# Patient Record
Sex: MALE | Race: WHITE | NOT HISPANIC OR LATINO | Employment: OTHER | URBAN - METROPOLITAN AREA
[De-identification: names, ages, dates, MRNs, and addresses within clinical notes are randomized per-mention and may not be internally consistent; named-entity substitution may affect disease eponyms.]

---

## 2017-01-05 ENCOUNTER — OFFICE VISIT (OUTPATIENT)
Dept: ENDOCRINOLOGY | Age: 62
End: 2017-01-05

## 2017-01-05 VITALS
OXYGEN SATURATION: 97 % | HEIGHT: 73 IN | HEART RATE: 70 BPM | SYSTOLIC BLOOD PRESSURE: 128 MMHG | DIASTOLIC BLOOD PRESSURE: 64 MMHG | WEIGHT: 213.2 LBS | BODY MASS INDEX: 28.26 KG/M2

## 2017-01-05 DIAGNOSIS — E11.9 TYPE 2 DIABETES MELLITUS WITHOUT COMPLICATION, WITH LONG-TERM CURRENT USE OF INSULIN (HCC): Primary | ICD-10-CM

## 2017-01-05 DIAGNOSIS — Z79.4 TYPE 2 DIABETES MELLITUS WITHOUT COMPLICATION, WITH LONG-TERM CURRENT USE OF INSULIN (HCC): Primary | ICD-10-CM

## 2017-01-05 DIAGNOSIS — E78.5 HYPERLIPIDEMIA, UNSPECIFIED HYPERLIPIDEMIA TYPE: ICD-10-CM

## 2017-01-05 PROCEDURE — 99214 OFFICE O/P EST MOD 30 MIN: CPT | Performed by: INTERNAL MEDICINE

## 2017-01-05 RX ORDER — INSULIN GLARGINE 100 [IU]/ML
32 INJECTION, SOLUTION SUBCUTANEOUS NIGHTLY
Qty: 30 ML | Refills: 0 | Status: SHIPPED | OUTPATIENT
Start: 2017-01-05 | End: 2017-05-17 | Stop reason: SDUPTHER

## 2017-01-05 NOTE — PROGRESS NOTES
Subjective   Jeff Bernard is a 61 y.o. male.     HPI Comments: DM2. Test B/S 4x a day. Last DM eye exam 12/2016. Last Dm Foot exam today with Dr. Grier.    Diabetes   He has type 2 diabetes mellitus. No MedicAlert identification noted. The initial diagnosis of diabetes was made 17 years ago. Pertinent negatives for hypoglycemia include no hunger, mood changes, sleepiness or sweats. Pertinent negatives for diabetes include no blurred vision, no foot paresthesias, no foot ulcerations, no visual change and no weight loss. Pertinent negatives for hypoglycemia complications include no blackouts, no hospitalization, no nocturnal hypoglycemia, no required assistance and no required glucagon injection. Symptoms are worsening. Pertinent negatives for diabetic complications include no CVA, heart disease, nephropathy, peripheral neuropathy, PVD or retinopathy. There are no known risk factors for coronary artery disease. Current diabetic treatment includes diet and insulin injections. He is compliant with treatment all of the time. He is currently taking insulin pre-breakfast, pre-lunch, pre-dinner and at bedtime. Insulin injections are given by patient. Rotation sites for injection include the abdominal wall. His weight is fluctuating minimally. He is following a generally healthy, high fiber and low salt diet. Meal planning includes carbohydrate counting. He has not had a previous visit with a dietitian. He participates in exercise weekly. He monitors blood glucose at home 3-4 x per day. He monitors urine at home <1 x per month. Blood glucose monitoring compliance is good. His home blood glucose trend is fluctuating minimally. His breakfast blood glucose is taken between 8-9 am. His breakfast blood glucose range is generally 70-90 mg/dl. His lunch blood glucose is taken between 1-2 pm. His lunch blood glucose range is generally  mg/dl. His dinner blood glucose is taken between 5-6 pm. His dinner blood glucose range is  generally 130-140 mg/dl. His highest blood glucose is 180-200 mg/dl. His overall blood glucose range is 140-180 mg/dl. He does not see a podiatrist.Eye exam is current.      Patient has known diabetes mellitus since 1999 and started on insulin in 2000. He is presently on Lantus 30 units every evening and NovoLog 1 unit per 6 g of carbohydrate before each meal.  He checks his blood sugar 4 times a day. Fasting blood sugar runs 102-250. Lunchtime blood sugar runs . Suppertime blood sugar runs between . Bedtime blood sugar runs between . He denies any severe hypoglycemic episode. He has gained 8 pounds since Jan 2015. His last meal was at 11 AM.     His last eye examination was in December 2016 and he has no retinopathy. He denies any associated nephropathy. Urine microalbumin was normal in September 2015. He is on benazepril. He denies any numbness, tingling or burning in his feet.     He has hyperlipidemia and has been on Lipitor 20 mg once a day. He denies any muscle pains.     He has no history of hypertension. He denies any previous history of myocardial infarction, heart failure, or stroke.  He denies chest pain, shortness of breath or pedal edema.    The following portions of the patient's history were reviewed and updated as appropriate: allergies, current medications, past family history, past medical history, past social history, past surgical history and problem list.    Review of Systems   Constitutional: Negative.  Negative for weight loss.   HENT: Negative.    Eyes: Negative.  Negative for blurred vision.   Respiratory: Negative.    Cardiovascular: Negative.    Gastrointestinal: Negative.    Endocrine: Negative.    Genitourinary: Negative.    Musculoskeletal: Negative.    Skin: Negative.    Allergic/Immunologic: Negative.    Neurological: Negative.    Hematological: Negative.    Psychiatric/Behavioral: Negative.        Objective      Vitals:    01/05/17 1403   BP: 128/64   BP Location:  "Left arm   Patient Position: Sitting   Cuff Size: Large Adult   Pulse: 70   SpO2: 97%   Weight: 213 lb 3.2 oz (96.7 kg)   Height: 73\" (185.4 cm)     Physical Exam   Constitutional: He is oriented to person, place, and time. He appears well-developed and well-nourished. No distress.   HENT:   Head: Normocephalic.   Nose: Nose normal.   Mouth/Throat: No oropharyngeal exudate.   Eyes: Conjunctivae and EOM are normal. Right eye exhibits no discharge. Left eye exhibits no discharge. No scleral icterus.   Neck: Neck supple. No JVD present. No tracheal deviation present. No thyromegaly present.   Cardiovascular: Normal rate, regular rhythm, normal heart sounds and intact distal pulses.  Exam reveals no friction rub.    No murmur heard.  Pulmonary/Chest: Effort normal and breath sounds normal. No respiratory distress. He has no wheezes. He has no rales.   Abdominal: Soft. Bowel sounds are normal. He exhibits no distension and no mass. There is no tenderness.   Musculoskeletal: He exhibits no edema, tenderness or deformity.   Lymphadenopathy:     He has no cervical adenopathy.   Neurological: He is alert and oriented to person, place, and time. He displays normal reflexes. No cranial nerve deficit.   Skin: Skin is warm and dry. No rash noted. No erythema.   Psychiatric: He has a normal mood and affect. His behavior is normal.     Admission on 08/07/2016, Discharged on 08/07/2016   Component Date Value Ref Range Status   • Rapid Strep A Screen 08/07/2016 Negative  Negative, VALID, INVALID, Not Performed Final     Assessment/Plan   Jeff was seen today for diabetes.    Diagnoses and all orders for this visit:    Type 2 diabetes mellitus without complication, with long-term current use of insulin  -     Comprehensive Metabolic Panel  -     Lipid Panel  -     Hemoglobin A1c  -     Microalbumin / Creatinine Urine Ratio  -     T4, Free  -     TSH  -     Glutamic Acid Decarboxylase  -     C-Peptide  -     insulin glargine (LANTUS) " 100 UNIT/ML injection; Inject 32 Units under the skin Every Night.    Hyperlipidemia, unspecified hyperlipidemia type  -     Comprehensive Metabolic Panel  -     Lipid Panel  -     Hemoglobin A1c  -     Microalbumin / Creatinine Urine Ratio  -     T4, Free  -     TSH  -     Glutamic Acid Decarboxylase  -     C-Peptide      Increase Lantus to 32 units every evening.  Continue NovoLog 1 unit per 6 g of carbohydrate.  Check hemoglobin A1c, C-peptide, RALF antibody and urine microalbumin.  Continue Lipitor 20 mg once a day.  Check lipid profile and thyroid function tests.  Advised to make appointment with Dr. Kelly for follow-up colonoscopy    Send copy of my notes and labs to Esperanza MISHRA    RTC 6 mos.

## 2017-01-05 NOTE — MR AVS SNAPSHOT
Jeff Bernard   1/5/2017 2:00 PM   Office Visit    Dept Phone:  [phone]   Encounter #:  [id number]    Provider:  Salinas Juarez MD   Department:  [facility] ENDOCRINOLOGY                Your Full Care Plan              Today's Medication Changes          These changes are accurate as of: 1/5/17  2:50 PM.  If you have any questions, ask your nurse or doctor.               Medication(s)that have changed:     B-D ULTRAFINE III SHORT PEN 31G X 8 MM misc   Generic drug:  Insulin Pen Needle   USE 3 NEEDLES EVERYDAY AS DIRECTED   What changed:  Another medication with the same name was removed. Continue taking this medication, and follow the directions you see here.   Changed by:  Salinas Juarez MD       insulin glargine 100 UNIT/ML injection   Commonly known as:  LANTUS   Inject 32 Units under the skin Every Night.   What changed:  how much to take   Changed by:  Salinas Juarez MD         Stop taking medication(s)listed here:     cefdinir 300 MG capsule   Commonly known as:  OMNICEF   Stopped by:  Salinas Juarez MD           cetirizine 10 MG tablet   Commonly known as:  zyrTEC   Stopped by:  Salinas Juarez MD           guaifenesin-dextromethorphan  MG tablet sustained-release 12 hour tablet   Stopped by:  Salinas Juarez MD           SINUS RINSE KIT pack   Stopped by:  Salinas Juarez MD                Where to Get Your Medications      These medications were sent to [facility]/pharmacy #[id number] [facility], [place] - [address] AT [facility] [phone] [facility] [phone]   [address], [facility] [address]     Phone:  [phone]     insulin glargine 100 UNIT/ML injection                  Your Updated Medication List          This list is accurate as of: 1/5/17  2:50 PM.  Always use your most recent med list.                aspirin 81 MG tablet       atorvastatin 20 MG tablet   Commonly known as:  LIPITOR   Take 1  "tablet by mouth Daily.       B-D ULTRAFINE III SHORT PEN 31G X 8 MM misc   Generic drug:  Insulin Pen Needle   USE 3 NEEDLES EVERYDAY AS DIRECTED       benazepril 10 MG tablet   Commonly known as:  LOTENSIN   Take 1 tablet by mouth daily.       * insulin aspart 100 UNIT/ML solution pen-injector sc pen   Commonly known as:  novoLOG FLEXPEN   Inject 8-13 units at breakfast, 9-15 at lunch and 10-15 at supper 1 UNIT PER 6 CARBS AVERAGE 45 UNITS DAILY       * NOVOLOG PENFILL 100 UNIT/ML solution cartridge   Generic drug:  Insulin Aspart       insulin glargine 100 UNIT/ML injection   Commonly known as:  LANTUS   Inject 32 Units under the skin Every Night.       Insulin Syringe 31G X 5/16\" 0.5 ML misc   USING  1 SYRING DAILY       ONETOUCH VERIO test strip   Generic drug:  glucose blood   TESTING BS 4 X DAY   DX CODE E11.9       * Notice:  This list has 2 medication(s) that are the same as other medications prescribed for you. Read the directions carefully, and ask your doctor or other care provider to review them with you.            We Performed the Following     C-Peptide     Comprehensive Metabolic Panel     Glutamic Acid Decarboxylase     Hemoglobin A1c     Lipid Panel     Microalbumin / Creatinine Urine Ratio     T4, Free     TSH       You Were Diagnosed With        Codes Comments    Type 2 diabetes mellitus without complication, with long-term current use of insulin    -  Primary ICD-10-CM: E11.9, Z79.4  ICD-9-CM: 250.00, V58.67     Hyperlipidemia, unspecified hyperlipidemia type     ICD-10-CM: E78.5  ICD-9-CM: 272.4       Instructions     None    Patient Instructions History      Upcoming Appointments     Visit Type Date Time Department    OFFICE VISIT 1/5/2017  2:00 PM [facility]    PHYSICAL 1/18/2017  9:00 AM [facility]    OFFICE VISIT 7/12/2017  9:00 AM [facility]      MyChart Signup     Our records indicate that you have an active [employer] account.    You can view your " "After Visit Summary by going to [employer] and logging in with your [employer] username and password.  If you don't have a [employer] username and password but a parent or guardian has access to your record, the parent or guardian should login with their own [employer] username and password and access your record to view the After Visit Summary.    If you have questions, you can email [email or web address] or call [phone] to talk to our [employer] staff.  Remember, [employer] is NOT to be used for urgent needs.  For medical emergencies, dial 911.               Other Info from Your Visit           Your Appointments     Jan 18, 2017  9:00 AM EST   Physical with TAD Mccartney   [facility] FAMILY MEDICINE (--)    [address] [facility] [address] [phone]           Arrive 15 minutes prior to appointment.            Jul 12, 2017  9:00 AM EDT   Office Visit with Salinas Juarez MD   [facility] ENDOCRINOLOGY (--)    [address] [place] [id number] [phone]           Arrive 15 minutes prior to appointment.              Allergies     No Known Allergies      Reason for Visit     Diabetes           Vital Signs     Blood Pressure Pulse Height Weight Oxygen Saturation Body Mass Index    128/64 (BP Location: Left arm, Patient Position: Sitting, Cuff Size: Large Adult) 70 73\" (185.4 cm) 213 lb 3.2 oz (96.7 kg) 97% 28.13 kg/m2    Smoking Status                   Never Smoker           Problems and Diagnoses Noted     High cholesterol or triglycerides    Type 2 diabetes mellitus without complication        "

## 2017-01-05 NOTE — LETTER
January 5, 2017     TAD Mccartney  [address] [facility] [address]    Patient: Jeff Bernard   YOB: 1955   Date of Visit: 1/5/2017       Dear TAD Colindres:    Thank you for referring Jeff Bernard to me for evaluation. Below are the relevant portions of my assessment and plan of care.    If you have questions, please do not hesitate to call me. I look forward to following Jeff along with you.         Sincerely,        Salinas Juarez MD        CC: No Recipients  Salinas Juarez MD  1/5/2017  2:46 PM  Signed  Subjective   Jeff Bernard is a 61 y.o. male.     HPI Comments: DM2. Test B/S 4x a day. Last DM eye exam 12/2016. Last Dm Foot exam today with Dr. Grier.    Diabetes   He has type 2 diabetes mellitus. No MedicAlert identification noted. The initial diagnosis of diabetes was made 17 years ago. Pertinent negatives for hypoglycemia include no hunger, mood changes, sleepiness or sweats. Pertinent negatives for diabetes include no blurred vision, no foot paresthesias, no foot ulcerations, no visual change and no weight loss. Pertinent negatives for hypoglycemia complications include no blackouts, no hospitalization, no nocturnal hypoglycemia, no required assistance and no required glucagon injection. Symptoms are worsening. Pertinent negatives for diabetic complications include no CVA, heart disease, nephropathy, peripheral neuropathy, PVD or retinopathy. There are no known risk factors for coronary artery disease. Current diabetic treatment includes diet and insulin injections. He is compliant with treatment all of the time. He is currently taking insulin pre-breakfast, pre-lunch, pre-dinner and at bedtime. Insulin injections are given by patient. Rotation sites for injection include the abdominal wall. His weight is fluctuating minimally. He is following a generally healthy, high fiber and low salt diet. Meal planning includes carbohydrate counting. He has not had a previous  visit with a dietitian. He participates in exercise weekly. He monitors blood glucose at home 3-4 x per day. He monitors urine at home <1 x per month. Blood glucose monitoring compliance is good. His home blood glucose trend is fluctuating minimally. His breakfast blood glucose is taken between 8-9 am. His breakfast blood glucose range is generally 70-90 mg/dl. His lunch blood glucose is taken between 1-2 pm. His lunch blood glucose range is generally  mg/dl. His dinner blood glucose is taken between 5-6 pm. His dinner blood glucose range is generally 130-140 mg/dl. His highest blood glucose is 180-200 mg/dl. His overall blood glucose range is 140-180 mg/dl. He does not see a podiatrist.Eye exam is current.      Patient has known diabetes mellitus since 1999 and started on insulin in 2000. He is presently on Lantus 30 units every evening and NovoLog 1 unit per 6 g of carbohydrate before each meal.  He checks his blood sugar 4 times a day. Fasting blood sugar runs 102-250. Lunchtime blood sugar runs . Suppertime blood sugar runs between . Bedtime blood sugar runs between . He denies any severe hypoglycemic episode. He has gained 8 pounds since Jan 2015. His last meal was at 11 AM.     His last eye examination was in December 2016 and he has no retinopathy. He denies any associated nephropathy. Urine microalbumin was normal in September 2015. He is on benazepril. He denies any numbness, tingling or burning in his feet.     He has hyperlipidemia and has been on Lipitor 20 mg once a day. He denies any muscle pains.     He has no history of hypertension. He denies any previous history of myocardial infarction, heart failure, or stroke.  He denies chest pain, shortness of breath or pedal edema.    The following portions of the patient's history were reviewed and updated as appropriate: allergies, current medications, past family history, past medical history, past social history, past surgical  "history and problem list.    Review of Systems   Constitutional: Negative.  Negative for weight loss.   HENT: Negative.    Eyes: Negative.  Negative for blurred vision.   Respiratory: Negative.    Cardiovascular: Negative.    Gastrointestinal: Negative.    Endocrine: Negative.    Genitourinary: Negative.    Musculoskeletal: Negative.    Skin: Negative.    Allergic/Immunologic: Negative.    Neurological: Negative.    Hematological: Negative.    Psychiatric/Behavioral: Negative.        Objective      Vitals:    01/05/17 1403   BP: 128/64   BP Location: Left arm   Patient Position: Sitting   Cuff Size: Large Adult   Pulse: 70   SpO2: 97%   Weight: 213 lb 3.2 oz (96.7 kg)   Height: 73\" (185.4 cm)     Physical Exam   Constitutional: He is oriented to person, place, and time. He appears well-developed and well-nourished. No distress.   HENT:   Head: Normocephalic.   Nose: Nose normal.   Mouth/Throat: No oropharyngeal exudate.   Eyes: Conjunctivae and EOM are normal. Right eye exhibits no discharge. Left eye exhibits no discharge. No scleral icterus.   Neck: Neck supple. No JVD present. No tracheal deviation present. No thyromegaly present.   Cardiovascular: Normal rate, regular rhythm, normal heart sounds and intact distal pulses.  Exam reveals no friction rub.    No murmur heard.  Pulmonary/Chest: Effort normal and breath sounds normal. No respiratory distress. He has no wheezes. He has no rales.   Abdominal: Soft. Bowel sounds are normal. He exhibits no distension and no mass. There is no tenderness.   Musculoskeletal: He exhibits no edema, tenderness or deformity.   Lymphadenopathy:     He has no cervical adenopathy.   Neurological: He is alert and oriented to person, place, and time. He displays normal reflexes. No cranial nerve deficit.   Skin: Skin is warm and dry. No rash noted. No erythema.   Psychiatric: He has a normal mood and affect. His behavior is normal.     Admission on 08/07/2016, Discharged on 08/07/2016 "   Component Date Value Ref Range Status   • Rapid Strep A Screen 08/07/2016 Negative  Negative, VALID, INVALID, Not Performed Final     Assessment/Plan   Jeff was seen today for diabetes.    Diagnoses and all orders for this visit:    Type 2 diabetes mellitus without complication, with long-term current use of insulin  -     Comprehensive Metabolic Panel  -     Lipid Panel  -     Hemoglobin A1c  -     Microalbumin / Creatinine Urine Ratio  -     T4, Free  -     TSH  -     Glutamic Acid Decarboxylase  -     C-Peptide  -     insulin glargine (LANTUS) 100 UNIT/ML injection; Inject 32 Units under the skin Every Night.    Hyperlipidemia, unspecified hyperlipidemia type  -     Comprehensive Metabolic Panel  -     Lipid Panel  -     Hemoglobin A1c  -     Microalbumin / Creatinine Urine Ratio  -     T4, Free  -     TSH  -     Glutamic Acid Decarboxylase  -     C-Peptide      Increase Lantus to 32 units every evening.  Continue NovoLog 1 unit per 6 g of carbohydrate.  Check hemoglobin A1c, C-peptide, RALF antibody and urine microalbumin.  Continue Lipitor 20 mg once a day.  Check lipid profile and thyroid function tests.  Advised to make appointment with Dr. Kelly for follow-up colonoscopy    Send copy of my notes and labs to Esperanza MISHRA    RTC 6 mos.

## 2017-01-09 LAB
ALBUMIN SERPL-MCNC: 4.5 G/DL (ref 3.5–5.2)
ALBUMIN/CREAT UR: 4.2 MG/G CREAT (ref 0–30)
ALBUMIN/GLOB SERPL: 2 G/DL
ALP SERPL-CCNC: 68 U/L (ref 39–117)
ALT SERPL-CCNC: 21 U/L (ref 1–41)
AST SERPL-CCNC: 21 U/L (ref 1–40)
BILIRUB SERPL-MCNC: 0.4 MG/DL (ref 0.1–1.2)
BUN SERPL-MCNC: 14 MG/DL (ref 8–23)
BUN/CREAT SERPL: 16.3 (ref 7–25)
C PEPTIDE SERPL-MCNC: 0.2 NG/ML (ref 1.1–4.4)
CALCIUM SERPL-MCNC: 9 MG/DL (ref 8.6–10.5)
CHLORIDE SERPL-SCNC: 104 MMOL/L (ref 98–107)
CHOLEST SERPL-MCNC: 141 MG/DL (ref 0–200)
CO2 SERPL-SCNC: 25.1 MMOL/L (ref 22–29)
CREAT SERPL-MCNC: 0.86 MG/DL (ref 0.76–1.27)
CREAT UR-MCNC: 171.5 MG/DL
GAD65 AB SER IA-ACNC: <5 U/ML (ref 0–5)
GLOBULIN SER CALC-MCNC: 2.3 GM/DL
GLUCOSE SERPL-MCNC: 121 MG/DL (ref 65–99)
HBA1C MFR BLD: 7.94 % (ref 4.8–5.6)
HDLC SERPL-MCNC: 55 MG/DL (ref 40–60)
LDLC SERPL CALC-MCNC: 75 MG/DL (ref 0–100)
MICROALBUMIN UR-MCNC: 7.2 UG/ML
POTASSIUM SERPL-SCNC: 4.3 MMOL/L (ref 3.5–5.2)
PROT SERPL-MCNC: 6.8 G/DL (ref 6–8.5)
SODIUM SERPL-SCNC: 143 MMOL/L (ref 136–145)
T4 FREE SERPL-MCNC: 0.99 NG/DL (ref 0.93–1.7)
TRIGL SERPL-MCNC: 53 MG/DL (ref 0–150)
TSH SERPL DL<=0.005 MIU/L-ACNC: 1.3 MIU/ML (ref 0.27–4.2)
VLDLC SERPL CALC-MCNC: 10.6 MG/DL (ref 5–40)

## 2017-01-09 RX ORDER — PEN NEEDLE, DIABETIC 31 GX5/16"
NEEDLE, DISPOSABLE MISCELLANEOUS
Qty: 100 EACH | Refills: 5 | Status: SHIPPED | OUTPATIENT
Start: 2017-01-09 | End: 2017-07-12 | Stop reason: SDUPTHER

## 2017-01-23 RX ORDER — ATORVASTATIN CALCIUM 20 MG/1
TABLET, FILM COATED ORAL
Qty: 90 TABLET | Refills: 1 | Status: SHIPPED | OUTPATIENT
Start: 2017-01-23 | End: 2017-10-25 | Stop reason: SDUPTHER

## 2017-02-01 ENCOUNTER — OFFICE VISIT (OUTPATIENT)
Dept: FAMILY MEDICINE CLINIC | Facility: CLINIC | Age: 62
End: 2017-02-01

## 2017-02-01 VITALS
TEMPERATURE: 98.2 F | BODY MASS INDEX: 27.94 KG/M2 | DIASTOLIC BLOOD PRESSURE: 64 MMHG | HEIGHT: 73 IN | SYSTOLIC BLOOD PRESSURE: 102 MMHG | OXYGEN SATURATION: 99 % | HEART RATE: 66 BPM | WEIGHT: 210.8 LBS

## 2017-02-01 DIAGNOSIS — E78.5 HYPERLIPIDEMIA, UNSPECIFIED HYPERLIPIDEMIA TYPE: ICD-10-CM

## 2017-02-01 DIAGNOSIS — Z79.4 TYPE 2 DIABETES MELLITUS WITHOUT COMPLICATION, WITH LONG-TERM CURRENT USE OF INSULIN (HCC): ICD-10-CM

## 2017-02-01 DIAGNOSIS — I10 BENIGN ESSENTIAL HYPERTENSION: ICD-10-CM

## 2017-02-01 DIAGNOSIS — K63.5 BENIGN COLONIC POLYP: ICD-10-CM

## 2017-02-01 DIAGNOSIS — E11.9 TYPE 2 DIABETES MELLITUS WITHOUT COMPLICATION, WITH LONG-TERM CURRENT USE OF INSULIN (HCC): ICD-10-CM

## 2017-02-01 DIAGNOSIS — E55.9 VITAMIN D DEFICIENCY: ICD-10-CM

## 2017-02-01 DIAGNOSIS — Z00.00 ROUTINE ADULT HEALTH MAINTENANCE: Primary | ICD-10-CM

## 2017-02-01 LAB
25(OH)D3+25(OH)D2 SERPL-MCNC: 18.2 NG/ML (ref 30–100)
BILIRUB BLD-MCNC: NEGATIVE MG/DL
CK SERPL-CCNC: 247 U/L (ref 20–200)
CLARITY, POC: CLEAR
COLOR UR: YELLOW
DEVELOPER EXPIRATION DATE: NORMAL
DEVELOPER LOT NUMBER: NORMAL
EXPIRATION DATE: NORMAL
FECAL OCCULT BLOOD SCREEN, POC: NEGATIVE
GLUCOSE UR STRIP-MCNC: NEGATIVE MG/DL
KETONES UR QL: NEGATIVE
LEUKOCYTE EST, POC: NEGATIVE
Lab: NORMAL
NEGATIVE CONTROL: NEGATIVE
NITRITE UR-MCNC: NEGATIVE MG/ML
PH UR: 5.5 [PH] (ref 5–8)
POSITIVE CONTROL: POSITIVE
PROT UR STRIP-MCNC: NEGATIVE MG/DL
PSA SERPL-MCNC: 2.13 NG/ML (ref 0–4)
RBC # UR STRIP: NEGATIVE /UL
SP GR UR: 1.02 (ref 1–1.03)
UROBILINOGEN UR QL: NORMAL

## 2017-02-01 PROCEDURE — 93000 ELECTROCARDIOGRAM COMPLETE: CPT | Performed by: PHYSICIAN ASSISTANT

## 2017-02-01 PROCEDURE — 81003 URINALYSIS AUTO W/O SCOPE: CPT | Performed by: PHYSICIAN ASSISTANT

## 2017-02-01 PROCEDURE — 82270 OCCULT BLOOD FECES: CPT | Performed by: PHYSICIAN ASSISTANT

## 2017-02-01 PROCEDURE — 99213 OFFICE O/P EST LOW 20 MIN: CPT | Performed by: PHYSICIAN ASSISTANT

## 2017-02-01 NOTE — PATIENT INSTRUCTIONS
61 YEAR OLD MALE WHO PRESENTS TODAY FOR CPE. CPE COMPLETED TODAY. EKG WITHOUT CHANGE. I WILL CHECK LABS TODAY. CALL IF NO RESULTS IN 1 WEEK. PATIENT TO CALL INSURANCE REGARDING COVERAGE OF ZOSTAVAX AND TO FIND OUT WHICH PNEUMONIA VACCINE WAS GIVEN AND WHEN WAS GIVEN. PATIENT WILL FOLLOW UP WITH DR MUKHERJEE FOR RECHECK COLONOSCOPY. HAS PAPERWORK. FOLLOW UP IN 6 MONTHS OR SOONER IF NEEDED.

## 2017-02-01 NOTE — PROGRESS NOTES
Subjective   Jeff Bernard is a 61 y.o. male here today for physical exam    History of Present Illness     LAST COLONOSCOPY- 2/2014- POLYPECTOMY- DR MUKHERJEE- RECHECK IN 3-5 YEARS.   LAST TD- 11/13/13  FLU SHOT- 10/5/16  Pneumonia vaccine- UNSURE WHEN- HAD 1.   ZOSTAVAX- NOT HAD.     The following portions of the patient's history were reviewed and updated as appropriate: allergies, current medications, past family history, past medical history, past social history, past surgical history and problem list.    Review of Systems   All other systems reviewed and are negative.      Objective   Physical Exam   Constitutional: He is oriented to person, place, and time. He appears well-developed and well-nourished. No distress.   HENT:   Head: Normocephalic and atraumatic.   Right Ear: Hearing, tympanic membrane, external ear and ear canal normal.   Left Ear: Hearing, tympanic membrane, external ear and ear canal normal.   Nose: Nose normal.   Mouth/Throat: Oropharynx is clear and moist.   Eyes: Conjunctivae, EOM and lids are normal. Pupils are equal, round, and reactive to light.   Neck: Neck supple. No JVD present. Carotid bruit is not present. No tracheal deviation present. No thyroid mass and no thyromegaly present.   Cardiovascular: Normal rate, regular rhythm, normal heart sounds and intact distal pulses.  Exam reveals no gallop and no friction rub.    No murmur heard.  Pulses:       Radial pulses are 2+ on the right side, and 2+ on the left side.        Posterior tibial pulses are 2+ on the right side, and 2+ on the left side.   Pulmonary/Chest: Effort normal and breath sounds normal. No respiratory distress. He has no wheezes. He has no rhonchi. He has no rales.   Abdominal: Soft. Normal aorta and bowel sounds are normal. He exhibits no distension and no abdominal bruit. There is no hepatosplenomegaly. There is no tenderness. There is no rigidity, no rebound and no guarding. No hernia.   Musculoskeletal: Normal  range of motion. He exhibits no edema, tenderness or deformity.   Normal strength.   Lymphadenopathy:     He has no cervical adenopathy.   Neurological: He is alert and oriented to person, place, and time. He has normal strength and normal reflexes. He displays normal reflexes. No cranial nerve deficit or sensory deficit. He exhibits normal muscle tone. Coordination and gait normal.   Skin: Skin is warm and dry. No rash noted. He is not diaphoretic. No erythema.   Psychiatric: He has a normal mood and affect. His speech is normal and behavior is normal. Judgment and thought content normal. Cognition and memory are normal.     ECG 12 Lead  Date/Time: 2/1/2017 9:42 AM  Performed by: HAIDER PRICE  Authorized by: HAIDER PRICE   Comparison: compared with previous ECG from 11/19/2014  Similar to previous ECG  Rhythm: sinus rhythm  Rate: normal  Conduction: non-specific intraventricular conduction delay  ST Depression: III  T wave elevation noted on lead: PEAKED T.  QRS axis: normal  Other findings comments: INCREASED VOLTAGE- SUSPECT LVH. REVIEWED ECHO FROM 11/2013- NORMAL LEFT VENTRICLE  Clinical impression: non-specific ECG  Comments: INDICATION : CPE          Assessment/Plan   Jeff was seen today for annual exam.    Diagnoses and all orders for this visit:    Routine adult health maintenance  -     ECG 12 Lead  -     CK  -     Vitamin D 25 Hydroxy  -     PSA  -     POC Urinalysis Dipstick, Automated  -     POC Occult Blood Stool    Vitamin D deficiency  -     CK  -     Vitamin D 25 Hydroxy  -     PSA    Type 2 diabetes mellitus without complication, with long-term current use of insulin  -     CK  -     Vitamin D 25 Hydroxy  -     PSA  -     POC Urinalysis Dipstick, Automated    Hyperlipidemia, unspecified hyperlipidemia type  -     CK  -     Vitamin D 25 Hydroxy  -     PSA    Benign essential hypertension  -     CK  -     Vitamin D 25 Hydroxy  -     PSA    Benign colonic polyp  -     CK  -     Vitamin D 25  Hydroxy  -     PSA      Patient Instructions   61 YEAR OLD MALE WHO PRESENTS TODAY FOR CPE. CPE COMPLETED TODAY. EKG WITHOUT CHANGE. I WILL CHECK LABS TODAY. CALL IF NO RESULTS IN 1 WEEK. PATIENT TO CALL INSURANCE REGARDING COVERAGE OF ZOSTAVAX AND TO FIND OUT WHICH PNEUMONIA VACCINE WAS GIVEN AND WHEN WAS GIVEN. PATIENT WILL FOLLOW UP WITH DR MUKHERJEE FOR RECHECK COLONOSCOPY. HAS PAPERWORK. FOLLOW UP IN 6 MONTHS OR SOONER IF NEEDED.

## 2017-02-13 RX ORDER — BLOOD SUGAR DIAGNOSTIC
STRIP MISCELLANEOUS
Qty: 400 EACH | Refills: 1 | Status: SHIPPED | OUTPATIENT
Start: 2017-02-13 | End: 2017-07-12 | Stop reason: CLARIF

## 2017-02-24 ENCOUNTER — TRANSCRIBE ORDERS (OUTPATIENT)
Dept: GASTROENTEROLOGY | Facility: CLINIC | Age: 62
End: 2017-02-24

## 2017-02-24 DIAGNOSIS — Z86.010 HX OF COLONIC POLYPS: Primary | ICD-10-CM

## 2017-03-13 RX ORDER — SYRINGE-NEEDLE,INSULIN,0.5 ML 31 GX5/16"
SYRINGE, EMPTY DISPOSABLE MISCELLANEOUS
Qty: 100 EACH | Refills: 1 | Status: SHIPPED | OUTPATIENT
Start: 2017-03-13 | End: 2017-07-12 | Stop reason: SDUPTHER

## 2017-03-20 ENCOUNTER — HOSPITAL ENCOUNTER (OUTPATIENT)
Facility: HOSPITAL | Age: 62
Setting detail: HOSPITAL OUTPATIENT SURGERY
Discharge: HOME | End: 2017-03-20
Attending: INTERNAL MEDICINE | Admitting: INTERNAL MEDICINE

## 2017-03-20 ENCOUNTER — ANESTHESIA EVENT (OUTPATIENT)
Dept: GASTROENTEROLOGY | Facility: HOSPITAL | Age: 62
End: 2017-03-20

## 2017-03-20 ENCOUNTER — ANESTHESIA (OUTPATIENT)
Dept: GASTROENTEROLOGY | Facility: HOSPITAL | Age: 62
End: 2017-03-20

## 2017-03-20 VITALS
OXYGEN SATURATION: 96 % | SYSTOLIC BLOOD PRESSURE: 142 MMHG | RESPIRATION RATE: 16 BRPM | HEART RATE: 69 BPM | HEIGHT: 73 IN | BODY MASS INDEX: 27.77 KG/M2 | DIASTOLIC BLOOD PRESSURE: 74 MMHG | WEIGHT: 209.5 LBS | TEMPERATURE: 98 F

## 2017-03-20 DIAGNOSIS — Z86.010 HX OF COLONIC POLYPS: ICD-10-CM

## 2017-03-20 PROBLEM — Z86.0101 HISTORY OF ADENOMATOUS POLYP OF COLON: Status: ACTIVE | Noted: 2017-03-20

## 2017-03-20 LAB — GLUCOSE BLDC GLUCOMTR-MCNC: 130 MG/DL (ref 70–130)

## 2017-03-20 PROCEDURE — 82962 GLUCOSE BLOOD TEST: CPT

## 2017-03-20 PROCEDURE — 25010000002 PROPOFOL 10 MG/ML EMULSION: Performed by: ANESTHESIOLOGY

## 2017-03-20 PROCEDURE — 88305 TISSUE EXAM BY PATHOLOGIST: CPT | Performed by: INTERNAL MEDICINE

## 2017-03-20 PROCEDURE — 45385 COLONOSCOPY W/LESION REMOVAL: CPT | Performed by: INTERNAL MEDICINE

## 2017-03-20 RX ORDER — PROPOFOL 10 MG/ML
VIAL (ML) INTRAVENOUS AS NEEDED
Status: DISCONTINUED | OUTPATIENT
Start: 2017-03-20 | End: 2017-03-20 | Stop reason: SURG

## 2017-03-20 RX ORDER — SODIUM CHLORIDE, SODIUM LACTATE, POTASSIUM CHLORIDE, CALCIUM CHLORIDE 600; 310; 30; 20 MG/100ML; MG/100ML; MG/100ML; MG/100ML
30 INJECTION, SOLUTION INTRAVENOUS CONTINUOUS PRN
Status: DISCONTINUED | OUTPATIENT
Start: 2017-03-20 | End: 2017-03-20 | Stop reason: HOSPADM

## 2017-03-20 RX ORDER — LIDOCAINE HYDROCHLORIDE 20 MG/ML
INJECTION, SOLUTION INFILTRATION; PERINEURAL AS NEEDED
Status: DISCONTINUED | OUTPATIENT
Start: 2017-03-20 | End: 2017-03-20 | Stop reason: SURG

## 2017-03-20 RX ORDER — PROPOFOL 10 MG/ML
VIAL (ML) INTRAVENOUS CONTINUOUS PRN
Status: DISCONTINUED | OUTPATIENT
Start: 2017-03-20 | End: 2017-03-20 | Stop reason: SURG

## 2017-03-20 RX ADMIN — SODIUM CHLORIDE, POTASSIUM CHLORIDE, SODIUM LACTATE AND CALCIUM CHLORIDE 30 ML/HR: 600; 310; 30; 20 INJECTION, SOLUTION INTRAVENOUS at 11:55

## 2017-03-20 RX ADMIN — LIDOCAINE HYDROCHLORIDE 50 MG: 20 INJECTION, SOLUTION INFILTRATION; PERINEURAL at 13:05

## 2017-03-20 RX ADMIN — PROPOFOL 150 MG: 10 INJECTION, EMULSION INTRAVENOUS at 13:05

## 2017-03-20 RX ADMIN — PROPOFOL 140 MCG/KG/MIN: 10 INJECTION, EMULSION INTRAVENOUS at 13:05

## 2017-03-20 NOTE — ANESTHESIA PREPROCEDURE EVALUATION
Anesthesia Evaluation     Patient summary reviewed      Airway   Mallampati: II  TM distance: >3 FB  Neck ROM: full  no difficulty expected  Dental - normal exam     Pulmonary     breath sounds clear to auscultation  Cardiovascular   Exercise tolerance: good (4-7 METS)    Rhythm: regular  Rate: normal        Neuro/Psych  GI/Hepatic/Renal/Endo      Musculoskeletal     Abdominal    Substance History      OB/GYN          Other                                    Anesthesia Plan    ASA 3     MAC     intravenous induction   Anesthetic plan and risks discussed with patient.

## 2017-03-20 NOTE — BRIEF OP NOTE
COLONOSCOPY  Procedure Note    Jeff Bernard  3/20/2017    Pre-op Diagnosis:   Hx of colonic polyps [Z86.010]    Post-op Diagnosis:     Post-Op Diagnosis Codes:     * Colon polyp [K63.5]     * Internal hemorrhoids [K64.8]    Procedure/CPT® Codes:      Procedure(s):  COLONOSCOPY TO CECUM WITH HOT SNARE POLYPECTOMY    Surgeon(s):  Christian Kelly MD    Anesthesia: Monitor Anesthesia Care    Staff:   Endo Technician: Irina Olmedo  Endo Nurse: Ana Maria Coughlin RN    Estimated Blood Loss: 0 mL  Urine Voided: * No values recorded between 3/20/2017  1:02 PM and 3/20/2017  1:35 PM *    Specimens:                  ID Type Source Tests Collected by Time Destination   A : ASCENDING COLON POLYPS Polyp Large Intestine, Right / Ascending Colon TISSUE EXAM Christian Kelly MD 3/20/2017 1321          Drains:    na       Findings: Colon polyps  Internal hemorrhoids    Complications: None      Christian Kelly MD     Date: 3/20/2017  Time: 1:39 PM

## 2017-03-20 NOTE — ANESTHESIA POSTPROCEDURE EVALUATION
Patient: Jeff Bernard    Procedure Summary     Date Anesthesia Start Anesthesia Stop Room / Location    03/20/17 [phone]  [facility]       Procedure Diagnosis Surgeon Provider    COLONOSCOPY TO CECUM WITH HOT SNARE POLYPECTOMY (N/A ) Colon polyp; Internal hemorrhoids  (Hx of colonic polyps [Z86.010]) MD Vishal Lowe MD          Anesthesia Type: MAC  Last vitals  BP 98/59 (03/20/17 1340)    Temp      Pulse 74 (03/20/17 1340)   Resp 16 (03/20/17 1340)    SpO2 94 % (03/20/17 1340)      Post Anesthesia Care and Evaluation    Patient location during evaluation: PACU  Patient participation: complete - patient participated  Level of consciousness: awake and alert  Pain score: 0  Pain management: adequate  Airway patency: patent  Anesthetic complications: No anesthetic complications    Cardiovascular status: acceptable  Respiratory status: acceptable  Hydration status: acceptable

## 2017-03-20 NOTE — H&P
"[facility]  Pre Procedure History & Physical    Chief Complaint:   History colon polyp    Subjective     HPI:   Patient 60-year-old male with history of hypertension hyperlipidemia and diabetes presents for history of colon polyps.  Patient had 5 polyps in 2014 now referred for follow-up colonoscopy.    Past Medical History:   Past Medical History   Diagnosis Date   • ED (erectile dysfunction)    • Hematuria    • Hyperlipidemia    • Hypertension    • Type 2 diabetes mellitus        Family History:  Family History   Problem Relation Age of Onset   • Stroke Mother    • Aneurysm Mother      BRAIN   • COPD Father        Social History:   reports that he has never smoked. He has never used smokeless tobacco. He reports that he drinks alcohol. He reports that he does not use illicit drugs.    Medications:   Prescriptions Prior to Admission   Medication Sig Dispense Refill Last Dose   • atorvastatin (LIPITOR) 20 MG tablet TAKE 1 TABLET BY MOUTH DAILY. 90 tablet 1 Past Week at Unknown time   • B-D INS SYRINGE 0.5CC/31GX5/16 31G X 5/16\" 0.5 ML misc USE 1 SYRINGE DAILY AS DIRECTED 100 each 1 Past Week at Unknown time   • B-D ULTRAFINE III SHORT PEN 31G X 8 MM misc USE 3 NEEDLES EVERYDAY AS DIRECTED 100 each 5 Past Week at Unknown time   • benazepril (LOTENSIN) 10 MG tablet Take 1 tablet by mouth daily. 30 tablet 5 Past Week at Unknown time   • insulin aspart (NOVOLOG FLEXPEN) 100 UNIT/ML solution pen-injector sc pen Inject 8-13 units in the morning 9-15 units at lunch and 10-15 units at dinner 45 mL 1 Past Week at Unknown time   • insulin glargine (LANTUS) 100 UNIT/ML injection Inject 32 Units under the skin Every Night. 30 mL 0 3/19/2017 at Unknown time   • ONETOUCH VERIO test strip TEST BLOOD SUGAR 4 TIMES A DAY . DX E11.9 400 each 1 3/19/2017 at Unknown time   • aspirin 81 MG tablet Take 1 tablet by mouth daily.   3/18/2017       Allergies:  Review of patient's allergies indicates no known " "allergies.    ROS:    Pertinent items are noted in HPI     Objective     Blood pressure 126/70, pulse 76, temperature 98 °F (36.7 °C), temperature source Oral, resp. rate 12, height 73\" (185.4 cm), weight 209 lb 8 oz (95 kg), SpO2 95 %.    Physical Exam   Constitutional: Pt is oriented to person, place, and time and well-developed, well-nourished, and in no distress.   HENT:   Mouth/Throat: Oropharynx is clear and moist.   Neck: Normal range of motion. Neck supple.   Cardiovascular: Normal rate, regular rhythm and normal heart sounds.    Pulmonary/Chest: Effort normal and breath sounds normal. No respiratory distress. No  wheezes.   Abdominal: Soft. Bowel sounds are normal.   Skin: Skin is warm and dry.   Psychiatric: Mood, memory, affect and judgment normal.     Assessment/Plan     Diagnosis:  History of adenomatous colon polyps    Anticipated Surgical Procedure:  Colonoscopy    The risks, benefits, and alternatives of this procedure have been discussed with the patient or the responsible party- the patient understands and agrees to proceed.                                                                "

## 2017-03-20 NOTE — PLAN OF CARE
Problem: Patient Care Overview (Adult)  Goal: Plan of Care Review  Outcome: Ongoing (interventions implemented as appropriate)    03/20/17 1135   Coping/Psychosocial Response Interventions   Plan Of Care Reviewed With patient       Goal: Adult Individualization and Mutuality  Outcome: Ongoing (interventions implemented as appropriate)    03/20/17 1135   Individualization   Patient Specific Preferences none       Goal: Discharge Needs Assessment  Outcome: Ongoing (interventions implemented as appropriate)    03/20/17 1135   Discharge Needs Assessment   Concerns To Be Addressed no discharge needs identified   Living Environment   Transportation Available family or friend will provide         Problem: GI Endoscopy (Adult)  Goal: Signs and Symptoms of Listed Potential Problems Will be Absent or Manageable (GI Endoscopy)  Outcome: Ongoing (interventions implemented as appropriate)    03/20/17 1135   GI Endoscopy   Problems Assessed (GI Endoscopy) pain   Problems Present (GI Endoscopy) none

## 2017-03-21 LAB
CYTO UR: NORMAL
LAB AP CASE REPORT: NORMAL
Lab: NORMAL
PATH REPORT.FINAL DX SPEC: NORMAL
PATH REPORT.GROSS SPEC: NORMAL

## 2017-04-12 RX ORDER — BLOOD SUGAR DIAGNOSTIC
STRIP MISCELLANEOUS
Qty: 400 EACH | Refills: 1 | Status: SHIPPED | OUTPATIENT
Start: 2017-04-12 | End: 2017-07-12 | Stop reason: SDUPTHER

## 2017-04-12 RX ORDER — INSULIN ASPART 100 [IU]/ML
INJECTION, SOLUTION INTRAVENOUS; SUBCUTANEOUS
Qty: 45 ML | Refills: 1 | Status: SHIPPED | OUTPATIENT
Start: 2017-04-12 | End: 2017-07-12 | Stop reason: SDUPTHER

## 2017-05-17 DIAGNOSIS — Z79.4 TYPE 2 DIABETES MELLITUS WITHOUT COMPLICATION, WITH LONG-TERM CURRENT USE OF INSULIN (HCC): ICD-10-CM

## 2017-05-17 DIAGNOSIS — E11.9 TYPE 2 DIABETES MELLITUS WITHOUT COMPLICATION, WITH LONG-TERM CURRENT USE OF INSULIN (HCC): ICD-10-CM

## 2017-05-17 RX ORDER — INSULIN GLARGINE 100 [IU]/ML
INJECTION, SOLUTION SUBCUTANEOUS
Qty: 30 ML | Refills: 1 | Status: SHIPPED | OUTPATIENT
Start: 2017-05-17 | End: 2017-11-26 | Stop reason: SDUPTHER

## 2017-07-12 ENCOUNTER — OFFICE VISIT (OUTPATIENT)
Dept: ENDOCRINOLOGY | Age: 62
End: 2017-07-12

## 2017-07-12 VITALS
DIASTOLIC BLOOD PRESSURE: 74 MMHG | BODY MASS INDEX: 28.76 KG/M2 | SYSTOLIC BLOOD PRESSURE: 130 MMHG | WEIGHT: 217 LBS | HEART RATE: 64 BPM | HEIGHT: 73 IN | OXYGEN SATURATION: 95 %

## 2017-07-12 DIAGNOSIS — Z79.4 TYPE 2 DIABETES MELLITUS WITHOUT COMPLICATION, WITH LONG-TERM CURRENT USE OF INSULIN (HCC): Primary | ICD-10-CM

## 2017-07-12 DIAGNOSIS — E78.5 HYPERLIPIDEMIA, UNSPECIFIED HYPERLIPIDEMIA TYPE: ICD-10-CM

## 2017-07-12 DIAGNOSIS — I10 BENIGN ESSENTIAL HYPERTENSION: ICD-10-CM

## 2017-07-12 DIAGNOSIS — E11.9 TYPE 2 DIABETES MELLITUS WITHOUT COMPLICATION, WITH LONG-TERM CURRENT USE OF INSULIN (HCC): Primary | ICD-10-CM

## 2017-07-12 LAB
ALBUMIN SERPL-MCNC: 4.3 G/DL (ref 3.5–5.2)
ALBUMIN/GLOB SERPL: 1.5 G/DL
ALP SERPL-CCNC: 62 U/L (ref 39–117)
ALT SERPL-CCNC: 27 U/L (ref 1–41)
AST SERPL-CCNC: 29 U/L (ref 1–40)
BILIRUB SERPL-MCNC: 0.4 MG/DL (ref 0.1–1.2)
BUN SERPL-MCNC: 12 MG/DL (ref 8–23)
BUN/CREAT SERPL: 16.4 (ref 7–25)
CALCIUM SERPL-MCNC: 9.4 MG/DL (ref 8.6–10.5)
CHLORIDE SERPL-SCNC: 105 MMOL/L (ref 98–107)
CHOLEST SERPL-MCNC: 145 MG/DL (ref 0–200)
CO2 SERPL-SCNC: 25.3 MMOL/L (ref 22–29)
CREAT SERPL-MCNC: 0.73 MG/DL (ref 0.76–1.27)
GLOBULIN SER CALC-MCNC: 2.8 GM/DL
GLUCOSE SERPL-MCNC: 143 MG/DL (ref 65–99)
HBA1C MFR BLD: 8.02 % (ref 4.8–5.6)
HDLC SERPL-MCNC: 49 MG/DL (ref 40–60)
LDLC SERPL CALC-MCNC: 85 MG/DL (ref 0–100)
POTASSIUM SERPL-SCNC: 4 MMOL/L (ref 3.5–5.2)
PROT SERPL-MCNC: 7.1 G/DL (ref 6–8.5)
SODIUM SERPL-SCNC: 143 MMOL/L (ref 136–145)
TRIGL SERPL-MCNC: 57 MG/DL (ref 0–150)
TSH SERPL DL<=0.005 MIU/L-ACNC: 1.64 MIU/ML (ref 0.27–4.2)
VLDLC SERPL CALC-MCNC: 11.4 MG/DL (ref 5–40)

## 2017-07-12 PROCEDURE — 99214 OFFICE O/P EST MOD 30 MIN: CPT | Performed by: INTERNAL MEDICINE

## 2017-07-12 RX ORDER — BLOOD SUGAR DIAGNOSTIC
STRIP MISCELLANEOUS
Qty: 100 EACH | Refills: 1 | Status: SHIPPED | OUTPATIENT
Start: 2017-07-12 | End: 2018-05-22 | Stop reason: SDUPTHER

## 2017-07-12 RX ORDER — BENAZEPRIL HYDROCHLORIDE 10 MG/1
10 TABLET ORAL DAILY
Qty: 90 TABLET | Refills: 1 | Status: SHIPPED | OUTPATIENT
Start: 2017-07-12 | End: 2017-08-02 | Stop reason: SDUPTHER

## 2017-07-12 RX ORDER — LANCETS 33 GAUGE
EACH MISCELLANEOUS
Qty: 500 EACH | Refills: 1 | Status: SHIPPED | OUTPATIENT
Start: 2017-07-12 | End: 2020-02-07 | Stop reason: HOSPADM

## 2017-07-12 NOTE — PROGRESS NOTES
Subjective   Jeff Bernard is a 62 y.o. male.     HPI Comments: F/u for dm 2 / testing bs 4-5  x day / last dm eye exam 12/23/16 with dr ABNER Camejo / last dm foot exam 1/5/17 with dr Juarez     Diabetes   He has type 2 diabetes mellitus. No MedicAlert identification noted. The initial diagnosis of diabetes was made 18 years ago. Pertinent negatives for hypoglycemia include no confusion, dizziness, headaches, hunger, mood changes, nervousness/anxiousness, pallor, seizures, sleepiness, speech difficulty, sweats or tremors. Pertinent negatives for diabetes include no blurred vision, no chest pain, no fatigue, no foot paresthesias, no foot ulcerations, no polydipsia, no polyphagia, no polyuria, no visual change, no weakness and no weight loss. Pertinent negatives for hypoglycemia complications include no blackouts, no hospitalization, no nocturnal hypoglycemia, no required assistance and no required glucagon injection. Symptoms are improving. Pertinent negatives for diabetic complications include no CVA, heart disease, nephropathy, peripheral neuropathy, PVD or retinopathy. There are no known risk factors for coronary artery disease. Current diabetic treatment includes insulin injections. He is compliant with treatment most of the time. He is currently taking insulin pre-breakfast, pre-lunch, pre-dinner and at bedtime. Insulin injections are given by patient. Rotation sites for injection include the abdominal wall. His weight is fluctuating minimally. He is following a diabetic diet. Meal planning includes carbohydrate counting. He has not had a previous visit with a dietitian. He participates in exercise three times a week. He monitors blood glucose at home 3-4 x per day. He monitors urine at home <1 x per month. Blood glucose monitoring compliance is good. His home blood glucose trend is fluctuating minimally. His breakfast blood glucose is taken between 5-6 am. His breakfast blood glucose range is generally 140-180  mg/dl. His lunch blood glucose is taken between 11-12 pm. His lunch blood glucose range is generally 110-130 mg/dl. His dinner blood glucose is taken between 5-6 pm. His dinner blood glucose range is generally 140-180 mg/dl. His highest blood glucose is 140-180 mg/dl. His overall blood glucose range is 130-140 mg/dl. He does not see a podiatrist.Eye exam is current.      Patient has known diabetes mellitus since 1999 and started on insulin in 2000. He is on Lantus 32 units every evening and NovoLog 1 unit per 6 g of carbohydrate before each meal. He checks his blood sugar 4 times a day. Fasting blood sugar runs . Lunchtime blood sugar runs . Suppertime blood sugar runs between 109-228. Bedtime blood sugar runs between . He denies any severe hypoglycemic episode. He has gained 4 pounds since 7/17. His last meal was at 5 AM.      His last eye examination was in December 2016 and he has no retinopathy. He denies any associated nephropathy. Urine microalbumin was normal in 1/17. He is on benazepril. He denies any numbness, tingling or burning in his feet.      He has hyperlipidemia and has been on Lipitor 20 mg once a day. He denies any muscle pains.      He has no history of hypertension. He denies any previous history of myocardial infarction, heart failure, or stroke. He denies chest pain, shortness of breath or pedal edema.    He had a colonoscopy done in March 2017 and polyps were removed by Dr. Kelly.  Pathology report was read as tubular adenoma with low-grade dysplasia and hyperplastic polyps.  He was advised follow-up colonoscopy in 6 years.     He has vit D deficiency and is not taking any supplement.    The following portions of the patient's history were reviewed and updated as appropriate: allergies, current medications, past family history, past medical history, past social history, past surgical history and problem list.    Review of Systems   Constitutional: Negative.  Negative for  "fatigue and weight loss.   HENT: Negative.    Eyes: Negative.  Negative for blurred vision.   Respiratory: Negative.    Cardiovascular: Negative.  Negative for chest pain.   Gastrointestinal: Negative.    Endocrine: Negative.  Negative for polydipsia, polyphagia and polyuria.   Genitourinary: Negative.    Musculoskeletal: Negative.    Skin: Negative.  Negative for pallor.   Allergic/Immunologic: Negative.    Neurological: Negative.  Negative for dizziness, tremors, seizures, speech difficulty, weakness and headaches.   Hematological: Negative.    Psychiatric/Behavioral: Negative.  Negative for confusion. The patient is not nervous/anxious.        Objective      Vitals:    07/12/17 0853   BP: 130/74   BP Location: Right arm   Patient Position: Sitting   Cuff Size: Large Adult   Pulse: 64   SpO2: 95%   Weight: 217 lb (98.4 kg)   Height: 73\" (185.4 cm)     Physical Exam   Constitutional: He is oriented to person, place, and time. He appears well-developed and well-nourished. No distress.   HENT:   Head: Normocephalic.   Nose: Nose normal.   Mouth/Throat: No oropharyngeal exudate.   Eyes: Conjunctivae and EOM are normal. Right eye exhibits no discharge. Left eye exhibits no discharge. No scleral icterus.   Neck: Neck supple. No JVD present. No tracheal deviation present. No thyromegaly present.   Cardiovascular: Normal rate, regular rhythm, normal heart sounds and intact distal pulses.  Exam reveals no gallop and no friction rub.    No murmur heard.  Pulmonary/Chest: Effort normal and breath sounds normal. No respiratory distress. He has no wheezes. He has no rales. He exhibits no tenderness.   Abdominal: Soft. Bowel sounds are normal. He exhibits no distension and no mass. There is no tenderness. No hernia.   Musculoskeletal: Normal range of motion. He exhibits no edema, tenderness or deformity.   Lymphadenopathy:     He has no cervical adenopathy.   Neurological: He is alert and oriented to person, place, and time. He " displays normal reflexes. No cranial nerve deficit.   Intact light touch   Skin: Skin is warm and dry. No rash noted. No erythema. No pallor.   Psychiatric: He has a normal mood and affect. His behavior is normal.     Admission on 03/20/2017, Discharged on 03/20/2017   Component Date Value Ref Range Status   • Glucose 03/20/2017 130  70 - 130 mg/dL Final   • Case Report 03/20/2017    Final                    Value:Surgical Pathology Report                         Case: [id number]                                  Authorizing Provider:  Christian Kelly MD     Collected:           03/20/2017 01:21 PM          Ordering Location:     [facility]  Received:            03/20/2017 02:22 PM                                 [facility]                                                                  Pathologist:           Elton Torres MD                                                       Specimen:    Large Intestine, Right / Ascending Colon, ASCENDING COLON POLYPS                          • Final Diagnosis 03/20/2017    Final                    Value:This result contains rich text formatting which cannot be displayed here.   • Gross Description 03/20/2017    Final                    Value:This result contains rich text formatting which cannot be displayed here.   • Microscopic Description 03/20/2017    Final                    Value:This result contains rich text formatting which cannot be displayed here.     Assessment/Plan   Jeff was seen today for diabetes.    Diagnoses and all orders for this visit:    Type 2 diabetes mellitus without complication, with long-term current use of insulin  -     Comprehensive Metabolic Panel  -     Hemoglobin A1c  -     TSH    Hyperlipidemia, unspecified hyperlipidemia type  -     Lipid Panel  -     TSH    Benign essential hypertension      Continue Lantus and NovoLog.  Check hemoglobin A1c.  Continue Lipitor 20 mg once a day.  Check lipid profile.  Continue  benazepril.  Start vitamin D3 1000 units daily.  Follow-up with Dr. Kelly as per his instructions.  Discussed about CT colonography and Cologuard    RTC 6 mos    Send copy of my notes and labs to Dr. Kelly and Esperanza MISHRA.

## 2017-07-25 ENCOUNTER — TELEPHONE (OUTPATIENT)
Dept: GASTROENTEROLOGY | Facility: CLINIC | Age: 62
End: 2017-07-25

## 2017-07-25 NOTE — TELEPHONE ENCOUNTER
----- Message from Christian Kelly MD sent at 7/24/2017  8:12 AM EDT -----  Benign polyp, repeat colon 5 years as discussed.  Options discussed by his care provider including Corguard and CT colonography not recommended in high risk patients including those with previous polyps,

## 2017-08-02 ENCOUNTER — OFFICE VISIT (OUTPATIENT)
Dept: FAMILY MEDICINE CLINIC | Facility: CLINIC | Age: 62
End: 2017-08-02

## 2017-08-02 VITALS
OXYGEN SATURATION: 98 % | HEART RATE: 77 BPM | TEMPERATURE: 98.6 F | BODY MASS INDEX: 28.1 KG/M2 | HEIGHT: 73 IN | WEIGHT: 212 LBS | SYSTOLIC BLOOD PRESSURE: 108 MMHG | DIASTOLIC BLOOD PRESSURE: 58 MMHG

## 2017-08-02 DIAGNOSIS — E78.5 HYPERLIPIDEMIA, UNSPECIFIED HYPERLIPIDEMIA TYPE: ICD-10-CM

## 2017-08-02 DIAGNOSIS — Z23 NEED FOR ZOSTAVAX ADMINISTRATION: ICD-10-CM

## 2017-08-02 DIAGNOSIS — E55.9 VITAMIN D DEFICIENCY: ICD-10-CM

## 2017-08-02 DIAGNOSIS — Z23 NEED FOR PNEUMOCOCCAL VACCINATION: Primary | ICD-10-CM

## 2017-08-02 DIAGNOSIS — I10 BENIGN ESSENTIAL HYPERTENSION: ICD-10-CM

## 2017-08-02 PROCEDURE — 99213 OFFICE O/P EST LOW 20 MIN: CPT | Performed by: PHYSICIAN ASSISTANT

## 2017-08-02 RX ORDER — BENAZEPRIL HYDROCHLORIDE 10 MG/1
10 TABLET ORAL DAILY
Qty: 90 TABLET | Refills: 1 | Status: SHIPPED | OUTPATIENT
Start: 2017-08-02 | End: 2018-08-16 | Stop reason: SDUPTHER

## 2017-08-02 NOTE — PATIENT INSTRUCTIONS
62 YEAR OLD MALE WHO PRESENTS TODAY IN FOLLOW UP OF HTN AND VITAMIN D. BP TODAY IS OK. CONTINUE REGIMEN. I REVIEWED ENDOCRINOLOGY CONSULTS AND LABS. NO LABS TODAY. ENDOCRINOLOGY HAS ADDRESSED VITAMIN D. PATIENT REPORTS PNEUMONIA VACCINES AND ZOSTAVAX ARE COVERED ON INSURANCE. WE ARE UNABLE TO GIVE HERE. I WILL SEND RX TO PHARMACY FOR VACCINES. FOLLOW UP IN 6 MONTHS FOR CPE AND FOLLOW UP OF HTN. FOLLOW UP SOONER IF NEEDED.

## 2017-08-02 NOTE — PROGRESS NOTES
Subjective   Jeff Bernard is a 62 y.o. male seen today for 6 month medication follow-up for hypertension     History of Present Illness     PATIENT REPORTS INSURANCE WILL COVER ZOSTAVAX AND PNEUMONIA VACCINES. HE IS UNSURE THE VACCINE RECEIVED FOR PREVIOUS PNEUMONIA SHOT BUT STATES WHEN IT WAS GIVEN, THERE WAS ONLY 1 AND WAS GIVEN PRIOR TO 2013.     FEELING GOOD, NO COMPLAINTS.     The following portions of the patient's history were reviewed and updated as appropriate: allergies, current medications, past family history, past medical history, past social history, past surgical history and problem list.    Review of Systems   All other systems reviewed and are negative.      Objective   Physical Exam   Constitutional: He is oriented to person, place, and time. He appears well-developed.   HENT:   Head: Normocephalic and atraumatic.   Right Ear: External ear normal.   Left Ear: External ear normal.   Eyes: Conjunctivae are normal.   Neck: Carotid bruit is not present. No tracheal deviation present. No thyroid mass and no thyromegaly present.   Cardiovascular: Normal rate, regular rhythm, normal heart sounds and intact distal pulses.    Pulmonary/Chest: Effort normal and breath sounds normal.   Neurological: He is alert and oriented to person, place, and time. Gait normal.   Skin: Skin is warm and dry.   Psychiatric: He has a normal mood and affect. His behavior is normal. Judgment and thought content normal.   Nursing note and vitals reviewed.      Assessment/Plan   Jeff was seen today for 6 month medcation check for hypertension.    Diagnoses and all orders for this visit:    Need for pneumococcal vaccination  -     Cancel: Pneumococcal Conjugate Vaccine 13-Valent All  -     Cancel: Varicella-Zoster Vaccine Subcutaneous  -     pneumococcal conj. 13-valent (PREVNAR 13) vaccine; Inject 0.5 mL into the shoulder, thigh, or buttocks 1 (One) Time for 1 dose.    Need for Zostavax administration  -     Cancel:  Pneumococcal Conjugate Vaccine 13-Valent All  -     Cancel: Varicella-Zoster Vaccine Subcutaneous  -     zoster vaccine live (ZOSTAVAX) [id number] UNT/0.65ML reconstituted suspension; Inject 1 dose under the skin 1 (One) Time for 1 dose.    Benign essential hypertension    Hyperlipidemia, unspecified hyperlipidemia type    Vitamin D deficiency      Patient Instructions   62 YEAR OLD MALE WHO PRESENTS TODAY IN FOLLOW UP OF HTN AND VITAMIN D. BP TODAY IS OK. CONTINUE REGIMEN. I REVIEWED ENDOCRINOLOGY CONSULTS AND LABS. NO LABS TODAY. ENDOCRINOLOGY HAS ADDRESSED VITAMIN D. PATIENT REPORTS PNEUMONIA VACCINES AND ZOSTAVAX ARE COVERED ON INSURANCE. WE ARE UNABLE TO GIVE HERE. I WILL SEND RX TO PHARMACY FOR VACCINES. FOLLOW UP IN 6 MONTHS FOR CPE AND FOLLOW UP OF HTN. FOLLOW UP SOONER IF NEEDED.

## 2017-10-25 RX ORDER — ATORVASTATIN CALCIUM 20 MG/1
TABLET, FILM COATED ORAL
Qty: 90 TABLET | Refills: 1 | Status: SHIPPED | OUTPATIENT
Start: 2017-10-25 | End: 2018-08-14 | Stop reason: SDUPTHER

## 2017-11-26 DIAGNOSIS — Z79.4 TYPE 2 DIABETES MELLITUS WITHOUT COMPLICATION, WITH LONG-TERM CURRENT USE OF INSULIN (HCC): ICD-10-CM

## 2017-11-26 DIAGNOSIS — E11.9 TYPE 2 DIABETES MELLITUS WITHOUT COMPLICATION, WITH LONG-TERM CURRENT USE OF INSULIN (HCC): ICD-10-CM

## 2017-11-27 RX ORDER — INSULIN GLARGINE 100 [IU]/ML
INJECTION, SOLUTION SUBCUTANEOUS
Qty: 29 ML | Refills: 1 | Status: SHIPPED | OUTPATIENT
Start: 2017-11-27 | End: 2018-01-25 | Stop reason: CLARIF

## 2018-01-18 RX ORDER — PEN NEEDLE, DIABETIC 31 GX5/16"
NEEDLE, DISPOSABLE MISCELLANEOUS
Qty: 100 EACH | Refills: 0 | Status: SHIPPED | OUTPATIENT
Start: 2018-01-18 | End: 2020-02-07 | Stop reason: HOSPADM

## 2018-01-25 RX ORDER — INSULIN GLARGINE 100 [IU]/ML
INJECTION, SOLUTION SUBCUTANEOUS
Qty: 10 ML | Refills: 2 | Status: SHIPPED | OUTPATIENT
Start: 2018-01-25 | End: 2018-02-26 | Stop reason: DRUGHIGH

## 2018-02-07 ENCOUNTER — TRANSCRIBE ORDERS (OUTPATIENT)
Dept: ADMINISTRATIVE | Facility: HOSPITAL | Age: 63
End: 2018-02-07

## 2018-02-07 ENCOUNTER — OFFICE VISIT (OUTPATIENT)
Dept: FAMILY MEDICINE CLINIC | Facility: CLINIC | Age: 63
End: 2018-02-07

## 2018-02-07 ENCOUNTER — TELEPHONE (OUTPATIENT)
Dept: FAMILY MEDICINE CLINIC | Facility: CLINIC | Age: 63
End: 2018-02-07

## 2018-02-07 VITALS
HEIGHT: 73 IN | OXYGEN SATURATION: 99 % | BODY MASS INDEX: 28.31 KG/M2 | SYSTOLIC BLOOD PRESSURE: 124 MMHG | WEIGHT: 213.6 LBS | TEMPERATURE: 98.6 F | HEART RATE: 69 BPM | DIASTOLIC BLOOD PRESSURE: 68 MMHG

## 2018-02-07 DIAGNOSIS — Z79.4 TYPE 2 DIABETES MELLITUS WITHOUT COMPLICATION, WITH LONG-TERM CURRENT USE OF INSULIN (HCC): ICD-10-CM

## 2018-02-07 DIAGNOSIS — R68.89 ABNORMAL TESTICULAR EXAM: ICD-10-CM

## 2018-02-07 DIAGNOSIS — I65.29 ARTERIOSCLEROSIS OF CAROTID ARTERY, UNSPECIFIED LATERALITY: ICD-10-CM

## 2018-02-07 DIAGNOSIS — E78.5 HYPERLIPIDEMIA, UNSPECIFIED HYPERLIPIDEMIA TYPE: ICD-10-CM

## 2018-02-07 DIAGNOSIS — I10 BENIGN ESSENTIAL HYPERTENSION: ICD-10-CM

## 2018-02-07 DIAGNOSIS — E55.9 VITAMIN D DEFICIENCY: ICD-10-CM

## 2018-02-07 DIAGNOSIS — E11.9 TYPE 2 DIABETES MELLITUS WITHOUT COMPLICATION, WITH LONG-TERM CURRENT USE OF INSULIN (HCC): ICD-10-CM

## 2018-02-07 DIAGNOSIS — R94.31 ABNORMAL ELECTROCARDIOGRAM: ICD-10-CM

## 2018-02-07 DIAGNOSIS — Z00.00 ROUTINE ADULT HEALTH MAINTENANCE: Primary | ICD-10-CM

## 2018-02-07 DIAGNOSIS — Z13.6 ENCOUNTER FOR SCREENING FOR VASCULAR DISEASE: Primary | ICD-10-CM

## 2018-02-07 LAB
DEVELOPER EXPIRATION DATE: NORMAL
DEVELOPER LOT NUMBER: NORMAL
EXPIRATION DATE: NORMAL
FECAL OCCULT BLOOD SCREEN, POC: NEGATIVE
Lab: NORMAL
NEGATIVE CONTROL: NEGATIVE
POSITIVE CONTROL: POSITIVE

## 2018-02-07 PROCEDURE — 99214 OFFICE O/P EST MOD 30 MIN: CPT | Performed by: PHYSICIAN ASSISTANT

## 2018-02-07 PROCEDURE — 99396 PREV VISIT EST AGE 40-64: CPT | Performed by: PHYSICIAN ASSISTANT

## 2018-02-07 PROCEDURE — 82270 OCCULT BLOOD FECES: CPT | Performed by: PHYSICIAN ASSISTANT

## 2018-02-07 NOTE — PROGRESS NOTES
Subjective   Jeff Bernard is a 62 y.o. male seen today for annual exam AND 6 MONTH FOLLOW UP OF HTN. HYPERLIPIDEMIA, VITAMIN D.    History of Present Illness     LAST COLONOSCOPY- DR MUKHERJEE- 2/2014- POLYPECTOMY- 3/2017- RECHECK 5 YEARS.    LAST TD- 11/13/13  FLU SHOT- 10/2017- [facility]  Pneumonia vaccine- PREVNAR- .   ZOSTAVAX-     HAD TO CANCEL RECENT ENDOCRINOLOGY VISIT AND WILL NOT SEE THEM UNTIL June 2018. HAS NOT HAD A1C SINCE 7/2017.     The following portions of the patient's history were reviewed and updated as appropriate: allergies, current medications, past family history, past medical history, past social history, past surgical history and problem list.    Review of Systems   All other systems reviewed and are negative.      Objective   Physical Exam   Constitutional: He is oriented to person, place, and time. He appears well-developed and well-nourished. No distress.   HENT:   Head: Normocephalic and atraumatic.   Right Ear: Hearing, tympanic membrane, external ear and ear canal normal.   Left Ear: Hearing, tympanic membrane, external ear and ear canal normal.   Nose: Nose normal.   Mouth/Throat: Oropharynx is clear and moist.   Eyes: Conjunctivae, EOM and lids are normal. Pupils are equal, round, and reactive to light.   Neck: Neck supple. No JVD present. Carotid bruit is not present. No tracheal deviation present. No thyroid mass and no thyromegaly present.   Cardiovascular: Normal rate, regular rhythm, normal heart sounds and intact distal pulses.  Exam reveals no gallop and no friction rub.    No murmur heard.  Pulses:       Radial pulses are 2+ on the right side, and 2+ on the left side.        Posterior tibial pulses are 2+ on the right side, and 2+ on the left side.   Pulmonary/Chest: Effort normal and breath sounds normal. No respiratory distress. He has no wheezes. He has no rhonchi. He has no rales.   Abdominal: Soft. Normal aorta and bowel sounds are normal. He exhibits no distension and no  abdominal bruit. There is no hepatosplenomegaly. There is no tenderness. There is no rigidity, no rebound and no guarding. No hernia. Hernia confirmed negative in the right inguinal area and confirmed negative in the left inguinal area.   Genitourinary: Rectum normal, prostate normal and penis normal.   Genitourinary Comments: LEFT TESTICLE NORMAL. RIGHT TESTICLE NOT PALPABLE- NOT PRESENT IN SCROTOM   Musculoskeletal: Normal range of motion. He exhibits no edema, tenderness or deformity.   Lymphadenopathy:     He has no cervical adenopathy. No inguinal adenopathy noted on the right or left side.   Neurological: He is alert and oriented to person, place, and time. He has normal strength and normal reflexes. He displays normal reflexes. No cranial nerve deficit or sensory deficit. He exhibits normal muscle tone. Coordination and gait normal.   Skin: Skin is warm and dry. No rash noted. He is not diaphoretic. No erythema.   Psychiatric: He has a normal mood and affect. His speech is normal and behavior is normal. Judgment and thought content normal. Cognition and memory are normal.       Assessment/Plan   Jeff was seen today for annual exam.    Diagnoses and all orders for this visit:    Routine adult health maintenance  -     CBC & Differential  -     Comprehensive Metabolic Panel  -     CK  -     Lipid Panel With LDL / HDL Ratio  -     Thyroid Panel With TSH  -     PSA Screen  -     Vitamin D 25 Hydroxy  -     Hemoglobin A1c  -     Ambulatory Referral to Cardiology  -     POC Occult Blood Stool    Benign essential hypertension  -     CBC & Differential  -     Comprehensive Metabolic Panel  -     CK  -     Lipid Panel With LDL / HDL Ratio  -     Thyroid Panel With TSH  -     PSA Screen  -     Vitamin D 25 Hydroxy  -     Hemoglobin A1c  -     Ambulatory Referral to Cardiology    Abnormal electrocardiogram  -     CBC & Differential  -     Comprehensive Metabolic Panel  -     CK  -     Lipid Panel With LDL / HDL  Ratio  -     Thyroid Panel With TSH  -     PSA Screen  -     Vitamin D 25 Hydroxy  -     Hemoglobin A1c  -     Ambulatory Referral to Cardiology    Arteriosclerosis of carotid artery, unspecified laterality  -     CBC & Differential  -     Comprehensive Metabolic Panel  -     CK  -     Lipid Panel With LDL / HDL Ratio  -     Thyroid Panel With TSH  -     PSA Screen  -     Vitamin D 25 Hydroxy  -     Hemoglobin A1c  -     Ambulatory Referral to Cardiology    Hyperlipidemia, unspecified hyperlipidemia type  -     CBC & Differential  -     Comprehensive Metabolic Panel  -     CK  -     Lipid Panel With LDL / HDL Ratio  -     Thyroid Panel With TSH  -     PSA Screen  -     Vitamin D 25 Hydroxy  -     Hemoglobin A1c  -     Ambulatory Referral to Cardiology    Vitamin D deficiency  -     CBC & Differential  -     Comprehensive Metabolic Panel  -     CK  -     Lipid Panel With LDL / HDL Ratio  -     Thyroid Panel With TSH  -     PSA Screen  -     Vitamin D 25 Hydroxy  -     Hemoglobin A1c  -     Ambulatory Referral to Cardiology    Type 2 diabetes mellitus without complication, with long-term current use of insulin  -     CBC & Differential  -     Comprehensive Metabolic Panel  -     CK  -     Lipid Panel With LDL / HDL Ratio  -     Thyroid Panel With TSH  -     PSA Screen  -     Vitamin D 25 Hydroxy  -     Hemoglobin A1c  -     Ambulatory Referral to Cardiology    Abnormal testicular exam    Other orders  -     SCANNED EKG      Patient Instructions   62 YEAR OLD MALE WHO PRESENTS TODAY FOR CPE AND 6 MONTH FOLLOW UP OF HTN, HYPERLIPIDEMIA, AND VITAMIN D DEFICIENCY. CPE COMPLETED TODAY. EKG TODAY IS ABNORMAL- I WILL REFER TO CARDIOLOGY FOR EVALUATION WITH UNCONTROLLED DM, HTN, AND HYPERLIPIDEMIA, HE HAS SIGNIFICANT CAD RISK. HE ALSO HAS ABNORMAL SCROTAL EXAM WITH ONLY 1 PALPABLE TESTICLE. HE REPORTS THIS IS NOT ABNORMAL FOR HIM. PATIENT REPORTS HE WAS SEEN BY UROLOGY IN THE PAST WITH IMAGING AND WAS TOLD NO CONCERNS. I WILL  OBTAIN THE CONSULT AND IMAGING AND DETERMINE IF HE WILL NEED TO RETURN TO UROLOGY FOR FOLLOW UP. HE IS UP TO DATE ON COLONOSCOPY. FASTING LABS TODAY- CALL IF NO RESULTS IN 1 WEEK.     BLOOD PRESSURE IS NORMAL TODAY. CONTINUE LOTENSIN 10 MG ONCE DAILY. HE WILL HAVE FASTING LABS TODAY. CALL IF NO RESULTS IN 1 WEEK. FOLLOW UP IN 6 MONTHS IF LABS ARE STABLE. PATIENT HAS NOT SEEN ENDOCRINOLOGY SINCE 7/2017 AND APPT WAS RESCHEDULED TO 6/2018. LAST A1C WAS 8%. I WILL CHECK A1C WITH LABS TODAY. IF ABNORMAL, I WILL SEND TO ENDOCRINOLOGIST FOR RECOMMENDATIONS.

## 2018-02-08 ENCOUNTER — DOCUMENTATION (OUTPATIENT)
Dept: FAMILY MEDICINE CLINIC | Facility: CLINIC | Age: 63
End: 2018-02-08

## 2018-02-08 LAB
25(OH)D3+25(OH)D2 SERPL-MCNC: 30.5 NG/ML (ref 30–100)
ALBUMIN SERPL-MCNC: 4.1 G/DL (ref 3.5–5.2)
ALBUMIN/GLOB SERPL: 1.3 G/DL
ALP SERPL-CCNC: 70 U/L (ref 39–117)
ALT SERPL-CCNC: 22 U/L (ref 1–41)
AST SERPL-CCNC: 20 U/L (ref 1–40)
BASOPHILS # BLD AUTO: 0.03 10*3/MM3 (ref 0–0.2)
BASOPHILS NFR BLD AUTO: 0.4 % (ref 0–1.5)
BILIRUB SERPL-MCNC: 0.5 MG/DL (ref 0.1–1.2)
BUN SERPL-MCNC: 14 MG/DL (ref 8–23)
BUN/CREAT SERPL: 16.3 (ref 7–25)
CALCIUM SERPL-MCNC: 9.2 MG/DL (ref 8.6–10.5)
CHLORIDE SERPL-SCNC: 102 MMOL/L (ref 98–107)
CHOLEST SERPL-MCNC: 161 MG/DL (ref 0–200)
CK SERPL-CCNC: 181 U/L (ref 20–200)
CO2 SERPL-SCNC: 26.3 MMOL/L (ref 22–29)
CREAT SERPL-MCNC: 0.86 MG/DL (ref 0.76–1.27)
EOSINOPHIL # BLD AUTO: 0.1 10*3/MM3 (ref 0–0.7)
EOSINOPHIL NFR BLD AUTO: 1.2 % (ref 0.3–6.2)
ERYTHROCYTE [DISTWIDTH] IN BLOOD BY AUTOMATED COUNT: 12.8 % (ref 11.5–14.5)
FT4I SERPL CALC-MCNC: 1.5 (ref 1.2–4.9)
GFR SERPLBLD CREATININE-BSD FMLA CKD-EPI: 109 ML/MIN/1.73
GFR SERPLBLD CREATININE-BSD FMLA CKD-EPI: 90 ML/MIN/1.73
GLOBULIN SER CALC-MCNC: 3.1 GM/DL
GLUCOSE SERPL-MCNC: 49 MG/DL (ref 65–99)
HBA1C MFR BLD: 8.31 % (ref 4.8–5.6)
HCT VFR BLD AUTO: 46.8 % (ref 40.4–52.2)
HDLC SERPL-MCNC: 51 MG/DL (ref 40–60)
HGB BLD-MCNC: 15.6 G/DL (ref 13.7–17.6)
IMM GRANULOCYTES # BLD: 0 10*3/MM3 (ref 0–0.03)
IMM GRANULOCYTES NFR BLD: 0 % (ref 0–0.5)
LDLC SERPL CALC-MCNC: 98 MG/DL (ref 0–100)
LDLC/HDLC SERPL: 1.91 {RATIO}
LYMPHOCYTES # BLD AUTO: 1.59 10*3/MM3 (ref 0.9–4.8)
LYMPHOCYTES NFR BLD AUTO: 18.8 % (ref 19.6–45.3)
MCH RBC QN AUTO: 30.6 PG (ref 27–32.7)
MCHC RBC AUTO-ENTMCNC: 33.3 G/DL (ref 32.6–36.4)
MCV RBC AUTO: 91.8 FL (ref 79.8–96.2)
MONOCYTES # BLD AUTO: 0.56 10*3/MM3 (ref 0.2–1.2)
MONOCYTES NFR BLD AUTO: 6.6 % (ref 5–12)
NEUTROPHILS # BLD AUTO: 6.16 10*3/MM3 (ref 1.9–8.1)
NEUTROPHILS NFR BLD AUTO: 73 % (ref 42.7–76)
PLATELET # BLD AUTO: 271 10*3/MM3 (ref 140–500)
POTASSIUM SERPL-SCNC: 3.8 MMOL/L (ref 3.5–5.2)
PROT SERPL-MCNC: 7.2 G/DL (ref 6–8.5)
PSA SERPL-MCNC: 3.59 NG/ML (ref 0–4)
RBC # BLD AUTO: 5.1 10*6/MM3 (ref 4.6–6)
SODIUM SERPL-SCNC: 142 MMOL/L (ref 136–145)
T3RU NFR SERPL: 23 % (ref 24–39)
T4 SERPL-MCNC: 6.5 UG/DL (ref 4.5–12)
TRIGL SERPL-MCNC: 62 MG/DL (ref 0–150)
TSH SERPL DL<=0.005 MIU/L-ACNC: 1.44 UIU/ML (ref 0.45–4.5)
VLDLC SERPL CALC-MCNC: 12.4 MG/DL (ref 5–40)
WBC # BLD AUTO: 8.44 10*3/MM3 (ref 4.5–10.7)

## 2018-02-08 NOTE — PROGRESS NOTES
Received phone call on February 7, 2018 at approximately 7:40 PM from Hallie at [facility] with critical lab value of glucose 49. Called patient at his home phone [phone] left voicemail for him to eat something immediately and had to the emergency room if he was not feeling well. I am hopeful that the patient had already eaten during the day, but I did stress importance of eating a carbohydrate and a protein and heading to the emergency room if he felt ill.

## 2018-02-11 DIAGNOSIS — R97.20 INCREASED PROSTATE SPECIFIC ANTIGEN (PSA) VELOCITY: Primary | ICD-10-CM

## 2018-02-15 ENCOUNTER — APPOINTMENT (OUTPATIENT)
Dept: CARDIOLOGY | Facility: HOSPITAL | Age: 63
End: 2018-02-15

## 2018-02-16 ENCOUNTER — TELEPHONE (OUTPATIENT)
Dept: FAMILY MEDICINE CLINIC | Facility: CLINIC | Age: 63
End: 2018-02-16

## 2018-02-16 NOTE — TELEPHONE ENCOUNTER
Patient called states he has seen Dr Fatima at [facility] and he has a follow-up appointment on 3/5/18, he doesn't understand why his blood sugar level was so low here at home because at home it is 72, I called Dr Fatima's office for his records

## 2018-02-22 ENCOUNTER — APPOINTMENT (OUTPATIENT)
Dept: CARDIOLOGY | Facility: HOSPITAL | Age: 63
End: 2018-02-22

## 2018-02-23 ENCOUNTER — TRANSCRIBE ORDERS (OUTPATIENT)
Dept: ADMINISTRATIVE | Facility: HOSPITAL | Age: 63
End: 2018-02-23

## 2018-02-23 DIAGNOSIS — Z13.6 ENCOUNTER FOR SCREENING FOR VASCULAR DISEASE: Primary | ICD-10-CM

## 2018-02-26 RX ORDER — INSULIN GLARGINE 100 [IU]/ML
INJECTION, SOLUTION SUBCUTANEOUS
Qty: 10 ML | Refills: 2
Start: 2018-02-26 | End: 2018-02-27 | Stop reason: ALTCHOICE

## 2018-02-27 DIAGNOSIS — Z79.4 TYPE 2 DIABETES MELLITUS WITHOUT COMPLICATION, WITH LONG-TERM CURRENT USE OF INSULIN (HCC): ICD-10-CM

## 2018-02-27 DIAGNOSIS — E11.9 TYPE 2 DIABETES MELLITUS WITHOUT COMPLICATION, WITH LONG-TERM CURRENT USE OF INSULIN (HCC): ICD-10-CM

## 2018-02-27 RX ORDER — INSULIN GLARGINE 100 [IU]/ML
28 INJECTION, SOLUTION SUBCUTANEOUS NIGHTLY
Qty: 30 ML | Refills: 2
Start: 2018-02-27 | End: 2018-06-06 | Stop reason: CLARIF

## 2018-02-28 ENCOUNTER — HOSPITAL ENCOUNTER (OUTPATIENT)
Dept: CARDIOLOGY | Facility: HOSPITAL | Age: 63
Discharge: HOME | End: 2018-02-28
Admitting: PHYSICIAN ASSISTANT

## 2018-02-28 VITALS
HEIGHT: 71 IN | HEART RATE: 64 BPM | WEIGHT: 213 LBS | SYSTOLIC BLOOD PRESSURE: 128 MMHG | DIASTOLIC BLOOD PRESSURE: 64 MMHG | BODY MASS INDEX: 29.82 KG/M2

## 2018-02-28 DIAGNOSIS — Z13.6 ENCOUNTER FOR SCREENING FOR VASCULAR DISEASE: ICD-10-CM

## 2018-02-28 LAB
BH CV ECHO MEAS - DIST AO DIAM: 1.68 CM
BH CV VAS BP LEFT ARM: NORMAL MMHG
BH CV VAS BP RIGHT ARM: NORMAL MMHG
BH CV XLRA MEAS - MID AO DIAM: 1.61 CM
BH CV XLRA MEAS - PAD LEFT ABI DP: 1.17
BH CV XLRA MEAS - PAD LEFT ABI PT: 1.15
BH CV XLRA MEAS - PAD LEFT ARM: 125 MMHG
BH CV XLRA MEAS - PAD LEFT LEG DP: 150 MMHG
BH CV XLRA MEAS - PAD LEFT LEG PT: 148 MMHG
BH CV XLRA MEAS - PAD RIGHT ABI DP: 1.12
BH CV XLRA MEAS - PAD RIGHT ABI PT: 1.17
BH CV XLRA MEAS - PAD RIGHT ARM: 128 MMHG
BH CV XLRA MEAS - PAD RIGHT LEG DP: 144 MMHG
BH CV XLRA MEAS - PAD RIGHT LEG PT: 150 MMHG
BH CV XLRA MEAS - PROX AO DIAM: 1.99 CM
BH CV XLRA MEAS LEFT ICA/CCA RATIO: 1.22
BH CV XLRA MEAS LEFT MID CCA PSV: NORMAL CM/SEC
BH CV XLRA MEAS LEFT MID ICA PSV: NORMAL CM/SEC
BH CV XLRA MEAS LEFT PROX ECA PSV: NORMAL CM/SEC
BH CV XLRA MEAS RIGHT ICA/CCA RATIO: 1
BH CV XLRA MEAS RIGHT MID CCA PSV: NORMAL CM/SEC
BH CV XLRA MEAS RIGHT MID ICA PSV: NORMAL CM/SEC
BH CV XLRA MEAS RIGHT PROX ECA PSV: NORMAL CM/SEC

## 2018-02-28 PROCEDURE — 93799 UNLISTED CV SVC/PROCEDURE: CPT

## 2018-03-01 ENCOUNTER — APPOINTMENT (OUTPATIENT)
Dept: CARDIOLOGY | Facility: HOSPITAL | Age: 63
End: 2018-03-01

## 2018-03-16 ENCOUNTER — OFFICE VISIT (OUTPATIENT)
Dept: CARDIOLOGY | Facility: CLINIC | Age: 63
End: 2018-03-16

## 2018-03-16 VITALS
HEIGHT: 71 IN | SYSTOLIC BLOOD PRESSURE: 132 MMHG | DIASTOLIC BLOOD PRESSURE: 70 MMHG | HEART RATE: 65 BPM | BODY MASS INDEX: 30.1 KG/M2 | WEIGHT: 215 LBS

## 2018-03-16 DIAGNOSIS — Z79.4 TYPE 2 DIABETES MELLITUS WITHOUT COMPLICATION, WITH LONG-TERM CURRENT USE OF INSULIN (HCC): ICD-10-CM

## 2018-03-16 DIAGNOSIS — E11.9 TYPE 2 DIABETES MELLITUS WITHOUT COMPLICATION, WITH LONG-TERM CURRENT USE OF INSULIN (HCC): ICD-10-CM

## 2018-03-16 DIAGNOSIS — I10 BENIGN ESSENTIAL HYPERTENSION: ICD-10-CM

## 2018-03-16 DIAGNOSIS — E78.5 HYPERLIPIDEMIA, UNSPECIFIED HYPERLIPIDEMIA TYPE: ICD-10-CM

## 2018-03-16 DIAGNOSIS — I49.3 ASYMPTOMATIC PVCS: Primary | ICD-10-CM

## 2018-03-16 PROCEDURE — 99214 OFFICE O/P EST MOD 30 MIN: CPT | Performed by: INTERNAL MEDICINE

## 2018-03-16 PROCEDURE — 93000 ELECTROCARDIOGRAM COMPLETE: CPT | Performed by: INTERNAL MEDICINE

## 2018-03-16 NOTE — PROGRESS NOTES
Jeff Bernard  1955  Date of Office Visit: 03/16/2018  Encounter Provider: Lazarus Gallegos MD  Place of Service: [facility] CARDIOLOGY      CHIEF COMPLAINT:   Premature ventricular complexes  Essential hypertension  Hyperlipidemia    HISTORY OF PRESENT ILLNESS:He is a very pleasant 62-year-old gentleman with a medical history of essential hypertension, hyperlipidemia, diabetes mellitus, and just mild carotid plaquing who presents to me for evaluation of an abnormal electrocardiogram.  He was noted to have premature ventricular complexes on his EKG.  These are monomorphic and outflow tract in location.  He denies any dyspnea on exertion or chest pain with exertion.  He only does probably more towards mild-to-moderate levels of exertion at work.  He does not work out at a gym.  He does not do a whole lot from an aerobic exercise standpoint.  He has no orthopnea or PND.  No lower extremity edema.  He denies palpitations with his PVCs.        Review of Systems   Constitution: Negative for fever, weakness and malaise/fatigue.   HENT: Negative for nosebleeds and sore throat.    Eyes: Negative for blurred vision and double vision.   Cardiovascular: Negative for chest pain, claudication, palpitations and syncope.   Respiratory: Negative for cough, shortness of breath and snoring.    Endocrine: Negative for cold intolerance, heat intolerance and polydipsia.   Skin: Negative for itching, poor wound healing and rash.   Musculoskeletal: Negative for joint pain, joint swelling, muscle weakness and myalgias.   Gastrointestinal: Negative for abdominal pain, melena, nausea and vomiting.   Neurological: Negative for light-headedness, loss of balance, seizures and vertigo.   Psychiatric/Behavioral: Negative for altered mental status and depression.       Past Medical History:   Diagnosis Date   • Arteriosclerosis of carotid artery    • ED (erectile dysfunction)    • Hematuria    •  "Hyperlipidemia    • Hypertension    • Type 2 diabetes mellitus    • Vitamin D deficiency        The following portions of the patient's history were reviewed and updated as appropriate: Social history , Family history and Surgical history     Current Outpatient Prescriptions on File Prior to Visit   Medication Sig Dispense Refill   • aspirin 81 MG tablet Take 1 tablet by mouth daily.     • atorvastatin (LIPITOR) 20 MG tablet TAKE ONE TABLET BY MOUTH DAILY 90 tablet 1   • B-D ULTRAFINE III SHORT PEN 31G X 8 MM misc USE 3 PEN NEEDLES DAILY AS DIRECTED 100 each 0   • benazepril (LOTENSIN) 10 MG tablet Take 1 tablet by mouth Daily. 90 tablet 1   • glucose blood (LUDIVINA CONTOUR NEXT TEST) test strip 1 each by Other route As Needed (test bs). Testing bs 5 x day  Dx code E11.9 500 each 1   • insulin aspart (NOVOLOG FLEXPEN) 100 UNIT/ML solution pen-injector sc pen Inject 8-13 units in the morning 9-15 units at lunch and 10-15 units at dinner 15 mL 2   • insulin glargine (LANTUS) 100 UNIT/ML injection Inject 28 Units under the skin Every Night. 30 mL 2   • Insulin Pen Needle (B-D ULTRAFINE III SHORT PEN) 31G X 8 MM misc Using 4 pen needles daily  Dx code E11.9 400 each 1   • Insulin Syringe-Needle U-100 (B-D INS SYRINGE 0.5CC/31GX5/16) 31G X 5/16\" 0.5 ML misc Using 1 syringe daily 100 each 1   • [facility] LANCETS MICRO THIN 33G misc Testing bs 5 x day 500 each 1     No current facility-administered medications on file prior to visit.        No Known Allergies    Vitals:    03/16/18 1408   BP: 132/70   Pulse: 65   Weight: 97.5 kg (215 lb)   Height: 180.3 cm (71\")     Physical Exam   Constitutional: He is oriented to person, place, and time. He appears well-developed and well-nourished.   HENT:   Head: Normocephalic and atraumatic.   Eyes: Conjunctivae and EOM are normal. No scleral icterus.   Neck: Normal range of motion. Neck supple. Normal carotid pulses, no hepatojugular reflux and no JVD present. Carotid bruit is not " present. No tracheal deviation present. No thyromegaly present.   Cardiovascular: Normal rate and regular rhythm.  Exam reveals no gallop and no friction rub.    No murmur heard.  Pulses:       Carotid pulses are 2+ on the right side, and 2+ on the left side.       Radial pulses are 2+ on the right side, and 2+ on the left side.        Femoral pulses are 2+ on the right side, and 2+ on the left side.       Dorsalis pedis pulses are 2+ on the right side, and 2+ on the left side.        Posterior tibial pulses are 2+ on the right side, and 2+ on the left side.   Pulmonary/Chest: Breath sounds normal. No respiratory distress. He has no decreased breath sounds. He has no wheezes. He has no rhonchi. He has no rales. He exhibits no tenderness.   Abdominal: Soft. Bowel sounds are normal. He exhibits no distension. There is no tenderness. There is no rebound.   Musculoskeletal: He exhibits no edema or deformity.   Neurological: He is alert and oriented to person, place, and time. He has normal strength. No sensory deficit.   Skin: No rash noted. No erythema.   Psychiatric: He has a normal mood and affect. His behavior is normal.       No components found for: CBC  No results found for: CMP  No components found for: LIPID  No results found for: BMP      ECG 12 Lead  Date/Time: 3/16/2018 2:32 PM  Performed by: LUANN WINN  Authorized by: LUANN WINN   Comparison: compared with previous ECG from 2/7/2018  Comparison to previous ECG: PVCs are no longer present on initial EKG  Rhythm: sinus rhythm  Rate: normal  Conduction: conduction normal  ST Segments: ST segments normal  T Waves: T waves normal  QRS axis: normal  Clinical impression: normal ECG              DISCUSSION/SUMMARYA 62-year-old gentleman with a medical history of hypertension, hyperlipidemia, diabetes mellitus and premature ventricular atrial complexes that are asymptomatic. They are outflow tract in location. They do not appear to be frequent  enough to cause any significant issues with cardiomyopathies.     I am a little bit concerned that he is not an active gentleman and does have diabetes mellitus, hyperlipidemia, hypertension and has not had prior ischemic evaluation.    1. Premature ventricular complexes: Monomorphic and outflow tract. I do not think he needs a monitor as these are not frequent enough to cause any significant issue. I would not recommend medication for this as he is asymptomatic. I will get a transthoracic echocardiogram to evaluate for a cardiomyopathy that could predispose him to this; however, I think this is unlikely considering the outflow tract location. Standard treadmill stress test with his multiple risk factors for coronary disease.

## 2018-03-26 ENCOUNTER — HOSPITAL ENCOUNTER (OUTPATIENT)
Dept: CARDIOLOGY | Facility: HOSPITAL | Age: 63
Discharge: HOME | End: 2018-03-26
Attending: INTERNAL MEDICINE | Admitting: INTERNAL MEDICINE

## 2018-03-26 ENCOUNTER — HOSPITAL ENCOUNTER (OUTPATIENT)
Dept: CARDIOLOGY | Facility: HOSPITAL | Age: 63
Discharge: HOME | End: 2018-03-26
Attending: INTERNAL MEDICINE

## 2018-03-26 VITALS — WEIGHT: 210 LBS | OXYGEN SATURATION: 95 % | BODY MASS INDEX: 27.83 KG/M2 | HEART RATE: 87 BPM | HEIGHT: 73 IN

## 2018-03-26 DIAGNOSIS — I49.3 ASYMPTOMATIC PVCS: ICD-10-CM

## 2018-03-26 DIAGNOSIS — E78.5 HYPERLIPIDEMIA, UNSPECIFIED HYPERLIPIDEMIA TYPE: ICD-10-CM

## 2018-03-26 DIAGNOSIS — I10 BENIGN ESSENTIAL HYPERTENSION: ICD-10-CM

## 2018-03-26 LAB
BH CV ECHO MEAS - ACS: 2 CM
BH CV ECHO MEAS - AO MAX PG (FULL): 3.7 MMHG
BH CV ECHO MEAS - AO MAX PG: 8 MMHG
BH CV ECHO MEAS - AO MEAN PG (FULL): 3.5 MMHG
BH CV ECHO MEAS - AO MEAN PG: 5.5 MMHG
BH CV ECHO MEAS - AO ROOT AREA (BSA CORRECTED): 1.6
BH CV ECHO MEAS - AO ROOT AREA: 9.4 CM^2
BH CV ECHO MEAS - AO ROOT DIAM: 3.5 CM
BH CV ECHO MEAS - AO V2 MAX: 141.2 CM/SEC
BH CV ECHO MEAS - AO V2 MEAN: 112.7 CM/SEC
BH CV ECHO MEAS - AO V2 VTI: 33.3 CM
BH CV ECHO MEAS - AVA(I,A): 2 CM^2
BH CV ECHO MEAS - AVA(I,D): 2 CM^2
BH CV ECHO MEAS - AVA(V,A): 2.8 CM^2
BH CV ECHO MEAS - AVA(V,D): 2.8 CM^2
BH CV ECHO MEAS - BSA(HAYCOCK): 2.2 M^2
BH CV ECHO MEAS - BSA: 2.2 M^2
BH CV ECHO MEAS - BZI_BMI: 27.7 KILOGRAMS/M^2
BH CV ECHO MEAS - BZI_METRIC_HEIGHT: 185.4 CM
BH CV ECHO MEAS - BZI_METRIC_WEIGHT: 95.3 KG
BH CV ECHO MEAS - CONTRAST EF (2CH): 62.9 ML/M^2
BH CV ECHO MEAS - CONTRAST EF 4CH: 62.6 ML/M^2
BH CV ECHO MEAS - EDV(MOD-SP2): 105 ML
BH CV ECHO MEAS - EDV(MOD-SP4): 107 ML
BH CV ECHO MEAS - EDV(TEICH): 103.6 ML
BH CV ECHO MEAS - EF(CUBED): 69.3 %
BH CV ECHO MEAS - EF(MOD-SP2): 62.9 %
BH CV ECHO MEAS - EF(MOD-SP4): 62.6 %
BH CV ECHO MEAS - EF(TEICH): 60.8 %
BH CV ECHO MEAS - ESV(MOD-SP2): 39 ML
BH CV ECHO MEAS - ESV(MOD-SP4): 40 ML
BH CV ECHO MEAS - ESV(TEICH): 40.6 ML
BH CV ECHO MEAS - FS: 32.5 %
BH CV ECHO MEAS - IVS/LVPW: 0.96
BH CV ECHO MEAS - IVSD: 1 CM
BH CV ECHO MEAS - LAT PEAK E' VEL: 13 CM/SEC
BH CV ECHO MEAS - LV DIASTOLIC VOL/BSA (35-75): 48.7 ML/M^2
BH CV ECHO MEAS - LV MASS(C)D: 169.9 GRAMS
BH CV ECHO MEAS - LV MASS(C)DI: 77.3 GRAMS/M^2
BH CV ECHO MEAS - LV MAX PG: 4.3 MMHG
BH CV ECHO MEAS - LV MEAN PG: 2 MMHG
BH CV ECHO MEAS - LV SYSTOLIC VOL/BSA (12-30): 18.2 ML/M^2
BH CV ECHO MEAS - LV V1 MAX: 103.3 CM/SEC
BH CV ECHO MEAS - LV V1 MEAN: 63.7 CM/SEC
BH CV ECHO MEAS - LV V1 VTI: 17.3 CM
BH CV ECHO MEAS - LVIDD: 4.7 CM
BH CV ECHO MEAS - LVIDS: 3.2 CM
BH CV ECHO MEAS - LVLD AP2: 7 CM
BH CV ECHO MEAS - LVLD AP4: 7 CM
BH CV ECHO MEAS - LVLS AP2: 6.2 CM
BH CV ECHO MEAS - LVLS AP4: 6 CM
BH CV ECHO MEAS - LVOT AREA (M): 3.8 CM^2
BH CV ECHO MEAS - LVOT AREA: 3.8 CM^2
BH CV ECHO MEAS - LVOT DIAM: 2.2 CM
BH CV ECHO MEAS - LVPWD: 1 CM
BH CV ECHO MEAS - MED PEAK E' VEL: 11 CM/SEC
BH CV ECHO MEAS - MV A DUR: 0.11 SEC
BH CV ECHO MEAS - MV A MAX VEL: 112.5 CM/SEC
BH CV ECHO MEAS - MV DEC SLOPE: 476.4 CM/SEC^2
BH CV ECHO MEAS - MV DEC TIME: 0.17 SEC
BH CV ECHO MEAS - MV E MAX VEL: 79.5 CM/SEC
BH CV ECHO MEAS - MV E/A: 0.71
BH CV ECHO MEAS - MV MAX PG: 4.4 MMHG
BH CV ECHO MEAS - MV MEAN PG: 2.7 MMHG
BH CV ECHO MEAS - MV P1/2T MAX VEL: 78.6 CM/SEC
BH CV ECHO MEAS - MV P1/2T: 48.3 MSEC
BH CV ECHO MEAS - MV V2 MAX: 104.5 CM/SEC
BH CV ECHO MEAS - MV V2 MEAN: 79.4 CM/SEC
BH CV ECHO MEAS - MV V2 VTI: 24.6 CM
BH CV ECHO MEAS - MVA P1/2T LCG: 2.8 CM^2
BH CV ECHO MEAS - MVA(P1/2T): 4.6 CM^2
BH CV ECHO MEAS - MVA(VTI): 2.6 CM^2
BH CV ECHO MEAS - PA MAX PG (FULL): 8.2 MMHG
BH CV ECHO MEAS - PA MAX PG: 9.7 MMHG
BH CV ECHO MEAS - PA V2 MAX: 156.1 CM/SEC
BH CV ECHO MEAS - PULM A REVS DUR: 0.11 SEC
BH CV ECHO MEAS - PULM A REVS VEL: 49.5 CM/SEC
BH CV ECHO MEAS - PULM DIAS VEL: 58 CM/SEC
BH CV ECHO MEAS - PULM S/D: 1.3
BH CV ECHO MEAS - PULM SYS VEL: 77.6 CM/SEC
BH CV ECHO MEAS - PVA(V,A): 1.6 CM^2
BH CV ECHO MEAS - PVA(V,D): 1.6 CM^2
BH CV ECHO MEAS - QP/QS: 0.76
BH CV ECHO MEAS - RV MAX PG: 1.5 MMHG
BH CV ECHO MEAS - RV MEAN PG: 0.89 MMHG
BH CV ECHO MEAS - RV V1 MAX: 61.6 CM/SEC
BH CV ECHO MEAS - RV V1 MEAN: 43.8 CM/SEC
BH CV ECHO MEAS - RV V1 VTI: 12.5 CM
BH CV ECHO MEAS - RVOT AREA: 4 CM^2
BH CV ECHO MEAS - RVOT DIAM: 2.2 CM
BH CV ECHO MEAS - SI(AO): 142.6 ML/M^2
BH CV ECHO MEAS - SI(CUBED): 33.3 ML/M^2
BH CV ECHO MEAS - SI(LVOT): 29.6 ML/M^2
BH CV ECHO MEAS - SI(MOD-SP2): 30 ML/M^2
BH CV ECHO MEAS - SI(MOD-SP4): 30.5 ML/M^2
BH CV ECHO MEAS - SI(TEICH): 28.7 ML/M^2
BH CV ECHO MEAS - SUP REN AO DIAM: 1.9 CM
BH CV ECHO MEAS - SV(AO): 313.2 ML
BH CV ECHO MEAS - SV(CUBED): 73.1 ML
BH CV ECHO MEAS - SV(LVOT): 65.1 ML
BH CV ECHO MEAS - SV(MOD-SP2): 66 ML
BH CV ECHO MEAS - SV(MOD-SP4): 67 ML
BH CV ECHO MEAS - SV(RVOT): 49.6 ML
BH CV ECHO MEAS - SV(TEICH): 63 ML
BH CV ECHO MEAS - TAPSE (>1.6): 2.7 CM2
BH CV STRESS BP STAGE 1: NORMAL
BH CV STRESS BP STAGE 2: NORMAL
BH CV STRESS DURATION MIN STAGE 1: 3
BH CV STRESS DURATION MIN STAGE 2: 3
BH CV STRESS DURATION SEC STAGE 1: 0
BH CV STRESS DURATION SEC STAGE 2: 0
BH CV STRESS GRADE STAGE 1: 10
BH CV STRESS GRADE STAGE 2: 12
BH CV STRESS HR STAGE 1: 159
BH CV STRESS HR STAGE 2: 160
BH CV STRESS METS STAGE 1: 5
BH CV STRESS METS STAGE 2: 7.5
BH CV STRESS PROTOCOL 1: NORMAL
BH CV STRESS RECOVERY BP: NORMAL MMHG
BH CV STRESS RECOVERY HR: 98 BPM
BH CV STRESS SPEED STAGE 1: 1.7
BH CV STRESS SPEED STAGE 2: 2.5
BH CV STRESS STAGE 1: 1
BH CV STRESS STAGE 2: 2
BH CV XLRA - RV BASE: 3.2 CM
BH CV XLRA - TDI S': 17 CM/SEC
E/E' RATIO: 7
LEFT ATRIUM VOLUME INDEX: 21 ML/M2
LEFT ATRIUM VOLUME: 47 CM3
MAXIMAL PREDICTED HEART RATE: 156 BPM
MAXIMAL PREDICTED HEART RATE: 156 BPM
PERCENT MAX PREDICTED HR: 102.56 %
STRESS BASELINE BP: NORMAL MMHG
STRESS BASELINE HR: 93 BPM
STRESS PERCENT HR: 121 %
STRESS POST ESTIMATED WORKLOAD: 7 METS
STRESS POST EXERCISE DUR MIN: 6 MIN
STRESS POST EXERCISE DUR SEC: 0 SEC
STRESS POST PEAK BP: NORMAL MMHG
STRESS POST PEAK HR: 160 BPM
STRESS TARGET HR: 133 BPM
STRESS TARGET HR: 133 BPM

## 2018-03-26 PROCEDURE — 93306 TTE W/DOPPLER COMPLETE: CPT | Performed by: INTERNAL MEDICINE

## 2018-03-26 PROCEDURE — 25010000002 PERFLUTREN (DEFINITY) 8.476 MG IN SODIUM CHLORIDE 0.9 % 10 ML INJECTION: Performed by: INTERNAL MEDICINE

## 2018-03-26 PROCEDURE — 93017 CV STRESS TEST TRACING ONLY: CPT

## 2018-03-26 PROCEDURE — 93018 CV STRESS TEST I&R ONLY: CPT | Performed by: INTERNAL MEDICINE

## 2018-03-26 PROCEDURE — 93016 CV STRESS TEST SUPVJ ONLY: CPT | Performed by: INTERNAL MEDICINE

## 2018-03-26 PROCEDURE — 93306 TTE W/DOPPLER COMPLETE: CPT

## 2018-03-26 RX ADMIN — PERFLUTREN 1.5 ML: 6.52 INJECTION, SUSPENSION INTRAVENOUS at 14:45

## 2018-03-28 DIAGNOSIS — R94.39 ABNORMAL STRESS ECG WITH TREADMILL: Primary | ICD-10-CM

## 2018-04-11 ENCOUNTER — HOSPITAL ENCOUNTER (OUTPATIENT)
Dept: CARDIOLOGY | Facility: HOSPITAL | Age: 63
Discharge: HOME | End: 2018-04-11
Attending: INTERNAL MEDICINE | Admitting: INTERNAL MEDICINE

## 2018-04-11 DIAGNOSIS — R94.39 ABNORMAL STRESS ECG WITH TREADMILL: ICD-10-CM

## 2018-04-11 LAB
BH CV NUCLEAR PRIOR STUDY: 3
BH CV STRESS BP STAGE 1: NORMAL
BH CV STRESS BP STAGE 2: NORMAL
BH CV STRESS DURATION MIN STAGE 1: 3
BH CV STRESS DURATION MIN STAGE 2: 3
BH CV STRESS DURATION SEC STAGE 1: 0
BH CV STRESS DURATION SEC STAGE 2: 0
BH CV STRESS GRADE STAGE 1: 10
BH CV STRESS GRADE STAGE 2: 12
BH CV STRESS HR STAGE 1: 132
BH CV STRESS HR STAGE 2: 152
BH CV STRESS METS STAGE 1: 5
BH CV STRESS METS STAGE 2: 7.5
BH CV STRESS PROTOCOL 1: NORMAL
BH CV STRESS RECOVERY BP: NORMAL MMHG
BH CV STRESS RECOVERY HR: 94 BPM
BH CV STRESS SPEED STAGE 1: 1.7
BH CV STRESS SPEED STAGE 2: 2.5
BH CV STRESS STAGE 1: 1
BH CV STRESS STAGE 2: 2
LV EF NUC BP: 61 %
MAXIMAL PREDICTED HEART RATE: 156 BPM
PERCENT MAX PREDICTED HR: 97.44 %
STRESS BASELINE BP: NORMAL MMHG
STRESS BASELINE HR: 84 BPM
STRESS PERCENT HR: 115 %
STRESS POST ESTIMATED WORKLOAD: 7 METS
STRESS POST EXERCISE DUR MIN: 6 MIN
STRESS POST EXERCISE DUR SEC: 0 SEC
STRESS POST PEAK BP: NORMAL MMHG
STRESS POST PEAK HR: 152 BPM
STRESS TARGET HR: 133 BPM

## 2018-04-11 PROCEDURE — 93018 CV STRESS TEST I&R ONLY: CPT | Performed by: INTERNAL MEDICINE

## 2018-04-11 PROCEDURE — 78452 HT MUSCLE IMAGE SPECT MULT: CPT

## 2018-04-11 PROCEDURE — 78452 HT MUSCLE IMAGE SPECT MULT: CPT | Performed by: INTERNAL MEDICINE

## 2018-04-11 PROCEDURE — 93016 CV STRESS TEST SUPVJ ONLY: CPT | Performed by: INTERNAL MEDICINE

## 2018-04-11 PROCEDURE — A9502 TC99M TETROFOSMIN: HCPCS | Performed by: INTERNAL MEDICINE

## 2018-04-11 PROCEDURE — 0 TECHNETIUM TETROFOSMIN KIT: Performed by: INTERNAL MEDICINE

## 2018-04-11 PROCEDURE — 93017 CV STRESS TEST TRACING ONLY: CPT

## 2018-04-11 PROCEDURE — 25010000002 REGADENOSON 0.4 MG/5ML SOLUTION: Performed by: INTERNAL MEDICINE

## 2018-04-11 RX ADMIN — REGADENOSON 0.4 MG: 0.08 INJECTION, SOLUTION INTRAVENOUS at 13:35

## 2018-04-11 RX ADMIN — TETROFOSMIN 1 DOSE: 1.38 INJECTION, POWDER, LYOPHILIZED, FOR SOLUTION INTRAVENOUS at 13:35

## 2018-04-11 RX ADMIN — TETROFOSMIN 1 DOSE: 1.38 INJECTION, POWDER, LYOPHILIZED, FOR SOLUTION INTRAVENOUS at 12:50

## 2018-04-16 ENCOUNTER — TELEPHONE (OUTPATIENT)
Dept: FAMILY MEDICINE CLINIC | Facility: CLINIC | Age: 63
End: 2018-04-16

## 2018-04-16 NOTE — TELEPHONE ENCOUNTER
I CAN SEE RESULTS FROM CARDIOLOGY. I WASN'T SURE IF YOU ASKED THEN ABOUT THEIR OPINION. BP MOST RECENT SEVERAL READINGS HAVE BEEN 120S-130S/60S-70S, WHICH DOES NOT REQUIRE MEDICATION CHANGE, BUT ARE JUST BARELY CONTROLLED. BP NOT CONSISTENTLY < 120, WHICH WOULD BE CONSIDERED CONTROLLED BLOOD PRESSURE WITHOUT MEDICATION AND IS NOT LOW, WHICH WE SEE FREQUENTLY WITH NORMAL BP THAT WE THEN ADD MEDICATION FOR HEART OR KIDNEY PROTECTION.

## 2018-04-16 NOTE — TELEPHONE ENCOUNTER
----- Message from Page Bernard sent at 4/16/2018  3:16 PM EDT -----  Regarding: RE: Non-Urgent Medical Question  Contact: [phone]  the last test they did was a stress test with myocardial perfusion,and dr. sanders called and said everything looked good and no further testing was required. we never talked about blood pressure. his number is [phone] or [phone]. results of all testing should have been sent to you. i have it through my chart. THANKS PAGE BERNARD. i was just wondering, because everytime i had my blood pressure taken(physical,d.o.t. physical,etc.) i would ask what it was and was told it was always good.  ----- Message -----  From: TAD Mccartney  Sent: 4/16/2018  8:35 AM EDT  To: Page Bernard  Subject: RE: Non-Urgent Medical Question  I UNDERSTAND. THIS IS FREQUENTLY STARTED FOR KIDNEY PROTECTION EVEN WITHOUT HIGH BLOOD PRESSURE. HOWEVER, YOUR BLOOD PRESSURE HAS BEEN BORDERLINE ELEVATED EVEN ON MEDICATION WHEN LOOKING AT YOUR VITAL SIGN HISTORY. IT IS POSSIBLE YOU HAVE DEVELOPED HIGH BLOOD PRESSURE. USUALLY WHEN SOMEONE DOES NOT HAVE HIGH BLOOD PRESSURE AND IS ON MEDICATION, IT IS USUALLY LOW. WHAT DID CARDIOLOGY THINK?    ----- Message -----     From: Page Bernard     Sent: 4/14/2018 10:20 AM EDT       To: TAD Mccartney  Subject: Non-Urgent Medical Question    when was i diagnosed with high blood pressure? the benazepril was prescribed by dr. meyer as a protection for my kidneys because of the diabetes, not for high blood pressure.

## 2018-05-22 RX ORDER — SYRINGE-NEEDLE,INSULIN,0.5 ML 31 GX5/16"
SYRINGE, EMPTY DISPOSABLE MISCELLANEOUS
Qty: 90 EACH | Refills: 0 | Status: SHIPPED | OUTPATIENT
Start: 2018-05-22 | End: 2022-01-20

## 2018-05-27 DIAGNOSIS — E11.9 TYPE 2 DIABETES MELLITUS WITHOUT COMPLICATION, WITH LONG-TERM CURRENT USE OF INSULIN (HCC): ICD-10-CM

## 2018-05-27 DIAGNOSIS — Z79.4 TYPE 2 DIABETES MELLITUS WITHOUT COMPLICATION, WITH LONG-TERM CURRENT USE OF INSULIN (HCC): ICD-10-CM

## 2018-05-29 RX ORDER — INSULIN ASPART 100 [IU]/ML
INJECTION, SOLUTION INTRAVENOUS; SUBCUTANEOUS
Qty: 15 ML | Refills: 0 | Status: SHIPPED | OUTPATIENT
Start: 2018-05-29 | End: 2018-06-06 | Stop reason: SDUPTHER

## 2018-05-29 RX ORDER — INSULIN GLARGINE 100 [IU]/ML
28 INJECTION, SOLUTION SUBCUTANEOUS NIGHTLY
Qty: 9 ML | Refills: 0 | Status: SHIPPED | OUTPATIENT
Start: 2018-05-29 | End: 2018-06-06 | Stop reason: CLARIF

## 2018-06-06 ENCOUNTER — OFFICE VISIT (OUTPATIENT)
Dept: ENDOCRINOLOGY | Age: 63
End: 2018-06-06

## 2018-06-06 VITALS
WEIGHT: 211.8 LBS | SYSTOLIC BLOOD PRESSURE: 104 MMHG | DIASTOLIC BLOOD PRESSURE: 70 MMHG | HEIGHT: 73 IN | OXYGEN SATURATION: 95 % | BODY MASS INDEX: 28.07 KG/M2 | HEART RATE: 78 BPM

## 2018-06-06 DIAGNOSIS — I10 BENIGN ESSENTIAL HYPERTENSION: ICD-10-CM

## 2018-06-06 DIAGNOSIS — E11.9 TYPE 2 DIABETES MELLITUS WITHOUT COMPLICATION, WITH LONG-TERM CURRENT USE OF INSULIN (HCC): Primary | ICD-10-CM

## 2018-06-06 DIAGNOSIS — E55.9 VITAMIN D DEFICIENCY: ICD-10-CM

## 2018-06-06 DIAGNOSIS — Z79.4 TYPE 2 DIABETES MELLITUS WITHOUT COMPLICATION, WITH LONG-TERM CURRENT USE OF INSULIN (HCC): Primary | ICD-10-CM

## 2018-06-06 DIAGNOSIS — E78.5 HYPERLIPIDEMIA, UNSPECIFIED HYPERLIPIDEMIA TYPE: ICD-10-CM

## 2018-06-06 PROCEDURE — 99214 OFFICE O/P EST MOD 30 MIN: CPT | Performed by: INTERNAL MEDICINE

## 2018-06-06 RX ORDER — INSULIN GLARGINE 100 [IU]/ML
INJECTION, SOLUTION SUBCUTANEOUS
Qty: 5 PEN | Refills: 6 | Status: SHIPPED | OUTPATIENT
Start: 2018-06-06 | End: 2019-03-25 | Stop reason: SDUPTHER

## 2018-06-06 NOTE — PROGRESS NOTES
Subjective   Jeff Bernard is a 63 y.o. male.     F/u for dm 2/ testing bs 4 x day / last dm eye exam 12/29/17 with dr Camejo/ last dm foot exam today with dr Juarez       Diabetes   He has type 2 diabetes mellitus. No MedicAlert identification noted. The initial diagnosis of diabetes was made 19 years ago. Pertinent negatives for hypoglycemia include no confusion, dizziness, headaches, hunger, mood changes, nervousness/anxiousness, pallor, seizures, sleepiness, speech difficulty, sweats or tremors. Pertinent negatives for diabetes include no blurred vision, no chest pain, no fatigue, no foot paresthesias, no foot ulcerations, no polydipsia, no polyphagia, no polyuria, no visual change, no weakness and no weight loss. Pertinent negatives for hypoglycemia complications include no blackouts, no hospitalization, no nocturnal hypoglycemia, no required assistance and no required glucagon injection. Symptoms are improving. Diabetic complications include impotence. Pertinent negatives for diabetic complications include no CVA, heart disease, nephropathy, peripheral neuropathy, PVD or retinopathy. Risk factors for coronary artery disease include obesity. Current diabetic treatment includes insulin injections. He is compliant with treatment all of the time. He is currently taking insulin pre-breakfast, pre-lunch, pre-dinner and at bedtime. Insulin injections are given by patient. Rotation sites for injection include the abdominal wall. His weight is stable. He is following a generally healthy diet. Meal planning includes carbohydrate counting. He has not had a previous visit with a dietitian. He participates in exercise three times a week. He monitors blood glucose at home 3-4 x per day. He monitors urine at home <1 x per month. Blood glucose monitoring compliance is good. His home blood glucose trend is fluctuating minimally. His breakfast blood glucose is taken between 8-9 am. His breakfast blood glucose range is generally  110-130 mg/dl. His lunch blood glucose is taken between 12-1 pm. His lunch blood glucose range is generally 140-180 mg/dl. His dinner blood glucose is taken between 5-6 pm. His dinner blood glucose range is generally 140-180 mg/dl. His highest blood glucose is 140-180 mg/dl. His overall blood glucose range is 140-180 mg/dl. He does not see a podiatrist.Eye exam is current.      Patient has known diabetes mellitus since 1999 and started on insulin in 2000. He is on Lantus 30 units every evening and NovoLog 1 unit per 6 g of carbohydrate before each meal. He checks his blood sugar 4 times a day. Fasting blood sugar runs . Lunchtime blood sugar runs . Suppertime blood sugar runs between . Bedtime blood sugar runs between .  He denies any severe hypoglycemic episode. He has lost 6  pounds since 7/17. His last meal was at 4:30 AM.      His last eye examination was in December 2017 and he has no retinopathy. He denies any associated nephropathy. Urine microalbumin was normal in 1/17. He is on benazepril. He denies any numbness, tingling or burning in his feet.      He has hyperlipidemia and has been on Lipitor 20 mg once a day. He denies any muscle pains.      He has no history of hypertension. He denies any previous history of myocardial infarction, heart failure, or stroke. He denies chest pain, shortness of breath or pedal edema.  He had a normal nuclear stress test in 4/18     He had a colonoscopy done in March 2017 and polyps were removed by Dr. Kelly.  Pathology report was read as tubular adenoma with low-grade dysplasia and hyperplastic polyps.  He was advised follow-up colonoscopy in 5 years.     He has vit D deficiency and is on Vit D 1000 u/day.    The following portions of the patient's history were reviewed and updated as appropriate: allergies, current medications, past family history, past medical history, past social history, past surgical history and problem list.    Review of  "Systems   Constitutional: Negative.  Negative for fatigue and weight loss.   HENT: Negative.    Eyes: Negative.  Negative for blurred vision.   Respiratory: Negative.    Cardiovascular: Negative.  Negative for chest pain.   Gastrointestinal: Negative.    Endocrine: Negative.  Negative for polydipsia, polyphagia and polyuria.   Genitourinary: Positive for impotence.   Musculoskeletal: Negative.    Skin: Negative.  Negative for pallor.   Allergic/Immunologic: Negative.    Neurological: Negative.  Negative for dizziness, tremors, seizures, speech difficulty, weakness and headaches.   Hematological: Negative.    Psychiatric/Behavioral: Negative.  Negative for confusion. The patient is not nervous/anxious.        Objective      Vitals:    06/06/18 1144   BP: 104/70   BP Location: Right arm   Patient Position: Sitting   Cuff Size: Large Adult   Pulse: 78   SpO2: 95%   Weight: 96.1 kg (211 lb 12.8 oz)   Height: 185.4 cm (72.99\")     Physical Exam   Constitutional: He is oriented to person, place, and time. He appears well-developed and well-nourished. No distress.   HENT:   Head: Normocephalic.   Nose: Nose normal.   Mouth/Throat: No oropharyngeal exudate.   Eyes: Conjunctivae and EOM are normal. Right eye exhibits no discharge. Left eye exhibits no discharge. No scleral icterus.   Neck: Neck supple. No JVD present. No tracheal deviation present. No thyromegaly present.   Cardiovascular: Normal rate, regular rhythm, normal heart sounds and intact distal pulses.  Exam reveals no friction rub.    No murmur heard.  Pulmonary/Chest: Effort normal and breath sounds normal. No respiratory distress. He has no wheezes. He has no rales.   Abdominal: Soft. Bowel sounds are normal. He exhibits no distension and no mass. There is no tenderness. There is no guarding.   Musculoskeletal: Normal range of motion. He exhibits no edema, tenderness or deformity.   Lymphadenopathy:     He has no cervical adenopathy.   Neurological: He is alert " and oriented to person, place, and time.   Intact light touch   Skin: Skin is warm and dry. No rash noted. No erythema.   Psychiatric: He has a normal mood and affect. His behavior is normal.     Hospital Outpatient Visit on 04/11/2018   Component Date Value Ref Range Status   • Nuclear Prior Study 04/11/2018 3   Final   • BH CV STRESS PROTOCOL 1 04/11/2018 Roby   Final   • Stage 1 04/11/2018 1   Final   • HR Stage 1 04/11/2018 132   Final   • BP Stage 1 04/11/2018 180/90   Final   • Duration Min Stage 1 04/11/2018 3   Final   • Duration Sec Stage 1 04/11/2018 0   Final   • Grade Stage 1 04/11/2018 10   Final   • Speed Stage 1 04/11/2018 1.7   Final   • BH CV STRESS METS STAGE 1 04/11/2018 5   Final   • Stage 2 04/11/2018 2   Final   • HR Stage 2 04/11/2018 152   Final   • BP Stage 2 04/11/2018 190/96   Final   • Duration Min Stage 2 04/11/2018 3   Final   • Duration Sec Stage 2 04/11/2018 0   Final   • Grade Stage 2 04/11/2018 12   Final   • Speed Stage 2 04/11/2018 2.5   Final   • BH CV STRESS METS STAGE 2 04/11/2018 7.5   Final   • Baseline HR 04/11/2018 84  bpm Final   • Baseline BP 04/11/2018 150/72  mmHg Final   • Peak HR 04/11/2018 152  bpm Final   • Percent Max Pred HR 04/11/2018 97.44  % Final   • Percent Target HR 04/11/2018 115  % Final   • Peak BP 04/11/2018 190/96  mmHg Final   • Recovery HR 04/11/2018 94  bpm Final   • Recovery BP 04/11/2018 180/72  mmHg Final   • Target HR (85%) 04/11/2018 133  bpm Final   • Max. Pred. HR (100%) 04/11/2018 156  bpm Final   • Exercise duration (min) 04/11/2018 6  min Final   • Exercise duration (sec) 04/11/2018 0  sec Final   • Estimated workload 04/11/2018 7.0  METS Final   • Nuc Stress EF 04/11/2018 61  % Final     Assessment/Plan   Jeff was seen today for diabetes.    Diagnoses and all orders for this visit:    Type 2 diabetes mellitus without complication, with long-term current use of insulin  -     Comprehensive Metabolic Panel  -     Lipid Panel  -      Hemoglobin A1c  -     TSH  -     T4, Free  -     Microalbumin / Creatinine Urine Ratio - Urine, Clean Catch  -     Insulin Glargine (BASAGLAR KWIKPEN) 100 UNIT/ML injection pen; 30 units every evening    Hyperlipidemia, unspecified hyperlipidemia type  -     Comprehensive Metabolic Panel  -     Lipid Panel  -     TSH  -     T4, Free    Benign essential hypertension  -     Comprehensive Metabolic Panel    Vitamin D deficiency  -     Comprehensive Metabolic Panel  -     Vitamin D 25 Hydroxy      May switch from Lantus to basilar 30 units every evening.  Continue mealtime NovoLog.  Continue Lipitor 20 mg once a day.  Continue benazepril 10 mg once a day.  Continue vitamin D 1000 units per day.    Send copy of my notes and labs to [facility]    RTC 6 mos

## 2018-06-07 LAB
25(OH)D3+25(OH)D2 SERPL-MCNC: 33.7 NG/ML (ref 30–100)
ALBUMIN SERPL-MCNC: 4.2 G/DL (ref 3.5–5.2)
ALBUMIN/CREAT UR: 5.4 MG/G CREAT (ref 0–30)
ALBUMIN/GLOB SERPL: 1.4 G/DL
ALP SERPL-CCNC: 64 U/L (ref 39–117)
ALT SERPL-CCNC: 18 U/L (ref 1–41)
AST SERPL-CCNC: 22 U/L (ref 1–40)
BILIRUB SERPL-MCNC: 0.7 MG/DL (ref 0.1–1.2)
BUN SERPL-MCNC: 13 MG/DL (ref 8–23)
BUN/CREAT SERPL: 15.5 (ref 7–25)
CALCIUM SERPL-MCNC: 9.1 MG/DL (ref 8.6–10.5)
CHLORIDE SERPL-SCNC: 102 MMOL/L (ref 98–107)
CHOLEST SERPL-MCNC: 152 MG/DL (ref 0–200)
CO2 SERPL-SCNC: 25.8 MMOL/L (ref 22–29)
CREAT SERPL-MCNC: 0.84 MG/DL (ref 0.76–1.27)
CREAT UR-MCNC: 216.6 MG/DL
GFR SERPLBLD CREATININE-BSD FMLA CKD-EPI: 111 ML/MIN/1.73
GFR SERPLBLD CREATININE-BSD FMLA CKD-EPI: 92 ML/MIN/1.73
GLOBULIN SER CALC-MCNC: 3.1 GM/DL
GLUCOSE SERPL-MCNC: 119 MG/DL (ref 65–99)
HBA1C MFR BLD: 7.8 % (ref 4.8–5.6)
HDLC SERPL-MCNC: 46 MG/DL (ref 40–60)
INTERPRETATION: NORMAL
LDLC SERPL CALC-MCNC: 94 MG/DL (ref 0–100)
Lab: NORMAL
MICROALBUMIN UR-MCNC: 11.7 UG/ML
POTASSIUM SERPL-SCNC: 4.1 MMOL/L (ref 3.5–5.2)
PROT SERPL-MCNC: 7.3 G/DL (ref 6–8.5)
SODIUM SERPL-SCNC: 141 MMOL/L (ref 136–145)
T4 FREE SERPL-MCNC: 1.08 NG/DL (ref 0.93–1.7)
TRIGL SERPL-MCNC: 58 MG/DL (ref 0–150)
TSH SERPL DL<=0.005 MIU/L-ACNC: 1.44 MIU/ML (ref 0.27–4.2)
VLDLC SERPL CALC-MCNC: 11.6 MG/DL (ref 5–40)

## 2018-06-25 DIAGNOSIS — E11.9 TYPE 2 DIABETES MELLITUS WITHOUT COMPLICATION, WITH LONG-TERM CURRENT USE OF INSULIN (HCC): ICD-10-CM

## 2018-06-25 DIAGNOSIS — Z79.4 TYPE 2 DIABETES MELLITUS WITHOUT COMPLICATION, WITH LONG-TERM CURRENT USE OF INSULIN (HCC): ICD-10-CM

## 2018-06-25 RX ORDER — INSULIN GLARGINE 100 [IU]/ML
INJECTION, SOLUTION SUBCUTANEOUS
Qty: 30 ML | Refills: 1 | Status: SHIPPED | OUTPATIENT
Start: 2018-06-25 | End: 2018-12-06 | Stop reason: SDUPTHER

## 2018-06-25 RX ORDER — INSULIN ASPART 100 [IU]/ML
INJECTION, SOLUTION INTRAVENOUS; SUBCUTANEOUS
Qty: 45 ML | Refills: 1 | Status: SHIPPED | OUTPATIENT
Start: 2018-06-25 | End: 2018-08-08 | Stop reason: SDUPTHER

## 2018-08-08 ENCOUNTER — OFFICE VISIT (OUTPATIENT)
Dept: FAMILY MEDICINE CLINIC | Facility: CLINIC | Age: 63
End: 2018-08-08

## 2018-08-08 VITALS
BODY MASS INDEX: 28.57 KG/M2 | HEIGHT: 73 IN | DIASTOLIC BLOOD PRESSURE: 76 MMHG | TEMPERATURE: 98.8 F | WEIGHT: 215.6 LBS | OXYGEN SATURATION: 96 % | HEART RATE: 69 BPM | SYSTOLIC BLOOD PRESSURE: 122 MMHG

## 2018-08-08 DIAGNOSIS — R68.89 ABNORMAL TESTICULAR EXAM: ICD-10-CM

## 2018-08-08 DIAGNOSIS — I10 BENIGN ESSENTIAL HYPERTENSION: Primary | ICD-10-CM

## 2018-08-08 DIAGNOSIS — E55.9 VITAMIN D DEFICIENCY: ICD-10-CM

## 2018-08-08 DIAGNOSIS — R97.20 INCREASED PROSTATE SPECIFIC ANTIGEN (PSA) VELOCITY: ICD-10-CM

## 2018-08-08 DIAGNOSIS — E11.9 TYPE 2 DIABETES MELLITUS WITHOUT COMPLICATION, WITH LONG-TERM CURRENT USE OF INSULIN (HCC): ICD-10-CM

## 2018-08-08 DIAGNOSIS — I65.21 ARTERIOSCLEROSIS OF RIGHT CAROTID ARTERY: ICD-10-CM

## 2018-08-08 DIAGNOSIS — Z79.4 TYPE 2 DIABETES MELLITUS WITHOUT COMPLICATION, WITH LONG-TERM CURRENT USE OF INSULIN (HCC): ICD-10-CM

## 2018-08-08 DIAGNOSIS — E78.5 HYPERLIPIDEMIA, UNSPECIFIED HYPERLIPIDEMIA TYPE: ICD-10-CM

## 2018-08-08 PROCEDURE — 99213 OFFICE O/P EST LOW 20 MIN: CPT | Performed by: PHYSICIAN ASSISTANT

## 2018-08-08 NOTE — PROGRESS NOTES
Subjective   Jeff Bernard is a 63 y.o. male.Patient seen for follow-up for hyperlipidemia, hypertension     History of Present Illness     HAS BEEN DOING WELL WITHOUT COMPLAINTS. NO CONCERNS. TOLERATING MEDICATIONS WITHOUT AE.     ENDOCRINOLOGY- DR PANTOJA- LAST SEEN 6/2018 WITH A1C 7.8%.   CARDIOLOGY- WAS SEEN BY DR WINN 3/2018 AND HAD ECHOCARDIOGRAM FOR PVC. HE WILL FOLLOW UP IN 6 MONTHS.   UROLOGY- DR CINTRON- LAST SEEN 6/2018 FOR ELEVATED PSA AND UNDESCENDED TESTICLE. FOLLOW UP IN 1 WEEK. WE WILL OBTAIN MORE RECENT CONSULT NOTE.   VASCULAR SCREENING- 2/2018- MILD PLAQUE IN THE RIGHT CAROTID BUT OTHERWISE NO STENOSIS, NORMAL KWAN AND AAA SCREEN.     The following portions of the patient's history were reviewed and updated as appropriate: allergies, current medications, past family history, past medical history, past social history, past surgical history and problem list.    Review of Systems   All other systems reviewed and are negative.      Objective   Physical Exam   Constitutional: He is oriented to person, place, and time. He appears well-developed.   HENT:   Head: Normocephalic and atraumatic.   Right Ear: External ear normal.   Left Ear: External ear normal.   Eyes: Conjunctivae are normal.   Neck: Carotid bruit is not present. No tracheal deviation present. No thyroid mass and no thyromegaly present.   Cardiovascular: Normal rate, regular rhythm, normal heart sounds and intact distal pulses.    Pulmonary/Chest: Effort normal and breath sounds normal.   Neurological: He is alert and oriented to person, place, and time. Gait normal.   Skin: Skin is warm and dry.   Psychiatric: He has a normal mood and affect. His behavior is normal. Judgment and thought content normal.   Nursing note and vitals reviewed.      Assessment/Plan   Jeff was seen today for follow-up.    Diagnoses and all orders for this visit:    Benign essential hypertension    Hyperlipidemia, unspecified hyperlipidemia type    Arteriosclerosis  of right carotid artery    Type 2 diabetes mellitus without complication, with long-term current use of insulin (CMS/[facility])    Vitamin D deficiency    Increased prostate specific antigen (PSA) velocity    Abnormal testicular exam      Patient Instructions   63 YEAR OLD MALE WHO PRESENTS TODAY IN FOLLOW UP OF HTN AND SPECIALISTS. BLOOD PRESSURE TODAY IS OK- HE HAS NOT BEEN DIAGNOSED WITH HTN BUT WAS STARTED ON LOTENSIN FOR RENAL PROTECTION WITH DIABETES. HOWEVER, BLOOD PRESSURE IS USUALLY JUST CONTROLLED OR BORDERLINE. HE WOULD LIKELY HAVE ELEVATED BP WITHOUT LOTENSIN. HE IS STABLE ON MEDICATION WITHOUT AE. PATIENT IS UP TO DATE WITH DR PANTOJA FOR DIABETES, HYPERLIPIDEMIA, AND VITAMIN D DEFICIENCY. HIS LAST A1C WAS ELEVATED AT 7.8%. HE IS WORKING ON BETTER CONTROL. HE WAS ALSO SEEN BY CARDIOLOGY WITH NEGATIVE ECHO AND IS DUE FOR FOLLOW UP IN 9/2018. PATIENT WAS SEEN IN FOLLOW UP WITH DR CINTRON. I DO NOT HAVE THE MOST RECENT NOTES FROM HIS, SO WE WILL NEED TO OBTAIN THESE. HE IS NOW ON MEDICARE AND WILL NEED A MEDICARE WELLNESS VISIT IN January. PATIENT IS AGREEABLE AND WILL SCHEDULE FOR AWV AND FOLLOW UP.

## 2018-08-13 PROBLEM — R97.20 INCREASED PROSTATE SPECIFIC ANTIGEN (PSA) VELOCITY: Status: ACTIVE | Noted: 2018-08-13

## 2018-08-13 PROBLEM — R68.89 ABNORMAL TESTICULAR EXAM: Status: ACTIVE | Noted: 2018-08-13

## 2018-08-13 NOTE — PATIENT INSTRUCTIONS
63 YEAR OLD MALE WHO PRESENTS TODAY IN FOLLOW UP OF HTN AND SPECIALISTS. BLOOD PRESSURE TODAY IS OK- HE HAS NOT BEEN DIAGNOSED WITH HTN BUT WAS STARTED ON LOTENSIN FOR RENAL PROTECTION WITH DIABETES. HOWEVER, BLOOD PRESSURE IS USUALLY JUST CONTROLLED OR BORDERLINE. HE WOULD LIKELY HAVE ELEVATED BP WITHOUT LOTENSIN. HE IS STABLE ON MEDICATION WITHOUT AE. PATIENT IS UP TO DATE WITH DR PANTOJA FOR DIABETES, HYPERLIPIDEMIA, AND VITAMIN D DEFICIENCY. HIS LAST A1C WAS ELEVATED AT 7.8%. HE IS WORKING ON BETTER CONTROL. HE WAS ALSO SEEN BY CARDIOLOGY WITH NEGATIVE ECHO AND IS DUE FOR FOLLOW UP IN 9/2018. PATIENT WAS SEEN IN FOLLOW UP WITH DR CINTRON. I DO NOT HAVE THE MOST RECENT NOTES FROM HIS, SO WE WILL NEED TO OBTAIN THESE. HE IS NOW ON MEDICARE AND WILL NEED A MEDICARE WELLNESS VISIT IN January. PATIENT IS AGREEABLE AND WILL SCHEDULE FOR AWV AND FOLLOW UP.

## 2018-08-14 RX ORDER — ATORVASTATIN CALCIUM 20 MG/1
TABLET, FILM COATED ORAL
Qty: 30 TABLET | Refills: 3 | Status: SHIPPED | OUTPATIENT
Start: 2018-08-14 | End: 2018-12-13 | Stop reason: SDUPTHER

## 2018-08-16 RX ORDER — BENAZEPRIL HYDROCHLORIDE 10 MG/1
10 TABLET ORAL DAILY
Qty: 30 TABLET | Refills: 0 | Status: SHIPPED | OUTPATIENT
Start: 2018-08-16 | End: 2018-09-17 | Stop reason: SDUPTHER

## 2018-09-17 RX ORDER — BENAZEPRIL HYDROCHLORIDE 10 MG/1
TABLET ORAL
Qty: 30 TABLET | Refills: 3 | Status: SHIPPED | OUTPATIENT
Start: 2018-09-17 | End: 2019-02-17 | Stop reason: SDUPTHER

## 2018-11-07 ENCOUNTER — OFFICE VISIT (OUTPATIENT)
Dept: FAMILY MEDICINE CLINIC | Facility: CLINIC | Age: 63
End: 2018-11-07

## 2018-11-07 ENCOUNTER — TELEPHONE (OUTPATIENT)
Dept: CARDIOLOGY | Facility: CLINIC | Age: 63
End: 2018-11-07

## 2018-11-07 VITALS
OXYGEN SATURATION: 98 % | SYSTOLIC BLOOD PRESSURE: 120 MMHG | HEIGHT: 73 IN | WEIGHT: 221 LBS | RESPIRATION RATE: 16 BRPM | TEMPERATURE: 98.3 F | HEART RATE: 78 BPM | BODY MASS INDEX: 29.29 KG/M2 | DIASTOLIC BLOOD PRESSURE: 64 MMHG

## 2018-11-07 DIAGNOSIS — Z79.4 TYPE 2 DIABETES MELLITUS WITHOUT COMPLICATION, WITH LONG-TERM CURRENT USE OF INSULIN (HCC): ICD-10-CM

## 2018-11-07 DIAGNOSIS — E78.5 HYPERLIPIDEMIA, UNSPECIFIED HYPERLIPIDEMIA TYPE: ICD-10-CM

## 2018-11-07 DIAGNOSIS — R97.20 INCREASED PROSTATE SPECIFIC ANTIGEN (PSA) VELOCITY: ICD-10-CM

## 2018-11-07 DIAGNOSIS — Z00.00 MEDICARE ANNUAL WELLNESS VISIT, INITIAL: Primary | ICD-10-CM

## 2018-11-07 DIAGNOSIS — E11.9 TYPE 2 DIABETES MELLITUS WITHOUT COMPLICATION, WITH LONG-TERM CURRENT USE OF INSULIN (HCC): ICD-10-CM

## 2018-11-07 DIAGNOSIS — I10 BENIGN ESSENTIAL HYPERTENSION: ICD-10-CM

## 2018-11-07 DIAGNOSIS — E55.9 VITAMIN D DEFICIENCY: ICD-10-CM

## 2018-11-07 LAB
A/C: NORMAL
BILIRUB BLD-MCNC: NEGATIVE MG/DL
CLARITY, POC: CLEAR
COLOR UR: YELLOW
GLUCOSE UR STRIP-MCNC: NEGATIVE MG/DL
KETONES UR QL: NEGATIVE
LEUKOCYTE EST, POC: NEGATIVE
NITRITE UR-MCNC: NEGATIVE MG/ML
PH UR: 7 [PH] (ref 5–8)
POC CREATININE URINE: NORMAL
POC MICROALBUMIN URINE: NORMAL
PROT UR STRIP-MCNC: NEGATIVE MG/DL
RBC # UR STRIP: NEGATIVE /UL
SP GR UR: 1.02 (ref 1–1.03)
UROBILINOGEN UR QL: NORMAL

## 2018-11-07 PROCEDURE — 82044 UR ALBUMIN SEMIQUANTITATIVE: CPT | Performed by: PHYSICIAN ASSISTANT

## 2018-11-07 PROCEDURE — G0402 INITIAL PREVENTIVE EXAM: HCPCS | Performed by: PHYSICIAN ASSISTANT

## 2018-11-07 PROCEDURE — 99213 OFFICE O/P EST LOW 20 MIN: CPT | Performed by: PHYSICIAN ASSISTANT

## 2018-11-07 PROCEDURE — 81003 URINALYSIS AUTO W/O SCOPE: CPT | Performed by: PHYSICIAN ASSISTANT

## 2018-11-07 NOTE — PATIENT INSTRUCTIONS
63 YEAR OLD MALE WHO PRESENTS TODAY FOR AWV AND IN FOLLOW UP OF HTN AND SPECIALISTS. HE IS UP TO DATE ON COLONOSCOPY AND MOST IMMUNIZATIONS. PATIENT TO CHECK WITH INSURANCE REGARDING COVERAGE FOR VACCINE- PNEUMOVAX AND HEPATITIS A HERE VS PHARMACY.     BLOOD PRESSURE TODAY IS OK- HE HAS NOT BEEN DIAGNOSED WITH HTN BUT WAS STARTED ON LOTENSIN FOR RENAL PROTECTION WITH DIABETES. HOWEVER, BLOOD PRESSURE IS USUALLY JUST CONTROLLED OR BORDERLINE. HE WOULD LIKELY HAVE ELEVATED BP WITHOUT LOTENSIN. HE IS STABLE ON MEDICATION WITHOUT AE.     PATIENT IS UP TO DATE WITH DR PANTOJA FOR DIABETES, HYPERLIPIDEMIA, AND VITAMIN D DEFICIENCY, WITH FOLLOW UP 12/2018. HIS LAST A1C WAS ELEVATED AT 7.8%. HE IS WORKING ON BETTER CONTROL. HE WAS ALSO SEEN BY CARDIOLOGY WITH NEGATIVE ECHO AND WAS DUE FOR FOLLOW UP IN 9/2018. HE DID NOT FOLLOW UP. HE WILL CALL TO SEE IF FOLLOW UP IS NEEDED OR ONLY PRN. PATIENT WAS SEEN IN FOLLOW UP WITH DR CINTRON 7/2018- HE WILL FOLLOW UP IN 1 YEAR.     LABS TODAY- CALL IF NO RESULTS IN 1 WEEK. FOLLOW UP IN 6 MONTHS IF HE IS DOING WELL.

## 2018-11-07 NOTE — TELEPHONE ENCOUNTER
11/07/18  2:46 PM  Jeff Bernard  1955    Home Phone [phone]   Mobile [phone]       Jeff Bernard is a patient who saw Dr Gallegos back 3/2018.  He stated you spoke with him after his stress test and told him he didn't need to follow up, unless he is having issues.  Mr Bernard saw his PCP and she mentioned to him you wanted to see him in 6 months.  He is not having any issues and wanted to know if he should schedule a follow up or not?  Amy Fitzgerald RN  Triage nurse

## 2018-11-07 NOTE — PROGRESS NOTES
QUICK REFERENCE INFORMATION:  The ABCs of the Annual Wellness Visit    Welcome to Medicare Visit    HEALTH RISK ASSESSMENT    1955    Recent Hospitalizations:  No hospitalization(s) within the last year..      Current Medical Providers:  Patient Care Team:  Esperanza Sarmiento PA as PCP - General  Esperanza Sarmiento PA as PCP - Family Medicine  Salinas Juarez MD as Consulting Physician (Endocrinology)  Lazarus Gallegos MD as Consulting Physician (Cardiology)  Michael Fatima MD as Consulting Physician (Urology)      Smoking Status:  History   Smoking Status   • Never Smoker   Smokeless Tobacco   • Never Used       Alcohol Consumption:  History   Alcohol Use   • Yes   • 4 - 6 Cans of beer per week     Comment: Socially.       Depression Screen:   PHQ-2/PHQ-9 Depression Screening 11/7/2018   Little interest or pleasure in doing things 0   Feeling down, depressed, or hopeless 0   Trouble falling or staying asleep, or sleeping too much 0   Feeling tired or having little energy 0   Poor appetite or overeating 0   Feeling bad about yourself - or that you are a failure or have let yourself or your family down 0   Trouble concentrating on things, such as reading the newspaper or watching television 0   Moving or speaking so slowly that other people could have noticed. Or the opposite - being so fidgety or restless that you have been moving around a lot more than usual 0   Thoughts that you would be better off dead, or of hurting yourself in some way 0   Total Score 0   If you checked off any problems, how difficult have these problems made it for you to do your work, take care of things at home, or get along with other people? Not difficult at all       Health Habits and Functional and Cognitive Screening:  Functional & Cognitive Status 11/7/2018   Do you have difficulty preparing food and eating? No   Do you have difficulty bathing yourself, getting dressed or grooming yourself? No   Do you have difficulty  using the toilet? No   Do you have difficulty moving around from place to place? No   Do you have trouble with steps or getting out of a bed or a chair? No   In the past year have you fallen or experienced a near fall? No   Current Diet Low Carb Diet   Dental Exam Up to date   Eye Exam Up to date   Exercise (times per week) 3 times per week   Current Exercise Activities Include Walking   Do you need help using the phone?  No   Are you deaf or do you have serious difficulty hearing?  No   Do you need help with transportation? No   Do you need help shopping? No   Do you need help preparing meals?  No   Do you need help with housework?  No   Do you need help with laundry? No   Do you need help taking your medications? No   Do you need help managing money? No   Do you ever drive or ride in a car without wearing a seat belt? No   Have you felt unusual stress, anger or loneliness in the last month? No   Who do you live with? Alone   If you need help, do you have trouble finding someone available to you? No   Have you been bothered in the last four weeks by sexual problems? No   Do you have difficulty concentrating, remembering or making decisions? No           Does the patient have evidence of cognitive impairment? No    Aspirin use counseling? Taking ASA appropriately as indicated      Recent Lab Results:  CMP:  Lab Results   Component Value Date     (H) 06/06/2018    BUN 13 06/06/2018    CREATININE 0.84 06/06/2018    EGFRIFNONA 92 06/06/2018    EGFRIFAFRI 111 06/06/2018    BCR 15.5 06/06/2018     06/06/2018    K 4.1 06/06/2018    CO2 25.8 06/06/2018    CALCIUM 9.1 06/06/2018    PROTENTOTREF 7.3 06/06/2018    ALBUMIN 4.20 06/06/2018    LABGLOBREF 3.1 06/06/2018    LABIL2 1.4 06/06/2018    BILITOT 0.7 06/06/2018    ALKPHOS 64 06/06/2018    AST 22 06/06/2018    ALT 18 06/06/2018     Lipid Panel:  Lab Results   Component Value Date    TRIG 58 06/06/2018    HDL 46 06/06/2018    VLDL 11.6 06/06/2018    LDLHDL  1.91 02/07/2018     HbA1c:  Lab Results   Component Value Date    HGBA1C 7.80 (H) 06/06/2018       Visual Acuity:  No exam data present    Age-appropriate Screening Schedule:  Refer to the list below for future screening recommendations based on patient's age, sex and/or medical conditions. Orders for these recommended tests are listed in the plan section. The patient has been provided with a written plan.    Health Maintenance   Topic Date Due   • ZOSTER VACCINE (2 of 3) 09/28/2017   • INFLUENZA VACCINE  08/01/2018   • PNEUMOCOCCAL VACCINES (65+ LOW/MEDIUM RISK) (2 of 2 - PPSV23) 08/02/2018   • HEMOGLOBIN A1C  12/06/2018   • DIABETIC EYE EXAM  12/29/2018   • DIABETIC FOOT EXAM  06/06/2019   • LIPID PANEL  06/06/2019   • URINE MICROALBUMIN  06/06/2019   • COLONOSCOPY  03/20/2022   • TDAP/TD VACCINES (2 - Td) 11/13/2023        Subjective   History of Present Illness    Jeff Bernard is a 63 y.o. male an established patient presenting for a Welcome to Medicare Visit.     LAST COLONOSCOPY- DR MUKHERJEE- 2/2014- POLYPECTOMY- 3/2017- RECHECK 5 YEARS.    LAST TDAP- 11/13/13  FLU SHOT- [facility]/17/2018- [facility]  PREVNAR- 8/2/17  PNEUMOVAX- HAS NOT HAD  ZOSTAVAX- 8/3/17  HEPATITIS A- 5/7/18    The following portions of the patient's history were reviewed and updated as appropriate: allergies, current medications, past family history, past medical history, past social history, past surgical history and problem list.    Outpatient Medications Prior to Visit   Medication Sig Dispense Refill   • aspirin 81 MG tablet Take 1 tablet by mouth daily.     • atorvastatin (LIPITOR) 20 MG tablet TAKE 1 TABLET BY MOUTH DAILY FOR CHOLESTEROL 30 tablet 3   • B-D ULTRAFINE III SHORT PEN 31G X 8 MM misc USE 3 PEN NEEDLES DAILY AS DIRECTED 100 each 0   • benazepril (LOTENSIN) 10 MG tablet TAKE 1 TABLET BY MOUTH EVERY DAY 30 tablet 3   • glucose blood (DAKSHA CONTOUR NEXT TEST) test strip Daksha contour next test strip  Testing bs 4 x day  Dx code E11.9 400  "each 1   • insulin aspart (NOVOLOG FLEXPEN) 100 UNIT/ML solution pen-injector sc pen Inject 8-13 units in the morning 9-15 units at lunch and 10-15 units at dinner 15 mL 2   • Insulin Glargine (BASAGLAR KWIKPEN) 100 UNIT/ML injection pen 30 units every evening 5 pen 6   • Insulin Pen Needle (B-D ULTRAFINE III SHORT PEN) 31G X 8 MM misc Use 1 pen needle 4 x daily   Dx code E11.9 400 each 1   • KROGER LANCETS MICRO THIN 33G misc Testing bs 5 x day 500 each 1   • B-D INS SYRINGE 0.5CC/31GX5/16 31G X 5/16\" 0.5 ML misc USE ONE SYRINGE DAILY AS DIRECTED 90 each 0   • insulin glargine (LANTUS) 100 UNIT/ML injection Inject 30 units in the evening 30 mL 1     No facility-administered medications prior to visit.        Patient Active Problem List   Diagnosis   • Abnormal electrocardiogram   • Abnormal pituitary follicle stimulating hormone (FSH)   • Arteriosclerosis of carotid artery   • Benign colonic polyp   • Benign essential hypertension   • Erectile dysfunction of nonorganic origin   • Muscle weakness   • Type 2 diabetes mellitus without complication, with long-term current use of insulin (CMS/[facility])   • Hyperlipidemia   • Vitamin D deficiency   • History of adenomatous polyp of colon   • Increased prostate specific antigen (PSA) velocity   • Abnormal testicular exam       Advance Care Planning:  has NO advance directive - information provided to the patient today    Identification of Risk Factors:  Risk factors include: cardiovascular risk and polypharmacy.    Review of Systems    Compared to one year ago, the patient feels his physical health is better.  Compared to one year ago, the patient feels his mental health is the same.    Objective    Physical Exam    Vitals:    11/07/18 1000   BP: 120/64   BP Location: Left arm   Patient Position: Sitting   Cuff Size: Adult   Pulse: 78   Resp: 16   Temp: 98.3 °F (36.8 °C)   TempSrc: Oral   SpO2: 98%   Weight: 100 kg (221 lb)   Height: 185.4 cm (72.99\")   PainSc: 0-No pain "       Patient's Body mass index is 29.16 kg/m². BMI is above normal parameters. Recommendations include: exercise counseling and nutrition counseling.      Procedure   Procedures       Assessment/Plan   Patient Self-Management and Personalized Health Advice  The patient has been provided with information about: diet, exercise, prevention of cardiac or vascular disease and designing advance directives and preventive services including:   · Advance directive, Pneumococcal vaccine , SHINGRIX, AND HEPATITIS A.    Visit Diagnoses:    ICD-10-CM ICD-9-CM   1. Medicare annual wellness visit, initial Z00.00 V70.0   2. Benign essential hypertension I10 401.1   3. Hyperlipidemia, unspecified hyperlipidemia type E78.5 272.4   4. Vitamin D deficiency E55.9 268.9   5. Type 2 diabetes mellitus without complication, with long-term current use of insulin (CMS/[facility]) E11.9 250.00    Z79.4 V58.67   6. Increased prostate specific antigen (PSA) velocity R97.20 790.93       Orders Placed This Encounter   Procedures   • Comprehensive Metabolic Panel     Order Specific Question:   [facility] Has the patient fasted?     Answer:   Yes   • CK     Order Specific Question:   [facility] Has the patient fasted?     Answer:   Yes   • Lipid Panel With LDL / HDL Ratio     Order Specific Question:   [facility] Has the patient fasted?     Answer:   Yes   • Thyroid Panel With TSH     Order Specific Question:   [facility] Has the patient fasted?     Answer:   Yes   • Vitamin B12 & Folate     Order Specific Question:   [facility] Has the patient fasted?     Answer:   Yes   • Vitamin D 25 Hydroxy     Order Specific Question:   [facility] Has the patient fasted?     Answer:   Yes   • C-Peptide     Order Specific Question:   [facility] Has the patient fasted?     Answer:   Yes   • POC Urinalysis Dipstick, Automated   • POC Microalbumin   • CBC & Differential     Order Specific Question:   Manual Differential     Answer:   No     Order Specific Question:   [facility] Has the patient  "fasted?     Answer:   Yes       Outpatient Encounter Prescriptions as of 11/7/2018   Medication Sig Dispense Refill   • aspirin 81 MG tablet Take 1 tablet by mouth daily.     • atorvastatin (LIPITOR) 20 MG tablet TAKE 1 TABLET BY MOUTH DAILY FOR CHOLESTEROL 30 tablet 3   • B-D ULTRAFINE III SHORT PEN 31G X 8 MM misc USE 3 PEN NEEDLES DAILY AS DIRECTED 100 each 0   • benazepril (LOTENSIN) 10 MG tablet TAKE 1 TABLET BY MOUTH EVERY DAY 30 tablet 3   • glucose blood (DAKSHA CONTOUR NEXT TEST) test strip Daksha contour next test strip  Testing bs 4 x day  Dx code E11.9 400 each 1   • insulin aspart (NOVOLOG FLEXPEN) 100 UNIT/ML solution pen-injector sc pen Inject 8-13 units in the morning 9-15 units at lunch and 10-15 units at dinner 15 mL 2   • Insulin Glargine (BASAGLAR KWIKPEN) 100 UNIT/ML injection pen 30 units every evening 5 pen 6   • Insulin Pen Needle (B-D ULTRAFINE III SHORT PEN) 31G X 8 MM misc Use 1 pen needle 4 x daily   Dx code E11.9 400 each 1   • KROGER LANCETS MICRO THIN 33G misc Testing bs 5 x day 500 each 1   • B-D INS SYRINGE 0.5CC/31GX5/16 31G X 5/16\" 0.5 ML misc USE ONE SYRINGE DAILY AS DIRECTED 90 each 0   • insulin glargine (LANTUS) 100 UNIT/ML injection Inject 30 units in the evening 30 mL 1     No facility-administered encounter medications on file as of 11/7/2018.        Reviewed use of high risk medication in the elderly: not applicable  Reviewed for potential of harmful drug interactions in the elderly: not applicable    Follow Up:  Return for PENDING LABS.     An After Visit Summary and PPPS with all of these plans were given to the patient.         "

## 2018-11-07 NOTE — PROGRESS NOTES
Subjective   Jeff Bernard is a 63 y.o. male HERE FOR FOLLOW UP OF HTN AND SPECIALISTS.     History of Present Illness     FOLLOW UP WITH UROLOGY NEXT YEAR- LAST SEEN 7/2018 WITH 1 YEAR FOLLOW UP.     DR PANTOJA FOLLOW UP IN December.     DID NOT SEE CARDIOLOGY 9/2018. REPORTS HE WAS TOLD NO FOLLOW UP WAS NEEDED AFTER STRESS TEST- NOTE STATES 6 MONTH FOLLOW UP.     The following portions of the patient's history were reviewed and updated as appropriate: allergies, current medications, past family history, past medical history, past social history, past surgical history and problem list.    Review of Systems   All other systems reviewed and are negative.      Objective   Physical Exam   Constitutional: He is oriented to person, place, and time. He appears well-developed and well-nourished. No distress.   HENT:   Head: Normocephalic and atraumatic.   Right Ear: Hearing, tympanic membrane, external ear and ear canal normal.   Left Ear: Hearing, tympanic membrane, external ear and ear canal normal.   Nose: Nose normal.   Mouth/Throat: Oropharynx is clear and moist.   Eyes: Conjunctivae, EOM and lids are normal. Pupils are equal, round, and reactive to light.   Neck: Neck supple. No JVD present. Carotid bruit is not present. No tracheal deviation present. No thyroid mass and no thyromegaly present.   Cardiovascular: Normal rate, regular rhythm, normal heart sounds and intact distal pulses. Exam reveals no gallop and no friction rub.   No murmur heard.  Pulses:       Radial pulses are 2+ on the right side, and 2+ on the left side.        Posterior tibial pulses are 2+ on the right side, and 2+ on the left side.   Pulmonary/Chest: Effort normal and breath sounds normal. No respiratory distress. He has no wheezes. He has no rhonchi. He has no rales.   Abdominal: Soft. Normal aorta and bowel sounds are normal. He exhibits no distension and no abdominal bruit. There is no hepatosplenomegaly. There is no tenderness. There is no  rigidity, no rebound and no guarding. No hernia.   Musculoskeletal: Normal range of motion. He exhibits no edema, tenderness or deformity.   Normal strength.   Lymphadenopathy:     He has no cervical adenopathy.   Neurological: He is alert and oriented to person, place, and time. He has normal strength and normal reflexes. He displays normal reflexes. No cranial nerve deficit or sensory deficit. He exhibits normal muscle tone. Coordination and gait normal.   Skin: Skin is warm and dry. No rash noted. He is not diaphoretic. No erythema.   Psychiatric: He has a normal mood and affect. His speech is normal and behavior is normal. Judgment and thought content normal. Cognition and memory are normal.       Assessment/Plan   Jeff was seen today for welcome to medicare.    Diagnoses and all orders for this visit:    Medicare annual wellness visit, initial    Benign essential hypertension  -     CBC & Differential  -     Comprehensive Metabolic Panel  -     Thyroid Panel With TSH  -     Vitamin B12 & Folate    Hyperlipidemia, unspecified hyperlipidemia type  -     CBC & Differential  -     Comprehensive Metabolic Panel  -     CK  -     Lipid Panel With LDL / HDL Ratio  -     Thyroid Panel With TSH  -     Vitamin B12 & Folate    Vitamin D deficiency  -     CBC & Differential  -     Comprehensive Metabolic Panel  -     Thyroid Panel With TSH  -     Vitamin B12 & Folate  -     Vitamin D 25 Hydroxy    Type 2 diabetes mellitus without complication, with long-term current use of insulin (CMS/[facility])  -     CBC & Differential  -     Comprehensive Metabolic Panel  -     Thyroid Panel With TSH  -     Vitamin B12 & Folate  -     C-Peptide  -     POC Urinalysis Dipstick, Automated  -     POC Microalbumin    Increased prostate specific antigen (PSA) velocity  -     Vitamin B12 & Folate      Patient Instructions   63 YEAR OLD MALE WHO PRESENTS TODAY FOR AWV AND IN FOLLOW UP OF HTN AND SPECIALISTS. HE IS UP TO DATE ON COLONOSCOPY AND MOST  IMMUNIZATIONS. PATIENT TO CHECK WITH INSURANCE REGARDING COVERAGE FOR VACCINE- PNEUMOVAX AND HEPATITIS A HERE VS PHARMACY.     BLOOD PRESSURE TODAY IS OK- HE HAS NOT BEEN DIAGNOSED WITH HTN BUT WAS STARTED ON LOTENSIN FOR RENAL PROTECTION WITH DIABETES. HOWEVER, BLOOD PRESSURE IS USUALLY JUST CONTROLLED OR BORDERLINE. HE WOULD LIKELY HAVE ELEVATED BP WITHOUT LOTENSIN. HE IS STABLE ON MEDICATION WITHOUT AE.     PATIENT IS UP TO DATE WITH DR PANTOJA FOR DIABETES, HYPERLIPIDEMIA, AND VITAMIN D DEFICIENCY, WITH FOLLOW UP 12/2018. HIS LAST A1C WAS ELEVATED AT 7.8%. HE IS WORKING ON BETTER CONTROL. HE WAS ALSO SEEN BY CARDIOLOGY WITH NEGATIVE ECHO AND WAS DUE FOR FOLLOW UP IN 9/2018. HE DID NOT FOLLOW UP. HE WILL CALL TO SEE IF FOLLOW UP IS NEEDED OR ONLY PRN. PATIENT WAS SEEN IN FOLLOW UP WITH DR CINTRON 7/2018- HE WILL FOLLOW UP IN 1 YEAR.     LABS TODAY- CALL IF NO RESULTS IN 1 WEEK. FOLLOW UP IN 6 MONTHS IF HE IS DOING WELL.

## 2018-11-08 LAB
25(OH)D3+25(OH)D2 SERPL-MCNC: 36.4 NG/ML (ref 30–100)
ALBUMIN SERPL-MCNC: 4.1 G/DL (ref 3.5–5.2)
ALBUMIN/GLOB SERPL: 1.4 G/DL
ALP SERPL-CCNC: 73 U/L (ref 39–117)
ALT SERPL-CCNC: 22 U/L (ref 1–41)
AST SERPL-CCNC: 15 U/L (ref 1–40)
BASOPHILS # BLD AUTO: 0.02 10*3/MM3 (ref 0–0.2)
BASOPHILS NFR BLD AUTO: 0.3 % (ref 0–1.5)
BILIRUB SERPL-MCNC: 0.6 MG/DL (ref 0.1–1.2)
BUN SERPL-MCNC: 13 MG/DL (ref 8–23)
BUN/CREAT SERPL: 15.5 (ref 7–25)
C PEPTIDE SERPL-MCNC: <0.1 NG/ML (ref 1.1–4.4)
CALCIUM SERPL-MCNC: 9.3 MG/DL (ref 8.6–10.5)
CHLORIDE SERPL-SCNC: 102 MMOL/L (ref 98–107)
CHOLEST SERPL-MCNC: 162 MG/DL (ref 0–200)
CK SERPL-CCNC: 130 U/L (ref 20–200)
CO2 SERPL-SCNC: 29.2 MMOL/L (ref 22–29)
CREAT SERPL-MCNC: 0.84 MG/DL (ref 0.76–1.27)
EOSINOPHIL # BLD AUTO: 0.05 10*3/MM3 (ref 0–0.7)
EOSINOPHIL NFR BLD AUTO: 0.8 % (ref 0.3–6.2)
ERYTHROCYTE [DISTWIDTH] IN BLOOD BY AUTOMATED COUNT: 12.7 % (ref 11.5–14.5)
FOLATE SERPL-MCNC: >20 NG/ML (ref 4.78–24.2)
FT4I SERPL CALC-MCNC: 1.8 (ref 1.2–4.9)
GLOBULIN SER CALC-MCNC: 3 GM/DL
GLUCOSE SERPL-MCNC: 143 MG/DL (ref 65–99)
HCT VFR BLD AUTO: 46.8 % (ref 40.4–52.2)
HDLC SERPL-MCNC: 45 MG/DL (ref 40–60)
HGB BLD-MCNC: 15.3 G/DL (ref 13.7–17.6)
IMM GRANULOCYTES # BLD: 0 10*3/MM3 (ref 0–0.03)
IMM GRANULOCYTES NFR BLD: 0 % (ref 0–0.5)
LDLC SERPL CALC-MCNC: 104 MG/DL (ref 0–100)
LDLC/HDLC SERPL: 2.32 {RATIO}
LYMPHOCYTES # BLD AUTO: 1.68 10*3/MM3 (ref 0.9–4.8)
LYMPHOCYTES NFR BLD AUTO: 25.6 % (ref 19.6–45.3)
MCH RBC QN AUTO: 30.1 PG (ref 27–32.7)
MCHC RBC AUTO-ENTMCNC: 32.7 G/DL (ref 32.6–36.4)
MCV RBC AUTO: 92.1 FL (ref 79.8–96.2)
MONOCYTES # BLD AUTO: 0.34 10*3/MM3 (ref 0.2–1.2)
MONOCYTES NFR BLD AUTO: 5.2 % (ref 5–12)
NEUTROPHILS # BLD AUTO: 4.47 10*3/MM3 (ref 1.9–8.1)
NEUTROPHILS NFR BLD AUTO: 68.1 % (ref 42.7–76)
PLATELET # BLD AUTO: 259 10*3/MM3 (ref 140–500)
POTASSIUM SERPL-SCNC: 4.1 MMOL/L (ref 3.5–5.2)
PROT SERPL-MCNC: 7.1 G/DL (ref 6–8.5)
RBC # BLD AUTO: 5.08 10*6/MM3 (ref 4.6–6)
SODIUM SERPL-SCNC: 141 MMOL/L (ref 136–145)
T3RU NFR SERPL: 24 % (ref 24–39)
T4 SERPL-MCNC: 7.3 UG/DL (ref 4.5–12)
TRIGL SERPL-MCNC: 64 MG/DL (ref 0–150)
TSH SERPL DL<=0.005 MIU/L-ACNC: 2.04 UIU/ML (ref 0.45–4.5)
VIT B12 SERPL-MCNC: 279 PG/ML (ref 211–946)
VLDLC SERPL CALC-MCNC: 12.8 MG/DL (ref 5–40)
WBC # BLD AUTO: 6.56 10*3/MM3 (ref 4.5–10.7)

## 2018-11-16 ENCOUNTER — CLINICAL SUPPORT (OUTPATIENT)
Dept: FAMILY MEDICINE CLINIC | Facility: CLINIC | Age: 63
End: 2018-11-16

## 2018-11-16 DIAGNOSIS — Z23 ENCOUNTER FOR IMMUNIZATION: ICD-10-CM

## 2018-11-16 DIAGNOSIS — E53.8 LOW VITAMIN B12 LEVEL: Primary | ICD-10-CM

## 2018-11-16 PROCEDURE — 90732 PPSV23 VACC 2 YRS+ SUBQ/IM: CPT | Performed by: PHYSICIAN ASSISTANT

## 2018-11-16 PROCEDURE — 96372 THER/PROPH/DIAG INJ SC/IM: CPT | Performed by: PHYSICIAN ASSISTANT

## 2018-11-16 PROCEDURE — G0009 ADMIN PNEUMOCOCCAL VACCINE: HCPCS | Performed by: PHYSICIAN ASSISTANT

## 2018-11-16 RX ORDER — CYANOCOBALAMIN 1000 UG/ML
1000 INJECTION, SOLUTION INTRAMUSCULAR; SUBCUTANEOUS
Status: DISCONTINUED | OUTPATIENT
Start: 2018-11-16 | End: 2018-12-14

## 2018-11-16 RX ORDER — CYANOCOBALAMIN 1000 UG/ML
1000 INJECTION, SOLUTION INTRAMUSCULAR; SUBCUTANEOUS
Status: DISCONTINUED | OUTPATIENT
Start: 2018-11-16 | End: 2018-11-16

## 2018-11-16 RX ADMIN — CYANOCOBALAMIN 1000 MCG: 1000 INJECTION, SOLUTION INTRAMUSCULAR; SUBCUTANEOUS at 10:36

## 2018-11-21 ENCOUNTER — CLINICAL SUPPORT (OUTPATIENT)
Dept: FAMILY MEDICINE CLINIC | Facility: CLINIC | Age: 63
End: 2018-11-21

## 2018-11-21 DIAGNOSIS — E53.8 VITAMIN B 12 DEFICIENCY: Primary | ICD-10-CM

## 2018-11-21 PROCEDURE — 96372 THER/PROPH/DIAG INJ SC/IM: CPT | Performed by: FAMILY MEDICINE

## 2018-11-21 RX ORDER — CYANOCOBALAMIN 1000 UG/ML
1000 INJECTION, SOLUTION INTRAMUSCULAR; SUBCUTANEOUS
Status: DISCONTINUED | OUTPATIENT
Start: 2018-11-21 | End: 2018-12-14

## 2018-11-21 RX ADMIN — CYANOCOBALAMIN 1000 MCG: 1000 INJECTION, SOLUTION INTRAMUSCULAR; SUBCUTANEOUS at 11:02

## 2018-11-30 ENCOUNTER — CLINICAL SUPPORT (OUTPATIENT)
Dept: FAMILY MEDICINE CLINIC | Facility: CLINIC | Age: 63
End: 2018-11-30

## 2018-11-30 DIAGNOSIS — E53.8 VITAMIN B 12 DEFICIENCY: Primary | ICD-10-CM

## 2018-11-30 PROCEDURE — 96372 THER/PROPH/DIAG INJ SC/IM: CPT | Performed by: PHYSICIAN ASSISTANT

## 2018-11-30 RX ORDER — CYANOCOBALAMIN 1000 UG/ML
1000 INJECTION, SOLUTION INTRAMUSCULAR; SUBCUTANEOUS
Status: DISCONTINUED | OUTPATIENT
Start: 2018-11-30 | End: 2018-12-14

## 2018-11-30 RX ADMIN — CYANOCOBALAMIN 1000 MCG: 1000 INJECTION, SOLUTION INTRAMUSCULAR; SUBCUTANEOUS at 11:07

## 2018-12-06 ENCOUNTER — OFFICE VISIT (OUTPATIENT)
Dept: ENDOCRINOLOGY | Age: 63
End: 2018-12-06

## 2018-12-06 VITALS
SYSTOLIC BLOOD PRESSURE: 128 MMHG | WEIGHT: 218 LBS | HEART RATE: 77 BPM | DIASTOLIC BLOOD PRESSURE: 66 MMHG | HEIGHT: 73 IN | BODY MASS INDEX: 28.89 KG/M2 | OXYGEN SATURATION: 96 %

## 2018-12-06 DIAGNOSIS — E11.9 TYPE 2 DIABETES MELLITUS WITHOUT COMPLICATION, WITH LONG-TERM CURRENT USE OF INSULIN (HCC): Primary | ICD-10-CM

## 2018-12-06 DIAGNOSIS — E55.9 VITAMIN D DEFICIENCY: ICD-10-CM

## 2018-12-06 DIAGNOSIS — E78.5 HYPERLIPIDEMIA, UNSPECIFIED HYPERLIPIDEMIA TYPE: ICD-10-CM

## 2018-12-06 DIAGNOSIS — Z79.4 TYPE 2 DIABETES MELLITUS WITHOUT COMPLICATION, WITH LONG-TERM CURRENT USE OF INSULIN (HCC): Primary | ICD-10-CM

## 2018-12-06 PROCEDURE — 99214 OFFICE O/P EST MOD 30 MIN: CPT | Performed by: INTERNAL MEDICINE

## 2018-12-06 RX ORDER — CHOLECALCIFEROL (VITAMIN D3) 25 MCG
1000 CAPSULE ORAL DAILY
COMMUNITY

## 2018-12-06 NOTE — PROGRESS NOTES
Subjective   Jeff Bernard is a 63 y.o. male.     F/u for dm 2, hyperlipidemia, vitamin d def / testing bs 4 x day /last dm eye exam 12/29/17 with dr Camejo/ last dm foot exam 6/6/18 with dr Lomax / flu vaccine @ [facility]       Diabetes   He presents for his follow-up diabetic visit. He has type 2 diabetes mellitus.   Hyperlipidemia        Patient has known diabetes mellitus since 1999 and started on insulin in 2000. He is on Basaglar 30 units every evening and NovoLog 1 unit per 5 g of carbohydrate before each meal. He checks his blood sugar 4 times a day. Fasting blood sugar runs 104-224. Lunchtime blood sugar runs . Suppertime blood sugar runs between 101-167. Bedtime blood sugar runs between .  He denies any severe hypoglycemic episode. He has gained 7 pounds since 7/18. His last meal was at 6 AM      His last eye examination was in December 2017 and he has no retinopathy. He denies any associated nephropathy. Urine microalbumin was normal in 11/18. He is on benazepril. He denies any numbness, tingling or burning in his feet.      He has hyperlipidemia and has been on Lipitor 20 mg once a day. He denies any muscle pains.      He has no history of hypertension. He denies any previous history of myocardial infarction, heart failure, or stroke. He denies chest pain, shortness of breath or pedal edema.  He had a normal nuclear stress test in 4/18     He had a colonoscopy done in March 2017 and polyps were removed by Dr. Kelly.  Pathology report was read as tubular adenoma with low-grade dysplasia and hyperplastic polyps.  He was advised follow-up colonoscopy in 2022.     He has vit D deficiency and is on Vit D 1000 u/day.  25-hydroxyvitamin D level done in 11/18 was normal.    He had a flu vac.    The following portions of the patient's history were reviewed and updated as appropriate: allergies, current medications, past family history, past medical history, past social history, past surgical history and  "problem list.    Review of Systems   Constitutional: Negative.    HENT: Negative.    Eyes: Negative.    Respiratory: Negative.    Cardiovascular: Negative.    Gastrointestinal: Negative.    Endocrine: Negative.    Genitourinary: Negative.    Musculoskeletal: Negative.    Skin: Negative.    Allergic/Immunologic: Negative.    Neurological: Negative.    Hematological: Negative.    Psychiatric/Behavioral: Negative.        Objective      Vitals:    12/06/18 1118   BP: 128/66   BP Location: Right arm   Patient Position: Sitting   Cuff Size: Large Adult   Pulse: 77   SpO2: 96%   Weight: 98.9 kg (218 lb)   Height: 185.4 cm (72.99\")     Physical Exam   Constitutional: He is oriented to person, place, and time. He appears well-developed and well-nourished. No distress.   HENT:   Head: Normocephalic.   Nose: Nose normal.   Mouth/Throat: No oropharyngeal exudate.   Eyes: Conjunctivae and EOM are normal. Right eye exhibits no discharge. Left eye exhibits no discharge. No scleral icterus.   Neck: Neck supple. No JVD present. No tracheal deviation present. No thyromegaly present.   Cardiovascular: Normal rate, regular rhythm, normal heart sounds and intact distal pulses. Exam reveals no gallop and no friction rub.   No murmur heard.  Pulmonary/Chest: Effort normal and breath sounds normal. No stridor. No respiratory distress. He has no wheezes. He has no rales. He exhibits no tenderness.   Abdominal: Soft. Bowel sounds are normal. He exhibits no distension and no mass. There is no tenderness. There is no rebound and no guarding.   Musculoskeletal: Normal range of motion. He exhibits no edema, tenderness or deformity.   Lymphadenopathy:     He has no cervical adenopathy.   Neurological: He is alert and oriented to person, place, and time. He displays normal reflexes. He exhibits normal muscle tone. Coordination normal.   Intact light touch   Skin: Skin is warm and dry. No rash noted. He is not diaphoretic. No erythema. "   Psychiatric: He has a normal mood and affect. His behavior is normal.     Office Visit on 11/07/2018   Component Date Value Ref Range Status   • WBC 11/07/2018 6.56  4.50 - 10.70 10*3/mm3 Final   • RBC 11/07/2018 5.08  4.60 - 6.00 10*6/mm3 Final   • Hemoglobin 11/07/2018 15.3  13.7 - 17.6 g/dL Final   • Hematocrit 11/07/2018 46.8  40.4 - 52.2 % Final   • MCV 11/07/2018 92.1  79.8 - 96.2 fL Final   • MCH 11/07/2018 30.1  27.0 - 32.7 pg Final   • MCHC 11/07/2018 32.7  32.6 - 36.4 g/dL Final   • RDW 11/07/2018 12.7  11.5 - 14.5 % Final   • Platelets 11/07/2018 259  140 - 500 10*3/mm3 Final   • Neutrophil Rel % 11/07/2018 68.1  42.7 - 76.0 % Final   • Lymphocyte Rel % 11/07/2018 25.6  19.6 - 45.3 % Final   • Monocyte Rel % 11/07/2018 5.2  5.0 - 12.0 % Final   • Eosinophil Rel % 11/07/2018 0.8  0.3 - 6.2 % Final   • Basophil Rel % 11/07/2018 0.3  0.0 - 1.5 % Final   • Neutrophils Absolute 11/07/2018 4.47  1.90 - 8.10 10*3/mm3 Final   • Lymphocytes Absolute 11/07/2018 1.68  0.90 - 4.80 10*3/mm3 Final   • Monocytes Absolute 11/07/2018 0.34  0.20 - 1.20 10*3/mm3 Final   • Eosinophils Absolute 11/07/2018 0.05  0.00 - 0.70 10*3/mm3 Final   • Basophils Absolute 11/07/2018 0.02  0.00 - 0.20 10*3/mm3 Final   • Immature Granulocyte Rel % 11/07/2018 0.0  0.0 - 0.5 % Final   • Immature Grans Absolute 11/07/2018 0.00  0.00 - 0.03 10*3/mm3 Final   • Glucose 11/07/2018 143* 65 - 99 mg/dL Final   • BUN 11/07/2018 13  8 - 23 mg/dL Final   • Creatinine 11/07/2018 0.84  0.76 - 1.27 mg/dL Final   • eGFR Non  Am 11/07/2018 92  >60 mL/min/1.73 Final   • eGFR African Am 11/07/2018 111  >60 mL/min/1.73 Final   • BUN/Creatinine Ratio 11/07/2018 15.5  7.0 - 25.0 Final   • Sodium 11/07/2018 141  136 - 145 mmol/L Final   • Potassium 11/07/2018 4.1  3.5 - 5.2 mmol/L Final   • Chloride 11/07/2018 102  98 - 107 mmol/L Final   • Total CO2 11/07/2018 29.2* 22.0 - 29.0 mmol/L Final   • Calcium 11/07/2018 9.3  8.6 - 10.5 mg/dL Final   • Total  Protein 11/07/2018 7.1  6.0 - 8.5 g/dL Final   • Albumin 11/07/2018 4.10  3.50 - 5.20 g/dL Final   • Globulin 11/07/2018 3.0  gm/dL Final   • A/G Ratio 11/07/2018 1.4  g/dL Final   • Total Bilirubin 11/07/2018 0.6  0.1 - 1.2 mg/dL Final   • Alkaline Phosphatase 11/07/2018 73  39 - 117 U/L Final   • AST (SGOT) 11/07/2018 15  1 - 40 U/L Final   • ALT (SGPT) 11/07/2018 22  1 - 41 U/L Final   • Creatine Kinase 11/07/2018 130  20 - 200 U/L Final   • Total Cholesterol 11/07/2018 162  0 - 200 mg/dL Final   • Triglycerides 11/07/2018 64  0 - 150 mg/dL Final   • HDL Cholesterol 11/07/2018 45  40 - 60 mg/dL Final   • VLDL Cholesterol 11/07/2018 12.8  5 - 40 mg/dL Final   • LDL Cholesterol  11/07/2018 104* 0 - 100 mg/dL Final   • LDL/HDL Ratio 11/07/2018 2.32   Final   • TSH 11/07/2018 2.040  0.450 - 4.500 uIU/mL Final   • T4, Total 11/07/2018 7.3  4.5 - 12.0 ug/dL Final   • T3 Uptake 11/07/2018 24  24 - 39 % Final   • Free Thyroxine Index 11/07/2018 1.8  1.2 - 4.9 Final   • Vitamin B-12 11/07/2018 279  211 - 946 pg/mL Final   • Folate 11/07/2018 >20.00  4.78 - 24.20 ng/mL Final   • 25 Hydroxy, Vitamin D 11/07/2018 36.4  30.0 - 100.0 ng/ml Final    Comment: Reference Range for Total Vitamin D 25(OH)  Deficiency    <20.0 ng/mL  Insufficiency 21-29 ng/mL  Sufficiency    ng/mL  Toxicity      >100 ng/ml        • C-Peptide 11/07/2018 <0.1* 1.1 - 4.4 ng/mL Final    C-Peptide reference interval is for fasting patients.   • Color 11/07/2018 Yellow  Yellow, Straw, Dark Yellow, Shae Final   • Clarity, UA 11/07/2018 Clear  Clear Final   • Specific Gravity  11/07/2018 1.020  1.005 - 1.030 Final   • pH, Urine 11/07/2018 7.0  5.0 - 8.0 Final   • Leukocytes 11/07/2018 Negative  Negative Final   • Nitrite, UA 11/07/2018 Negative  Negative Final   • Protein, POC 11/07/2018 Negative  Negative mg/dL Final   • Glucose, UA 11/07/2018 Negative  Negative, 1000 mg/dL (3+) mg/dL Final   • Ketones, UA 11/07/2018 Negative  Negative Final   •  Urobilinogen, UA 11/07/2018 Normal  Normal Final   • Bilirubin 11/07/2018 Negative  Negative Final   • Blood, UA 11/07/2018 Negative  Negative Final   • Microalbumin, Urine 11/07/2018 30 mg/L   Final   • Creatinine, Urine 11/07/2018 200 mg/g   Final   • A/C 11/07/2018 <30 mg/g   Final     Assessment/Plan   Jeff was seen today for diabetes, hyperlipidemia and vitamin d deficiency.    Diagnoses and all orders for this visit:    Type 2 diabetes mellitus without complication, with long-term current use of insulin (CMS/[facility])  -     Comprehensive Metabolic Panel  -     Lipid Panel  -     Hemoglobin A1c    Hyperlipidemia, unspecified hyperlipidemia type  -     Comprehensive Metabolic Panel  -     Lipid Panel    Vitamin D deficiency  -     Comprehensive Metabolic Panel      Continue Lantus, and Novolog  Discussed about Freestyle eva  Continue Lipitor  Continue Vit d    Send copy of my note to Esperanza MISHRA    RTC 4 mos.

## 2018-12-07 ENCOUNTER — CLINICAL SUPPORT (OUTPATIENT)
Dept: FAMILY MEDICINE CLINIC | Facility: CLINIC | Age: 63
End: 2018-12-07

## 2018-12-07 DIAGNOSIS — E53.8 LOW VITAMIN B12 LEVEL: ICD-10-CM

## 2018-12-07 LAB
ALBUMIN SERPL-MCNC: 4.8 G/DL (ref 3.5–5.2)
ALBUMIN/GLOB SERPL: 1.8 G/DL
ALP SERPL-CCNC: 71 U/L (ref 39–117)
ALT SERPL-CCNC: 24 U/L (ref 1–41)
AST SERPL-CCNC: 20 U/L (ref 1–40)
BILIRUB SERPL-MCNC: 0.5 MG/DL (ref 0.1–1.2)
BUN SERPL-MCNC: 11 MG/DL (ref 8–23)
BUN/CREAT SERPL: 12.2 (ref 7–25)
CALCIUM SERPL-MCNC: 9.7 MG/DL (ref 8.6–10.5)
CHLORIDE SERPL-SCNC: 102 MMOL/L (ref 98–107)
CHOLEST SERPL-MCNC: 178 MG/DL (ref 0–200)
CO2 SERPL-SCNC: 29.2 MMOL/L (ref 22–29)
CREAT SERPL-MCNC: 0.9 MG/DL (ref 0.76–1.27)
GLOBULIN SER CALC-MCNC: 2.6 GM/DL
GLUCOSE SERPL-MCNC: 101 MG/DL (ref 65–99)
HBA1C MFR BLD: 7.5 % (ref 4.8–5.6)
HDLC SERPL-MCNC: 44 MG/DL (ref 40–60)
INTERPRETATION: NORMAL
LDLC SERPL CALC-MCNC: 123 MG/DL (ref 0–100)
Lab: NORMAL
POTASSIUM SERPL-SCNC: 4.4 MMOL/L (ref 3.5–5.2)
PROT SERPL-MCNC: 7.4 G/DL (ref 6–8.5)
SODIUM SERPL-SCNC: 142 MMOL/L (ref 136–145)
TRIGL SERPL-MCNC: 57 MG/DL (ref 0–150)
VLDLC SERPL CALC-MCNC: 11.4 MG/DL (ref 5–40)

## 2018-12-07 PROCEDURE — 96372 THER/PROPH/DIAG INJ SC/IM: CPT | Performed by: PHYSICIAN ASSISTANT

## 2018-12-07 RX ADMIN — CYANOCOBALAMIN 1000 MCG: 1000 INJECTION, SOLUTION INTRAMUSCULAR; SUBCUTANEOUS at 10:55

## 2018-12-13 DIAGNOSIS — E11.9 TYPE 2 DIABETES MELLITUS WITHOUT COMPLICATION, WITH LONG-TERM CURRENT USE OF INSULIN (HCC): ICD-10-CM

## 2018-12-13 DIAGNOSIS — Z79.4 TYPE 2 DIABETES MELLITUS WITHOUT COMPLICATION, WITH LONG-TERM CURRENT USE OF INSULIN (HCC): ICD-10-CM

## 2018-12-13 DIAGNOSIS — I10 BENIGN ESSENTIAL HYPERTENSION: Primary | ICD-10-CM

## 2018-12-13 DIAGNOSIS — E78.49 OTHER HYPERLIPIDEMIA: ICD-10-CM

## 2018-12-13 RX ORDER — ATORVASTATIN CALCIUM 40 MG/1
40 TABLET, FILM COATED ORAL NIGHTLY
Qty: 30 TABLET | Refills: 5 | Status: SHIPPED | OUTPATIENT
Start: 2018-12-13 | End: 2019-03-15 | Stop reason: SDUPTHER

## 2018-12-14 ENCOUNTER — CLINICAL SUPPORT (OUTPATIENT)
Dept: FAMILY MEDICINE CLINIC | Facility: CLINIC | Age: 63
End: 2018-12-14

## 2018-12-14 DIAGNOSIS — E53.8 LOW VITAMIN B12 LEVEL: Primary | ICD-10-CM

## 2018-12-14 PROCEDURE — 96372 THER/PROPH/DIAG INJ SC/IM: CPT | Performed by: PHYSICIAN ASSISTANT

## 2018-12-14 RX ORDER — CYANOCOBALAMIN 1000 UG/ML
1000 INJECTION, SOLUTION INTRAMUSCULAR; SUBCUTANEOUS
Status: DISCONTINUED | OUTPATIENT
Start: 2018-12-14 | End: 2019-03-14

## 2018-12-14 RX ADMIN — CYANOCOBALAMIN 1000 MCG: 1000 INJECTION, SOLUTION INTRAMUSCULAR; SUBCUTANEOUS at 10:52

## 2018-12-21 ENCOUNTER — CLINICAL SUPPORT (OUTPATIENT)
Dept: FAMILY MEDICINE CLINIC | Facility: CLINIC | Age: 63
End: 2018-12-21

## 2018-12-21 DIAGNOSIS — E53.8 B12 DEFICIENCY: ICD-10-CM

## 2018-12-21 PROCEDURE — 96372 THER/PROPH/DIAG INJ SC/IM: CPT | Performed by: NURSE PRACTITIONER

## 2018-12-21 RX ADMIN — CYANOCOBALAMIN 1000 MCG: 1000 INJECTION, SOLUTION INTRAMUSCULAR; SUBCUTANEOUS at 10:52

## 2018-12-28 ENCOUNTER — CLINICAL SUPPORT (OUTPATIENT)
Dept: FAMILY MEDICINE CLINIC | Facility: CLINIC | Age: 63
End: 2018-12-28

## 2018-12-28 PROCEDURE — 96372 THER/PROPH/DIAG INJ SC/IM: CPT | Performed by: PHYSICIAN ASSISTANT

## 2018-12-28 RX ADMIN — CYANOCOBALAMIN 1000 MCG: 1000 INJECTION, SOLUTION INTRAMUSCULAR; SUBCUTANEOUS at 11:14

## 2018-12-30 ENCOUNTER — TELEPHONE (OUTPATIENT)
Dept: FAMILY MEDICINE CLINIC | Facility: CLINIC | Age: 63
End: 2018-12-30

## 2018-12-30 DIAGNOSIS — E53.8 B12 DEFICIENCY: Primary | ICD-10-CM

## 2019-01-04 LAB
FOLATE SERPL-MCNC: 17.16 NG/ML (ref 4.78–24.2)
VIT B12 SERPL-MCNC: 720 PG/ML (ref 211–946)

## 2019-01-14 RX ORDER — PERPHENAZINE 16 MG/1
TABLET, FILM COATED ORAL
Qty: 400 EACH | Refills: 1 | Status: SHIPPED | OUTPATIENT
Start: 2019-01-14 | End: 2019-05-03 | Stop reason: SDUPTHER

## 2019-01-16 ENCOUNTER — CLINICAL SUPPORT (OUTPATIENT)
Dept: FAMILY MEDICINE CLINIC | Facility: CLINIC | Age: 64
End: 2019-01-16

## 2019-01-16 DIAGNOSIS — E53.8 VITAMIN B12 DEFICIENCY: ICD-10-CM

## 2019-01-16 PROCEDURE — 96372 THER/PROPH/DIAG INJ SC/IM: CPT | Performed by: PHYSICIAN ASSISTANT

## 2019-01-16 RX ADMIN — CYANOCOBALAMIN 1000 MCG: 1000 INJECTION, SOLUTION INTRAMUSCULAR; SUBCUTANEOUS at 10:57

## 2019-01-30 ENCOUNTER — RESULTS ENCOUNTER (OUTPATIENT)
Dept: ENDOCRINOLOGY | Age: 64
End: 2019-01-30

## 2019-01-30 DIAGNOSIS — I10 BENIGN ESSENTIAL HYPERTENSION: ICD-10-CM

## 2019-01-30 DIAGNOSIS — Z79.4 TYPE 2 DIABETES MELLITUS WITHOUT COMPLICATION, WITH LONG-TERM CURRENT USE OF INSULIN (HCC): ICD-10-CM

## 2019-01-30 DIAGNOSIS — E11.9 TYPE 2 DIABETES MELLITUS WITHOUT COMPLICATION, WITH LONG-TERM CURRENT USE OF INSULIN (HCC): ICD-10-CM

## 2019-01-30 DIAGNOSIS — E78.49 OTHER HYPERLIPIDEMIA: ICD-10-CM

## 2019-02-02 LAB
ALBUMIN SERPL-MCNC: 4.4 G/DL (ref 3.5–5.2)
ALBUMIN/GLOB SERPL: 1.7 G/DL
ALP SERPL-CCNC: 69 U/L (ref 39–117)
ALT SERPL-CCNC: 19 U/L (ref 1–41)
AST SERPL-CCNC: 16 U/L (ref 1–40)
BILIRUB SERPL-MCNC: 0.4 MG/DL (ref 0.1–1.2)
BUN SERPL-MCNC: 12 MG/DL (ref 8–23)
BUN/CREAT SERPL: 14.6 (ref 7–25)
CALCIUM SERPL-MCNC: 9.1 MG/DL (ref 8.6–10.5)
CHLORIDE SERPL-SCNC: 98 MMOL/L (ref 98–107)
CHOLEST SERPL-MCNC: 126 MG/DL (ref 0–200)
CO2 SERPL-SCNC: 25 MMOL/L (ref 22–29)
CREAT SERPL-MCNC: 0.82 MG/DL (ref 0.76–1.27)
FRUCTOSAMINE SERPL-SCNC: 336 UMOL/L (ref 0–285)
GLOBULIN SER CALC-MCNC: 2.6 GM/DL
GLUCOSE SERPL-MCNC: 264 MG/DL (ref 65–99)
HDLC SERPL-MCNC: 40 MG/DL (ref 40–60)
INTERPRETATION: NORMAL
LDLC SERPL CALC-MCNC: 75 MG/DL (ref 0–100)
Lab: NORMAL
POTASSIUM SERPL-SCNC: 4 MMOL/L (ref 3.5–5.2)
PROT SERPL-MCNC: 7 G/DL (ref 6–8.5)
SODIUM SERPL-SCNC: 139 MMOL/L (ref 136–145)
TRIGL SERPL-MCNC: 55 MG/DL (ref 0–150)
VLDLC SERPL CALC-MCNC: 11 MG/DL (ref 5–40)

## 2019-02-13 ENCOUNTER — CLINICAL SUPPORT (OUTPATIENT)
Dept: FAMILY MEDICINE CLINIC | Facility: CLINIC | Age: 64
End: 2019-02-13

## 2019-02-13 DIAGNOSIS — E53.8 VITAMIN B 12 DEFICIENCY: Primary | ICD-10-CM

## 2019-02-13 PROCEDURE — 96372 THER/PROPH/DIAG INJ SC/IM: CPT | Performed by: PHYSICIAN ASSISTANT

## 2019-02-13 RX ORDER — CYANOCOBALAMIN 1000 UG/ML
1000 INJECTION, SOLUTION INTRAMUSCULAR; SUBCUTANEOUS
Status: SHIPPED | OUTPATIENT
Start: 2019-02-13

## 2019-02-13 RX ADMIN — CYANOCOBALAMIN 1000 MCG: 1000 INJECTION, SOLUTION INTRAMUSCULAR; SUBCUTANEOUS at 10:51

## 2019-02-18 RX ORDER — BENAZEPRIL HYDROCHLORIDE 10 MG/1
TABLET ORAL
Qty: 30 TABLET | Refills: 2 | Status: SHIPPED | OUTPATIENT
Start: 2019-02-18 | End: 2019-04-10 | Stop reason: SDUPTHER

## 2019-03-14 ENCOUNTER — CLINICAL SUPPORT (OUTPATIENT)
Dept: FAMILY MEDICINE CLINIC | Facility: CLINIC | Age: 64
End: 2019-03-14

## 2019-03-14 DIAGNOSIS — E53.8 B12 DEFICIENCY: ICD-10-CM

## 2019-03-14 PROCEDURE — 96372 THER/PROPH/DIAG INJ SC/IM: CPT | Performed by: PHYSICIAN ASSISTANT

## 2019-03-14 RX ADMIN — CYANOCOBALAMIN 1000 MCG: 1000 INJECTION, SOLUTION INTRAMUSCULAR; SUBCUTANEOUS at 10:48

## 2019-03-15 RX ORDER — ATORVASTATIN CALCIUM 40 MG/1
40 TABLET, FILM COATED ORAL NIGHTLY
Qty: 90 TABLET | Refills: 1 | Status: SHIPPED | OUTPATIENT
Start: 2019-03-15 | End: 2019-04-11 | Stop reason: SDUPTHER

## 2019-03-25 DIAGNOSIS — Z79.4 TYPE 2 DIABETES MELLITUS WITHOUT COMPLICATION, WITH LONG-TERM CURRENT USE OF INSULIN (HCC): ICD-10-CM

## 2019-03-25 DIAGNOSIS — E11.9 TYPE 2 DIABETES MELLITUS WITHOUT COMPLICATION, WITH LONG-TERM CURRENT USE OF INSULIN (HCC): ICD-10-CM

## 2019-03-25 RX ORDER — INSULIN GLARGINE 100 [IU]/ML
INJECTION, SOLUTION SUBCUTANEOUS
Qty: 29 ML | Refills: 1 | Status: SHIPPED | OUTPATIENT
Start: 2019-03-25 | End: 2019-12-29 | Stop reason: SDUPTHER

## 2019-03-27 ENCOUNTER — OFFICE VISIT (OUTPATIENT)
Dept: FAMILY MEDICINE CLINIC | Facility: CLINIC | Age: 64
End: 2019-03-27

## 2019-03-27 VITALS
DIASTOLIC BLOOD PRESSURE: 66 MMHG | BODY MASS INDEX: 28.89 KG/M2 | OXYGEN SATURATION: 98 % | HEIGHT: 73 IN | HEART RATE: 100 BPM | SYSTOLIC BLOOD PRESSURE: 130 MMHG | WEIGHT: 218 LBS | RESPIRATION RATE: 16 BRPM | TEMPERATURE: 99 F

## 2019-03-27 DIAGNOSIS — J10.1 INFLUENZA A: Primary | ICD-10-CM

## 2019-03-27 LAB
EXPIRATION DATE: ABNORMAL
FLUAV AG NPH QL: POSITIVE
FLUBV AG NPH QL: NEGATIVE
INTERNAL CONTROL: ABNORMAL
Lab: ABNORMAL

## 2019-03-27 PROCEDURE — 99213 OFFICE O/P EST LOW 20 MIN: CPT | Performed by: FAMILY MEDICINE

## 2019-03-27 PROCEDURE — 87804 INFLUENZA ASSAY W/OPTIC: CPT | Performed by: FAMILY MEDICINE

## 2019-03-27 NOTE — PROGRESS NOTES
Sanjana Bernard is a 63 y.o. male. Presents today to be evaluated for flu like symptoms x 4 days.     History of Present Illness     Sanjana Bernard is a 63 y.o. male who presents for evaluation of influenza like symptoms. Symptoms include chills, headache, myalgias, productive cough and fever and have been present for 4 days. He has tried to alleviate the symptoms with acetaminophen and rest with moderate relief. High risk factors for influenza complications: none.  Unknown any known exposures.  He is retired therefore not working.    The following portions of the patient's history were reviewed and updated as appropriate: allergies, current medications, past family history, past medical history, past social history, past surgical history and problem list.    Review of Systems   Constitutional: Positive for fever.        Body aches   Respiratory: Positive for cough.    Neurological: Positive for headaches.       Objective   Physical Exam   Constitutional: No distress.   Ill-appearing but not toxic   HENT:   Right Ear: Hearing, tympanic membrane, external ear and ear canal normal.   Left Ear: Hearing, tympanic membrane, external ear and ear canal normal.   Nose: Nose normal. Right sinus exhibits no maxillary sinus tenderness and no frontal sinus tenderness. Left sinus exhibits no maxillary sinus tenderness and no frontal sinus tenderness.   Mouth/Throat: Uvula is midline, oropharynx is clear and moist and mucous membranes are normal.   Eyes: Conjunctivae are normal. Right eye exhibits no discharge. Left eye exhibits no discharge.   Neck: Neck supple.   Cardiovascular: Normal rate, regular rhythm and normal heart sounds. Exam reveals no gallop and no friction rub.   No murmur heard.  Pulmonary/Chest: Effort normal and breath sounds normal. He has no wheezes. He has no rales.   Lymphadenopathy:     He has no cervical adenopathy.   Skin: He is not diaphoretic.   Nursing note and vitals  reviewed.  Rapid flu test positive for flu a    Assessment/Plan   Jeff was seen today for flu symptoms.    Diagnoses and all orders for this visit:    Influenza A  -     POCT Influenza A/B      Patient is outside the time window for Tamiflu.  He is already on supportive treatment with the Tylenol and Mucinex as needed and resting.  I believe he will get better on his own fairly quickly as he did have the flu vaccine this year.

## 2019-04-10 RX ORDER — BENAZEPRIL HYDROCHLORIDE 10 MG/1
10 TABLET ORAL DAILY
Qty: 30 TABLET | Refills: 0 | Status: SHIPPED | OUTPATIENT
Start: 2019-04-10 | End: 2019-06-20 | Stop reason: SDUPTHER

## 2019-04-11 RX ORDER — ATORVASTATIN CALCIUM 40 MG/1
40 TABLET, FILM COATED ORAL NIGHTLY
Qty: 90 TABLET | Refills: 1 | Status: SHIPPED | OUTPATIENT
Start: 2019-04-11 | End: 2019-05-22 | Stop reason: SDUPTHER

## 2019-04-17 ENCOUNTER — CLINICAL SUPPORT (OUTPATIENT)
Dept: FAMILY MEDICINE CLINIC | Facility: CLINIC | Age: 64
End: 2019-04-17

## 2019-04-17 DIAGNOSIS — E53.8 VITAMIN B12 DEFICIENCY: ICD-10-CM

## 2019-04-17 PROCEDURE — 96372 THER/PROPH/DIAG INJ SC/IM: CPT | Performed by: PHYSICIAN ASSISTANT

## 2019-04-17 RX ADMIN — CYANOCOBALAMIN 1000 MCG: 1000 INJECTION, SOLUTION INTRAMUSCULAR; SUBCUTANEOUS at 11:13

## 2019-05-08 ENCOUNTER — OFFICE VISIT (OUTPATIENT)
Dept: FAMILY MEDICINE CLINIC | Facility: CLINIC | Age: 64
End: 2019-05-08

## 2019-05-08 VITALS
DIASTOLIC BLOOD PRESSURE: 68 MMHG | HEIGHT: 73 IN | WEIGHT: 214 LBS | TEMPERATURE: 98.3 F | BODY MASS INDEX: 28.36 KG/M2 | OXYGEN SATURATION: 98 % | HEART RATE: 67 BPM | SYSTOLIC BLOOD PRESSURE: 124 MMHG

## 2019-05-08 DIAGNOSIS — E53.8 VITAMIN B 12 DEFICIENCY: ICD-10-CM

## 2019-05-08 DIAGNOSIS — I10 BENIGN ESSENTIAL HYPERTENSION: Primary | ICD-10-CM

## 2019-05-08 DIAGNOSIS — E78.49 OTHER HYPERLIPIDEMIA: ICD-10-CM

## 2019-05-08 DIAGNOSIS — E55.9 VITAMIN D DEFICIENCY: ICD-10-CM

## 2019-05-08 PROCEDURE — 99213 OFFICE O/P EST LOW 20 MIN: CPT | Performed by: PHYSICIAN ASSISTANT

## 2019-05-08 NOTE — PROGRESS NOTES
Subjective   Jeff Bernard is a 63 y.o. male. Patient here today for medication management for hypertension, hyperlipidemia     History of Present Illness     Doing well without complaints. Tolerating medications without AE. No concerns.     The following portions of the patient's history were reviewed and updated as appropriate: allergies, current medications, past family history, past medical history, past social history, past surgical history and problem list.    Review of Systems   All other systems reviewed and are negative.      Objective   Physical Exam   Constitutional: He is oriented to person, place, and time. He appears well-developed.   HENT:   Head: Normocephalic and atraumatic.   Right Ear: External ear normal.   Left Ear: External ear normal.   Eyes: Conjunctivae are normal.   Neck: Carotid bruit is not present. No tracheal deviation present. No thyroid mass and no thyromegaly present.   Cardiovascular: Normal rate, regular rhythm, normal heart sounds and intact distal pulses.   Pulmonary/Chest: Effort normal and breath sounds normal.   Neurological: He is alert and oriented to person, place, and time. Gait normal.   Skin: Skin is warm and dry.   Psychiatric: He has a normal mood and affect. His behavior is normal. Judgment and thought content normal.   Nursing note and vitals reviewed.      Assessment/Plan   Jeff was seen today for med management.    Diagnoses and all orders for this visit:    Benign essential hypertension    Other hyperlipidemia  -     Comprehensive Metabolic Panel  -     CK    Vitamin B 12 deficiency  -     CBC & Differential  -     Vitamin B12 & Folate    Vitamin D deficiency      Patient Instructions   63-year-old male who presents today in follow-up of hypertension, hyperlipidemia, vitamin D and B12 deficiencies.  Blood pressure today is okay.  He was not diagnosed with hypertension in the past and was started on Lotensin for renal protection with diabetes.  However, blood  pressure is borderline on medication, so it is likely he has hypertension.  Continue Lotensin 10 mg once daily.    Patient will follow-up with Dr. Juarez in 1 week.  His A1c has been out of control and they are working on better control.  He was seen by cardiology with echo and no follow-up is needed unless he develops symptoms.  Labs today.  Call if no results in 1 week.  Follow-up in 6 months for AWV and follow-up of hypertension, hyperlipidemia, B12 and vitamin D deficiency.    Last AWV he was up-to-date on colonoscopy and was going to check with insurance regarding coverage for Pneumovax and hepatitis A here or at his pharmacy.

## 2019-05-09 LAB
ALBUMIN SERPL-MCNC: 4.1 G/DL (ref 3.5–5.2)
ALBUMIN/GLOB SERPL: 1.3 G/DL
ALP SERPL-CCNC: 81 U/L (ref 39–117)
ALT SERPL-CCNC: 24 U/L (ref 1–41)
AST SERPL-CCNC: 22 U/L (ref 1–40)
BASOPHILS # BLD AUTO: 0.05 10*3/MM3 (ref 0–0.2)
BASOPHILS NFR BLD AUTO: 0.8 % (ref 0–1.5)
BILIRUB SERPL-MCNC: 0.5 MG/DL (ref 0.2–1.2)
BUN SERPL-MCNC: 11 MG/DL (ref 8–23)
BUN/CREAT SERPL: 14.9 (ref 7–25)
CALCIUM SERPL-MCNC: 9.5 MG/DL (ref 8.6–10.5)
CHLORIDE SERPL-SCNC: 100 MMOL/L (ref 98–107)
CK SERPL-CCNC: 151 U/L (ref 20–200)
CO2 SERPL-SCNC: 27.7 MMOL/L (ref 22–29)
CREAT SERPL-MCNC: 0.74 MG/DL (ref 0.76–1.27)
EOSINOPHIL # BLD AUTO: 0.15 10*3/MM3 (ref 0–0.4)
EOSINOPHIL NFR BLD AUTO: 2.4 % (ref 0.3–6.2)
ERYTHROCYTE [DISTWIDTH] IN BLOOD BY AUTOMATED COUNT: 12.5 % (ref 12.3–15.4)
FOLATE SERPL-MCNC: 18.7 NG/ML (ref 4.78–24.2)
GLOBULIN SER CALC-MCNC: 3.2 GM/DL
GLUCOSE SERPL-MCNC: 100 MG/DL (ref 65–99)
HCT VFR BLD AUTO: 46 % (ref 37.5–51)
HGB BLD-MCNC: 15.1 G/DL (ref 13–17.7)
IMM GRANULOCYTES # BLD AUTO: 0.02 10*3/MM3 (ref 0–0.05)
IMM GRANULOCYTES NFR BLD AUTO: 0.3 % (ref 0–0.5)
LYMPHOCYTES # BLD AUTO: 1.41 10*3/MM3 (ref 0.7–3.1)
LYMPHOCYTES NFR BLD AUTO: 22.3 % (ref 19.6–45.3)
MCH RBC QN AUTO: 30.3 PG (ref 26.6–33)
MCHC RBC AUTO-ENTMCNC: 32.8 G/DL (ref 31.5–35.7)
MCV RBC AUTO: 92.2 FL (ref 79–97)
MONOCYTES # BLD AUTO: 0.39 10*3/MM3 (ref 0.1–0.9)
MONOCYTES NFR BLD AUTO: 6.2 % (ref 5–12)
NEUTROPHILS # BLD AUTO: 4.29 10*3/MM3 (ref 1.7–7)
NEUTROPHILS NFR BLD AUTO: 68 % (ref 42.7–76)
NRBC BLD AUTO-RTO: 0 /100 WBC (ref 0–0.2)
PLATELET # BLD AUTO: 273 10*3/MM3 (ref 140–450)
POTASSIUM SERPL-SCNC: 4 MMOL/L (ref 3.5–5.2)
PROT SERPL-MCNC: 7.3 G/DL (ref 6–8.5)
RBC # BLD AUTO: 4.99 10*6/MM3 (ref 4.14–5.8)
SODIUM SERPL-SCNC: 141 MMOL/L (ref 136–145)
VIT B12 SERPL-MCNC: 702 PG/ML (ref 211–946)
WBC # BLD AUTO: 6.31 10*3/MM3 (ref 3.4–10.8)

## 2019-05-14 NOTE — PROGRESS NOTES
Subjective   Jeff Bernard is a 63 y.o. male.     F/u for dm 2, hyperlipidemia. Vitamin d def / testing bs 4 x day / last dm eye exam 12/2018 with dr Camejo/ last dm foot exam today with dr Juarez           Patient has known diabetes mellitus since 1999 and started on insulin in 2000. He is on Basaglar 30 units every evening and NovoLog 1 unit per 5 g of carbohydrate before each meal. He checks his blood sugar 4 times a day. Fasting blood sugar runs . Lunchtime blood sugar runs . Suppertime blood sugar runs between . Bedtime blood sugar runs between .  He denies any severe hypoglycemic episode. He has lost 4 pounds since 12/18. His last meal was at 7 AM      His last eye examination was in December 2018 and he has no retinopathy. He denies any associated nephropathy. Urine microalbumin was normal in 11/18. He is on benazepril. He denies any numbness, tingling or burning in his feet.      He has hyperlipidemia and has been on Lipitor 40 mg once a day. He denies any muscle pains.      He has no history of hypertension. He denies any previous history of myocardial infarction, heart failure, or stroke. He denies chest pain, shortness of breath or pedal edema.  He had a normal nuclear stress test in 4/18     He had a colonoscopy done in March 2017 and polyps were removed by Dr. Kelly.  Pathology report was read as tubular adenoma with low-grade dysplasia and hyperplastic polyps.  He was advised follow-up colonoscopy in 2022.  He denies bowel complaints.     The following portions of the patient's history were reviewed and updated as appropriate: allergies, current medications, past family history, past medical history, past social history, past surgical history and problem list.    Review of Systems   Constitutional: Negative.    HENT: Negative.    Eyes: Negative.    Respiratory: Negative.    Cardiovascular: Negative.    Gastrointestinal: Negative.    Endocrine: Negative.    Genitourinary:  "Negative.    Musculoskeletal: Negative.    Skin: Negative.    Allergic/Immunologic: Negative.    Neurological: Negative.    Hematological: Negative.    Psychiatric/Behavioral: Negative.        Objective      Vitals:    05/15/19 1019   BP: 124/68   BP Location: Right arm   Patient Position: Sitting   Cuff Size: Large Adult   Pulse: 66   SpO2: 95%   Weight: 97.4 kg (214 lb 12.8 oz)   Height: 185.4 cm (72.99\")     Physical Exam   Constitutional: He is oriented to person, place, and time. He appears well-developed and well-nourished. No distress.   HENT:   Head: Normocephalic.   Right Ear: External ear normal.   Left Ear: External ear normal.   Mouth/Throat: Oropharynx is clear and moist. No oropharyngeal exudate.   Eyes: Conjunctivae are normal. Right eye exhibits no discharge. Left eye exhibits no discharge. No scleral icterus.   Neck: Neck supple. No JVD present. No tracheal deviation present. No thyromegaly present.   Cardiovascular: Normal rate, regular rhythm, normal heart sounds and intact distal pulses. Exam reveals no gallop and no friction rub.   No murmur heard.  Pulmonary/Chest: Effort normal and breath sounds normal. No stridor. No respiratory distress. He has no wheezes. He has no rales. He exhibits no tenderness.   Abdominal: Soft. Bowel sounds are normal. He exhibits no distension and no mass. There is no tenderness. There is no rebound and no guarding.   Musculoskeletal: Normal range of motion. He exhibits no edema, tenderness or deformity.   Lymphadenopathy:     He has no cervical adenopathy.   Neurological: He is oriented to person, place, and time. He displays normal reflexes. No cranial nerve deficit. He exhibits normal muscle tone. Coordination normal.   Intact light touch   Skin: Skin is warm and dry. No rash noted. No erythema.   Psychiatric: He has a normal mood and affect. His behavior is normal.     Office Visit on 05/08/2019   Component Date Value Ref Range Status   • WBC 05/08/2019 6.31  " 3.40 - 10.80 10*3/mm3 Final   • RBC 05/08/2019 4.99  4.14 - 5.80 10*6/mm3 Final   • Hemoglobin 05/08/2019 15.1  13.0 - 17.7 g/dL Final   • Hematocrit 05/08/2019 46.0  37.5 - 51.0 % Final   • MCV 05/08/2019 92.2  79.0 - 97.0 fL Final   • MCH 05/08/2019 30.3  26.6 - 33.0 pg Final   • MCHC 05/08/2019 32.8  31.5 - 35.7 g/dL Final   • RDW 05/08/2019 12.5  12.3 - 15.4 % Final   • Platelets 05/08/2019 273  140 - 450 10*3/mm3 Final   • Neutrophil Rel % 05/08/2019 68.0  42.7 - 76.0 % Final   • Lymphocyte Rel % 05/08/2019 22.3  19.6 - 45.3 % Final   • Monocyte Rel % 05/08/2019 6.2  5.0 - 12.0 % Final   • Eosinophil Rel % 05/08/2019 2.4  0.3 - 6.2 % Final   • Basophil Rel % 05/08/2019 0.8  0.0 - 1.5 % Final   • Neutrophils Absolute 05/08/2019 4.29  1.70 - 7.00 10*3/mm3 Final   • Lymphocytes Absolute 05/08/2019 1.41  0.70 - 3.10 10*3/mm3 Final   • Monocytes Absolute 05/08/2019 0.39  0.10 - 0.90 10*3/mm3 Final   • Eosinophils Absolute 05/08/2019 0.15  0.00 - 0.40 10*3/mm3 Final   • Basophils Absolute 05/08/2019 0.05  0.00 - 0.20 10*3/mm3 Final   • Immature Granulocyte Rel % 05/08/2019 0.3  0.0 - 0.5 % Final   • Immature Grans Absolute 05/08/2019 0.02  0.00 - 0.05 10*3/mm3 Final   • nRBC 05/08/2019 0.0  0.0 - 0.2 /100 WBC Final   • Glucose 05/08/2019 100* 65 - 99 mg/dL Final   • BUN 05/08/2019 11  8 - 23 mg/dL Final   • Creatinine 05/08/2019 0.74* 0.76 - 1.27 mg/dL Final   • eGFR Non African Am 05/08/2019 106  >60 mL/min/1.73 Final   • eGFR African Am 05/08/2019 129  >60 mL/min/1.73 Final   • BUN/Creatinine Ratio 05/08/2019 14.9  7.0 - 25.0 Final   • Sodium 05/08/2019 141  136 - 145 mmol/L Final   • Potassium 05/08/2019 4.0  3.5 - 5.2 mmol/L Final   • Chloride 05/08/2019 100  98 - 107 mmol/L Final   • Total CO2 05/08/2019 27.7  22.0 - 29.0 mmol/L Final   • Calcium 05/08/2019 9.5  8.6 - 10.5 mg/dL Final   • Total Protein 05/08/2019 7.3  6.0 - 8.5 g/dL Final   • Albumin 05/08/2019 4.10  3.50 - 5.20 g/dL Final   • Globulin  05/08/2019 3.2  gm/dL Final   • A/G Ratio 05/08/2019 1.3  g/dL Final   • Total Bilirubin 05/08/2019 0.5  0.2 - 1.2 mg/dL Final   • Alkaline Phosphatase 05/08/2019 81  39 - 117 U/L Final   • AST (SGOT) 05/08/2019 22  1 - 40 U/L Final   • ALT (SGPT) 05/08/2019 24  1 - 41 U/L Final   • Creatine Kinase 05/08/2019 151  20 - 200 U/L Final   • Vitamin B-12 05/08/2019 702  211 - 946 pg/mL Final   • Folate 05/08/2019 18.70  4.78 - 24.20 ng/mL Final     Assessment/Plan   Jeff was seen today for diabetes, hyperlipidemia and vitamin d deficiency.    Diagnoses and all orders for this visit:    Type 2 diabetes mellitus without complication, with long-term current use of insulin (CMS/[facility])  -     Lipid Panel  -     Hemoglobin A1c  -     TSH    Hyperlipidemia, unspecified hyperlipidemia type  -     Lipid Panel  -     TSH    Benign essential hypertension      Continue Basaglar and Novolog.  Continue Lipitor.  Continue benazepril    Copy of my note to Esperanza MISHRA.    RTC 4 mos.

## 2019-05-15 ENCOUNTER — OFFICE VISIT (OUTPATIENT)
Dept: ENDOCRINOLOGY | Age: 64
End: 2019-05-15

## 2019-05-15 VITALS
BODY MASS INDEX: 28.47 KG/M2 | OXYGEN SATURATION: 95 % | WEIGHT: 214.8 LBS | HEART RATE: 66 BPM | SYSTOLIC BLOOD PRESSURE: 124 MMHG | HEIGHT: 73 IN | DIASTOLIC BLOOD PRESSURE: 68 MMHG

## 2019-05-15 DIAGNOSIS — E11.9 TYPE 2 DIABETES MELLITUS WITHOUT COMPLICATION, WITH LONG-TERM CURRENT USE OF INSULIN (HCC): Primary | ICD-10-CM

## 2019-05-15 DIAGNOSIS — I10 BENIGN ESSENTIAL HYPERTENSION: ICD-10-CM

## 2019-05-15 DIAGNOSIS — Z79.4 TYPE 2 DIABETES MELLITUS WITHOUT COMPLICATION, WITH LONG-TERM CURRENT USE OF INSULIN (HCC): Primary | ICD-10-CM

## 2019-05-15 DIAGNOSIS — E78.5 HYPERLIPIDEMIA, UNSPECIFIED HYPERLIPIDEMIA TYPE: ICD-10-CM

## 2019-05-15 PROCEDURE — 99214 OFFICE O/P EST MOD 30 MIN: CPT | Performed by: INTERNAL MEDICINE

## 2019-05-16 LAB
CHOLEST SERPL-MCNC: 134 MG/DL (ref 0–200)
HBA1C MFR BLD: 8 % (ref 4.8–5.6)
HDLC SERPL-MCNC: 43 MG/DL (ref 40–60)
INTERPRETATION: NORMAL
LDLC SERPL CALC-MCNC: 79 MG/DL (ref 0–100)
Lab: NORMAL
TRIGL SERPL-MCNC: 58 MG/DL (ref 0–150)
TSH SERPL DL<=0.005 MIU/L-ACNC: 1.77 MIU/ML (ref 0.27–4.2)
VLDLC SERPL CALC-MCNC: 11.6 MG/DL

## 2019-05-20 NOTE — PATIENT INSTRUCTIONS
63-year-old male who presents today in follow-up of hypertension, hyperlipidemia, vitamin D and B12 deficiencies.  Blood pressure today is okay.  He was not diagnosed with hypertension in the past and was started on Lotensin for renal protection with diabetes.  However, blood pressure is borderline on medication, so it is likely he has hypertension.  Continue Lotensin 10 mg once daily.    Patient will follow-up with Dr. Juarez in 1 week.  His A1c has been out of control and they are working on better control.  He was seen by cardiology with echo and no follow-up is needed unless he develops symptoms.  Labs today.  Call if no results in 1 week.  Follow-up in 6 months for AWV and follow-up of hypertension, hyperlipidemia, B12 and vitamin D deficiency.    Last AWV he was up-to-date on colonoscopy and was going to check with insurance regarding coverage for Pneumovax and hepatitis A here or at his pharmacy.

## 2019-05-22 RX ORDER — ATORVASTATIN CALCIUM 40 MG/1
40 TABLET, FILM COATED ORAL NIGHTLY
Qty: 90 TABLET | Refills: 1 | Status: SHIPPED | OUTPATIENT
Start: 2019-05-22 | End: 2019-06-19 | Stop reason: SDUPTHER

## 2019-06-19 RX ORDER — ATORVASTATIN CALCIUM 40 MG/1
40 TABLET, FILM COATED ORAL NIGHTLY
Qty: 90 TABLET | Refills: 1 | Status: SHIPPED | OUTPATIENT
Start: 2019-06-19 | End: 2019-12-20

## 2019-06-20 RX ORDER — BENAZEPRIL HYDROCHLORIDE 10 MG/1
10 TABLET ORAL DAILY
Qty: 90 TABLET | Refills: 1 | Status: SHIPPED | OUTPATIENT
Start: 2019-06-20 | End: 2019-12-20

## 2019-06-24 RX ORDER — BENAZEPRIL HYDROCHLORIDE 10 MG/1
TABLET ORAL
Qty: 30 TABLET | Refills: 2 | Status: SHIPPED | OUTPATIENT
Start: 2019-06-24 | End: 2019-09-25 | Stop reason: SDUPTHER

## 2019-06-24 RX ORDER — ATORVASTATIN CALCIUM 40 MG/1
TABLET, FILM COATED ORAL
Qty: 30 TABLET | Refills: 5 | Status: SHIPPED | OUTPATIENT
Start: 2019-06-24 | End: 2019-10-24 | Stop reason: SDUPTHER

## 2019-09-25 RX ORDER — BENAZEPRIL HYDROCHLORIDE 10 MG/1
TABLET ORAL
Qty: 90 TABLET | Refills: 0 | Status: SHIPPED | OUTPATIENT
Start: 2019-09-25 | End: 2019-10-24 | Stop reason: SDUPTHER

## 2019-10-23 NOTE — PROGRESS NOTES
Subjective   Jeff Bernard is a 64 y.o. male.     F/u for dm 2, hyperlipidemia, vitamin d def / testing bs 4 x day / last dm eye exam 12/2018 with dr Camejo / last dm foot exam 5/15/19 with dr Juarez / flu vaccine @ [facility]        Patient has known diabetes mellitus since 1999 and started on insulin in 2000. He is on Basaglar 30 units every evening and NovoLog 1 unit per 5 g of carbohydrate before each meal. He checks his blood sugar 4 times a day.  He is using a freestyle eva but has not downloaded.  Fasting glucose .  Lunchtime glucose .  Suppertime glucose .  Bedtime glucose . He denies any severe hypoglycemic episode. He has lost 3 pounds since 5/19. His last meal was at 6 AM      His last eye examination was in December 2018 and he has no retinopathy. He denies any associated nephropathy. Urine microalbumin was normal in 11/18. He is on benazepril. He denies any numbness, tingling or burning in his feet.      He has hyperlipidemia and has been on Lipitor 40 mg once a day. He denies any muscle pains.      He has no history of hypertension. He denies any previous history of myocardial infarction, heart failure, or stroke. He denies chest pain, shortness of breath or pedal edema.  He had a normal nuclear stress test in 4/18     He had a colonoscopy done in March 2017 and polyps were removed by Dr. Kelly.  Pathology report was read as tubular adenoma with low-grade dysplasia and hyperplastic polyps.  He was advised follow-up colonoscopy in 2022.  He denies bowel complaints.    He had 2 pneumococcal vaccination and flu vaccine from [facility].    The following portions of the patient's history were reviewed and updated as appropriate: allergies, current medications, past family history, past medical history, past social history, past surgical history and problem list.    Review of Systems   Constitutional: Negative.    HENT: Negative.    Eyes: Negative.    Respiratory: Negative.    Cardiovascular:  "Negative for chest pain, palpitations and leg swelling.   Gastrointestinal: Negative.    Genitourinary: Negative.    Musculoskeletal: Negative for arthralgias and myalgias.   Neurological: Negative for syncope, weakness and numbness.   Hematological: Negative for adenopathy. Does not bruise/bleed easily.     ROS reviewed again  Objective      Vitals:    10/24/19 0959   BP: 118/68   BP Location: Right arm   Patient Position: Sitting   Cuff Size: Large Adult   Pulse: 68   SpO2: 99%   Weight: 96.1 kg (211 lb 12.8 oz)   Height: 185.4 cm (72.99\")     Physical Exam   Constitutional: He is oriented to person, place, and time. He appears well-developed and well-nourished. No distress.   HENT:   Head: Normocephalic.   Right Ear: External ear normal.   Left Ear: External ear normal.   Nose: Nose normal.   Mouth/Throat: Oropharynx is clear and moist. No oropharyngeal exudate.   Eyes: Conjunctivae and EOM are normal. Right eye exhibits no discharge. Left eye exhibits no discharge. No scleral icterus.   Neck: Neck supple. No JVD present. No tracheal deviation present. No thyromegaly present.   Cardiovascular: Normal rate, regular rhythm and normal heart sounds. Exam reveals no gallop and no friction rub.   No murmur heard.  Pulmonary/Chest: Effort normal and breath sounds normal. No stridor. No respiratory distress. He has no wheezes. He has no rales. He exhibits no tenderness.   Abdominal: Soft. Bowel sounds are normal. He exhibits no distension and no mass. There is no tenderness. There is no rebound and no guarding.   Musculoskeletal: Normal range of motion. He exhibits no edema, tenderness or deformity.   Lymphadenopathy:     He has no cervical adenopathy.   Neurological: He is alert and oriented to person, place, and time. He displays normal reflexes. He exhibits normal muscle tone. Coordination normal.   Intact light touch in lower extremities   Skin: Skin is warm and dry. No rash noted. He is not diaphoretic. No erythema. "   Psychiatric: He has a normal mood and affect. His behavior is normal.     Office Visit on 05/15/2019   Component Date Value Ref Range Status   • Total Cholesterol 05/15/2019 134  0 - 200 mg/dL Final   • Triglycerides 05/15/2019 58  0 - 150 mg/dL Final   • HDL Cholesterol 05/15/2019 43  40 - 60 mg/dL Final   • VLDL Cholesterol 05/15/2019 11.6  mg/dL Final   • LDL Cholesterol  05/15/2019 79  0 - 100 mg/dL Final   • Hemoglobin A1C 05/15/2019 8.00* 4.80 - 5.60 % Final    Comment: Hemoglobin A1C Ranges:  Increased Risk for Diabetes  5.7% to 6.4%  Diabetes                     >= 6.5%  Diabetic Goal                < 7.0%     • TSH 05/15/2019 1.770  0.270 - 4.200 mIU/mL Final   • Interpretation 05/15/2019 Note   Final    Supplemental report is available.   • PDF Image 05/15/2019 Not applicable   Final     Assessment/Plan   Jeff was seen today for diabetes, hyperlipidemia and vitamin d deficiency.    Diagnoses and all orders for this visit:    Type 2 diabetes mellitus without complication, with long-term current use of insulin (CMS/[facility])    Hyperlipidemia, unspecified hyperlipidemia type    Benign essential hypertension      Continue Basaglar, and NovoLog.  Continue Lipitor 40 mg/day.  Continue benazepril 10 mg/day.    Copy of my note sent to [facility]ney    RTC 4 mos.

## 2019-10-24 ENCOUNTER — OFFICE VISIT (OUTPATIENT)
Dept: ENDOCRINOLOGY | Age: 64
End: 2019-10-24

## 2019-10-24 VITALS
BODY MASS INDEX: 28.07 KG/M2 | WEIGHT: 211.8 LBS | HEIGHT: 73 IN | DIASTOLIC BLOOD PRESSURE: 68 MMHG | OXYGEN SATURATION: 99 % | SYSTOLIC BLOOD PRESSURE: 118 MMHG | HEART RATE: 68 BPM

## 2019-10-24 DIAGNOSIS — R68.89 ABNORMAL ENDOCRINE LABORATORY TEST FINDING: ICD-10-CM

## 2019-10-24 DIAGNOSIS — Z79.4 TYPE 2 DIABETES MELLITUS WITHOUT COMPLICATION, WITH LONG-TERM CURRENT USE OF INSULIN (HCC): Primary | ICD-10-CM

## 2019-10-24 DIAGNOSIS — E11.9 TYPE 2 DIABETES MELLITUS WITHOUT COMPLICATION, WITH LONG-TERM CURRENT USE OF INSULIN (HCC): Primary | ICD-10-CM

## 2019-10-24 DIAGNOSIS — I10 BENIGN ESSENTIAL HYPERTENSION: ICD-10-CM

## 2019-10-24 DIAGNOSIS — E78.5 HYPERLIPIDEMIA, UNSPECIFIED HYPERLIPIDEMIA TYPE: ICD-10-CM

## 2019-10-24 PROCEDURE — 99214 OFFICE O/P EST MOD 30 MIN: CPT | Performed by: INTERNAL MEDICINE

## 2019-10-25 LAB
ALBUMIN SERPL-MCNC: 4.7 G/DL (ref 3.5–5.2)
ALBUMIN/GLOB SERPL: 1.9 G/DL
ALP SERPL-CCNC: 72 U/L (ref 39–117)
ALT SERPL-CCNC: 23 U/L (ref 1–41)
AST SERPL-CCNC: 19 U/L (ref 1–40)
BILIRUB SERPL-MCNC: 0.7 MG/DL (ref 0.2–1.2)
BUN SERPL-MCNC: 11 MG/DL (ref 8–23)
BUN/CREAT SERPL: 15.9 (ref 7–25)
CALCIUM SERPL-MCNC: 9.2 MG/DL (ref 8.6–10.5)
CHLORIDE SERPL-SCNC: 102 MMOL/L (ref 98–107)
CHOLEST SERPL-MCNC: 135 MG/DL (ref 0–200)
CO2 SERPL-SCNC: 28.3 MMOL/L (ref 22–29)
CREAT SERPL-MCNC: 0.69 MG/DL (ref 0.76–1.27)
GLOBULIN SER CALC-MCNC: 2.5 GM/DL
GLUCOSE SERPL-MCNC: 91 MG/DL (ref 65–99)
HBA1C MFR BLD: 8.2 % (ref 4.8–5.6)
HDLC SERPL-MCNC: 44 MG/DL (ref 40–60)
INTERPRETATION: NORMAL
LDLC SERPL CALC-MCNC: 80 MG/DL (ref 0–100)
Lab: NORMAL
POTASSIUM SERPL-SCNC: 4.1 MMOL/L (ref 3.5–5.2)
PROT SERPL-MCNC: 7.2 G/DL (ref 6–8.5)
SODIUM SERPL-SCNC: 140 MMOL/L (ref 136–145)
TRIGL SERPL-MCNC: 56 MG/DL (ref 0–150)
TSH SERPL DL<=0.005 MIU/L-ACNC: 1.57 UIU/ML (ref 0.27–4.2)
VLDLC SERPL CALC-MCNC: 11.2 MG/DL

## 2019-11-15 ENCOUNTER — OFFICE VISIT (OUTPATIENT)
Dept: FAMILY MEDICINE CLINIC | Facility: CLINIC | Age: 64
End: 2019-11-15

## 2019-11-15 VITALS
BODY MASS INDEX: 28.63 KG/M2 | TEMPERATURE: 97.7 F | DIASTOLIC BLOOD PRESSURE: 68 MMHG | SYSTOLIC BLOOD PRESSURE: 114 MMHG | HEART RATE: 67 BPM | WEIGHT: 216 LBS | HEIGHT: 73 IN | OXYGEN SATURATION: 98 %

## 2019-11-15 DIAGNOSIS — M62.541 ATROPHY OF MUSCLE OF RIGHT HAND: ICD-10-CM

## 2019-11-15 DIAGNOSIS — E11.9 TYPE 2 DIABETES MELLITUS WITHOUT COMPLICATION, WITH LONG-TERM CURRENT USE OF INSULIN (HCC): ICD-10-CM

## 2019-11-15 DIAGNOSIS — R29.898 WEAKNESS OF RIGHT HAND: ICD-10-CM

## 2019-11-15 DIAGNOSIS — E78.49 OTHER HYPERLIPIDEMIA: ICD-10-CM

## 2019-11-15 DIAGNOSIS — Z00.00 MEDICARE ANNUAL WELLNESS VISIT, SUBSEQUENT: Primary | ICD-10-CM

## 2019-11-15 DIAGNOSIS — Z79.4 TYPE 2 DIABETES MELLITUS WITHOUT COMPLICATION, WITH LONG-TERM CURRENT USE OF INSULIN (HCC): ICD-10-CM

## 2019-11-15 DIAGNOSIS — I10 BENIGN ESSENTIAL HYPERTENSION: ICD-10-CM

## 2019-11-15 DIAGNOSIS — E53.8 VITAMIN B 12 DEFICIENCY: ICD-10-CM

## 2019-11-15 DIAGNOSIS — E55.9 VITAMIN D DEFICIENCY: ICD-10-CM

## 2019-11-15 DIAGNOSIS — Z11.59 ENCOUNTER FOR HEPATITIS C SCREENING TEST FOR LOW RISK PATIENT: ICD-10-CM

## 2019-11-15 DIAGNOSIS — R97.20 INCREASED PROSTATE SPECIFIC ANTIGEN (PSA) VELOCITY: ICD-10-CM

## 2019-11-15 DIAGNOSIS — M62.542 ATROPHY OF MUSCLE OF LEFT HAND: ICD-10-CM

## 2019-11-15 LAB
A/C: NORMAL
POC CREATININE URINE: NORMAL
POC MICROALBUMIN URINE: NORMAL

## 2019-11-15 PROCEDURE — 82044 UR ALBUMIN SEMIQUANTITATIVE: CPT | Performed by: PHYSICIAN ASSISTANT

## 2019-11-15 PROCEDURE — 99214 OFFICE O/P EST MOD 30 MIN: CPT | Performed by: PHYSICIAN ASSISTANT

## 2019-11-15 PROCEDURE — G0439 PPPS, SUBSEQ VISIT: HCPCS | Performed by: PHYSICIAN ASSISTANT

## 2019-11-15 NOTE — PROGRESS NOTES
Subjective   Jeff Bernard is a 64 y.o. male here today for follow-up of hypertension, hyperlipidemia, B12 deficiency, vitamin D deficiency, and specialist. Also weakness and atrophy of right hand after a shoulder injury.     History of Present Illness     B12 taking 1000 mcg twice weekly.   Vitamin D 1000IU daily    Just ran out of ASA and bought regular ASA. Will go back on ASA 81 mg   Weight at home 213. No change in weight at home.     12-13 years ago- hurt right shoulder at work - slipped and swing around, caught by hand, and had therapy and had physical therapy. Then had another job, hurt his shoulder and had to have PT again. He continues doing exercises. He has atrophy and reports he will be holding a coffee cup and drop it. 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 Otherwise, does notice the weakness.     The following portions of the patient's history were reviewed and updated as appropriate: allergies, current medications, past family history, past medical history, past social history, past surgical history and problem list.    Review of Systems   HENT: Negative.    Eyes: Negative.    Respiratory: Negative.    Cardiovascular: Negative.    Gastrointestinal: Negative.    Endocrine: Negative.    Genitourinary: Negative.    Musculoskeletal: Positive for arthralgias.   Skin: Negative.    Neurological: Positive for weakness.   Psychiatric/Behavioral: Negative.        Objective    Vitals:    11/15/19 0924   BP: 114/68   Pulse: 67   Temp: 97.7 °F (36.5 °C)   SpO2: 98%     Body mass index is 28.5 kg/m².    Physical Exam   Constitutional: He is oriented to person, place, and time. He appears well-developed and well-nourished. No distress.   HENT:   Head: Normocephalic and atraumatic.   Right Ear: Hearing, tympanic membrane, external ear and ear canal normal.   Left Ear: Hearing, tympanic membrane, external ear and ear canal normal.   Nose: Nose normal.   Mouth/Throat: Oropharynx is clear and moist.   Eyes:  Conjunctivae, EOM and lids are normal. Pupils are equal, round, and reactive to light.   Neck: Neck supple. No JVD present. Carotid bruit is not present. No tracheal deviation present. No thyroid mass and no thyromegaly present.   Cardiovascular: Normal rate, regular rhythm, normal heart sounds and intact distal pulses. Exam reveals no gallop and no friction rub.   No murmur heard.  Pulses:       Radial pulses are 2+ on the right side, and 2+ on the left side.        Posterior tibial pulses are 2+ on the right side, and 2+ on the left side.   Pulmonary/Chest: Effort normal and breath sounds normal. No respiratory distress. He has no wheezes. He has no rhonchi. He has no rales.   Abdominal: Soft. Normal aorta and bowel sounds are normal. He exhibits no distension and no abdominal bruit. There is no hepatosplenomegaly. There is no tenderness. There is no rigidity, no rebound and no guarding. No hernia.   Musculoskeletal: Normal range of motion. He exhibits no edema, tenderness or deformity.   Normal strength. Atrophy noted right > left thenar and hypothenar eminence.    Lymphadenopathy:     He has no cervical adenopathy.   Neurological: He is alert and oriented to person, place, and time. He has normal strength and normal reflexes. He displays normal reflexes. No cranial nerve deficit or sensory deficit. He exhibits normal muscle tone. Coordination and gait normal.   Skin: Skin is warm and dry. No rash noted. He is not diaphoretic. No erythema.   Psychiatric: He has a normal mood and affect. His speech is normal and behavior is normal. Judgment and thought content normal. Cognition and memory are normal.       Assessment/Plan   Jeff was seen today for medicare wellness-subsequent, follow-up and shoulder injury.    Diagnoses and all orders for this visit:    Medicare annual wellness visit, subsequent  -     Hepatitis C Antibody    Benign essential hypertension    Other hyperlipidemia  -     Comprehensive Metabolic  Panel  -     CK    Vitamin B 12 deficiency  -     CBC & Differential  -     Vitamin B12 & Folate    Vitamin D deficiency  -     Comprehensive Metabolic Panel  -     Vitamin D 25 Hydroxy    Increased prostate specific antigen (PSA) velocity    Encounter for hepatitis C screening test for low risk patient  -     Hepatitis C Antibody    Type 2 diabetes mellitus without complication, with long-term current use of insulin (CMS/HCC)  -     Comprehensive Metabolic Panel  -     POC Microalbumin    Weakness of right hand  -     EMG & Nerve Conduction Test    Atrophy of muscle of right hand  -     EMG & Nerve Conduction Test    Atrophy of muscle of left hand  -     EMG & Nerve Conduction Test      Patient Instructions   64 year old male who presents today for AWV and in follow up of hypertension, hyperlipidemia, B12 and vitamin D deficiency, and specialists. He also has weakness of right > left hands after a remote history of shoulder injury. Last colonoscopy was in 2017 with Dr Kelly and recommendations to follow up in 5 years. To check with insurance about coverage of Shingrix.     Blood pressure is stable today. He is on Lotensin for renal protection with diabetes mellitus but blood pressure is controlled but not low on medication. He is stable on medication without AE. He is taking B12 1000 mcg twice weekly and Vitamin D 1000IU once daily. Patient will have fasting labs. Call if no results in 1 week. Stability of conditions, plan, follow up, and further recommendations pending labs. If stable, follow up in 6 months.     12-13 years ago, he hurt his right shoulder at work. Patient slipped and swung around, caught himself by his hand with injury, and had physical therapy. He then had another job, hurt his shoulder, and had to have PT again. He continues doing exercises he was given by PT. He has atrophy and reports he is dropping things. He does notice the weakness and has atrophy noted on exam. I will refer to EMG/NCS and  we will consider specialist vs further workup pending results.      Patient sees Dr Juarez for diabetes mellitus, hyperlipidemia, and vitamin D deficiency. Last appointment was 10/24/19 and A1C remains uncontrolled at 8.2%. He will keep follow up with with specialists as directed by them (as noted below) and be more compliant with exercise and will continue medication as directed by endocrinology. He is up to date with ophthalmology follow up. I have reviewed consult notes and labs.     Urology-Dr. Fatima- last seen 7/2019 for elevated PSA, BPH, and undescended right testicle.  [id number] PRN.   Endocrinology-Dr. Juarez last visit 10/2019 for diabetes mellitus type 2- stable- continue medications and follow-up in 4 months.  Cardiology- last seen 9/2018-they confirmed no need for follow-up after last visit.  Ophthalmology- Dr Camejo- last appt 12/2018 in [facility] or [facility]- follow up annually. Has appt 12/2019.

## 2019-11-15 NOTE — PROGRESS NOTES
The ABCs of the Annual Wellness Visit  Subsequent Medicare Wellness Visit    Chief Complaint   Patient presents with   • Medicare Wellness-subsequent       Subjective   History of Present Illness:  Jeff Bernard is a 64 y.o. male who presents for a Subsequent Medicare Wellness Visit.    LAST COLONOSCOPY- DR MUKHERJEE- 2/2014- POLYPECTOMY- 3/2017- RECHECK 5 YEARS.    LAST TDAP- 11/13/13  FLU SHOT-9/28/2019- [facility]  PREVNAR- 8/2/2017  PNEUMOVAX-11/16/2018  ZOSTAVAX- 8/3/17  HEPATITIS A- 5/7/18, 12/15/2018    Urology-Dr. Fatima- last seen 7/2019 for elevated PSA, BPH, and undescended right testicle.  [id number] PRN.   Endocrinology-Dr. Juarez last visit 10/2019 for diabetes mellitus type 2- stable- continue medications and follow-up in 4 months.  Cardiology- last seen 9/2018-they confirmed no need for follow-up after last visit.  Ophthalmology- Dr Caemjo- last appt 12/2018 in [facility] or [facility]- follow up annually. Has appt 12/2019.     HEALTH RISK ASSESSMENT    Recent Hospitalizations:  No hospitalization(s) within the last year.    Current Medical Providers:  Patient Care Team:  Esperanza Sarmiento PA as PCP - General  Esperanza Sarmiento PA as PCP - Family Medicine  Salinas Juarez MD as PCP - Claims Attributed  Salinas Juarez MD as Consulting Physician (Endocrinology)  Lazarus Gallegos MD as Consulting Physician (Cardiology)  Michael Fatima MD as Consulting Physician (Urology)    Smoking Status:  Social History     Tobacco Use   Smoking Status Never Smoker   Smokeless Tobacco Never Used       Alcohol Consumption:  Social History     Substance and Sexual Activity   Alcohol Use Yes   • Types: 4 - 6 Cans of beer per week    Comment: Socially.       Depression Screen:   PHQ-2/PHQ-9 Depression Screening 11/15/2019   Little interest or pleasure in doing things 0   Feeling down, depressed, or hopeless 0   Trouble falling or staying asleep, or sleeping too much 0   Feeling tired or having little energy 0    Poor appetite or overeating 0   Feeling bad about yourself - or that you are a failure or have let yourself or your family down 0   Trouble concentrating on things, such as reading the newspaper or watching television 0   Moving or speaking so slowly that other people could have noticed. Or the opposite - being so fidgety or restless that you have been moving around a lot more than usual 0   Thoughts that you would be better off dead, or of hurting yourself in some way 0   Total Score 0   If you checked off any problems, how difficult have these problems made it for you to do your work, take care of things at home, or get along with other people? Not difficult at all       Fall Risk Screen:  CHRISTINE Fall Risk Assessment has not been completed.    Health Habits and Functional and Cognitive Screening:  Functional & Cognitive Status 11/15/2019   Do you have difficulty preparing food and eating? No   Do you have difficulty bathing yourself, getting dressed or grooming yourself? No   Do you have difficulty using the toilet? No   Do you have difficulty moving around from place to place? No   Do you have trouble with steps or getting out of a bed or a chair? No   Current Diet Low Carb Diet   Dental Exam Not up to date   Eye Exam Up to date   Exercise (times per week) 3 times per week   Current Exercise Activities Include Walking   Do you need help using the phone?  No   Are you deaf or do you have serious difficulty hearing?  No   Do you need help with transportation? No   Do you need help shopping? No   Do you need help preparing meals?  No   Do you need help with housework?  No   Do you need help with laundry? No   Do you need help taking your medications? No   Do you need help managing money? No   Do you ever drive or ride in a car without wearing a seat belt? No   Have you felt unusual stress, anger or loneliness in the last month? No   Who do you live with? Alone   If you need help, do you have trouble finding  "someone available to you? No   Have you been bothered in the last four weeks by sexual problems? No   Do you have difficulty concentrating, remembering or making decisions? No         Does the patient have evidence of cognitive impairment? No    Asprin use counseling:Taking ASA appropriately as indicated    Age-appropriate Screening Schedule:  Refer to the list below for future screening recommendations based on patient's age, sex and/or medical conditions. Orders for these recommended tests are listed in the plan section. The patient has been provided with a written plan.    Health Maintenance   Topic Date Due   • ZOSTER VACCINE (2 of 3) 09/28/2017   • DIABETIC EYE EXAM  12/29/2018   • INFLUENZA VACCINE  08/01/2019   • URINE MICROALBUMIN  11/07/2019   • HEMOGLOBIN A1C  04/24/2020   • DIABETIC FOOT EXAM  05/15/2020   • LIPID PANEL  10/24/2020   • COLONOSCOPY  03/20/2022   • TDAP/TD VACCINES (2 - Td) 11/13/2023   • PNEUMOCOCCAL VACCINES (65+ LOW/MEDIUM RISK)  Completed          The following portions of the patient's history were reviewed and updated as appropriate: allergies, current medications, past family history, past medical history, past social history, past surgical history and problem list.    Outpatient Medications Prior to Visit   Medication Sig Dispense Refill   • aspirin 81 MG tablet Take 1 tablet by mouth daily.     • atorvastatin (LIPITOR) 40 MG tablet Take 1 tablet by mouth Every Night. 90 tablet 1   • B-D INS SYRINGE 0.5CC/31GX5/16 31G X 5/16\" 0.5 ML misc USE ONE SYRINGE DAILY AS DIRECTED 90 each 0   • B-D ULTRAFINE III SHORT PEN 31G X 8 MM misc USE 3 PEN NEEDLES DAILY AS DIRECTED 100 each 0   • benazepril (LOTENSIN) 10 MG tablet Take 1 tablet by mouth Daily. 90 tablet 1   • Cholecalciferol (VITAMIN D-3) 1000 units capsule Take 1,000 Units by mouth Daily.     • Cyanocobalamin (VITAMIN B 12 PO) Take 1,000 mg by mouth 1 (One) Time Per Week.     • glucose blood (CONTOUR NEXT TEST) test strip Dx code " E11.9 per medicare pt can only test bs 3 x day 300 each 1   • insulin aspart (NOVOLOG FLEXPEN) 100 UNIT/ML solution pen-injector sc pen INJECT 8-13 UNITS EVERY MORNING, 9-15 UNITS AT LUNCH AND 10-15 UNITS AT SUPPER (AVG 45U/DAY) 15 mL 4   • Insulin Glargine (BASAGLAR KWIKPEN) 100 UNIT/ML injection pen 30 units every evening 29 mL 1   • Insulin Pen Needle (B-D ULTRAFINE III SHORT PEN) 31G X 8 MM misc USE 1 PEN NEEDLE 4 X DAILY DX CODE E11.9 200 each 1   • [employer] LANCETS MICRO THIN 33G misc Testing bs 5 x day 500 each 1     Facility-Administered Medications Prior to Visit   Medication Dose Route Frequency Provider Last Rate Last Dose   • cyanocobalamin injection 1,000 mcg  1,000 mcg Intramuscular Q28 Days Esperanza Sarmiento PA   1,000 mcg at 04/17/19 1113       Patient Active Problem List   Diagnosis   • Abnormal electrocardiogram   • Abnormal pituitary follicle stimulating hormone (FSH)   • Arteriosclerosis of carotid artery   • Benign colonic polyp   • Benign essential hypertension   • Erectile dysfunction of nonorganic origin   • Muscle weakness   • Type 2 diabetes mellitus without complication, with long-term current use of insulin (CMS/[facility])   • Hyperlipidemia   • Vitamin D deficiency   • History of adenomatous polyp of colon   • Increased prostate specific antigen (PSA) velocity   • Abnormal testicular exam       Advanced Care Planning:  Patient does not have an advance directive - information provided to the patient today    Review of Systems    Compared to one year ago, the patient feels his physical health is the same.  Compared to one year ago, the patient feels his mental health is the same.    Reviewed chart for potential of high risk medication in the elderly: not applicable  Reviewed chart for potential of harmful drug interactions in the elderly:not applicable    Objective         Vitals:    11/15/19 0924   BP: 114/68   BP Location: Left arm   Patient Position: Sitting   Cuff Size: Adult   Pulse: 67  "  Temp: 97.7 °F (36.5 °C)   TempSrc: Oral   SpO2: 98%   Weight: 98 kg (216 lb)   Height: 185.4 cm (72.99\")   PainSc: 0-No pain       Body mass index is 28.5 kg/m².  Discussed the patient's BMI with him. The BMI is above average; BMI management plan is completed.    Physical Exam    Lab Results   Component Value Date    GLU 91 10/24/2019    CHLPL 135 10/24/2019    TRIG 56 10/24/2019    HDL 44 10/24/2019    LDL 80 10/24/2019    VLDL 11.2 10/24/2019    HGBA1C 8.20 (H) 10/24/2019        Assessment/Plan   Medicare Risks and Personalized Health Plan  CMS Preventative Services Quick Reference  Immunizations Discussed/Encouraged (specific immunizations; Shingrix )    The above risks/problems have been discussed with the patient.  Pertinent information has been shared with the patient in the After Visit Summary.  Follow up plans and orders are seen below in the Assessment/Plan Section.    Diagnoses and all orders for this visit:    1. Medicare annual wellness visit, subsequent (Primary)  -     Hepatitis C Antibody    2. Benign essential hypertension    3. Other hyperlipidemia  -     Comprehensive Metabolic Panel  -     CK    4. Vitamin B 12 deficiency  -     CBC & Differential  -     Vitamin B12 & Folate    5. Vitamin D deficiency  -     Comprehensive Metabolic Panel  -     Vitamin D 25 Hydroxy    6. Increased prostate specific antigen (PSA) velocity    7. Encounter for hepatitis C screening test for low risk patient  -     Hepatitis C Antibody    8. Type 2 diabetes mellitus without complication, with long-term current use of insulin (CMS/HCC)  -     Comprehensive Metabolic Panel  -     POC Microalbumin    9. Weakness of right hand  -     EMG & Nerve Conduction Test    10. Atrophy of muscle of right hand  -     EMG & Nerve Conduction Test    11. Atrophy of muscle of left hand  -     EMG & Nerve Conduction Test      Follow Up:  Return in about 6 months (around 5/15/2020) for HTN, lipids, B12, Vit D, specialists.     An After " Visit Summary and PPPS were given to the patient.

## 2019-11-16 LAB
25(OH)D3+25(OH)D2 SERPL-MCNC: 35.1 NG/ML (ref 30–100)
ALBUMIN SERPL-MCNC: 4.6 G/DL (ref 3.5–5.2)
ALBUMIN/GLOB SERPL: 1.8 G/DL
ALP SERPL-CCNC: 70 U/L (ref 39–117)
ALT SERPL-CCNC: 18 U/L (ref 1–41)
AST SERPL-CCNC: 15 U/L (ref 1–40)
BASOPHILS # BLD AUTO: 0.04 10*3/MM3 (ref 0–0.2)
BASOPHILS NFR BLD AUTO: 0.7 % (ref 0–1.5)
BILIRUB SERPL-MCNC: 0.7 MG/DL (ref 0.2–1.2)
BUN SERPL-MCNC: 14 MG/DL (ref 8–23)
BUN/CREAT SERPL: 18.9 (ref 7–25)
CALCIUM SERPL-MCNC: 8.8 MG/DL (ref 8.6–10.5)
CHLORIDE SERPL-SCNC: 98 MMOL/L (ref 98–107)
CK SERPL-CCNC: 113 U/L (ref 20–200)
CO2 SERPL-SCNC: 26.4 MMOL/L (ref 22–29)
CREAT SERPL-MCNC: 0.74 MG/DL (ref 0.76–1.27)
EOSINOPHIL # BLD AUTO: 0.05 10*3/MM3 (ref 0–0.4)
EOSINOPHIL NFR BLD AUTO: 0.9 % (ref 0.3–6.2)
ERYTHROCYTE [DISTWIDTH] IN BLOOD BY AUTOMATED COUNT: 12.1 % (ref 12.3–15.4)
FOLATE SERPL-MCNC: 17.2 NG/ML (ref 4.78–24.2)
GLOBULIN SER CALC-MCNC: 2.6 GM/DL
GLUCOSE SERPL-MCNC: 162 MG/DL (ref 65–99)
HCT VFR BLD AUTO: 43.3 % (ref 37.5–51)
HCV AB S/CO SERPL IA: <0.1 S/CO RATIO (ref 0–0.9)
HGB BLD-MCNC: 15.3 G/DL (ref 13–17.7)
IMM GRANULOCYTES # BLD AUTO: 0.02 10*3/MM3 (ref 0–0.05)
IMM GRANULOCYTES NFR BLD AUTO: 0.4 % (ref 0–0.5)
LYMPHOCYTES # BLD AUTO: 1.61 10*3/MM3 (ref 0.7–3.1)
LYMPHOCYTES NFR BLD AUTO: 29.2 % (ref 19.6–45.3)
MCH RBC QN AUTO: 31.2 PG (ref 26.6–33)
MCHC RBC AUTO-ENTMCNC: 35.3 G/DL (ref 31.5–35.7)
MCV RBC AUTO: 88.2 FL (ref 79–97)
MONOCYTES # BLD AUTO: 0.25 10*3/MM3 (ref 0.1–0.9)
MONOCYTES NFR BLD AUTO: 4.5 % (ref 5–12)
NEUTROPHILS # BLD AUTO: 3.55 10*3/MM3 (ref 1.7–7)
NEUTROPHILS NFR BLD AUTO: 64.3 % (ref 42.7–76)
NRBC BLD AUTO-RTO: 0.2 /100 WBC (ref 0–0.2)
PLATELET # BLD AUTO: 279 10*3/MM3 (ref 140–450)
POTASSIUM SERPL-SCNC: 4.2 MMOL/L (ref 3.5–5.2)
PROT SERPL-MCNC: 7.2 G/DL (ref 6–8.5)
RBC # BLD AUTO: 4.91 10*6/MM3 (ref 4.14–5.8)
SODIUM SERPL-SCNC: 136 MMOL/L (ref 136–145)
VIT B12 SERPL-MCNC: 547 PG/ML (ref 211–946)
WBC # BLD AUTO: 5.52 10*3/MM3 (ref 3.4–10.8)

## 2019-11-29 NOTE — PATIENT INSTRUCTIONS
64 year old male who presents today for AWV and in follow up of hypertension, hyperlipidemia, B12 and vitamin D deficiency, and specialists. He also has weakness of right > left hands after a remote history of shoulder injury. Last colonoscopy was in 2017 with Dr Kelly and recommendations to follow up in 5 years. To check with insurance about coverage of Shingrix.     Blood pressure is stable today. He is on Lotensin for renal protection with diabetes mellitus but blood pressure is controlled but not low on medication. He is stable on medication without AE. He is taking B12 1000 mcg twice weekly and Vitamin D 1000IU once daily. Patient will have fasting labs. Call if no results in 1 week. Stability of conditions, plan, follow up, and further recommendations pending labs. If stable, follow up in 6 months.     12-13 years ago, he hurt his right shoulder at work. Patient slipped and swung around, caught himself by his hand with injury, and had physical therapy. He then had another job, hurt his shoulder, and had to have PT again. He continues doing exercises he was given by PT. He has atrophy and reports he is dropping things. He does notice the weakness and has atrophy noted on exam. I will refer to EMG/NCS and we will consider specialist vs further workup pending results.      Patient sees Dr Juarez for diabetes mellitus, hyperlipidemia, and vitamin D deficiency. Last appointment was 10/24/19 and A1C remains uncontrolled at 8.2%. He will keep follow up with with specialists as directed by them (as noted below) and be more compliant with exercise and will continue medication as directed by endocrinology. He is up to date with ophthalmology follow up. I have reviewed consult notes and labs.     Urology-Dr. Fatima- last seen 7/2019 for elevated PSA, BPH, and undescended right testicle.  [id number] [facility].   Endocrinology-Dr. Juarez last visit 10/2019 for diabetes mellitus type 2- stable- continue medications and  follow-up in 4 months.  Cardiology- last seen 2018-they confirmed no need for follow-up after last visit.  Ophthalmology- Dr Camejo- last appt 2018 in [facility] or [facility]- follow up annually. Has appt 2019.       Medicare Wellness  Personal Prevention Plan of Service     Date of Office Visit:  11/15/2019  Encounter Provider:  TAD Mccartney  Place of Service:  [facility]  Patient Name: Jeff Bernard  :  1955    As part of the Medicare Wellness portion of your visit today, we are providing you with this personalized preventive plan of services (PPPS). This plan is based upon recommendations of the [place] Preventive Services Task Force (USPSTF) and the Advisory Committee on Immunization Practices (ACIP).    This lists the preventive care services that should be considered, and provides dates of when you are due. Items listed as completed are up-to-date and do not require any further intervention.    Health Maintenance   Topic Date Due   • ZOSTER VACCINE (2 of 3) 2017   • DIABETIC EYE EXAM  2018   • HEMOGLOBIN A1C  2020   • DIABETIC FOOT EXAM  05/15/2020   • LIPID PANEL  10/24/2020   • MEDICARE ANNUAL WELLNESS  11/15/2020   • URINE MICROALBUMIN  11/15/2020   • COLONOSCOPY  2022   • TDAP/TD VACCINES (2 - Td) 2023   • HEPATITIS C SCREENING  Completed   • INFLUENZA VACCINE  Completed   • PNEUMOCOCCAL VACCINES (65+ LOW/MEDIUM RISK)  Completed       Orders Placed This Encounter   Procedures   • Comprehensive Metabolic Panel     Order Specific Question:   [facility] Has the patient fasted?     Answer:   Yes   • CK     Order Specific Question:   [facility] Has the patient fasted?     Answer:   Yes   • Vitamin B12 & Folate     Order Specific Question:   [facility] Has the patient fasted?     Answer:   Yes   • Hepatitis C Antibody     Order Specific Question:   [facility] Has the patient fasted?     Answer:   Yes   • Vitamin D 25 Hydroxy      Order Specific Question:   [facility] Has the patient fasted?     Answer:   Yes   • POC Microalbumin   • EMG & Nerve Conduction Test     Order Specific Question:   Extremity     Answer:   bilateral upper extremities     Order Specific Question:   Please specify number of extremities:     Answer:   2 Extremities     Order Specific Question:   Reason for Exam:     Answer:   weakness, atrophy right > left hands   • CBC & Differential     Order Specific Question:   Manual Differential     Answer:   No     Order Specific Question:   [facility] Has the patient fasted?     Answer:   Yes       Return in about 6 months (around 5/15/2020) for HTN, lipids, B12, Vit D, specialists.

## 2019-12-10 ENCOUNTER — HOSPITAL ENCOUNTER (OUTPATIENT)
Dept: INFUSION THERAPY | Facility: HOSPITAL | Age: 64
Discharge: HOME | End: 2019-12-10
Admitting: PSYCHIATRY & NEUROLOGY

## 2019-12-10 DIAGNOSIS — M62.542 ATROPHY OF MUSCLE OF LEFT HAND: ICD-10-CM

## 2019-12-10 DIAGNOSIS — R29.898 WEAKNESS OF RIGHT HAND: ICD-10-CM

## 2019-12-10 DIAGNOSIS — M62.541 ATROPHY OF MUSCLE OF RIGHT HAND: ICD-10-CM

## 2019-12-10 PROCEDURE — 95886 MUSC TEST DONE W/N TEST COMP: CPT

## 2019-12-10 PROCEDURE — 95910 NRV CNDJ TEST 7-8 STUDIES: CPT | Performed by: PSYCHIATRY & NEUROLOGY

## 2019-12-10 PROCEDURE — 95910 NRV CNDJ TEST 7-8 STUDIES: CPT

## 2019-12-10 PROCEDURE — 95886 MUSC TEST DONE W/N TEST COMP: CPT | Performed by: PSYCHIATRY & NEUROLOGY

## 2019-12-10 NOTE — PROCEDURES
EMG REPORT    Indication: Bilateral upper extremity numbness and weakness, right greater than left    Findings: Median sensory latency was prolonged at 4.4 ms.  Left median sensory latency is prolonged at 5.0 ms.  Bilateral ulnar sensory study showed no reliable response.  Right median motor study showed a prolonged distal latency of 6.0 ms with a small amplitude response and velocity of 47.7 m/s.  Left median motor study showed a prolonged distal latency of 5.8 ms with normal amplitude and velocity 46.5 m/s.  Right ulnar motor study showed prolonged distal latency of 6.9 ms.  Small amplitude responses and slow velocities.  Left ulnar motor study showed distal latency of 4.9 ms with low amplitude response.    Concentric needle EMG of bilateral deltoid, triceps, brachioradialis, extensor digitorum muscles showed no abnormality.  Both first dorsal interosseous muscles showed reduced interference pattern right greater than left.    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  Median neuropathies are moderate in degree and show some evidence of axonal loss.  Bilateral ulnar motor studies showed severe axonal neuropathies right greater than left.  Because of the loss of amplitude localization is not possible.  Needle EMG failed to show evidence of superimposed cervical radiculopathy or proximal entrapment neuropathy.       Jeff Giron M.D.

## 2019-12-12 DIAGNOSIS — M62.542 ATROPHY OF MUSCLE OF LEFT HAND: ICD-10-CM

## 2019-12-12 DIAGNOSIS — R29.898 WEAKNESS OF RIGHT HAND: Primary | ICD-10-CM

## 2019-12-12 DIAGNOSIS — M62.541 ATROPHY OF MUSCLE OF RIGHT HAND: ICD-10-CM

## 2019-12-12 DIAGNOSIS — R94.131 ABNORMAL EMG: ICD-10-CM

## 2019-12-20 RX ORDER — BENAZEPRIL HYDROCHLORIDE 10 MG/1
TABLET ORAL
Qty: 90 TABLET | Refills: 0 | Status: SHIPPED | OUTPATIENT
Start: 2019-12-20 | End: 2020-03-24

## 2019-12-20 RX ORDER — ATORVASTATIN CALCIUM 40 MG/1
TABLET, FILM COATED ORAL
Qty: 90 TABLET | Refills: 1 | Status: SHIPPED | OUTPATIENT
Start: 2019-12-20 | End: 2020-06-22

## 2019-12-29 DIAGNOSIS — Z79.4 TYPE 2 DIABETES MELLITUS WITHOUT COMPLICATION, WITH LONG-TERM CURRENT USE OF INSULIN (HCC): ICD-10-CM

## 2019-12-29 DIAGNOSIS — E11.9 TYPE 2 DIABETES MELLITUS WITHOUT COMPLICATION, WITH LONG-TERM CURRENT USE OF INSULIN (HCC): ICD-10-CM

## 2019-12-30 RX ORDER — INSULIN GLARGINE 100 [IU]/ML
INJECTION, SOLUTION SUBCUTANEOUS
Qty: 29 ML | Refills: 1 | Status: SHIPPED | OUTPATIENT
Start: 2019-12-30 | End: 2020-03-19

## 2020-01-09 ENCOUNTER — TRANSCRIBE ORDERS (OUTPATIENT)
Dept: ADMINISTRATIVE | Facility: HOSPITAL | Age: 65
End: 2020-01-09

## 2020-01-09 DIAGNOSIS — M54.12 CERVICAL RADICULOPATHY: Primary | ICD-10-CM

## 2020-01-23 ENCOUNTER — HOSPITAL ENCOUNTER (OUTPATIENT)
Dept: MRI IMAGING | Facility: HOSPITAL | Age: 65
Discharge: HOME | End: 2020-01-23
Admitting: ORTHOPAEDIC SURGERY

## 2020-01-23 DIAGNOSIS — M54.12 CERVICAL RADICULOPATHY: ICD-10-CM

## 2020-01-23 PROCEDURE — 72141 MRI NECK SPINE W/O DYE: CPT

## 2020-01-30 ENCOUNTER — TELEPHONE (OUTPATIENT)
Dept: NEUROSURGERY | Facility: CLINIC | Age: 65
End: 2020-01-30

## 2020-02-07 ENCOUNTER — OFFICE VISIT (OUTPATIENT)
Dept: NEUROSURGERY | Facility: CLINIC | Age: 65
End: 2020-02-07

## 2020-02-07 VITALS
SYSTOLIC BLOOD PRESSURE: 137 MMHG | HEART RATE: 72 BPM | BODY MASS INDEX: 27.83 KG/M2 | WEIGHT: 210 LBS | DIASTOLIC BLOOD PRESSURE: 73 MMHG | HEIGHT: 73 IN

## 2020-02-07 DIAGNOSIS — M48.02 SPINAL STENOSIS IN CERVICAL REGION: ICD-10-CM

## 2020-02-07 DIAGNOSIS — M62.541 ATROPHY OF MUSCLE OF RIGHT HAND: Primary | ICD-10-CM

## 2020-02-07 DIAGNOSIS — G56.20 ULNAR NEUROPATHY AT ELBOW, UNSPECIFIED LATERALITY: ICD-10-CM

## 2020-02-07 DIAGNOSIS — G56.03 BILATERAL CARPAL TUNNEL SYNDROME: ICD-10-CM

## 2020-02-07 PROCEDURE — 99204 OFFICE O/P NEW MOD 45 MIN: CPT | Performed by: PHYSICIAN ASSISTANT

## 2020-03-11 NOTE — PROGRESS NOTES
Subjective   Jeff Bernard is a 64 y.o. male.     F/u for dm 2, hyperlipidemia, vitamin d def / testing bs 4 x day / last dm eye exam dec 2018 with dr Camejo / last dm foot exam today with dr Juarez / flu vaccine at [facility]        Patient has known diabetes mellitus since 1999 and started on insulin in 2000. He is on Basaglar 30 units every evening and NovoLog 1 unit per 5 g of carbohydrate before each meal. He checks his blood sugar 4 times a day.  He is using a freestyle eva.  Sensor data reviewed he denies any severe hypoglycemic episode. He has no weight change since October 2019.  His last meal was last night.      His last eye examination was in December 2019 and he has no retinopathy. He denies any associated nephropathy. Urine microalbumin was normal in 11/18. He is on benazepril. He denies any numbness, tingling or burning in his feet.      He has hyperlipidemia and has been on Lipitor 40 mg once a day. He denies any muscle pains.      He has no history of hypertension. He denies any previous history of myocardial infarction, heart failure, or stroke. He denies chest pain, shortness of breath or pedal edema.  He had a normal nuclear stress test in 4/18     He had a colonoscopy done in March 2017 and polyps were removed by Dr. Kelly.  Pathology report was read as tubular adenoma with low-grade dysplasia and hyperplastic polyps.  He was advised follow-up colonoscopy in 2022.  He denies bowel complaints.    He has vitamin D deficiency is on vitamin D3 1000 units/day.    The following portions of the patient's history were reviewed and updated as appropriate: allergies, current medications, past family history, past medical history, past social history, past surgical history and problem list.    Review of Systems   Constitutional: Negative.    HENT: Negative.    Eyes: Negative.    Respiratory: Negative.    Cardiovascular: Negative.    Gastrointestinal: Negative.    Endocrine: Negative.    Genitourinary:  "Negative.    Musculoskeletal: Negative for arthralgias and myalgias.   Neurological: Negative for dizziness, weakness and numbness.   Hematological: Negative for adenopathy. Does not bruise/bleed easily.     Objective      Vitals:    03/19/20 1039   BP: 132/80   Pulse: 71   SpO2: 99%   Weight: 95.7 kg (211 lb)   Height: 182.9 cm (72\")     Physical Exam   Constitutional: He appears well-developed and well-nourished. No distress.   HENT:   Head: Normocephalic.   Right Ear: External ear normal.   Left Ear: External ear normal.   Nose: Nose normal.   Mouth/Throat: Oropharynx is clear and moist. No oropharyngeal exudate.   Eyes: Conjunctivae and EOM are normal. Right eye exhibits no discharge. Left eye exhibits no discharge. No scleral icterus.   Neck: Neck supple. No JVD present. No tracheal deviation present. No thyromegaly present.   Cardiovascular: Normal rate, regular rhythm, normal heart sounds and intact distal pulses. Exam reveals no gallop and no friction rub.   No murmur heard.  Pulmonary/Chest: Effort normal and breath sounds normal. No stridor. No respiratory distress. He has no wheezes. He has no rales. He exhibits no tenderness.   Abdominal: Soft. Bowel sounds are normal. He exhibits no distension and no mass. There is no tenderness. There is no guarding.   Musculoskeletal: Normal range of motion. He exhibits no edema, tenderness or deformity.   Lymphadenopathy:     He has no cervical adenopathy.   Neurological: He is alert. He displays normal reflexes. He exhibits normal muscle tone.   Skin: Skin is warm and dry. No rash noted. He is not diaphoretic. No erythema.   Psychiatric: He has a normal mood and affect.     Office Visit on 11/15/2019   Component Date Value Ref Range Status   • WBC 11/15/2019 5.52  3.40 - 10.80 10*3/mm3 Final   • RBC 11/15/2019 4.91  4.14 - 5.80 10*6/mm3 Final   • Hemoglobin 11/15/2019 15.3  13.0 - 17.7 g/dL Final   • Hematocrit 11/15/2019 43.3  37.5 - 51.0 % Final   • MCV 11/15/2019 " 88.2  79.0 - 97.0 fL Final   • MCH 11/15/2019 31.2  26.6 - 33.0 pg Final   • MCHC 11/15/2019 35.3  31.5 - 35.7 g/dL Final   • RDW 11/15/2019 12.1* 12.3 - 15.4 % Final   • Platelets 11/15/2019 279  140 - 450 10*3/mm3 Final   • Neutrophil Rel % 11/15/2019 64.3  42.7 - 76.0 % Final   • Lymphocyte Rel % 11/15/2019 29.2  19.6 - 45.3 % Final   • Monocyte Rel % 11/15/2019 4.5* 5.0 - 12.0 % Final   • Eosinophil Rel % 11/15/2019 0.9  0.3 - 6.2 % Final   • Basophil Rel % 11/15/2019 0.7  0.0 - 1.5 % Final   • Neutrophils Absolute 11/15/2019 3.55  1.70 - 7.00 10*3/mm3 Final   • Lymphocytes Absolute 11/15/2019 1.61  0.70 - 3.10 10*3/mm3 Final   • Monocytes Absolute 11/15/2019 0.25  0.10 - 0.90 10*3/mm3 Final   • Eosinophils Absolute 11/15/2019 0.05  0.00 - 0.40 10*3/mm3 Final   • Basophils Absolute 11/15/2019 0.04  0.00 - 0.20 10*3/mm3 Final   • Immature Granulocyte Rel % 11/15/2019 0.4  0.0 - 0.5 % Final   • Immature Grans Absolute 11/15/2019 0.02  0.00 - 0.05 10*3/mm3 Final   • nRBC 11/15/2019 0.2  0.0 - 0.2 /100 WBC Final   • Glucose 11/15/2019 162* 65 - 99 mg/dL Final   • BUN 11/15/2019 14  8 - 23 mg/dL Final   • Creatinine 11/15/2019 0.74* 0.76 - 1.27 mg/dL Final   • eGFR Non African Am 11/15/2019 106  >60 mL/min/1.73 Final   • eGFR African Am 11/15/2019 128  >60 mL/min/1.73 Final   • BUN/Creatinine Ratio 11/15/2019 18.9  7.0 - 25.0 Final   • Sodium 11/15/2019 136  136 - 145 mmol/L Final   • Potassium 11/15/2019 4.2  3.5 - 5.2 mmol/L Final   • Chloride 11/15/2019 98  98 - 107 mmol/L Final   • Total CO2 11/15/2019 26.4  22.0 - 29.0 mmol/L Final   • Calcium 11/15/2019 8.8  8.6 - 10.5 mg/dL Final   • Total Protein 11/15/2019 7.2  6.0 - 8.5 g/dL Final   • Albumin 11/15/2019 4.60  3.50 - 5.20 g/dL Final   • Globulin 11/15/2019 2.6  gm/dL Final   • A/G Ratio 11/15/2019 1.8  g/dL Final   • Total Bilirubin 11/15/2019 0.7  0.2 - 1.2 mg/dL Final   • Alkaline Phosphatase 11/15/2019 70  39 - 117 U/L Final   • AST (SGOT) 11/15/2019 15   1 - 40 U/L Final   • ALT (SGPT) 11/15/2019 18  1 - 41 U/L Final   • Creatine Kinase 11/15/2019 113  20 - 200 U/L Final   • Vitamin B-12 11/15/2019 547  211 - 946 pg/mL Final   • Folate 11/15/2019 17.20  4.78 - 24.20 ng/mL Final   • Microalbumin, Urine 11/15/2019 30 MG/L   Final   • Creatinine, Urine 11/15/2019 100 MG/DL   Final   • A/C 11/15/2019 <30 MG/G   Final   • Hep C Virus Ab 11/15/2019 <0.1  0.0 - 0.9 s/co ratio Final    Comment:                                   Negative:     < 0.8                               Indeterminate: 0.8 - 0.9                                    Positive:     > 0.9   The CDC recommends that a positive HCV antibody result   be followed up with a HCV Nucleic Acid Amplification   test ([id number]).     • 25 Hydroxy, Vitamin D 11/15/2019 35.1  30.0 - 100.0 ng/ml Final    Comment: Reference Range for Total Vitamin D 25(OH)  Deficiency <20.0 ng/mL  Insufficiency 21-29 ng/mL  Sufficiency  ng/mL  Toxicity >100 ng/ml       Assessment/Plan   Jeff was seen today for diabetes.    Diagnoses and all orders for this visit:    Type 2 diabetes mellitus without complication, with long-term current use of insulin (CMS/[facility])  -     Comprehensive Metabolic Panel  -     Hemoglobin A1c  -     TSH  -     Microalbumin / Creatinine Urine Ratio - Urine, Clean Catch  -     T4, Free  -     Insulin Glargine (BASAGLAR KWIKPEN) 100 UNIT/ML injection pen; 27 units every evening    Benign essential hypertension  -     Comprehensive Metabolic Panel    Hyperlipidemia, unspecified hyperlipidemia type  -     Comprehensive Metabolic Panel  -     Lipid Panel    Vitamin D deficiency  -     Comprehensive Metabolic Panel      Decrease Basaglar 27 units every evening.  Continue NovoLog 1 unit per 5 g of carbohydrate before each meal.  Continue Lipitor 40 mg/day.  Continue vitamin D3 1000 units/day.    Copy of my note sent to TAD Mccartney    RTC 4 mos.

## 2020-03-19 ENCOUNTER — OFFICE VISIT (OUTPATIENT)
Dept: ENDOCRINOLOGY | Age: 65
End: 2020-03-19

## 2020-03-19 VITALS
WEIGHT: 211 LBS | DIASTOLIC BLOOD PRESSURE: 80 MMHG | HEART RATE: 71 BPM | OXYGEN SATURATION: 99 % | HEIGHT: 72 IN | SYSTOLIC BLOOD PRESSURE: 132 MMHG | BODY MASS INDEX: 28.58 KG/M2

## 2020-03-19 DIAGNOSIS — E78.5 HYPERLIPIDEMIA, UNSPECIFIED HYPERLIPIDEMIA TYPE: ICD-10-CM

## 2020-03-19 DIAGNOSIS — E55.9 VITAMIN D DEFICIENCY: ICD-10-CM

## 2020-03-19 DIAGNOSIS — I10 BENIGN ESSENTIAL HYPERTENSION: ICD-10-CM

## 2020-03-19 DIAGNOSIS — Z79.4 TYPE 2 DIABETES MELLITUS WITHOUT COMPLICATION, WITH LONG-TERM CURRENT USE OF INSULIN (HCC): Primary | ICD-10-CM

## 2020-03-19 DIAGNOSIS — E11.9 TYPE 2 DIABETES MELLITUS WITHOUT COMPLICATION, WITH LONG-TERM CURRENT USE OF INSULIN (HCC): Primary | ICD-10-CM

## 2020-03-19 PROCEDURE — 99214 OFFICE O/P EST MOD 30 MIN: CPT | Performed by: INTERNAL MEDICINE

## 2020-03-19 RX ORDER — INSULIN GLARGINE 100 [IU]/ML
INJECTION, SOLUTION SUBCUTANEOUS
Qty: 29 ML | Refills: 1 | Status: SHIPPED | OUTPATIENT
Start: 2020-03-19 | End: 2020-07-23 | Stop reason: SDUPTHER

## 2020-03-20 LAB
ALBUMIN SERPL-MCNC: 4.4 G/DL (ref 3.5–5.2)
ALBUMIN/CREAT UR: 15 MG/G CREAT (ref 0–29)
ALBUMIN/GLOB SERPL: 1.6 G/DL
ALP SERPL-CCNC: 79 U/L (ref 39–117)
ALT SERPL-CCNC: 24 U/L (ref 1–41)
AST SERPL-CCNC: 17 U/L (ref 1–40)
BILIRUB SERPL-MCNC: 0.8 MG/DL (ref 0.2–1.2)
BUN SERPL-MCNC: 15 MG/DL (ref 8–23)
BUN/CREAT SERPL: 19 (ref 7–25)
CALCIUM SERPL-MCNC: 9 MG/DL (ref 8.6–10.5)
CHLORIDE SERPL-SCNC: 96 MMOL/L (ref 98–107)
CHOLEST SERPL-MCNC: 141 MG/DL (ref 0–200)
CO2 SERPL-SCNC: 26.8 MMOL/L (ref 22–29)
CREAT SERPL-MCNC: 0.79 MG/DL (ref 0.76–1.27)
CREAT UR-MCNC: 73.2 MG/DL
GLOBULIN SER CALC-MCNC: 2.8 GM/DL
GLUCOSE SERPL-MCNC: 283 MG/DL (ref 65–99)
HBA1C MFR BLD: 8.7 % (ref 4.8–5.6)
HDLC SERPL-MCNC: 47 MG/DL (ref 40–60)
INTERPRETATION: NORMAL
LDLC SERPL CALC-MCNC: 81 MG/DL (ref 0–100)
Lab: NORMAL
MICROALBUMIN UR-MCNC: 11 UG/ML
POTASSIUM SERPL-SCNC: 4.7 MMOL/L (ref 3.5–5.2)
PROT SERPL-MCNC: 7.2 G/DL (ref 6–8.5)
SODIUM SERPL-SCNC: 135 MMOL/L (ref 136–145)
T4 FREE SERPL-MCNC: 1.51 NG/DL (ref 0.93–1.7)
TRIGL SERPL-MCNC: 63 MG/DL (ref 0–150)
TSH SERPL DL<=0.005 MIU/L-ACNC: 1.76 UIU/ML (ref 0.27–4.2)
VLDLC SERPL CALC-MCNC: 12.6 MG/DL

## 2020-03-24 RX ORDER — BENAZEPRIL HYDROCHLORIDE 10 MG/1
TABLET ORAL
Qty: 90 TABLET | Refills: 0 | Status: SHIPPED | OUTPATIENT
Start: 2020-03-24 | End: 2020-06-22

## 2020-05-15 ENCOUNTER — OFFICE VISIT (OUTPATIENT)
Dept: FAMILY MEDICINE CLINIC | Facility: CLINIC | Age: 65
End: 2020-05-15

## 2020-05-15 DIAGNOSIS — Z79.4 TYPE 2 DIABETES MELLITUS WITHOUT COMPLICATION, WITH LONG-TERM CURRENT USE OF INSULIN (HCC): ICD-10-CM

## 2020-05-15 DIAGNOSIS — I65.21 ARTERIOSCLEROSIS OF RIGHT CAROTID ARTERY: ICD-10-CM

## 2020-05-15 DIAGNOSIS — R29.898 WEAKNESS OF RIGHT HAND: ICD-10-CM

## 2020-05-15 DIAGNOSIS — M48.02 SPINAL STENOSIS IN CERVICAL REGION: ICD-10-CM

## 2020-05-15 DIAGNOSIS — G56.03 BILATERAL CARPAL TUNNEL SYNDROME: ICD-10-CM

## 2020-05-15 DIAGNOSIS — E11.9 TYPE 2 DIABETES MELLITUS WITHOUT COMPLICATION, WITH LONG-TERM CURRENT USE OF INSULIN (HCC): ICD-10-CM

## 2020-05-15 DIAGNOSIS — E55.9 VITAMIN D DEFICIENCY: ICD-10-CM

## 2020-05-15 DIAGNOSIS — E53.8 VITAMIN B 12 DEFICIENCY: ICD-10-CM

## 2020-05-15 DIAGNOSIS — G56.20 ULNAR NEUROPATHY AT ELBOW, UNSPECIFIED LATERALITY: ICD-10-CM

## 2020-05-15 DIAGNOSIS — E78.5 HYPERLIPIDEMIA, UNSPECIFIED HYPERLIPIDEMIA TYPE: ICD-10-CM

## 2020-05-15 DIAGNOSIS — M62.541 ATROPHY OF MUSCLE OF RIGHT HAND: ICD-10-CM

## 2020-05-15 DIAGNOSIS — I10 BENIGN ESSENTIAL HYPERTENSION: Primary | ICD-10-CM

## 2020-05-15 DIAGNOSIS — R94.131 ABNORMAL EMG: ICD-10-CM

## 2020-05-15 DIAGNOSIS — M62.542 ATROPHY OF MUSCLE OF LEFT HAND: ICD-10-CM

## 2020-05-15 PROCEDURE — G2025 DIS SITE TELE SVCS RHC/FQHC: HCPCS | Performed by: PHYSICIAN ASSISTANT

## 2020-05-15 NOTE — PROGRESS NOTES
Subjective   Jeff Bernard is a 64 y.o. male who is being evaluated by telephone visit today for follow-up of hypertension, hyperlipidemia, B12 deficiency, vitamin D deficiency, weakness and atrophy of right hand after a shoulder injury, and specialists.     History of Present Illness   You have chosen to receive care through a telephone visit. Do you consent to use a telephone visit for your medical care today? Yes    Answers for HPI/ROS submitted by the patient on 5/8/2020   What is the primary reason for your visit?: Other  Please describe your symptoms.: nothing wrong,follow up visit  Have you had these symptoms before?: No  How long have you been having these symptoms?: Other  Please list any medications you are currently taking for this condition.: no condition,no medications  Please describe any probable cause for these symptoms. : this is a followup from previous visits with davi and     Hypertension- has been stable on Benazepril 10 mg once daily. Does not check BP at home.   Hyperlipidemia- continues on Lipitor 40 mg at bedtime and is tolerating without AE.   B12 taking 1000 mcg twice weekly.   Vitamin D 1000IU daily    Patient is having a major discrepancy with his blood sugars.  He is using the freestyle eva machine and when he scanned his meter for his sugars the morning of his labs with endocrinology, he had readings between 80s and 151.  However, the glucose reading from his labs was 283.  He has started checking fingerstick glucose as well as standing his meter and reports the readings are only about 20 points off.  He is not having that significant of the discrepancy.  He discussed this with the endocrinologist and they advised that he call the freestyle company.  They just sent in the new sensor.  He is a little concerned about how to accurately track his blood sugars.  He stated his average per his meter was about 145 which would not be quite 8.7% A1c.    Taking ASA 81 mg   No  change in weight at home.     12-13 years ago- hurt right shoulder at work - slipped and swing around, caught by hand, and had therapy and had physical therapy. Then had another job, hurt his shoulder and had to have PT again. He continues doing exercises. He has atrophy and reports he will be holding a coffee cup and drop it. 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 Otherwise, does notice the weakness.     Urology-Dr. Fatima- last seen 7/2019 for elevated PSA, BPH, and undescended right testicle.  [id number] PRN.   Endocrinology-Dr. Juarez last visit 3/2020 for diabetes mellitus type 2- decrease Basaglar to 27 units every evening, continue NovoLog 1 unit per 5 g of carbohydrate, Lipitor, and vitamin D and follow-up in 4 months.  Cardiology- Dr Gallegos- last seen 9/2018-they confirmed no need for follow-up after last visit.  Ophthalmology- Dr Camejo- last appt 12/2019 in [facility] or [facility]- follow up annually. Has appt 12/2020.   Hand surgery- Dr Herzog- last visit 2/2020 for carpal tunnel syndrome and lesion of ulnar nerve. Follow up had to be cancelled with COVID 19 pandemic. Has rescheduled to the end of this month. Prior Dr Quiroz who referred to neurosurgery  Neurosurgery- Evi Burden PA-C-He had MRI cervical spine with prominent diffuse cervical spondylosis, generalized reversal of the normal cervical lordosis centered at C4-5, mild left facet overgrowth contributing to mild left foraminal narrowing at C2-3 and C3-4, prominent disc space narrowing and degenerative disc and endplate changes from C4 to T1, diffuse spurring and diffuse posterior disc bulge that abuts and mildly deforms the ventral surface of the cord moderately narrowing the canal at C4-5 and bilateral uncovertebral joint hypertrophy with moderate-to-severe bilateral foraminal narrowing at C4-5 that could affect the exiting C5 nerve roots bilaterally, at C5-6, a posterior disc osteophyte complex abuts and flattens the cord  contributing to moderate-to-severe narrowing of the anterior posterior dimension of the canal with uncovertebral joint spurs contributing to moderate-to-severe bilateral bony foraminal narrowing that could affect the exiting C6 nerve roots bilaterally, questionable very minimal T2 high signal in the right lateral aspect of the cord at the C5-6 level, could be artifact or some minimal myelomalacia in the right lateral cord at the C5-6 level, posterior spurs and uncovertebral joint hypertrophy contributing to mild canal and moderate bilateral bony foraminal narrowing at C6-7 and uncovertebral joint spurring contributes to mild-to-moderate bilateral bony foraminal narrowing at C7-T1. Advised to see Dr Herzog and follow up in 6 weeks.     The following portions of the patient's history were reviewed and updated as appropriate: allergies, current medications, past family history, past medical history, past social history, past surgical history and problem list.    Review of Systems   HENT: Negative.    Eyes: Negative.    Respiratory: Negative.    Cardiovascular: Negative.    Gastrointestinal: Negative.    Endocrine: Negative.    Genitourinary: Negative.    Musculoskeletal: Positive for arthralgias.   Skin: Negative.    Neurological: Positive for weakness.   Psychiatric/Behavioral: Negative.      Objective    There were no vitals filed for this visit.  There is no height or weight on file to calculate BMI.    Physical Exam  NA    Assessment/Plan   Diagnoses and all orders for this visit:    Benign essential hypertension    Hyperlipidemia, unspecified hyperlipidemia type    Vitamin B 12 deficiency    Vitamin D deficiency    Type 2 diabetes mellitus without complication, with long-term current use of insulin (CMS/[facility])    Weakness of right hand    Atrophy of muscle of right hand    Atrophy of muscle of left hand    Abnormal EMG    Arteriosclerosis of right carotid artery    Bilateral carpal tunnel syndrome    Ulnar  neuropathy at elbow, unspecified laterality    Spinal stenosis in cervical region    Assessment and plan  64 year old male who is being evaluated by telephone visit today in follow up of hypertension, hyperlipidemia, B12 and vitamin D deficiency, weakness and atrophy of right hand after a shoulder injury, and specialists. Blood pressure is stable today. He is on Lotensin for renal protection with diabetes mellitus but blood pressure is controlled but not low on medication. He is stable on medication without AE. He is taking B12 1000 mcg twice weekly and Vitamin D 1000IU once daily. I reviewed endocrinology labs, and labs here were stable 11/2019. Follow up in 6 months for follow-up and AWV.     12-13 years ago, he hurt his right shoulder at work. Patient slipped and swung around, caught himself by his hand with injury, and had physical therapy. He then had another job, hurt his shoulder, and had to have PT again. He continues doing exercises he was given by PT. He has atrophy and reports he is dropping things. He does notice the weakness and has atrophy noted on exam. I referred for EMG/NCS with median and ulnar neuropathy and he was seen by Dr Quiroz, hand surgery, then was referred to neurosurgery. He had MRI cervical spine with prominent diffuse cervical spondylosis, generalized reversal of the normal cervical  lordosis centered at C4-5, mild left facet overgrowth contributing to mild left foraminal narrowing at C2-3 and C3-4, prominent disc space narrowing and degenerative disc and endplate changes from C4 to T1, diffuse spurring and diffuse posterior disc bulge that abuts and mildly deforms the ventral surface of the cord moderately narrowing the canal at C4-5 and bilateral uncovertebral joint hypertrophy with moderate-to-severe bilateral foraminal narrowing at C4-5 that could affect the exiting C5 nerve roots bilaterally, at C5-6, a posterior disc osteophyte complex abuts and flattens the cord contributing to  moderate-to-severe narrowing of the anterior posterior dimension of the canal with uncovertebral joint spurs contributing to moderate-to-severe bilateral bony foraminal narrowing that could affect the exiting C6 nerve roots bilaterally, questionable  very minimal T2 high signal in the right lateral aspect of the cord at the C5-6 level, could be artifact or some minimal myelomalacia in the right lateral cord at the C5-6 level, posterior spurs and uncovertebral joint hypertrophy contributing to mild canal and moderate bilateral bony foraminal narrowing at C6-7 and uncovertebral joint spurring contributes to mild-to-moderate bilateral bony foraminal narrowing at C7-T1. He was referred him to Dr Herzog, hand surgery, and was to follow up with neurosurgery in 6 weeks from 2/2020 appt. However, then there were restrictions on office visits. He will need follow-up with neurosurgery as well as follow-up with hand surgery as directed by them.    Patient is having a major discrepancy with his blood sugars.  He is using the freestyle eva machine and when he scanned his meter for his sugars the morning of his labs with endocrinology, he had readings between 80s and 151.  However, the glucose reading from his labs was 283.  He has started checking fingerstick glucose as well as standing his meter and reports the readings are only about 20 points off.  He is not having that significant of the discrepancy.  He discussed this with the endocrinologist and they advised that he call the freestyle company.  They just sent in the new sensor.  He is a little concerned about how to accurately track his blood sugars.  He stated his average per his meter was about 145 which would not be quite 8.7% A1c.  I will see if I can find out anything to try to help him with the discrepancy in blood sugars and his labs.    Patient sees Dr Juaerz for diabetes mellitus, hyperlipidemia, and vitamin D deficiency. Last appointment was 10/24/19 and A1C  remains uncontrolled at 8.7%. He will keep follow up with with specialists as directed by them (as noted below) and be more compliant with exercise and will continue medication as directed by endocrinology. He is up to date with ophthalmology follow up. I have reviewed consult notes and labs.   Urology-Dr. Fatima- last seen 7/2019 for elevated PSA, BPH, and undescended right testicle.  [id number] PRN.   Endocrinology-Dr. Juarez last visit 3/2020 for diabetes mellitus type 2- decrease Basaglar to 27 units every evening, continue NovoLog 1 unit per 5 g of carbohydrate, Lipitor, and vitamin D and follow-up in 4 months.  Cardiology- Dr Gallegos- last seen 9/2018-they confirmed no need for follow-up after last visit.  Ophthalmology- Dr Camejo- last appt 12/2019 in [facility] or [facility]- follow up annually. Has appt 12/2020.   Hand surgery- Dr Herzog- last visit 2/2020 for carpal tunnel syndrome and lesion of ulnar nerve. Follow up had to be cancelled with COVID 19 pandemic. Has rescheduled to the end of this month. Prior Dr Quiroz who referred to neurosurgery  Neurosurgery- Evi Burden PA-C-He had MRI cervical spine with prominent diffuse cervical spondylosis, generalized reversal of the normal cervical lordosis centered at C4-5, mild left facet overgrowth contributing to mild left foraminal narrowing at C2-3 and C3-4, prominent disc space narrowing and degenerative disc and endplate changes from C4 to T1, diffuse spurring and diffuse posterior disc bulge that abuts and mildly deforms the ventral surface of the cord moderately narrowing the canal at C4-5 and bilateral uncovertebral joint hypertrophy with moderate-to-severe bilateral foraminal narrowing at C4-5 that could affect the exiting C5 nerve roots bilaterally, at C5-6, a posterior disc osteophyte complex abuts and flattens the cord contributing to moderate-to-severe narrowing of the anterior posterior dimension of the canal with uncovertebral joint  spurs contributing to moderate-to-severe bilateral bony foraminal narrowing that could affect the exiting C6 nerve roots bilaterally, questionable very minimal T2 high signal in the right lateral aspect of the cord at the C5-6 level, could be artifact or some minimal myelomalacia in the right lateral cord at the C5-6 level, posterior spurs and uncovertebral joint hypertrophy contributing to mild canal and moderate bilateral bony foraminal narrowing at C6-7 and uncovertebral joint spurring contributes to mild-to-moderate bilateral bony foraminal narrowing at C7-T1. Advised to see Dr Herzog and follow up in 6 weeks.  He will call for follow-up after his appointment with Dr. Herzog the end of this month.    From AWV 11/2019- Last colonoscopy was in 2017 with Dr Kelly and recommendations to follow up in 5 years. To check with insurance about coverage of Shingrix.       About 25 minutes spent reviewing the patient's chart and telephone visit, medical decision-making, and treatment plan.

## 2020-06-22 RX ORDER — ATORVASTATIN CALCIUM 40 MG/1
TABLET, FILM COATED ORAL
Qty: 90 TABLET | Refills: 1 | Status: SHIPPED | OUTPATIENT
Start: 2020-06-22 | End: 2020-12-15

## 2020-06-22 RX ORDER — BENAZEPRIL HYDROCHLORIDE 10 MG/1
TABLET ORAL
Qty: 90 TABLET | Refills: 0 | Status: SHIPPED | OUTPATIENT
Start: 2020-06-22 | End: 2020-09-17

## 2020-07-23 DIAGNOSIS — Z79.4 TYPE 2 DIABETES MELLITUS WITHOUT COMPLICATION, WITH LONG-TERM CURRENT USE OF INSULIN (HCC): ICD-10-CM

## 2020-07-23 DIAGNOSIS — E11.9 TYPE 2 DIABETES MELLITUS WITHOUT COMPLICATION, WITH LONG-TERM CURRENT USE OF INSULIN (HCC): ICD-10-CM

## 2020-07-24 RX ORDER — INSULIN GLARGINE 100 [IU]/ML
INJECTION, SOLUTION SUBCUTANEOUS
Qty: 29 ML | Refills: 1 | Status: SHIPPED | OUTPATIENT
Start: 2020-07-24 | End: 2021-01-07 | Stop reason: DRUGHIGH

## 2020-08-06 ENCOUNTER — OFFICE VISIT (OUTPATIENT)
Dept: ENDOCRINOLOGY | Age: 65
End: 2020-08-06

## 2020-08-06 VITALS
SYSTOLIC BLOOD PRESSURE: 118 MMHG | OXYGEN SATURATION: 98 % | WEIGHT: 207.2 LBS | DIASTOLIC BLOOD PRESSURE: 66 MMHG | HEART RATE: 76 BPM | BODY MASS INDEX: 28.06 KG/M2 | HEIGHT: 72 IN

## 2020-08-06 DIAGNOSIS — E55.9 VITAMIN D DEFICIENCY: ICD-10-CM

## 2020-08-06 DIAGNOSIS — E11.9 TYPE 2 DIABETES MELLITUS WITHOUT COMPLICATION, WITH LONG-TERM CURRENT USE OF INSULIN (HCC): Primary | ICD-10-CM

## 2020-08-06 DIAGNOSIS — I10 BENIGN ESSENTIAL HYPERTENSION: ICD-10-CM

## 2020-08-06 DIAGNOSIS — Z79.4 TYPE 2 DIABETES MELLITUS WITHOUT COMPLICATION, WITH LONG-TERM CURRENT USE OF INSULIN (HCC): Primary | ICD-10-CM

## 2020-08-06 DIAGNOSIS — E78.5 HYPERLIPIDEMIA, UNSPECIFIED HYPERLIPIDEMIA TYPE: ICD-10-CM

## 2020-08-06 PROCEDURE — 99214 OFFICE O/P EST MOD 30 MIN: CPT | Performed by: INTERNAL MEDICINE

## 2020-08-06 PROCEDURE — 95251 CONT GLUC MNTR ANALYSIS I&R: CPT | Performed by: INTERNAL MEDICINE

## 2020-08-07 LAB
25(OH)D3+25(OH)D2 SERPL-MCNC: 34.9 NG/ML (ref 30–100)
ALBUMIN SERPL-MCNC: 4.5 G/DL (ref 3.5–5.2)
ALBUMIN/GLOB SERPL: 2.1 G/DL
ALP SERPL-CCNC: 70 U/L (ref 39–117)
ALT SERPL-CCNC: 18 U/L (ref 1–41)
AST SERPL-CCNC: 18 U/L (ref 1–40)
BILIRUB SERPL-MCNC: 0.7 MG/DL (ref 0–1.2)
BUN SERPL-MCNC: 17 MG/DL (ref 8–23)
BUN/CREAT SERPL: 22.7 (ref 7–25)
CALCIUM SERPL-MCNC: 8.8 MG/DL (ref 8.6–10.5)
CHLORIDE SERPL-SCNC: 99 MMOL/L (ref 98–107)
CHOLEST SERPL-MCNC: 123 MG/DL (ref 0–200)
CO2 SERPL-SCNC: 26.5 MMOL/L (ref 22–29)
CREAT SERPL-MCNC: 0.75 MG/DL (ref 0.76–1.27)
GLOBULIN SER CALC-MCNC: 2.1 GM/DL
GLUCOSE SERPL-MCNC: 191 MG/DL (ref 65–99)
HBA1C MFR BLD: 8.2 % (ref 4.8–5.6)
HDLC SERPL-MCNC: 40 MG/DL (ref 40–60)
INTERPRETATION: NORMAL
LDLC SERPL CALC-MCNC: 70 MG/DL (ref 0–100)
Lab: NORMAL
POTASSIUM SERPL-SCNC: 4.3 MMOL/L (ref 3.5–5.2)
PROT SERPL-MCNC: 6.6 G/DL (ref 6–8.5)
SODIUM SERPL-SCNC: 137 MMOL/L (ref 136–145)
TRIGL SERPL-MCNC: 63 MG/DL (ref 0–150)
VLDLC SERPL CALC-MCNC: 12.6 MG/DL

## 2020-08-13 DIAGNOSIS — Z79.4 TYPE 2 DIABETES MELLITUS WITHOUT COMPLICATION, WITH LONG-TERM CURRENT USE OF INSULIN (HCC): Primary | ICD-10-CM

## 2020-08-13 DIAGNOSIS — E11.9 TYPE 2 DIABETES MELLITUS WITHOUT COMPLICATION, WITH LONG-TERM CURRENT USE OF INSULIN (HCC): Primary | ICD-10-CM

## 2020-09-17 RX ORDER — BENAZEPRIL HYDROCHLORIDE 10 MG/1
TABLET ORAL
Qty: 90 TABLET | Refills: 0 | Status: SHIPPED | OUTPATIENT
Start: 2020-09-17 | End: 2020-12-15

## 2020-10-09 DIAGNOSIS — Z79.4 TYPE 2 DIABETES MELLITUS WITHOUT COMPLICATION, WITH LONG-TERM CURRENT USE OF INSULIN (HCC): Primary | ICD-10-CM

## 2020-10-09 DIAGNOSIS — E11.9 TYPE 2 DIABETES MELLITUS WITHOUT COMPLICATION, WITH LONG-TERM CURRENT USE OF INSULIN (HCC): Primary | ICD-10-CM

## 2020-10-22 RX ORDER — INSULIN ASPART 100 [IU]/ML
INJECTION, SOLUTION INTRAVENOUS; SUBCUTANEOUS
Qty: 41 ML | Refills: 1 | Status: SHIPPED | OUTPATIENT
Start: 2020-10-22 | End: 2021-04-26

## 2020-11-20 ENCOUNTER — OFFICE VISIT (OUTPATIENT)
Dept: FAMILY MEDICINE CLINIC | Facility: CLINIC | Age: 65
End: 2020-11-20

## 2020-11-20 VITALS
TEMPERATURE: 97 F | OXYGEN SATURATION: 100 % | WEIGHT: 208.4 LBS | SYSTOLIC BLOOD PRESSURE: 104 MMHG | BODY MASS INDEX: 28.23 KG/M2 | HEART RATE: 82 BPM | RESPIRATION RATE: 18 BRPM | HEIGHT: 72 IN | DIASTOLIC BLOOD PRESSURE: 60 MMHG

## 2020-11-20 DIAGNOSIS — E11.9 TYPE 2 DIABETES MELLITUS WITHOUT COMPLICATION, WITH LONG-TERM CURRENT USE OF INSULIN (HCC): ICD-10-CM

## 2020-11-20 DIAGNOSIS — G56.21 CUBITAL TUNNEL SYNDROME ON RIGHT: ICD-10-CM

## 2020-11-20 DIAGNOSIS — I65.21 ATHEROSCLEROSIS OF RIGHT CAROTID ARTERY: ICD-10-CM

## 2020-11-20 DIAGNOSIS — R29.898 WEAKNESS OF RIGHT HAND: ICD-10-CM

## 2020-11-20 DIAGNOSIS — Z00.00 MEDICARE ANNUAL WELLNESS VISIT, SUBSEQUENT: Primary | ICD-10-CM

## 2020-11-20 DIAGNOSIS — Z12.11 ENCOUNTER FOR HEMOCCULT SCREENING: ICD-10-CM

## 2020-11-20 DIAGNOSIS — Z79.4 TYPE 2 DIABETES MELLITUS WITHOUT COMPLICATION, WITH LONG-TERM CURRENT USE OF INSULIN (HCC): ICD-10-CM

## 2020-11-20 DIAGNOSIS — Z12.5 PROSTATE CANCER SCREENING: ICD-10-CM

## 2020-11-20 DIAGNOSIS — I10 BENIGN ESSENTIAL HYPERTENSION: ICD-10-CM

## 2020-11-20 DIAGNOSIS — E53.8 VITAMIN B 12 DEFICIENCY: ICD-10-CM

## 2020-11-20 DIAGNOSIS — G56.01 CARPAL TUNNEL SYNDROME OF RIGHT WRIST: ICD-10-CM

## 2020-11-20 DIAGNOSIS — E78.5 HYPERLIPIDEMIA, UNSPECIFIED HYPERLIPIDEMIA TYPE: ICD-10-CM

## 2020-11-20 DIAGNOSIS — E55.9 VITAMIN D DEFICIENCY: ICD-10-CM

## 2020-11-20 LAB
25(OH)D3+25(OH)D2 SERPL-MCNC: 35 NG/ML (ref 30–100)
ALBUMIN SERPL-MCNC: 4.4 G/DL (ref 3.5–5.2)
ALBUMIN/GLOB SERPL: 1.8 G/DL
ALP SERPL-CCNC: 94 U/L (ref 39–117)
ALT SERPL-CCNC: 30 U/L (ref 1–41)
AST SERPL-CCNC: 19 U/L (ref 1–40)
BASOPHILS # BLD AUTO: 0.04 10*3/MM3 (ref 0–0.2)
BASOPHILS NFR BLD AUTO: 0.7 % (ref 0–1.5)
BILIRUB BLD-MCNC: NEGATIVE MG/DL
BILIRUB SERPL-MCNC: 0.6 MG/DL (ref 0–1.2)
BUN SERPL-MCNC: 16 MG/DL (ref 8–23)
BUN/CREAT SERPL: 21.3 (ref 7–25)
CALCIUM SERPL-MCNC: 8.8 MG/DL (ref 8.6–10.5)
CHLORIDE SERPL-SCNC: 98 MMOL/L (ref 98–107)
CHOLEST SERPL-MCNC: 131 MG/DL (ref 0–200)
CK SERPL-CCNC: 149 U/L (ref 20–200)
CLARITY, POC: CLEAR
CO2 SERPL-SCNC: 28.7 MMOL/L (ref 22–29)
COLOR UR: YELLOW
CREAT SERPL-MCNC: 0.75 MG/DL (ref 0.76–1.27)
DEVELOPER EXPIRATION DATE: NORMAL
DEVELOPER LOT NUMBER: NORMAL
EOSINOPHIL # BLD AUTO: 0.03 10*3/MM3 (ref 0–0.4)
EOSINOPHIL NFR BLD AUTO: 0.5 % (ref 0.3–6.2)
ERYTHROCYTE [DISTWIDTH] IN BLOOD BY AUTOMATED COUNT: 12 % (ref 12.3–15.4)
EXPIRATION DATE: NORMAL
FECAL OCCULT BLOOD SCREEN, POC: NEGATIVE
FOLATE SERPL-MCNC: 18.3 NG/ML (ref 4.78–24.2)
GLOBULIN SER CALC-MCNC: 2.5 GM/DL
GLUCOSE SERPL-MCNC: 275 MG/DL (ref 65–99)
GLUCOSE UR STRIP-MCNC: ABNORMAL MG/DL
HBA1C MFR BLD: 8.1 % (ref 4.8–5.6)
HCT VFR BLD AUTO: 46.7 % (ref 37.5–51)
HDLC SERPL-MCNC: 48 MG/DL (ref 40–60)
HGB BLD-MCNC: 15.5 G/DL (ref 13–17.7)
IMM GRANULOCYTES # BLD AUTO: 0.02 10*3/MM3 (ref 0–0.05)
IMM GRANULOCYTES NFR BLD AUTO: 0.3 % (ref 0–0.5)
KETONES UR QL: ABNORMAL
LDLC SERPL CALC-MCNC: 72 MG/DL (ref 0–100)
LDLC/HDLC SERPL: 1.52 {RATIO}
LEUKOCYTE EST, POC: NEGATIVE
LYMPHOCYTES # BLD AUTO: 1.52 10*3/MM3 (ref 0.7–3.1)
LYMPHOCYTES NFR BLD AUTO: 25.7 % (ref 19.6–45.3)
Lab: NORMAL
MCH RBC QN AUTO: 29.9 PG (ref 26.6–33)
MCHC RBC AUTO-ENTMCNC: 33.2 G/DL (ref 31.5–35.7)
MCV RBC AUTO: 90.2 FL (ref 79–97)
MONOCYTES # BLD AUTO: 0.29 10*3/MM3 (ref 0.1–0.9)
MONOCYTES NFR BLD AUTO: 4.9 % (ref 5–12)
NEGATIVE CONTROL: NEGATIVE
NEUTROPHILS # BLD AUTO: 4.01 10*3/MM3 (ref 1.7–7)
NEUTROPHILS NFR BLD AUTO: 67.9 % (ref 42.7–76)
NITRITE UR-MCNC: NEGATIVE MG/ML
NRBC BLD AUTO-RTO: 0 /100 WBC (ref 0–0.2)
PH UR: 6 [PH] (ref 5–8)
PLATELET # BLD AUTO: 301 10*3/MM3 (ref 140–450)
POC CREATININE URINE: NORMAL
POC MICROALBUMIN URINE: NORMAL
POSITIVE CONTROL: POSITIVE
POTASSIUM SERPL-SCNC: 4.8 MMOL/L (ref 3.5–5.2)
PROT SERPL-MCNC: 6.9 G/DL (ref 6–8.5)
PROT UR STRIP-MCNC: NEGATIVE MG/DL
PSA SERPL-MCNC: 4.61 NG/ML (ref 0–4)
RBC # BLD AUTO: 5.18 10*6/MM3 (ref 4.14–5.8)
RBC # UR STRIP: NEGATIVE /UL
SODIUM SERPL-SCNC: 135 MMOL/L (ref 136–145)
SP GR UR: 1.02 (ref 1–1.03)
TRIGL SERPL-MCNC: 50 MG/DL (ref 0–150)
UROBILINOGEN UR QL: NORMAL
VIT B12 SERPL-MCNC: 752 PG/ML (ref 211–946)
VLDLC SERPL CALC-MCNC: 11 MG/DL (ref 5–40)
WBC # BLD AUTO: 5.91 10*3/MM3 (ref 3.4–10.8)

## 2020-11-20 PROCEDURE — 99213 OFFICE O/P EST LOW 20 MIN: CPT | Performed by: PHYSICIAN ASSISTANT

## 2020-11-20 PROCEDURE — 81003 URINALYSIS AUTO W/O SCOPE: CPT | Performed by: PHYSICIAN ASSISTANT

## 2020-11-20 PROCEDURE — 82270 OCCULT BLOOD FECES: CPT | Performed by: PHYSICIAN ASSISTANT

## 2020-11-20 PROCEDURE — G0439 PPPS, SUBSEQ VISIT: HCPCS | Performed by: PHYSICIAN ASSISTANT

## 2020-11-20 PROCEDURE — 82044 UR ALBUMIN SEMIQUANTITATIVE: CPT | Performed by: PHYSICIAN ASSISTANT

## 2020-11-20 NOTE — PROGRESS NOTES
The ABCs of the Annual Wellness Visit  Subsequent Medicare Wellness Visit    Chief Complaint   Patient presents with   • Medicare Wellness-subsequent       Subjective   History of Present Illness:  Jeff Bernard is a 65 y.o. male who presents for a Subsequent Medicare Wellness Visit.    LAST COLONOSCOPY-Dr. Kelly-2/2014-polypectomy.  10/3/2017-recheck in 5 years.    LAST TDAP- 11/13/13  FLU SHOT-9/28/2019- [facility]  PREVNAR- 8/2/2017  PNEUMOVAX-11/16/2018  ZOSTAVAX- 8/3/17.   HEPATITIS A- 5/7/18, 12/15/2018    HEALTH RISK ASSESSMENT    Recent Hospitalizations:  No hospitalization(s) within the last year.    Current Medical Providers:  Patient Care Team:  Esperanza Sarmiento PA as PCP - General  Esperanza Sarmiento PA as PCP - Family Medicine  Salinas Juarez MD as Consulting Physician (Endocrinology)  Lazarus Gallegos MD as Consulting Physician (Cardiology)  Michael Fatima MD as Consulting Physician (Urology)    Smoking Status:  Social History     Tobacco Use   Smoking Status Never Smoker   Smokeless Tobacco Never Used       Alcohol Consumption:  Social History     Substance and Sexual Activity   Alcohol Use Yes   • Alcohol/week: 4.0 - 6.0 standard drinks   • Types: 4 - 6 Cans of beer per week    Comment: Socially.       Depression Screen:   PHQ-2/PHQ-9 Depression Screening 11/20/2020   Little interest or pleasure in doing things 0   Feeling down, depressed, or hopeless 0   Trouble falling or staying asleep, or sleeping too much 0   Feeling tired or having little energy 0   Poor appetite or overeating 0   Feeling bad about yourself - or that you are a failure or have let yourself or your family down 0   Trouble concentrating on things, such as reading the newspaper or watching television 0   Moving or speaking so slowly that other people could have noticed. Or the opposite - being so fidgety or restless that you have been moving around a lot more than usual 0   Thoughts that you would be better off dead, or of  hurting yourself in some way 0   Total Score 0   If you checked off any problems, how difficult have these problems made it for you to do your work, take care of things at home, or get along with other people? Not difficult at all       Fall Risk Screen:  CHRISTINE Fall Risk Assessment was completed, and patient is at LOW risk for falls.Assessment completed on:11/20/2020    Health Habits and Functional and Cognitive Screening:  Functional & Cognitive Status 11/20/2020   Do you have difficulty preparing food and eating? No   Do you have difficulty bathing yourself, getting dressed or grooming yourself? No   Do you have difficulty using the toilet? No   Do you have difficulty moving around from place to place? No   Do you have trouble with steps or getting out of a bed or a chair? No   Current Diet Well Balanced Diet   Dental Exam Not up to date   Eye Exam Up to date   Exercise (times per week) 2 times per week   Current Exercises Include Walking;Home Fitness Gym   Current Exercise Activities Include -   Do you need help using the phone?  No   Are you deaf or do you have serious difficulty hearing?  No   Do you need help with transportation? No   Do you need help shopping? No   Do you need help preparing meals?  No   Do you need help with housework?  Yes   Do you need help with laundry? No   Do you need help taking your medications? No   Do you need help managing money? No   Do you ever drive or ride in a car without wearing a seat belt? No   Have you felt unusual stress, anger or loneliness in the last month? No   Who do you live with? Alone   If you need help, do you have trouble finding someone available to you? No   Have you been bothered in the last four weeks by sexual problems? No   Do you have difficulty concentrating, remembering or making decisions? No         Does the patient have evidence of cognitive impairment? No    Asprin use counseling:Taking ASA appropriately as indicated    Age-appropriate Screening  "Schedule:  Refer to the list below for future screening recommendations based on patient's age, sex and/or medical conditions. Orders for these recommended tests are listed in the plan section. The patient has been provided with a written plan.    Health Maintenance   Topic Date Due   • DIABETIC EYE EXAM  12/21/2020   • DIABETIC FOOT EXAM  03/19/2021   • HEMOGLOBIN A1C  05/20/2021   • LIPID PANEL  11/20/2021   • URINE MICROALBUMIN  11/20/2021   • COLONOSCOPY  03/20/2022   • TDAP/TD VACCINES (2 - Td) 11/13/2023   • INFLUENZA VACCINE  Completed   • ZOSTER VACCINE  Completed          The following portions of the patient's history were reviewed and updated as appropriate: allergies, current medications, past family history, past medical history, past social history, past surgical history and problem list.    Outpatient Medications Prior to Visit   Medication Sig Dispense Refill   • aspirin 81 MG tablet Take 1 tablet by mouth daily.     • atorvastatin (LIPITOR) 40 MG tablet TAKE 1 TABLET BY MOUTH EVERY DAY EVERY NIGHT 90 tablet 1   • B-D INS SYRINGE 0.5CC/31GX5/16 31G X 5/16\" 0.5 ML misc USE ONE SYRINGE DAILY AS DIRECTED 90 each 0   • benazepril (LOTENSIN) 10 MG tablet TAKE 1 TABLET BY MOUTH EVERY DAY 90 tablet 0   • Cholecalciferol (VITAMIN D-3) 1000 units capsule Take 1,000 Units by mouth Daily.     • Cyanocobalamin (VITAMIN B 12 PO) Take 1,000 mg by mouth. 2 tablets weelkly     • glucose blood (CONTOUR NEXT TEST) test strip Dx code E11.9 testing bs 3 x day 300 each 1   • insulin aspart (NovoLOG FlexPen) 100 UNIT/ML solution pen-injector sc pen INJECT 8-13 UNITS EVERY MORNING, 9-15 UNITS AT LUNCH AND 10-15 UNITS AT SUPPER (AVG 45U/DAY) 41 mL 1   • Insulin Glargine (BASAGLAR KWIKPEN) 100 UNIT/ML injection pen 27 units every evening 29 mL 1   • Insulin Pen Needle (B-D ULTRAFINE III SHORT PEN) 31G X 8 MM misc USE 1 PEN NEEDLE 4 X DAILY DX CODE E11.9 400 each 1     Facility-Administered Medications Prior to Visit " "  Medication Dose Route Frequency Provider Last Rate Last Admin   • cyanocobalamin injection 1,000 mcg  1,000 mcg Intramuscular Q28 Days Esperanza Sarmiento PA   1,000 mcg at 04/17/19 1113       Patient Active Problem List   Diagnosis   • Abnormal electrocardiogram   • Abnormal pituitary follicle stimulating hormone (FSH)   • Arteriosclerosis of carotid artery   • Benign colonic polyp   • Benign essential hypertension   • Erectile dysfunction of nonorganic origin   • Type 2 diabetes mellitus without complication, with long-term current use of insulin (CMS/[facility])   • Hyperlipidemia   • Vitamin D deficiency   • History of adenomatous polyp of colon   • Increased prostate specific antigen (PSA) velocity   • Abnormal testicular exam   • Spinal stenosis in cervical region   • Bilateral carpal tunnel syndrome   • Ulnar neuropathy at elbow   • Atrophy of muscle of right hand       Advanced Care Planning:  ACP discussion was held with the patient during this visit. Patient does not have an advance directive, information provided.    Review of Systems    Compared to one year ago, the patient feels his physical health is the same.  Compared to one year ago, the patient feels his mental health is the same.    Reviewed chart for potential of high risk medication in the elderly: not applicable  Reviewed chart for potential of harmful drug interactions in the elderly:not applicable    Objective         Vitals:    11/20/20 0918   BP: 104/60   Pulse: 82   Resp: 18   Temp: 97 °F (36.1 °C)   SpO2: 100%   Weight: 94.5 kg (208 lb 6.4 oz)   Height: 182.9 cm (72.01\")       Body mass index is 28.26 kg/m².  Discussed the patient's BMI with him. The BMI is above average; BMI management plan is completed.    Physical Exam    Lab Results   Component Value Date     (H) 11/20/2020    CHLPL 131 11/20/2020    TRIG 50 11/20/2020    HDL 48 11/20/2020    LDL 72 11/20/2020    VLDL 11 11/20/2020    HGBA1C 8.10 (H) 11/20/2020        Assessment/Plan "   Medicare Risks and Personalized Health Plan  CMS Preventative Services Quick Reference  Advance Directive Discussion    The above risks/problems have been discussed with the patient.  Pertinent information has been shared with the patient in the After Visit Summary.  Follow up plans and orders are seen below in the Assessment/Plan Section.    Diagnoses and all orders for this visit:    1. Medicare annual wellness visit, subsequent (Primary)    2. Prostate cancer screening  -     PSA Screen    3. Encounter for Hemoccult screening  -     POC Occult Blood Stool    4. Atherosclerosis of right carotid artery  -     Cancel: Duplex Carotid Ultrasound CAR; Future    5. Benign essential hypertension  -     Comprehensive Metabolic Panel  -     POC Urinalysis Dipstick, Automated  -     POC Microalbumin    6. Vitamin B 12 deficiency  -     CBC & Differential  -     Vitamin B12 & Folate    7. Vitamin D deficiency  -     Comprehensive Metabolic Panel  -     Vitamin D 25 Hydroxy    8. Type 2 diabetes mellitus without complication, with long-term current use of insulin (CMS/[facility])  -     Comprehensive Metabolic Panel  -     Hemoglobin A1c  -     POC Urinalysis Dipstick, Automated  -     POC Microalbumin    9. Hyperlipidemia, unspecified hyperlipidemia type  -     Comprehensive Metabolic Panel  -     CK  -     Lipid Panel With LDL / HDL Ratio    10. Weakness of right hand  -     Vitamin D 25 Hydroxy    11. Carpal tunnel syndrome of right wrist  -     Vitamin D 25 Hydroxy    12. Cubital tunnel syndrome on right  -     Vitamin D 25 Hydroxy    Other orders  -     PSA Screen      Follow Up:  Return in about 6 months (around 5/20/2021) for hypertension, B12 and specialists.     An After Visit Summary and PPPS were given to the patient.

## 2020-11-20 NOTE — PROGRESS NOTES
Subjective   Jeff Bernard is a 65 y.o. male who presents today for follow-up of hypertension, hyperlipidemia, B12 deficiency, vitamin D deficiency, weakness and atrophy of right hand after a shoulder injury, and specialists.     History of Present Illness     Hypertension- has been stable on medication- tolerating ok. Does not check BP at home.   Hyperlipidemia- continues medication as directed and is tolerating without AE.   B12- taking supplement twice weekly.  Vitamin D-taking Vitamin D daily    Taking ASA 81 mg   No change in weight at home.     Right shoulder pain- 12-13 years ago, he hurt his right shoulder at work. Patient slipped and swung around, caught himself by his hand with injury, and had physical therapy. He then had another job, hurt his shoulder, and had to have PT again. He continues doing exercises he was given by PT. He has atrophy and reports he is dropping things. He does notice the weakness and has atrophy noted on exam. I referred for EMG/NCS with median and ulnar neuropathy and he was seen by Dr Quiroz, hand surgery, then was referred to neurosurgery. He had MRI cervical spine with prominent diffuse cervical spondylosis, generalized reversal of the normal cervical  lordosis centered at C4-5, mild left facet overgrowth contributing to mild left foraminal narrowing at C2-3 and C3-4, prominent disc space narrowing and degenerative disc and endplate changes from C4 to T1, diffuse spurring and diffuse posterior disc bulge that abuts and mildly deforms the ventral surface of the cord moderately narrowing the canal at C4-5 and bilateral uncovertebral joint hypertrophy with moderate-to-severe bilateral foraminal narrowing at C4-5 that could affect the exiting C5 nerve roots bilaterally, at C5-6, a posterior disc osteophyte complex abuts and flattens the cord contributing to moderate-to-severe narrowing of the anterior posterior dimension of the canal with uncovertebral joint spurs contributing  to moderate-to-severe bilateral bony foraminal narrowing that could affect the exiting C6 nerve roots bilaterally, questionable  very minimal T2 high signal in the right lateral aspect of the cord at the C5-6 level, could be artifact or some minimal myelomalacia in the right lateral cord at the C5-6 level, posterior spurs and uncovertebral joint hypertrophy contributing to mild canal and moderate bilateral bony foraminal narrowing at C6-7 and uncovertebral joint spurring contributes to mild-to-moderate bilateral bony foraminal narrowing at C7-T1. He was referred him to Dr Herzog, hand surgery,  Right arm and hand weakness-patient was seen by Dr. Lyons last 9/2020 following EMG with severe cubital tunnel and carpal tunnel syndrome.  Recommended surgery.  Patient advised to think about it and go back after the first year.    Patient's specialists:  Urology-Dr. Fatima- last seen 7/2019 for elevated PSA, BPH, and undescended right testicle.  [id number] PRN.   Endocrinology-Dr. Juarez last visit 8/2020 for diabetes mellitus type 2-continue Basaglar to 27 units every evening and NovoLog 1 unit per 5 g of carbohydrate, Lipitor, and vitamin D and follow-up in 4 months. Patient had a major discrepancy with his blood sugars.  He is using the freestyle eva machine and when he scanned his meter for his sugars the morning of his labs with endocrinology, he had readings between 80s and 151.  However, the glucose reading from his labs was 283.  He has started checking fingerstick glucose as well as standing his meter and reports the readings are only about 20 points off.  He is not having that significant of the discrepancy.  He discussed this with the endocrinologist and they advised that he call the freestyle company.  They just sent in the new sensor.  He is a little concerned about how to accurately track his blood sugars.  He stated his average per his meter was about 145 which would not be quite 8.7%  A1c.  Cardiology- Dr Gallegos- last seen 9/2018-they confirmed no need for follow-up after last visit.  Ophthalmology- Dr Camejo- last appt 12/2019 in [facility] or [facility]- follow up annually. Has appt 12/2020.   Hand surgery- Dr Herzog- last visit 9/2020 for carpal tunnel syndrome and a little tunnel syndrome.  EMG confirmed severe carpal tunnel and cubital tunnel syndromes-discussed surgical options.  Patient wanted to think about surgery and will let them know. Prior Dr Quiroz who referred to neurosurgery  Neurosurgery- Evi Burden PA-C-He had MRI cervical spine with prominent diffuse cervical spondylosis, generalized reversal of the normal cervical lordosis centered at C4-5, mild left facet overgrowth contributing to mild left foraminal narrowing at C2-3 and C3-4, prominent disc space narrowing and degenerative disc and endplate changes from C4 to T1, diffuse spurring and diffuse posterior disc bulge that abuts and mildly deforms the ventral surface of the cord moderately narrowing the canal at C4-5 and bilateral uncovertebral joint hypertrophy with moderate-to-severe bilateral foraminal narrowing at C4-5 that could affect the exiting C5 nerve roots bilaterally, at C5-6, a posterior disc osteophyte complex abuts and flattens the cord contributing to moderate-to-severe narrowing of the anterior posterior dimension of the canal with uncovertebral joint spurs contributing to moderate-to-severe bilateral bony foraminal narrowing that could affect the exiting C6 nerve roots bilaterally, questionable very minimal T2 high signal in the right lateral aspect of the cord at the C5-6 level, could be artifact or some minimal myelomalacia in the right lateral cord at the C5-6 level, posterior spurs and uncovertebral joint hypertrophy contributing to mild canal and moderate bilateral bony foraminal narrowing at C6-7 and uncovertebral joint spurring contributes to mild-to-moderate bilateral bony foraminal narrowing at  C7-T1. Advised to see Dr Herzog and follow up in 6 weeks.     The following portions of the patient's history were reviewed and updated as appropriate: allergies, current medications, past family history, past medical history, past social history, past surgical history and problem list.    Review of Systems   HENT: Negative.    Eyes: Negative.    Respiratory: Negative.    Cardiovascular: Negative.    Gastrointestinal: Negative.    Endocrine: Negative.    Genitourinary: Negative.    Musculoskeletal: Positive for arthralgias.   Skin: Negative.    Neurological: Positive for weakness.   Psychiatric/Behavioral: Negative.      Objective    Vitals:    11/20/20 0918   BP: 104/60   Pulse: 82   Resp: 18   Temp: 97 °F (36.1 °C)   SpO2: 100%     Body mass index is 28.26 kg/m².    Physical Exam   Constitutional: He is oriented to person, place, and time. He appears well-developed. No distress.   HENT:   Head: Normocephalic and atraumatic.   Right Ear: Hearing, tympanic membrane, external ear and ear canal normal.   Left Ear: Hearing, tympanic membrane, external ear and ear canal normal.   Nose: Nose normal.   Eyes: Pupils are equal, round, and reactive to light. Conjunctivae and lids are normal.   Neck: Neck supple. No JVD present. Carotid bruit is not present. No tracheal deviation present. No thyroid mass and no thyromegaly present.   Cardiovascular: Normal rate, regular rhythm and normal heart sounds. Exam reveals no gallop and no friction rub.   No murmur heard.  Pulses:       Radial pulses are 2+ on the right side and 2+ on the left side.        Posterior tibial pulses are 2+ on the right side and 2+ on the left side.   Pulmonary/Chest: Effort normal and breath sounds normal. No respiratory distress. He has no wheezes. He has no rhonchi. He has no rales.   Abdominal: Soft. Bowel sounds are normal. He exhibits no distension and no abdominal bruit. There is no abdominal tenderness. There is no rigidity, no rebound and no  guarding. No hernia. Hernia confirmed negative in the left inguinal area and confirmed negative in the right inguinal area.   Genitourinary:    Prostate, penis and rectum normal.   Left testis shows no mass, no swelling and no tenderness. Left testis is descended. Cremasteric reflex is not absent on the left side.    Genitourinary Comments: Absent right testicle-patient reports is chronic.     Musculoskeletal: Normal range of motion. No tenderness or deformity.      Comments: Significant atrophy and slight weakness right upper extremity and hand.   Lymphadenopathy:     He has no cervical adenopathy. No inguinal adenopathy noted on the right or left side.   Neurological: He is alert and oriented to person, place, and time. He has normal reflexes. He displays normal reflexes. No cranial nerve deficit or sensory deficit. He exhibits normal muscle tone. Coordination and gait normal.   Skin: Skin is warm and dry. No rash noted. He is not diaphoretic. No erythema.   Psychiatric: His speech is normal and behavior is normal. Judgment and thought content normal.   Nursing note and vitals reviewed.        Assessment/Plan   Diagnoses and all orders for this visit:    1. Medicare annual wellness visit, subsequent (Primary)    2. Prostate cancer screening  -     PSA Screen    3. Encounter for Hemoccult screening  -     POC Occult Blood Stool    4. Atherosclerosis of right carotid artery  -     Cancel: Duplex Carotid Ultrasound CAR; Future    5. Benign essential hypertension  -     Comprehensive Metabolic Panel  -     POC Urinalysis Dipstick, Automated  -     POC Microalbumin    6. Vitamin B 12 deficiency  -     CBC & Differential  -     Vitamin B12 & Folate    7. Vitamin D deficiency  -     Comprehensive Metabolic Panel  -     Vitamin D 25 Hydroxy    8. Type 2 diabetes mellitus without complication, with long-term current use of insulin (CMS/[facility])  -     Comprehensive Metabolic Panel  -     Hemoglobin A1c  -     POC Urinalysis  Dipstick, Automated  -     POC Microalbumin    9. Hyperlipidemia, unspecified hyperlipidemia type  -     Comprehensive Metabolic Panel  -     CK  -     Lipid Panel With LDL / HDL Ratio    10. Weakness of right hand  -     Vitamin D 25 Hydroxy    11. Carpal tunnel syndrome of right wrist  -     Vitamin D 25 Hydroxy    12. Cubital tunnel syndrome on right  -     Vitamin D 25 Hydroxy    Other orders  -     PSA Screen         Assessment and plan  AWV completed today.  He is up-to-date on colonoscopy until 2022 and is up-to-date on flu shot, Prevnar, Pneumovax, Tdap, Shingrix, and hepatitis A.  Last carotid duplex with right sided plaque without stenosis in 2018.  Patient has triple arterial screen card to call for screening today.    Patient will have fasting labs. Call if no results in 1 week. Stability of conditions, plan, follow up, and further recommendations pending labs. Follow up in 6 months for follow-up.  · Hypertension-Blood pressure is stable today. He is on Lotensin for renal protection with diabetes mellitus. Continue Lotensin 10 mg once daily.   · B12 deficiency-Continue B12 1000 mcg twice weekly. Further recommendations pending labs.   · Vitamin D deficiency-Continue Vitamin D 1000IU once daily. Dosing recommendation per endocrinology and labs.   · Diabetes mellitus type II uncontrolled, hyperlipidemia, and vitamin D deficiency- Continue follow up with Dr. Juarez, endocrinology, with last appointment 8/2020 - A1C remains uncontrolled at 8.2%.  Patient wanted to complete his fasting labs today since it has been more than 3 months.  I will complete labs and will send to endocrinologist.  Continue follow up with ophthalmology annually or sooner if directed by them.  · Right upper extremity atrophy-cubital tunnel syndrome and carpal tunnel syndrome- Follow up with hand surgery, Dr. Lyons, to discuss surgery pros and cons and any other treatment recommendations.    Patient continues to see specialist as noted  above.  I have reviewed available records, including consult notes, labs, and imaging/testing.  Patient to continue follow-up with specialist as directed by them.

## 2020-11-24 NOTE — PATIENT INSTRUCTIONS
Medicare Wellness  Personal Prevention Plan of Service     Date of Office Visit:  2020  Encounter Provider:  TAD Mccartney  Place of Service:  [facility]  Patient Name: Jeff Bernard  :  1955    As part of the Medicare Wellness portion of your visit today, we are providing you with this personalized preventive plan of services (PPPS). This plan is based upon recommendations of the [place] Preventive Services Task Force (USPSTF) and the Advisory Committee on Immunization Practices (ACIP).    This lists the preventive care services that should be considered, and provides dates of when you are due. Items listed as completed are up-to-date and do not require any further intervention.    Health Maintenance   Topic Date Due   • ANNUAL WELLNESS VISIT  11/15/2020   • DIABETIC EYE EXAM  2020   • DIABETIC FOOT EXAM  2021   • HEMOGLOBIN A1C  2021   • LIPID PANEL  2021   • URINE MICROALBUMIN  2021   • COLONOSCOPY  2022   • TDAP/TD VACCINES (2 - Td) 2023   • HEPATITIS C SCREENING  Completed   • INFLUENZA VACCINE  Completed   • Pneumococcal Vaccine 65+  Completed   • ZOSTER VACCINE  Completed       Orders Placed This Encounter   Procedures   • Comprehensive Metabolic Panel     Order Specific Question:   [facility] Has the patient fasted?     Answer:   Yes   • Hemoglobin A1c     Order Specific Question:   [facility] Has the patient fasted?     Answer:   Yes   • CK     Order Specific Question:   [facility] Has the patient fasted?     Answer:   Yes   • Lipid Panel With LDL / HDL Ratio     Order Specific Question:   [facility] Has the patient fasted?     Answer:   Yes   • Vitamin B12 & Folate     Order Specific Question:   [facility] Has the patient fasted?     Answer:   Yes   • Vitamin D 25 Hydroxy     Order Specific Question:   [facility] Has the patient fasted?     Answer:   Yes   • PSA Screen   • PSA Screen     Order Specific Question:    [facility] Has the patient fasted?     Answer:   Yes   • POC Urinalysis Dipstick, Automated   • POC Microalbumin   • POC Occult Blood Stool   • CBC & Differential     Order Specific Question:   Manual Differential     Answer:   No     Order Specific Question:   [facility] Has the patient fasted?     Answer:   Yes       Return in about 6 months (around 5/20/2021) for hypertension, B12 and specialists.

## 2020-12-15 RX ORDER — BENAZEPRIL HYDROCHLORIDE 10 MG/1
TABLET ORAL
Qty: 90 TABLET | Refills: 1 | Status: SHIPPED | OUTPATIENT
Start: 2020-12-15 | End: 2021-05-11

## 2020-12-15 RX ORDER — ATORVASTATIN CALCIUM 40 MG/1
TABLET, FILM COATED ORAL
Qty: 90 TABLET | Refills: 1 | Status: SHIPPED | OUTPATIENT
Start: 2020-12-15 | End: 2021-05-08

## 2020-12-22 RX ORDER — PEN NEEDLE, DIABETIC 31 GX5/16"
NEEDLE, DISPOSABLE MISCELLANEOUS
Qty: 120 EACH | Refills: 3 | Status: SHIPPED | OUTPATIENT
Start: 2020-12-22 | End: 2021-03-23

## 2021-01-06 ENCOUNTER — TRANSCRIBE ORDERS (OUTPATIENT)
Dept: ADMINISTRATIVE | Facility: HOSPITAL | Age: 66
End: 2021-01-06

## 2021-01-06 DIAGNOSIS — Z13.9 SCREENING FOR CONDITION: Primary | ICD-10-CM

## 2021-01-06 NOTE — PROGRESS NOTES
Subjective   Jeff Bernard is a 65 y.o. male.     F/u for dm 2, hyperlipidemia, vitamin d def / testing bs using the freestyle eva / last dm eye exam 11/20/20 with dr Camejo/ last dm foot exam today with dr Juarez      Patient has known diabetes mellitus since 1999 and started on insulin in 2000. He is on Basaglar 23 units every evening and NovoLog 1 unit per 4 g of carbohydrate before each meal. He checks his blood sugar 4 times a day.  He is using a freestyle eva. He has gained 4 pounds since 8/20.  He has few hypoglycemic episodes in the afternoon.  His last meal was last night.     Sensor data reviewed.        His last eye examination was in December 2020 and he has no retinopathy. He denies any associated nephropathy. Urine microalbumin was normal in 11/20. He is on benazepril. He denies any numbness, tingling or burning in his feet.      He has hyperlipidemia and has been on Lipitor 40 mg once a day. He denies any muscle pains.      He has no history of hypertension. He denies any previous history of myocardial infarction, heart failure, or stroke. He denies chest pain, shortness of breath or pedal edema.  He had a normal nuclear stress test in 4/18     He had a colonoscopy done in March 2017 and polyps were removed by Dr. Kelly.  Pathology report was read as tubular adenoma with low-grade dysplasia and hyperplastic polyps.  He was advised follow-up colonoscopy in 2022.  He denies bowel complaints.     He has vitamin D deficiency is on vitamin D3 1000 units/day.    He had flu vac this fall.    The following portions of the patient's history were reviewed and updated as appropriate: allergies, current medications, past family history, past medical history, past social history, past surgical history and problem list.    Review of Systems   Constitutional: Negative.    HENT: Negative.    Eyes: Negative.    Respiratory: Negative for shortness of breath and wheezing.    Cardiovascular: Negative for chest pain  "and palpitations.   Gastrointestinal: Negative.    Endocrine: Negative.    Genitourinary: Negative.    Musculoskeletal: Negative for myalgias.   Neurological: Negative for weakness and numbness.     Objective      Vitals:    01/13/21 0953   BP: 116/62   BP Location: Right arm   Patient Position: Sitting   Cuff Size: Large Adult   Pulse: 64   SpO2: 99%   Weight: 95.6 kg (210 lb 12.8 oz)   Height: 182.9 cm (72.01\")     Physical Exam  Constitutional:       General: He is not in acute distress.     Appearance: Normal appearance. He is obese. He is not ill-appearing, toxic-appearing or diaphoretic.   Eyes:      General: No scleral icterus.        Right eye: No discharge.         Left eye: No discharge.      Extraocular Movements: Extraocular movements intact.      Conjunctiva/sclera: Conjunctivae normal.   Neck:      Musculoskeletal: Neck supple. No neck rigidity or muscular tenderness.      Vascular: No carotid bruit.   Cardiovascular:      Rate and Rhythm: Normal rate and regular rhythm.      Heart sounds: Normal heart sounds. No murmur. No friction rub. No gallop.    Pulmonary:      Effort: No respiratory distress.      Breath sounds: Normal breath sounds. No stridor. No wheezing, rhonchi or rales.   Chest:      Chest wall: No tenderness.   Abdominal:      General: Bowel sounds are normal. There is no distension.      Palpations: Abdomen is soft. There is no mass.      Tenderness: There is no abdominal tenderness. There is no guarding or rebound.      Hernia: No hernia is present.   Musculoskeletal:         General: No swelling, tenderness or deformity.   Lymphadenopathy:      Cervical: No cervical adenopathy.   Skin:     General: Skin is warm and dry.   Neurological:      General: No focal deficit present.      Mental Status: He is alert and oriented to person, place, and time.   Psychiatric:         Mood and Affect: Mood normal.         Behavior: Behavior normal.       Office Visit on 11/20/2020   Component Date " Value Ref Range Status   • WBC 11/20/2020 5.91  3.40 - 10.80 10*3/mm3 Final   • RBC 11/20/2020 5.18  4.14 - 5.80 10*6/mm3 Final   • Hemoglobin 11/20/2020 15.5  13.0 - 17.7 g/dL Final   • Hematocrit 11/20/2020 46.7  37.5 - 51.0 % Final   • MCV 11/20/2020 90.2  79.0 - 97.0 fL Final   • MCH 11/20/2020 29.9  26.6 - 33.0 pg Final   • MCHC 11/20/2020 33.2  31.5 - 35.7 g/dL Final   • RDW 11/20/2020 12.0* 12.3 - 15.4 % Final   • Platelets 11/20/2020 301  140 - 450 10*3/mm3 Final   • Neutrophil Rel % 11/20/2020 67.9  42.7 - 76.0 % Final   • Lymphocyte Rel % 11/20/2020 25.7  19.6 - 45.3 % Final   • Monocyte Rel % 11/20/2020 4.9* 5.0 - 12.0 % Final   • Eosinophil Rel % 11/20/2020 0.5  0.3 - 6.2 % Final   • Basophil Rel % 11/20/2020 0.7  0.0 - 1.5 % Final   • Neutrophils Absolute 11/20/2020 4.01  1.70 - 7.00 10*3/mm3 Final   • Lymphocytes Absolute 11/20/2020 1.52  0.70 - 3.10 10*3/mm3 Final   • Monocytes Absolute 11/20/2020 0.29  0.10 - 0.90 10*3/mm3 Final   • Eosinophils Absolute 11/20/2020 0.03  0.00 - 0.40 10*3/mm3 Final   • Basophils Absolute 11/20/2020 0.04  0.00 - 0.20 10*3/mm3 Final   • Immature Granulocyte Rel % 11/20/2020 0.3  0.0 - 0.5 % Final   • Immature Grans Absolute 11/20/2020 0.02  0.00 - 0.05 10*3/mm3 Final   • nRBC 11/20/2020 0.0  0.0 - 0.2 /100 WBC Final   • Glucose 11/20/2020 275* 65 - 99 mg/dL Final   • BUN 11/20/2020 16  8 - 23 mg/dL Final   • Creatinine 11/20/2020 0.75* 0.76 - 1.27 mg/dL Final   • eGFR Non  Am 11/20/2020 104  >60 mL/min/1.73 Final   • eGFR African Am 11/20/2020 126  >60 mL/min/1.73 Final   • BUN/Creatinine Ratio 11/20/2020 21.3  7.0 - 25.0 Final   • Sodium 11/20/2020 135* 136 - 145 mmol/L Final   • Potassium 11/20/2020 4.8  3.5 - 5.2 mmol/L Final   • Chloride 11/20/2020 98  98 - 107 mmol/L Final   • Total CO2 11/20/2020 28.7  22.0 - 29.0 mmol/L Final   • Calcium 11/20/2020 8.8  8.6 - 10.5 mg/dL Final   • Total Protein 11/20/2020 6.9  6.0 - 8.5 g/dL Final   • Albumin 11/20/2020 4.40   3.50 - 5.20 g/dL Final   • Globulin 11/20/2020 2.5  gm/dL Final   • A/G Ratio 11/20/2020 1.8  g/dL Final   • Total Bilirubin 11/20/2020 0.6  0.0 - 1.2 mg/dL Final   • Alkaline Phosphatase 11/20/2020 94  39 - 117 U/L Final   • AST (SGOT) 11/20/2020 19  1 - 40 U/L Final   • ALT (SGPT) 11/20/2020 30  1 - 41 U/L Final   • Hemoglobin A1C 11/20/2020 8.10* 4.80 - 5.60 % Final    Comment: Hemoglobin A1C Ranges:  Increased Risk for Diabetes  5.7% to 6.4%  Diabetes                     >= 6.5%  Diabetic Goal                < 7.0%     • Creatine Kinase 11/20/2020 149  20 - 200 U/L Final   • Total Cholesterol 11/20/2020 131  0 - 200 mg/dL Final   • Triglycerides 11/20/2020 50  0 - 150 mg/dL Final   • HDL Cholesterol 11/20/2020 48  40 - 60 mg/dL Final   • VLDL Cholesterol David 11/20/2020 11  5 - 40 mg/dL Final   • LDL Chol Calc (NIH) 11/20/2020 72  0 - 100 mg/dL Final   • LDL/HDL RATIO 11/20/2020 1.52   Final   • Vitamin B-12 11/20/2020 752  211 - 946 pg/mL Final    Results may be falsely increased if patient taking Biotin.   • Folate 11/20/2020 18.30  4.78 - 24.20 ng/mL Final    Results may be falsely increased if patient taking Biotin.   • 25 Hydroxy, Vitamin D 11/20/2020 35.0  30.0 - 100.0 ng/ml Final    Comment: Results may be falsely increased if patient taking Biotin.  Reference Range for Total Vitamin D 25(OH)  Deficiency <20.0 ng/mL  Insufficiency 21-29 ng/mL  Sufficiency  ng/mL  Toxicity >100 ng/ml     • Color 11/20/2020 Yellow  Yellow, Straw, Dark Yellow, Shae Final   • Clarity, UA 11/20/2020 Clear  Clear Final   • Specific Gravity  11/20/2020 1.025  1.005 - 1.030 Final   • pH, Urine 11/20/2020 6.0  5.0 - 8.0 Final   • Leukocytes 11/20/2020 Negative  Negative Final   • Nitrite, UA 11/20/2020 Negative  Negative Final   • Protein, POC 11/20/2020 Negative  Negative mg/dL Final   • Glucose, UA 11/20/2020 >=1000 mg/dL (3+)* Negative, 1000 mg/dL (3+) mg/dL Final   • Ketones, UA 11/20/2020 15 mg/dL* Negative Final   •  Urobilinogen, UA 11/20/2020 Normal  Normal Final   • Bilirubin 11/20/2020 Negative  Negative Final   • Blood, UA 11/20/2020 Negative  Negative Final   • Microalbumin, Urine 11/20/2020 30 mg/L   Final   • Creatinine, Urine 11/20/2020 200 mg/dL   Final   • Fecal Occult Blood 11/20/2020 Negative  Negative Final   • Lot Number 11/20/2020 [id number]   Final   • Expiration Date 11/20/2020 12/31/2020   Final   • DEVELOPER LOT NUMBER 11/20/2020 [id number]   Final   • DEVELOPER EXPIRATION DATE 11/20/2020 12/31/2020   Final   • Positive Control 11/20/2020 Positive  Positive Final   • Negative Control 11/20/2020 Negative  Negative Final   • PSA 11/20/2020 4.610* 0.000 - 4.000 ng/mL Final    Results may be falsely decreased if patient taking Biotin.     Assessment/Plan   Diagnoses and all orders for this visit:    1. Type 2 diabetes mellitus without complication, with long-term current use of insulin (CMS/[facility]) (Primary)  -     C-Peptide  -     Glutamic Acid Decarboxylase  -     IA-2 Autoantibodies  -     Anti-islet Cell Antibody  -     Comprehensive Metabolic Panel  -     Fructosamine    2. Vitamin D deficiency    3. Hyperlipidemia, unspecified hyperlipidemia type  -     Comprehensive Metabolic Panel    4. Benign essential hypertension  -     Comprehensive Metabolic Panel      Continue Basaglar 23 units every evening and mealtime NovoLog  Continue vitamin D3 1000 units/day  Continue Lipitor 40 mg/day    Copy of my note sent to TAD Mccartney    RTC 4 mos.

## 2021-01-07 DIAGNOSIS — Z79.4 TYPE 2 DIABETES MELLITUS WITHOUT COMPLICATION, WITH LONG-TERM CURRENT USE OF INSULIN (HCC): ICD-10-CM

## 2021-01-07 DIAGNOSIS — E11.9 TYPE 2 DIABETES MELLITUS WITHOUT COMPLICATION, WITH LONG-TERM CURRENT USE OF INSULIN (HCC): ICD-10-CM

## 2021-01-07 RX ORDER — INSULIN GLARGINE 100 [IU]/ML
INJECTION, SOLUTION SUBCUTANEOUS
Qty: 29 ML | Refills: 1
Start: 2021-01-07 | End: 2021-01-25 | Stop reason: SDUPTHER

## 2021-01-13 ENCOUNTER — OFFICE VISIT (OUTPATIENT)
Dept: ENDOCRINOLOGY | Age: 66
End: 2021-01-13

## 2021-01-13 VITALS
WEIGHT: 210.8 LBS | HEIGHT: 72 IN | BODY MASS INDEX: 28.55 KG/M2 | HEART RATE: 64 BPM | OXYGEN SATURATION: 99 % | SYSTOLIC BLOOD PRESSURE: 116 MMHG | DIASTOLIC BLOOD PRESSURE: 62 MMHG

## 2021-01-13 DIAGNOSIS — Z79.4 TYPE 2 DIABETES MELLITUS WITHOUT COMPLICATION, WITH LONG-TERM CURRENT USE OF INSULIN (HCC): Primary | ICD-10-CM

## 2021-01-13 DIAGNOSIS — E55.9 VITAMIN D DEFICIENCY: ICD-10-CM

## 2021-01-13 DIAGNOSIS — E11.9 TYPE 2 DIABETES MELLITUS WITHOUT COMPLICATION, WITH LONG-TERM CURRENT USE OF INSULIN (HCC): Primary | ICD-10-CM

## 2021-01-13 DIAGNOSIS — E78.5 HYPERLIPIDEMIA, UNSPECIFIED HYPERLIPIDEMIA TYPE: ICD-10-CM

## 2021-01-13 PROCEDURE — 95251 CONT GLUC MNTR ANALYSIS I&R: CPT | Performed by: INTERNAL MEDICINE

## 2021-01-13 PROCEDURE — 99214 OFFICE O/P EST MOD 30 MIN: CPT | Performed by: INTERNAL MEDICINE

## 2021-01-18 ENCOUNTER — HOSPITAL ENCOUNTER (OUTPATIENT)
Dept: CARDIOLOGY | Facility: HOSPITAL | Age: 66
Discharge: HOME | End: 2021-01-18
Admitting: PHYSICIAN ASSISTANT

## 2021-01-18 VITALS
HEART RATE: 73 BPM | SYSTOLIC BLOOD PRESSURE: 126 MMHG | DIASTOLIC BLOOD PRESSURE: 64 MMHG | BODY MASS INDEX: 29.12 KG/M2 | HEIGHT: 71 IN | WEIGHT: 208 LBS

## 2021-01-18 DIAGNOSIS — Z13.9 SCREENING FOR CONDITION: ICD-10-CM

## 2021-01-18 LAB
BH CV ECHO MEAS - DIST AO DIAM: 1.44 CM
BH CV VAS BP LEFT ARM: NORMAL MMHG
BH CV VAS BP RIGHT ARM: NORMAL MMHG
BH CV XLRA MEAS - MID AO DIAM: 1.71 CM
BH CV XLRA MEAS - PAD LEFT ABI DP: 1.32
BH CV XLRA MEAS - PAD LEFT ABI PT: 1.37
BH CV XLRA MEAS - PAD LEFT ARM: 126 MMHG
BH CV XLRA MEAS - PAD LEFT LEG DP: 166 MMHG
BH CV XLRA MEAS - PAD LEFT LEG PT: 172 MMHG
BH CV XLRA MEAS - PAD RIGHT ABI DP: 1.36
BH CV XLRA MEAS - PAD RIGHT ABI PT: 1.34
BH CV XLRA MEAS - PAD RIGHT ARM: 125 MMHG
BH CV XLRA MEAS - PAD RIGHT LEG DP: 171 MMHG
BH CV XLRA MEAS - PAD RIGHT LEG PT: 169 MMHG
BH CV XLRA MEAS - PROX AO DIAM: 1.9 CM
BH CV XLRA MEAS LEFT ICA/CCA RATIO: 1.05
BH CV XLRA MEAS LEFT MID CCA PSV: NORMAL CM/SEC
BH CV XLRA MEAS LEFT MID ICA PSV: NORMAL CM/SEC
BH CV XLRA MEAS LEFT PROX ECA PSV: NORMAL CM/SEC
BH CV XLRA MEAS RIGHT ICA/CCA RATIO: 0.94
BH CV XLRA MEAS RIGHT MID CCA PSV: NORMAL CM/SEC
BH CV XLRA MEAS RIGHT MID ICA PSV: NORMAL CM/SEC
BH CV XLRA MEAS RIGHT PROX ECA PSV: NORMAL CM/SEC

## 2021-01-18 PROCEDURE — 93799 UNLISTED CV SVC/PROCEDURE: CPT

## 2021-01-19 LAB
ALBUMIN SERPL-MCNC: 4.5 G/DL (ref 3.5–5.2)
ALBUMIN/GLOB SERPL: 1.8 G/DL
ALP SERPL-CCNC: 84 U/L (ref 39–117)
ALT SERPL-CCNC: 37 U/L (ref 1–41)
AST SERPL-CCNC: 40 U/L (ref 1–40)
BILIRUB SERPL-MCNC: 0.7 MG/DL (ref 0–1.2)
BUN SERPL-MCNC: 19 MG/DL (ref 8–23)
BUN/CREAT SERPL: 24.7 (ref 7–25)
C PEPTIDE SERPL-MCNC: 0.3 NG/ML (ref 1.1–4.4)
CALCIUM SERPL-MCNC: 9.1 MG/DL (ref 8.6–10.5)
CHLORIDE SERPL-SCNC: 101 MMOL/L (ref 98–107)
CO2 SERPL-SCNC: 27.1 MMOL/L (ref 22–29)
CREAT SERPL-MCNC: 0.77 MG/DL (ref 0.76–1.27)
FRUCTOSAMINE SERPL-SCNC: 390 UMOL/L (ref 0–285)
GAD65 AB SER IA-ACNC: <5 U/ML (ref 0–5)
GLOBULIN SER CALC-MCNC: 2.5 GM/DL
GLUCOSE SERPL-MCNC: 162 MG/DL (ref 65–99)
ISLET CELL512 AB SER-ACNC: <7.5 U/ML
PANC ISLET CELL AB TITR SER: NEGATIVE {TITER}
POTASSIUM SERPL-SCNC: 3.8 MMOL/L (ref 3.5–5.2)
PROT SERPL-MCNC: 7 G/DL (ref 6–8.5)
SODIUM SERPL-SCNC: 137 MMOL/L (ref 136–145)

## 2021-01-25 DIAGNOSIS — E11.9 TYPE 2 DIABETES MELLITUS WITHOUT COMPLICATION, WITH LONG-TERM CURRENT USE OF INSULIN (HCC): ICD-10-CM

## 2021-01-25 DIAGNOSIS — Z79.4 TYPE 2 DIABETES MELLITUS WITHOUT COMPLICATION, WITH LONG-TERM CURRENT USE OF INSULIN (HCC): ICD-10-CM

## 2021-01-25 RX ORDER — INSULIN GLARGINE 100 [IU]/ML
INJECTION, SOLUTION SUBCUTANEOUS
Qty: 23 ML | Refills: 1 | Status: SHIPPED | OUTPATIENT
Start: 2021-01-25 | End: 2021-05-27

## 2021-01-29 ENCOUNTER — TELEPHONE (OUTPATIENT)
Dept: NEUROSURGERY | Facility: CLINIC | Age: 66
End: 2021-01-29

## 2021-01-29 NOTE — TELEPHONE ENCOUNTER
Caller:  PAGE OROURKE  Relationship to Patient: SELF  Phone Number: [phone]    Reason for Call: PT WOULD LIKE A COPY OF HIS LAST OFFICE NOTE AND A COPY OF HIS MRI CERVICAL REPORT SENT TO HIM PLEASE.

## 2021-01-29 NOTE — TELEPHONE ENCOUNTER
Records were mailed to the patient at his request. He is aware they will not go out until Monday. He is not in any rush, she is still being treated with Dr. Herzog the hand surgeon as well.

## 2021-03-23 RX ORDER — PEN NEEDLE, DIABETIC 31 GX5/16"
NEEDLE, DISPOSABLE MISCELLANEOUS
Qty: 400 EACH | Refills: 2 | Status: SHIPPED | OUTPATIENT
Start: 2021-03-23 | End: 2022-01-21

## 2021-04-27 RX ORDER — INSULIN ASPART 100 [IU]/ML
INJECTION, SOLUTION INTRAVENOUS; SUBCUTANEOUS
Qty: 75 ML | Refills: 1 | Status: SHIPPED | OUTPATIENT
Start: 2021-04-27 | End: 2021-11-16

## 2021-04-27 RX ORDER — INSULIN ASPART 100 [IU]/ML
INJECTION, SOLUTION INTRAVENOUS; SUBCUTANEOUS
Qty: 45 ML | Refills: 1 | Status: SHIPPED | OUTPATIENT
Start: 2021-04-27 | End: 2021-05-21

## 2021-05-08 RX ORDER — ATORVASTATIN CALCIUM 40 MG/1
TABLET, FILM COATED ORAL
Qty: 90 TABLET | Refills: 1 | Status: SHIPPED | OUTPATIENT
Start: 2021-05-08 | End: 2021-12-15

## 2021-05-11 RX ORDER — BENAZEPRIL HYDROCHLORIDE 10 MG/1
TABLET ORAL
Qty: 90 TABLET | Refills: 1 | Status: SHIPPED | OUTPATIENT
Start: 2021-05-11 | End: 2021-12-14

## 2021-05-21 ENCOUNTER — OFFICE VISIT (OUTPATIENT)
Dept: FAMILY MEDICINE CLINIC | Facility: CLINIC | Age: 66
End: 2021-05-21

## 2021-05-21 VITALS
HEART RATE: 86 BPM | SYSTOLIC BLOOD PRESSURE: 122 MMHG | OXYGEN SATURATION: 98 % | TEMPERATURE: 98.6 F | HEIGHT: 71 IN | RESPIRATION RATE: 16 BRPM | BODY MASS INDEX: 29.26 KG/M2 | DIASTOLIC BLOOD PRESSURE: 60 MMHG | WEIGHT: 209 LBS

## 2021-05-21 DIAGNOSIS — R94.131 ABNORMAL EMG: ICD-10-CM

## 2021-05-21 DIAGNOSIS — G56.01 CARPAL TUNNEL SYNDROME OF RIGHT WRIST: ICD-10-CM

## 2021-05-21 DIAGNOSIS — R29.898 WEAKNESS OF RIGHT HAND: ICD-10-CM

## 2021-05-21 DIAGNOSIS — R97.20 ELEVATED PSA: ICD-10-CM

## 2021-05-21 DIAGNOSIS — G56.21 CUBITAL TUNNEL SYNDROME ON RIGHT: ICD-10-CM

## 2021-05-21 DIAGNOSIS — M62.541 ATROPHY OF MUSCLE OF RIGHT HAND: ICD-10-CM

## 2021-05-21 DIAGNOSIS — M48.02 SPINAL STENOSIS IN CERVICAL REGION: ICD-10-CM

## 2021-05-21 DIAGNOSIS — E11.65 UNCONTROLLED TYPE 2 DIABETES MELLITUS WITH HYPERGLYCEMIA (HCC): ICD-10-CM

## 2021-05-21 DIAGNOSIS — E53.8 VITAMIN B 12 DEFICIENCY: ICD-10-CM

## 2021-05-21 DIAGNOSIS — I65.21 ARTERIOSCLEROSIS OF RIGHT CAROTID ARTERY: ICD-10-CM

## 2021-05-21 DIAGNOSIS — M62.542 ATROPHY OF MUSCLE OF LEFT HAND: ICD-10-CM

## 2021-05-21 DIAGNOSIS — E55.9 VITAMIN D DEFICIENCY: ICD-10-CM

## 2021-05-21 DIAGNOSIS — E78.5 HYPERLIPIDEMIA, UNSPECIFIED HYPERLIPIDEMIA TYPE: Primary | ICD-10-CM

## 2021-05-21 PROCEDURE — 99214 OFFICE O/P EST MOD 30 MIN: CPT | Performed by: PHYSICIAN ASSISTANT

## 2021-05-26 DIAGNOSIS — E11.9 TYPE 2 DIABETES MELLITUS WITHOUT COMPLICATION, WITH LONG-TERM CURRENT USE OF INSULIN (HCC): ICD-10-CM

## 2021-05-26 DIAGNOSIS — Z79.4 TYPE 2 DIABETES MELLITUS WITHOUT COMPLICATION, WITH LONG-TERM CURRENT USE OF INSULIN (HCC): ICD-10-CM

## 2021-05-27 RX ORDER — INSULIN GLARGINE 100 [IU]/ML
INJECTION, SOLUTION SUBCUTANEOUS
Qty: 10 PEN | Refills: 1 | Status: SHIPPED | OUTPATIENT
Start: 2021-05-27 | End: 2021-12-16

## 2021-06-28 ENCOUNTER — OFFICE VISIT (OUTPATIENT)
Dept: FAMILY MEDICINE CLINIC | Facility: CLINIC | Age: 66
End: 2021-06-28

## 2021-06-28 VITALS
RESPIRATION RATE: 18 BRPM | DIASTOLIC BLOOD PRESSURE: 76 MMHG | HEART RATE: 78 BPM | TEMPERATURE: 98 F | HEIGHT: 71 IN | BODY MASS INDEX: 29.26 KG/M2 | WEIGHT: 209 LBS | SYSTOLIC BLOOD PRESSURE: 122 MMHG | OXYGEN SATURATION: 97 %

## 2021-06-28 DIAGNOSIS — M54.16 LUMBAR RADICULOPATHY: ICD-10-CM

## 2021-06-28 DIAGNOSIS — S39.012A STRAIN OF LUMBAR REGION, INITIAL ENCOUNTER: Primary | ICD-10-CM

## 2021-06-28 PROCEDURE — 99213 OFFICE O/P EST LOW 20 MIN: CPT | Performed by: PHYSICIAN ASSISTANT

## 2021-06-28 RX ORDER — BACLOFEN 10 MG/1
10 TABLET ORAL 3 TIMES DAILY
Qty: 45 TABLET | Refills: 0 | Status: SHIPPED | OUTPATIENT
Start: 2021-06-28 | End: 2021-07-09

## 2021-06-28 RX ORDER — METHYLPREDNISOLONE 4 MG/1
TABLET ORAL
Qty: 21 TABLET | Refills: 0 | Status: SHIPPED | OUTPATIENT
Start: 2021-06-28 | End: 2021-07-14

## 2021-06-28 NOTE — PROGRESS NOTES
Subjective   Jeff Bernard is a 66 y.o. male who presents today for back and hip pain.     History of Present Illness     Patient with right hip pain after lifting 350 pound family member out of their car 6/22/2021. Started with pain and believes it is related to bursitis. Previously he was treated with steroid with improvement. He put his arms around patient's neck and he got him out of the car. He initially felt pain in right low back. He took Tylenol and back pain resolved. He then developed pain in lateral and posterior right hip and sometimes pain in anterior leg. Dull pain that makes sit down. Sitting and laying down helps. Getting up and down worsens it.     Tried Naproxen and extra strength Tylenol.     The following portions of the patient's history were reviewed and updated as appropriate: allergies, current medications, past family history, past medical history, past social history, past surgical history and problem list.    Review of Systems   HENT: Negative.    Respiratory: Negative.    Cardiovascular: Negative.    Gastrointestinal: Negative.    Genitourinary: Negative.    Musculoskeletal: Positive for back pain and myalgias.   Neurological: Positive for weakness and numbness.   Psychiatric/Behavioral: Negative.        Objective   Vitals:    06/28/21 0954   BP: 122/76   Pulse: 78   Resp: 18   Temp: 98 °F (36.7 °C)   SpO2: 97%     Body mass index is 29.15 kg/m².    Physical Exam  Vitals and nursing note reviewed.   Constitutional:       Appearance: He is well-developed.   HENT:      Head: Normocephalic and atraumatic.      Right Ear: External ear normal.      Left Ear: External ear normal.   Eyes:      Conjunctiva/sclera: Conjunctivae normal.   Cardiovascular:      Rate and Rhythm: Normal rate and regular rhythm.      Heart sounds: Normal heart sounds. No murmur heard.   No friction rub. No gallop.    Pulmonary:      Effort: Pulmonary effort is normal.      Breath sounds: Normal breath sounds.    Musculoskeletal:      Lumbar back: No edema, spasms, tenderness or bony tenderness. Normal range of motion.   Skin:     General: Skin is warm and dry.   Neurological:      Mental Status: He is alert and oriented to person, place, and time.      Gait: Gait normal.      Deep Tendon Reflexes:      Reflex Scores:       Patellar reflexes are 2+ on the right side and 2+ on the left side.       Achilles reflexes are 2+ on the right side and 2+ on the left side.  Psychiatric:         Speech: Speech normal.         Behavior: Behavior normal.         Thought Content: Thought content normal.         Judgment: Judgment normal.         Assessment/Plan   Diagnoses and all orders for this visit:    1. Strain of lumbar region, initial encounter (Primary)  -     methylPREDNISolone (Medrol) 4 MG dose pack; follow package directions  Dispense: 21 tablet; Refill: 0  -     baclofen (LIORESAL) 10 MG tablet; Take 1 tablet by mouth 3 (Three) Times a Day.  Dispense: 45 tablet; Refill: 0    2. Lumbar radiculopathy  -     methylPREDNISolone (Medrol) 4 MG dose pack; follow package directions  Dispense: 21 tablet; Refill: 0  -     baclofen (LIORESAL) 10 MG tablet; Take 1 tablet by mouth 3 (Three) Times a Day.  Dispense: 45 tablet; Refill: 0        Assessment and Plan  Patient has lumbar strain from about 2 weeks ago, pulling and helping lift family member out of the car. He has hip and back pain, however, it appears to be lumbar radiculopathy. He would like to try steroid. I discussed with him that this would usually not be indicated with uncontrolled DM. I will give medrol dose pack as directed and Baclofen as needed. He will need to monitor glucose carefully, use sliding scale, and control glucose levels. I will consider PT for treatment and consider imaging if no resolution of pain.     I spent 25 minutes caring for Jeff Bernard on this date of service. This time includes time spent by me in the following activities as necessary:  preparing for the visit, reviewing tests, specialists records and previous visits, obtaining and/or reviewing a separately obtained history, performing a medically appropriate exam and/or evaluation, counseling and educating the patient, family, garegiver, referring and/or communicating with other healthcare professionals, documenting information in the medical record, independently interpreting results and communicating that information with the patient, family, caregiver, and developing a medically appropriate treatment plan with consideration of other conditions, medications, and treatments.

## 2021-07-05 ENCOUNTER — PATIENT MESSAGE (OUTPATIENT)
Dept: FAMILY MEDICINE CLINIC | Facility: CLINIC | Age: 66
End: 2021-07-05

## 2021-07-05 DIAGNOSIS — M25.551 RIGHT HIP PAIN: Primary | ICD-10-CM

## 2021-07-05 DIAGNOSIS — M54.16 LUMBAR RADICULOPATHY: ICD-10-CM

## 2021-07-05 DIAGNOSIS — S39.012A STRAIN OF LUMBAR REGION, INITIAL ENCOUNTER: ICD-10-CM

## 2021-07-06 NOTE — TELEPHONE ENCOUNTER
From: Jeff Bernard  To: TAD Mccartney  Sent: 7/5/2021 11:16 AM EDT  Subject: Visit Follow-Up Question    Please let Esperanza know that i have finished taking the Methylprednisolone on Saturday ( 7/3/21). I can not really tell that it did any good. It seems the pain has moved from around my hip to the front and inside of my right leg, especially the groin area. The muscle relaxer(Baclofen) does not seem to be working at all. I have gone back to taking Naproxen 220 mg for the pain. It seems to knock the pain a little, but if i move the leg the wrong way i know about it. The pain seems to be more of a dull pain instead of a sharp pain. On a scale of 1-10 i would say about a 6, where as before it was about an 8. Should i try to do some type of exercise or something to maybe loosen things up. I would really like to get off the Naproxen.

## 2021-07-08 NOTE — PROGRESS NOTES
Subjective   Jeff Bernard is a 66 y.o. male.      F/u for dm 2, hyperlipidemia, vitamin d def / testing bs using the freestyle eva / last dm eye exam 11/20/20 with dr Camejo/ last dm foot exam 1/13/21 with dr Juarez            Patient has known diabetes mellitus since 1999 and started on insulin in 2000. He is on Basaglar 25 units every evening and NovoLog 1 unit per 3-4 g of carbohydrate before each meal. He was given Medrol for back pain and blood sugars were higher. He is off steroids for 2 weeks.  He is using a freestyle eva. He has lost 2 pounds since January 2021.  His last meal was last night.     Sensor data reviewed.    Average glucose 161.  Time in target 51%.  Time above target 48%.  Time below target 11%.      His last eye examination was in December 2020 and he has no retinopathy. He denies any associated nephropathy. Urine microalbumin was normal in 11/20. He is on benazepril. He denies any numbness, tingling or burning in his feet.      He has hyperlipidemia and has been on Lipitor 40 mg once a day. He denies any muscle pains.      He has no history of hypertension. He denies any previous history of myocardial infarction, heart failure, or stroke. He denies chest pain, shortness of breath or pedal edema.  He had a normal nuclear stress test in 4/18     He had a colonoscopy done in March 2017 and polyps were removed by Dr. Kelly.  Pathology report was read as tubular adenoma with low-grade dysplasia and hyperplastic polyps.  He was advised follow-up colonoscopy in 2022.  He denies bowel complaints.     He has vitamin D deficiency is on vitamin D3 1000 units/day.     He will have surgery for right carpal tunnel and right ulnar entrapment.    The following portions of the patient's history were reviewed and updated as appropriate: allergies, current medications, past family history, past medical history, past social history, past surgical history and problem list.    Review of Systems   Eyes:  "Negative.    Respiratory: Negative.    Cardiovascular: Negative for chest pain and palpitations.   Gastrointestinal: Negative.    Endocrine: Negative for cold intolerance and heat intolerance.   Genitourinary: Negative.    Musculoskeletal: Negative for myalgias.   Neurological: Negative for numbness.   Hematological: Negative for adenopathy. Does not bruise/bleed easily.     Objective      Vitals:    07/14/21 1201   BP: 126/70   BP Location: Right arm   Patient Position: Sitting   Cuff Size: Large Adult   Pulse: 64   SpO2: 98%   Weight: 93.6 kg (206 lb 6.4 oz)   Height: 180.3 cm (70.98\")     Physical Exam  Constitutional:       General: He is not in acute distress.     Appearance: Normal appearance. He is not ill-appearing, toxic-appearing or diaphoretic.   Eyes:      General: No scleral icterus.        Right eye: No discharge.         Left eye: No discharge.      Extraocular Movements: Extraocular movements intact.      Conjunctiva/sclera: Conjunctivae normal.   Neck:      Vascular: No carotid bruit.   Cardiovascular:      Rate and Rhythm: Normal rate and regular rhythm.      Pulses: Normal pulses.      Heart sounds: Normal heart sounds. No murmur heard.   No friction rub. No gallop.    Pulmonary:      Effort: Pulmonary effort is normal. No respiratory distress.      Breath sounds: Normal breath sounds. No stridor. No rales.   Chest:      Chest wall: No tenderness.   Abdominal:      General: Bowel sounds are normal. There is no distension.      Palpations: Abdomen is soft. There is no mass.      Tenderness: There is no right CVA tenderness or left CVA tenderness.   Musculoskeletal:         General: Normal range of motion.      Cervical back: Normal range of motion. No rigidity or tenderness.      Right lower leg: No edema.      Left lower leg: No edema.   Lymphadenopathy:      Cervical: No cervical adenopathy.   Skin:     General: Skin is warm and dry.   Neurological:      General: No focal deficit present.      " Mental Status: He is alert and oriented to person, place, and time.   Psychiatric:         Mood and Affect: Mood normal.         Behavior: Behavior normal.       Hospital Outpatient Visit on 01/18/2021   Component Date Value Ref Range Status   • BH CV VAS BP RIGHT ARM 01/18/2021 125/65  mmHg Final   • BH CV VAS BP LEFT ARM 01/18/2021 126/64  mmHg Final   • Right Mid CCA PSV 01/18/2021 58/15  cm/sec Final   • Prox ECA PSV 01/18/2021 95/14  cm/sec Final   • Mid ICA PSV 01/18/2021 55/21  cm/sec Final   • ICA/CCA ratio 01/18/2021 0.94   Final   • left Mid CCA PSV 01/18/2021 56/11  cm/sec Final   • Prox ECA PSV 01/18/2021 93/12  cm/sec Final   • Mid ICA PSV 01/18/2021 59/20  cm/sec Final   • ICA/CCA ratio 01/18/2021 1.05   Final   • Prox Ao Diam 01/18/2021 1.90  cm Final   • Mid Ao Diam 01/18/2021 1.71  cm Final   • PAD Right Arm 01/18/2021 125  mmHg Final   • PAD Right Leg PT 01/18/2021 169  mmHg Final   • PAD Right Leg DP 01/18/2021 171  mmHg Final   • PAD Right KWAN PT 01/18/2021 1.34   Final   • PAD Right KWAN DP 01/18/2021 1.36   Final   • PAD Left Arm 01/18/2021 126  mmHg Final   • PAD Left Leg PT 01/18/2021 172  mmHg Final   • PAD Left Leg DP 01/18/2021 166  mmHg Final   • PAD Left KWAN PT 01/18/2021 1.37   Final   • PAD Left KWAN DP 01/18/2021 1.32   Final   • Dist Ao Diam 01/18/2021 1.44  cm Final     Assessment/Plan   Diagnoses and all orders for this visit:    1. Type 2 diabetes mellitus without complication, with long-term current use of insulin (CMS/[facility]) (Primary)  -     Comprehensive Metabolic Panel  -     TSH  -     T4, Free  -     Hemoglobin A1c  -     Lipid Panel    2. Benign essential hypertension  -     Comprehensive Metabolic Panel    3. Hyperlipidemia, unspecified hyperlipidemia type  -     Comprehensive Metabolic Panel  -     TSH  -     T4, Free  -     Lipid Panel    4. Vitamin D deficiency  -     Comprehensive Metabolic Panel  -     TSH  -     T4, Free  -     Vitamin D 25 Hydroxy      Continue Basaglar  and NovoLog.  Continue benazepril  Continue Lipitor 40 mg/day.  Continue vitamin D3 1000 units/day.    Copy of my note sent to Esperanza MISHRA.    RTC 4 mos.

## 2021-07-09 ENCOUNTER — PATIENT MESSAGE (OUTPATIENT)
Dept: FAMILY MEDICINE CLINIC | Facility: CLINIC | Age: 66
End: 2021-07-09

## 2021-07-09 DIAGNOSIS — M54.16 LUMBAR RADICULOPATHY: ICD-10-CM

## 2021-07-09 DIAGNOSIS — M47.26 OSTEOARTHRITIS OF SPINE WITH RADICULOPATHY, LUMBAR REGION: ICD-10-CM

## 2021-07-09 DIAGNOSIS — S39.012A STRAIN OF LUMBAR REGION, INITIAL ENCOUNTER: ICD-10-CM

## 2021-07-09 DIAGNOSIS — M25.551 RIGHT HIP PAIN: Primary | ICD-10-CM

## 2021-07-09 DIAGNOSIS — M25.551 RIGHT HIP PAIN: ICD-10-CM

## 2021-07-09 DIAGNOSIS — M51.36 DEGENERATIVE DISC DISEASE, LUMBAR: ICD-10-CM

## 2021-07-09 DIAGNOSIS — M54.16 LUMBAR RADICULOPATHY: Primary | ICD-10-CM

## 2021-07-09 RX ORDER — BACLOFEN 10 MG/1
TABLET ORAL
Qty: 45 TABLET | Refills: 0 | Status: SHIPPED | OUTPATIENT
Start: 2021-07-09 | End: 2021-07-12

## 2021-07-09 NOTE — TELEPHONE ENCOUNTER
From: Jeff Bernard  To: TAD Mccartney  Sent: 7/9/2021 12:27 PM EDT  Subject: Visit Follow-Up Question    I saw the report from the x-rays, looks like no problems,also increase in dosage of the baclofen. Is there anything else i can do to ease what is going on? I was taking naproxen but have switched to ibuprofen ( 3 tablets every 8-12 hours, 200 mg each] Concerned about blood sugar with what i am taking

## 2021-07-12 RX ORDER — TIZANIDINE HYDROCHLORIDE 4 MG/1
4 CAPSULE, GELATIN COATED ORAL 3 TIMES DAILY PRN
Qty: 45 CAPSULE | Refills: 0 | Status: SHIPPED | OUTPATIENT
Start: 2021-07-12 | End: 2021-07-15

## 2021-07-12 NOTE — TELEPHONE ENCOUNTER
From: Jeff Bernard  To: TAD Mccartney  Sent: 7/9/2021 1:21 PM EDT  Subject: Visit Follow-Up Question    So i have aggravated something in the lumbar region that is causing this? Have not gotten the new Baclofen prescription yet but the other dosage did not due much. Blood sugars are not as high as when i was taking the steroids,but they are still higher than usual. I see  next Wednesday for regular checkup. Will heat or cold or both help?

## 2021-07-14 ENCOUNTER — OFFICE VISIT (OUTPATIENT)
Dept: ENDOCRINOLOGY | Age: 66
End: 2021-07-14

## 2021-07-14 VITALS
BODY MASS INDEX: 28.9 KG/M2 | DIASTOLIC BLOOD PRESSURE: 70 MMHG | SYSTOLIC BLOOD PRESSURE: 126 MMHG | WEIGHT: 206.4 LBS | OXYGEN SATURATION: 98 % | HEIGHT: 71 IN | HEART RATE: 64 BPM

## 2021-07-14 DIAGNOSIS — I10 BENIGN ESSENTIAL HYPERTENSION: ICD-10-CM

## 2021-07-14 DIAGNOSIS — Z79.4 TYPE 2 DIABETES MELLITUS WITHOUT COMPLICATION, WITH LONG-TERM CURRENT USE OF INSULIN (HCC): Primary | ICD-10-CM

## 2021-07-14 DIAGNOSIS — E55.9 VITAMIN D DEFICIENCY: ICD-10-CM

## 2021-07-14 DIAGNOSIS — E11.9 TYPE 2 DIABETES MELLITUS WITHOUT COMPLICATION, WITH LONG-TERM CURRENT USE OF INSULIN (HCC): Primary | ICD-10-CM

## 2021-07-14 DIAGNOSIS — E78.5 HYPERLIPIDEMIA, UNSPECIFIED HYPERLIPIDEMIA TYPE: ICD-10-CM

## 2021-07-14 PROCEDURE — 99214 OFFICE O/P EST MOD 30 MIN: CPT | Performed by: INTERNAL MEDICINE

## 2021-07-14 PROCEDURE — 95251 CONT GLUC MNTR ANALYSIS I&R: CPT | Performed by: INTERNAL MEDICINE

## 2021-07-14 RX ORDER — BACLOFEN 10 MG/1
10 TABLET ORAL 3 TIMES DAILY
COMMUNITY
End: 2021-08-15

## 2021-07-15 ENCOUNTER — PATIENT MESSAGE (OUTPATIENT)
Dept: FAMILY MEDICINE CLINIC | Facility: CLINIC | Age: 66
End: 2021-07-15

## 2021-07-15 LAB
25(OH)D3+25(OH)D2 SERPL-MCNC: 38 NG/ML (ref 30–100)
ALBUMIN SERPL-MCNC: 4.2 G/DL (ref 3.5–5.2)
ALBUMIN/GLOB SERPL: 1.8 G/DL
ALP SERPL-CCNC: 71 U/L (ref 39–117)
ALT SERPL-CCNC: 18 U/L (ref 1–41)
AST SERPL-CCNC: 15 U/L (ref 1–40)
BILIRUB SERPL-MCNC: 0.4 MG/DL (ref 0–1.2)
BUN SERPL-MCNC: 11 MG/DL (ref 8–23)
BUN/CREAT SERPL: 16.2 (ref 7–25)
CALCIUM SERPL-MCNC: 9.1 MG/DL (ref 8.6–10.5)
CHLORIDE SERPL-SCNC: 104 MMOL/L (ref 98–107)
CHOLEST SERPL-MCNC: 133 MG/DL (ref 0–200)
CO2 SERPL-SCNC: 26.7 MMOL/L (ref 22–29)
CREAT SERPL-MCNC: 0.68 MG/DL (ref 0.76–1.27)
GLOBULIN SER CALC-MCNC: 2.4 GM/DL
GLUCOSE SERPL-MCNC: 171 MG/DL (ref 65–99)
HBA1C MFR BLD: 8.3 % (ref 4.8–5.6)
HDLC SERPL-MCNC: 43 MG/DL (ref 40–60)
IMP & REVIEW OF LAB RESULTS: NORMAL
LDLC SERPL CALC-MCNC: 78 MG/DL (ref 0–100)
POTASSIUM SERPL-SCNC: 4.6 MMOL/L (ref 3.5–5.2)
PROT SERPL-MCNC: 6.6 G/DL (ref 6–8.5)
SODIUM SERPL-SCNC: 139 MMOL/L (ref 136–145)
T4 FREE SERPL-MCNC: 1.19 NG/DL (ref 0.93–1.7)
TRIGL SERPL-MCNC: 57 MG/DL (ref 0–150)
TSH SERPL DL<=0.005 MIU/L-ACNC: 1.57 UIU/ML (ref 0.27–4.2)
VLDLC SERPL CALC-MCNC: 12 MG/DL (ref 5–40)

## 2021-07-15 RX ORDER — TIZANIDINE 4 MG/1
4 TABLET ORAL EVERY 8 HOURS PRN
Qty: 45 TABLET | Refills: 0 | Status: SHIPPED | OUTPATIENT
Start: 2021-07-15 | End: 2021-08-01

## 2021-07-15 NOTE — TELEPHONE ENCOUNTER
From: Jeff Bernard  To: TAD Mccartney  Sent: 7/15/2021 10:02 AM EDT  Subject: Prescription Question    I haven't been able to get the Zanaflex according to [facility] because it was ordered in capsule form and needs to be changed to tablet form. The baclofen does not seem to really help. Also,should i apply heat, cold or both to help ease the pain?

## 2021-07-17 NOTE — PROGRESS NOTES
Normal thyroid function test.Normal vitamin D.  Continue vitamin D3 1000 units/day.Hemoglobin A1c slightly higher.  Patient was on oral steroids for back pain.LDL 78.  HDL 43.  Continue Lipitor 40 mg/day and low-fat diet.  Copy of labs sent to TAD Mccartney and to patient through my chart

## 2021-07-21 DIAGNOSIS — M54.16 RADICULOPATHY, LUMBAR REGION: ICD-10-CM

## 2021-07-21 DIAGNOSIS — S39.012A STRAIN OF MUSCLE, FASCIA AND TENDON OF LOWER BACK, INITIAL ENCOUNTER: ICD-10-CM

## 2021-07-21 RX ORDER — BACLOFEN 10 MG/1
TABLET ORAL
Qty: 45 TABLET | OUTPATIENT
Start: 2021-07-21

## 2021-07-27 ENCOUNTER — TELEPHONE (OUTPATIENT)
Dept: FAMILY MEDICINE CLINIC | Facility: CLINIC | Age: 66
End: 2021-07-27

## 2021-07-28 ENCOUNTER — TRANSCRIBE ORDERS (OUTPATIENT)
Dept: ADMINISTRATIVE | Facility: HOSPITAL | Age: 66
End: 2021-07-28

## 2021-07-28 ENCOUNTER — LAB (OUTPATIENT)
Dept: LAB | Facility: HOSPITAL | Age: 66
End: 2021-07-28

## 2021-07-28 ENCOUNTER — HOSPITAL ENCOUNTER (OUTPATIENT)
Dept: CARDIOLOGY | Facility: HOSPITAL | Age: 66
Discharge: HOME | End: 2021-07-28

## 2021-07-28 DIAGNOSIS — Z01.818 PRE-OP TESTING: ICD-10-CM

## 2021-07-28 DIAGNOSIS — Z01.818 PRE-OP TESTING: Primary | ICD-10-CM

## 2021-07-28 LAB
ANION GAP SERPL CALCULATED.3IONS-SCNC: 9.9 MMOL/L (ref 5–15)
BUN SERPL-MCNC: 14 MG/DL (ref 8–23)
BUN/CREAT SERPL: 18.9 (ref 7–25)
CALCIUM SPEC-SCNC: 8.8 MG/DL (ref 8.6–10.5)
CHLORIDE SERPL-SCNC: 105 MMOL/L (ref 98–107)
CO2 SERPL-SCNC: 25.1 MMOL/L (ref 22–29)
CREAT SERPL-MCNC: 0.74 MG/DL (ref 0.76–1.27)
GFR SERPL CREATININE-BSD FRML MDRD: 105 ML/MIN/1.73
GLUCOSE SERPL-MCNC: 174 MG/DL (ref 65–99)
POTASSIUM SERPL-SCNC: 4.1 MMOL/L (ref 3.5–5.2)
SODIUM SERPL-SCNC: 140 MMOL/L (ref 136–145)

## 2021-07-28 PROCEDURE — 93005 ELECTROCARDIOGRAM TRACING: CPT | Performed by: PLASTIC SURGERY

## 2021-07-28 PROCEDURE — 36415 COLL VENOUS BLD VENIPUNCTURE: CPT

## 2021-07-28 PROCEDURE — 93010 ELECTROCARDIOGRAM REPORT: CPT | Performed by: INTERNAL MEDICINE

## 2021-07-28 PROCEDURE — 80048 BASIC METABOLIC PNL TOTAL CA: CPT

## 2021-07-29 LAB — QT INTERVAL: 417 MS

## 2021-08-01 RX ORDER — TIZANIDINE 4 MG/1
4 TABLET ORAL EVERY 8 HOURS PRN
Qty: 45 TABLET | Refills: 0 | Status: SHIPPED | OUTPATIENT
Start: 2021-08-01 | End: 2021-08-15

## 2021-08-09 ENCOUNTER — APPOINTMENT (OUTPATIENT)
Dept: MRI IMAGING | Facility: HOSPITAL | Age: 66
End: 2021-08-09

## 2021-08-10 NOTE — TELEPHONE ENCOUNTER
Rx Refill Note  Requested Prescriptions     Pending Prescriptions Disp Refills    tiZANidine (ZANAFLEX) 4 MG tablet [Pharmacy Med Name: TIZANIDINE HCL 4 MG TABLET] 45 tablet 0     Sig: TAKE 1 TABLET BY MOUTH EVERY 8 HOURS AS NEEDED FOR MUSCLE SPASMS DO NOT DRIVE, OPERATE MACHINERY,ETC      Last office visit with prescribing clinician: 6/28/2021      Next office visit with prescribing clinician: 11/22/2021           Shakira Ibarra MA  08/10/21, 16:17 EDT

## 2021-08-15 RX ORDER — TIZANIDINE 4 MG/1
TABLET ORAL
Qty: 45 TABLET | Refills: 0 | Status: SHIPPED | OUTPATIENT
Start: 2021-08-15 | End: 2021-10-11

## 2021-08-25 RX ORDER — TIZANIDINE 4 MG/1
TABLET ORAL
Qty: 45 TABLET | Refills: 0 | OUTPATIENT
Start: 2021-08-25

## 2021-09-01 ENCOUNTER — APPOINTMENT (OUTPATIENT)
Dept: MRI IMAGING | Facility: HOSPITAL | Age: 66
End: 2021-09-01

## 2021-09-18 ENCOUNTER — APPOINTMENT (OUTPATIENT)
Dept: MRI IMAGING | Facility: HOSPITAL | Age: 66
End: 2021-09-18

## 2021-11-16 RX ORDER — INSULIN ASPART 100 [IU]/ML
INJECTION, SOLUTION INTRAVENOUS; SUBCUTANEOUS
Qty: 60 PEN | Refills: 2 | Status: SHIPPED | OUTPATIENT
Start: 2021-11-16 | End: 2022-10-07

## 2021-11-22 ENCOUNTER — OFFICE VISIT (OUTPATIENT)
Dept: FAMILY MEDICINE CLINIC | Facility: CLINIC | Age: 66
End: 2021-11-22

## 2021-11-22 VITALS
OXYGEN SATURATION: 98 % | SYSTOLIC BLOOD PRESSURE: 104 MMHG | TEMPERATURE: 97.3 F | HEIGHT: 60 IN | DIASTOLIC BLOOD PRESSURE: 68 MMHG | BODY MASS INDEX: 40.44 KG/M2 | HEART RATE: 71 BPM | WEIGHT: 206 LBS | RESPIRATION RATE: 16 BRPM

## 2021-11-22 DIAGNOSIS — S39.012A STRAIN OF LUMBAR REGION, INITIAL ENCOUNTER: ICD-10-CM

## 2021-11-22 DIAGNOSIS — M54.16 LUMBAR RADICULOPATHY: ICD-10-CM

## 2021-11-22 DIAGNOSIS — M62.541 ATROPHY OF MUSCLE OF RIGHT HAND: ICD-10-CM

## 2021-11-22 DIAGNOSIS — G56.21 CUBITAL TUNNEL SYNDROME ON RIGHT: ICD-10-CM

## 2021-11-22 DIAGNOSIS — M62.542 ATROPHY OF MUSCLE OF LEFT HAND: ICD-10-CM

## 2021-11-22 DIAGNOSIS — M51.36 DEGENERATIVE DISC DISEASE, LUMBAR: ICD-10-CM

## 2021-11-22 DIAGNOSIS — E78.5 HYPERLIPIDEMIA, UNSPECIFIED HYPERLIPIDEMIA TYPE: ICD-10-CM

## 2021-11-22 DIAGNOSIS — R97.20 ELEVATED PSA: ICD-10-CM

## 2021-11-22 DIAGNOSIS — E11.65 UNCONTROLLED TYPE 2 DIABETES MELLITUS WITH HYPERGLYCEMIA (HCC): ICD-10-CM

## 2021-11-22 DIAGNOSIS — E53.8 VITAMIN B 12 DEFICIENCY: ICD-10-CM

## 2021-11-22 DIAGNOSIS — Z00.00 MEDICARE ANNUAL WELLNESS VISIT, SUBSEQUENT: Primary | ICD-10-CM

## 2021-11-22 DIAGNOSIS — G56.01 CARPAL TUNNEL SYNDROME OF RIGHT WRIST: ICD-10-CM

## 2021-11-22 DIAGNOSIS — E55.9 VITAMIN D DEFICIENCY: ICD-10-CM

## 2021-11-22 DIAGNOSIS — M48.02 SPINAL STENOSIS IN CERVICAL REGION: ICD-10-CM

## 2021-11-22 DIAGNOSIS — R94.131 ABNORMAL EMG: ICD-10-CM

## 2021-11-22 DIAGNOSIS — R29.898 WEAKNESS OF RIGHT HAND: ICD-10-CM

## 2021-11-22 DIAGNOSIS — M47.26 OSTEOARTHRITIS OF SPINE WITH RADICULOPATHY, LUMBAR REGION: ICD-10-CM

## 2021-11-22 DIAGNOSIS — M54.16 RADICULOPATHY, LUMBAR REGION: ICD-10-CM

## 2021-11-22 PROCEDURE — 1170F FXNL STATUS ASSESSED: CPT | Performed by: PHYSICIAN ASSISTANT

## 2021-11-22 PROCEDURE — 1160F RVW MEDS BY RX/DR IN RCRD: CPT | Performed by: PHYSICIAN ASSISTANT

## 2021-11-22 PROCEDURE — 99213 OFFICE O/P EST LOW 20 MIN: CPT | Performed by: PHYSICIAN ASSISTANT

## 2021-11-22 PROCEDURE — G0439 PPPS, SUBSEQ VISIT: HCPCS | Performed by: PHYSICIAN ASSISTANT

## 2021-11-23 LAB
BASOPHILS # BLD AUTO: 0 X10E3/UL (ref 0–0.2)
BASOPHILS NFR BLD AUTO: 1 %
CK SERPL-CCNC: 130 U/L (ref 41–331)
EOSINOPHIL # BLD AUTO: 0.1 X10E3/UL (ref 0–0.4)
EOSINOPHIL NFR BLD AUTO: 2 %
ERYTHROCYTE [DISTWIDTH] IN BLOOD BY AUTOMATED COUNT: 11.8 % (ref 11.6–15.4)
FOLATE SERPL-MCNC: 15.6 NG/ML
HCT VFR BLD AUTO: 43.7 % (ref 37.5–51)
HGB BLD-MCNC: 14.5 G/DL (ref 13–17.7)
IMM GRANULOCYTES # BLD AUTO: 0 X10E3/UL (ref 0–0.1)
IMM GRANULOCYTES NFR BLD AUTO: 0 %
LYMPHOCYTES # BLD AUTO: 1.4 X10E3/UL (ref 0.7–3.1)
LYMPHOCYTES NFR BLD AUTO: 27 %
MCH RBC QN AUTO: 29.7 PG (ref 26.6–33)
MCHC RBC AUTO-ENTMCNC: 33.2 G/DL (ref 31.5–35.7)
MCV RBC AUTO: 90 FL (ref 79–97)
MONOCYTES # BLD AUTO: 0.3 X10E3/UL (ref 0.1–0.9)
MONOCYTES NFR BLD AUTO: 6 %
NEUTROPHILS # BLD AUTO: 3.2 X10E3/UL (ref 1.4–7)
NEUTROPHILS NFR BLD AUTO: 64 %
PLATELET # BLD AUTO: 284 X10E3/UL (ref 150–450)
RBC # BLD AUTO: 4.88 X10E6/UL (ref 4.14–5.8)
VIT B12 SERPL-MCNC: 595 PG/ML (ref 232–1245)
WBC # BLD AUTO: 5 X10E3/UL (ref 3.4–10.8)

## 2021-12-07 RX ORDER — TADALAFIL 10 MG/1
10 TABLET ORAL DAILY
Qty: 30 TABLET | Refills: 2 | Status: SHIPPED | OUTPATIENT
Start: 2021-12-07 | End: 2021-12-13 | Stop reason: SDUPTHER

## 2021-12-13 RX ORDER — TADALAFIL 10 MG/1
10 TABLET ORAL DAILY
Qty: 30 TABLET | Refills: 2 | Status: SHIPPED | OUTPATIENT
Start: 2021-12-13 | End: 2022-03-18 | Stop reason: SDUPTHER

## 2021-12-14 RX ORDER — BENAZEPRIL HYDROCHLORIDE 10 MG/1
TABLET ORAL
Qty: 90 TABLET | Refills: 1 | Status: SHIPPED | OUTPATIENT
Start: 2021-12-14 | End: 2022-05-10

## 2021-12-15 RX ORDER — ATORVASTATIN CALCIUM 40 MG/1
TABLET, FILM COATED ORAL
Qty: 90 TABLET | Refills: 1 | Status: SHIPPED | OUTPATIENT
Start: 2021-12-15 | End: 2022-05-11

## 2021-12-15 NOTE — TELEPHONE ENCOUNTER
Order is in to recheck B12   asthma/atrial fibrillation/brain aneurysm/cancer/congestive heart failure/COPD/coronary disease/diabetes/dementia/emphysema/heart disease/hypertension/irritable bowel syndrome/kidney disease/stroke/thyroid disease/VTE

## 2021-12-16 DIAGNOSIS — Z79.4 TYPE 2 DIABETES MELLITUS WITHOUT COMPLICATION, WITH LONG-TERM CURRENT USE OF INSULIN (HCC): ICD-10-CM

## 2021-12-16 DIAGNOSIS — E11.9 TYPE 2 DIABETES MELLITUS WITHOUT COMPLICATION, WITH LONG-TERM CURRENT USE OF INSULIN (HCC): ICD-10-CM

## 2021-12-16 RX ORDER — INSULIN GLARGINE 100 [IU]/ML
INJECTION, SOLUTION SUBCUTANEOUS
Qty: 30 ML | Refills: 1 | Status: SHIPPED | OUTPATIENT
Start: 2021-12-16 | End: 2022-06-21

## 2022-01-20 ENCOUNTER — OFFICE VISIT (OUTPATIENT)
Dept: ENDOCRINOLOGY | Age: 67
End: 2022-01-20

## 2022-01-20 VITALS
SYSTOLIC BLOOD PRESSURE: 131 MMHG | DIASTOLIC BLOOD PRESSURE: 68 MMHG | BODY MASS INDEX: 27.59 KG/M2 | OXYGEN SATURATION: 98 % | HEART RATE: 64 BPM | WEIGHT: 208.2 LBS | HEIGHT: 73 IN

## 2022-01-20 DIAGNOSIS — I65.21 ARTERIOSCLEROSIS OF RIGHT CAROTID ARTERY: ICD-10-CM

## 2022-01-20 DIAGNOSIS — E55.9 VITAMIN D DEFICIENCY: ICD-10-CM

## 2022-01-20 DIAGNOSIS — I10 BENIGN ESSENTIAL HYPERTENSION: ICD-10-CM

## 2022-01-20 DIAGNOSIS — E11.9 TYPE 2 DIABETES MELLITUS WITHOUT COMPLICATION, WITH LONG-TERM CURRENT USE OF INSULIN: Primary | ICD-10-CM

## 2022-01-20 DIAGNOSIS — Z79.4 TYPE 2 DIABETES MELLITUS WITHOUT COMPLICATION, WITH LONG-TERM CURRENT USE OF INSULIN: Primary | ICD-10-CM

## 2022-01-20 DIAGNOSIS — E78.5 HYPERLIPIDEMIA, UNSPECIFIED HYPERLIPIDEMIA TYPE: ICD-10-CM

## 2022-01-20 PROCEDURE — 99214 OFFICE O/P EST MOD 30 MIN: CPT | Performed by: INTERNAL MEDICINE

## 2022-01-20 NOTE — PROGRESS NOTES
Subjective   Jeff Bernard is a 66 y.o. male.     History of Present Illness       Patient has known diabetes mellitus since 1999 and started on insulin in 2000. He is on Basaglar 25 units every evening and NovoLog 1 unit per 3-4 g of carbohydrate before each meal.  He is using a freestyle eva. He has few hypoglycemia in the early afternoon.  His last meal was last night.    His last eye examination was in December 2021 and he has no retinopathy. He denies any associated nephropathy. Urine microalbumin was normal in 11/20. He is on benazepril. He denies any numbness, tingling or burning in his feet.      He has hyperlipidemia and has been on Lipitor 40 mg once a day. He denies any muscle pains.      He has no history of hypertension. He denies any previous history of myocardial infarction, heart failure, or stroke. He denies chest pain, shortness of breath or pedal edema.  He had a normal nuclear stress test in 4/18     He had a colonoscopy done in March 2017 and polyps were removed by Dr. Kelly.  Pathology report was read as tubular adenoma with low-grade dysplasia and hyperplastic polyps.  He was advised follow-up colonoscopy in 2022.  He denies bowel complaints.     He has vitamin D deficiency is on vitamin D3 1000 units/day.     He will have surgery for right carpal tunnel and right ulnar entrapment.    The following portions of the patient's history were reviewed and updated as appropriate: allergies, current medications, past family history, past medical history, past social history, past surgical history and problem list.    Review of Systems   Eyes: Negative.    Respiratory: Negative.  Negative for shortness of breath.    Cardiovascular: Negative for chest pain and palpitations.   Gastrointestinal: Negative.    Genitourinary: Negative.    Musculoskeletal: Negative for myalgias.   Neurological: Negative for numbness.     Objective      Vitals:    01/20/22 1211   BP: 131/68   Pulse: 64   SpO2: 98%  "  Weight: 94.4 kg (208 lb 3.2 oz)   Height: 185.4 cm (73\")     Physical Exam  Constitutional:       General: He is not in acute distress.     Appearance: Normal appearance. He is not ill-appearing, toxic-appearing or diaphoretic.   Eyes:      General: No scleral icterus.        Right eye: No discharge.         Left eye: No discharge.      Extraocular Movements: Extraocular movements intact.      Conjunctiva/sclera: Conjunctivae normal.   Neck:      Vascular: No carotid bruit.   Cardiovascular:      Rate and Rhythm: Normal rate and regular rhythm.      Pulses: Normal pulses.      Heart sounds: Normal heart sounds. No murmur heard.  No friction rub.   Pulmonary:      Effort: Pulmonary effort is normal.      Breath sounds: Normal breath sounds.   Abdominal:      General: Bowel sounds are normal. There is no distension.      Palpations: Abdomen is soft.      Tenderness: There is no right CVA tenderness or left CVA tenderness.   Musculoskeletal:         General: No swelling or tenderness. Normal range of motion.      Cervical back: Normal range of motion. No rigidity or tenderness.   Lymphadenopathy:      Cervical: No cervical adenopathy.   Skin:     General: Skin is warm and dry.   Neurological:      General: No focal deficit present.      Mental Status: He is alert and oriented to person, place, and time.       Office Visit on 11/22/2021   Component Date Value Ref Range Status   • WBC 11/22/2021 5.0  3.4 - 10.8 x10E3/uL Final   • RBC 11/22/2021 4.88  4.14 - 5.80 x10E6/uL Final   • Hemoglobin 11/22/2021 14.5  13.0 - 17.7 g/dL Final   • Hematocrit 11/22/2021 43.7  37.5 - 51.0 % Final   • MCV 11/22/2021 90  79 - 97 fL Final   • MCH 11/22/2021 29.7  26.6 - 33.0 pg Final   • MCHC 11/22/2021 33.2  31.5 - 35.7 g/dL Final   • RDW 11/22/2021 11.8  11.6 - 15.4 % Final   • Platelets 11/22/2021 284  150 - 450 x10E3/uL Final   • Neutrophil Rel % 11/22/2021 64  Not Estab. % Final   • Lymphocyte Rel % 11/22/2021 27  Not Estab. % " Final   • Monocyte Rel % 11/22/2021 6  Not Estab. % Final   • Eosinophil Rel % 11/22/2021 2  Not Estab. % Final   • Basophil Rel % 11/22/2021 1  Not Estab. % Final   • Neutrophils Absolute 11/22/2021 3.2  1.4 - 7.0 x10E3/uL Final   • Lymphocytes Absolute 11/22/2021 1.4  0.7 - 3.1 x10E3/uL Final   • Monocytes Absolute 11/22/2021 0.3  0.1 - 0.9 x10E3/uL Final   • Eosinophils Absolute 11/22/2021 0.1  0.0 - 0.4 x10E3/uL Final   • Basophils Absolute 11/22/2021 0.0  0.0 - 0.2 x10E3/uL Final   • Immature Granulocyte Rel % 11/22/2021 0  Not Estab. % Final   • Immature Grans Absolute 11/22/2021 0.0  0.0 - 0.1 x10E3/uL Final   • Vitamin B-12 11/22/2021 595  232 - 1,245 pg/mL Final   • Folate 11/22/2021 15.6  >3.0 ng/mL Final    Comment: A serum folate concentration of less than 3.1 ng/mL is  considered to represent clinical deficiency.     • Creatine Kinase 11/22/2021 130  41 - 331 U/L Final     Assessment/Plan   Diagnoses and all orders for this visit:    1. Type 2 diabetes mellitus without complication, with long-term current use of insulin (HCC) (Primary)  -     Comprehensive Metabolic Panel  -     Lipid Panel  -     Hemoglobin A1c  -     TSH  -     T4, Free  -     Microalbumin / Creatinine Urine Ratio - Urine, Clean Catch    2. Arteriosclerosis of right carotid artery  -     Comprehensive Metabolic Panel    3. Benign essential hypertension  -     Comprehensive Metabolic Panel    4. Hyperlipidemia, unspecified hyperlipidemia type  -     TSH  -     T4, Free    5. Vitamin D deficiency  -     Vitamin D 25 Hydroxy      Continue Basaglar and NovoLog.  Coming for sensor download.  Continue benazepril.  Continue Lipitor 40 mg/day.  Continue vitamin D3 1000 units/day.  Follow-up colonoscopy this year.    Copy my note sent to Esperanza MISHRA.    RTC 4 mos.

## 2022-01-21 LAB
25(OH)D3+25(OH)D2 SERPL-MCNC: 31.3 NG/ML (ref 30–100)
ALBUMIN SERPL-MCNC: 4.3 G/DL (ref 3.8–4.8)
ALBUMIN/CREAT UR: 11 MG/G CREAT (ref 0–29)
ALBUMIN/GLOB SERPL: 1.7 {RATIO} (ref 1.2–2.2)
ALP SERPL-CCNC: 75 IU/L (ref 44–121)
ALT SERPL-CCNC: 22 IU/L (ref 0–44)
AST SERPL-CCNC: 19 IU/L (ref 0–40)
BILIRUB SERPL-MCNC: 0.5 MG/DL (ref 0–1.2)
BUN SERPL-MCNC: 15 MG/DL (ref 8–27)
BUN/CREAT SERPL: 19 (ref 10–24)
CALCIUM SERPL-MCNC: 8.8 MG/DL (ref 8.6–10.2)
CHLORIDE SERPL-SCNC: 99 MMOL/L (ref 96–106)
CHOLEST SERPL-MCNC: 135 MG/DL (ref 100–199)
CO2 SERPL-SCNC: 24 MMOL/L (ref 20–29)
CREAT SERPL-MCNC: 0.8 MG/DL (ref 0.76–1.27)
CREAT UR-MCNC: 100.5 MG/DL
GLOBULIN SER CALC-MCNC: 2.6 G/DL (ref 1.5–4.5)
GLUCOSE SERPL-MCNC: 110 MG/DL (ref 65–99)
HBA1C MFR BLD: 8.1 % (ref 4.8–5.6)
HDLC SERPL-MCNC: 50 MG/DL
IMP & REVIEW OF LAB RESULTS: NORMAL
LDLC SERPL CALC-MCNC: 73 MG/DL (ref 0–99)
MICROALBUMIN UR-MCNC: 11.2 UG/ML
POTASSIUM SERPL-SCNC: 3.8 MMOL/L (ref 3.5–5.2)
PROT SERPL-MCNC: 6.9 G/DL (ref 6–8.5)
SODIUM SERPL-SCNC: 139 MMOL/L (ref 134–144)
T4 FREE SERPL-MCNC: 1.2 NG/DL (ref 0.82–1.77)
TRIGL SERPL-MCNC: 55 MG/DL (ref 0–149)
TSH SERPL-ACNC: 1.73 UIU/ML (ref 0.45–4.5)
VLDLC SERPL CALC-MCNC: 12 MG/DL (ref 5–40)

## 2022-01-21 RX ORDER — PEN NEEDLE, DIABETIC 31 GX5/16"
NEEDLE, DISPOSABLE MISCELLANEOUS
Qty: 400 EACH | Refills: 2 | Status: SHIPPED | OUTPATIENT
Start: 2022-01-21 | End: 2022-11-30

## 2022-01-23 NOTE — PROGRESS NOTES
LDL 73.  HDL 50.  Continue Lipitor 40 mg a day.Hemoglobin A1c slightly lower at 8.1%.  Come in for sensor download.Normal thyroid function test.Normal vitamin D.  Continue vitamin D3 1000 units/day.Normal urine microalbumin.  Continue benazepril.Copy of labs sent to TAD Mccartney and to patient through [employer].

## 2022-03-18 RX ORDER — TADALAFIL 10 MG/1
10 TABLET ORAL DAILY PRN
Qty: 30 TABLET | Refills: 5 | Status: SHIPPED | OUTPATIENT
Start: 2022-03-18 | End: 2022-04-06 | Stop reason: SDUPTHER

## 2022-03-18 NOTE — TELEPHONE ENCOUNTER
[facility] will be sending another request for this to refill, please look for request and accept refill for 10mg for 30 days. Please put more refill times than 2 if possible    Last ov appt 1/20/22  Next ov appt 6/22/22

## 2022-03-22 ENCOUNTER — PATIENT MESSAGE (OUTPATIENT)
Dept: FAMILY MEDICINE CLINIC | Facility: CLINIC | Age: 67
End: 2022-03-22

## 2022-03-22 DIAGNOSIS — Z86.010 HISTORY OF ADENOMATOUS POLYP OF COLON: ICD-10-CM

## 2022-03-22 DIAGNOSIS — Z12.11 COLON CANCER SCREENING: Primary | ICD-10-CM

## 2022-03-22 DIAGNOSIS — K63.5 BENIGN COLONIC POLYP: ICD-10-CM

## 2022-03-23 NOTE — TELEPHONE ENCOUNTER
From: Jeff Bernard  To: TAD Mccartney  Sent: 3/22/2022 9:15 AM EDT  Subject: colonoscopy    I am due for my colonoscopy this year. Do i need to call them or will they be in touch with me? Thanks

## 2022-04-05 ENCOUNTER — TELEPHONE (OUTPATIENT)
Dept: ENDOCRINOLOGY | Age: 67
End: 2022-04-05

## 2022-04-06 RX ORDER — TADALAFIL 10 MG/1
10 TABLET ORAL DAILY PRN
Qty: 30 TABLET | Refills: 5 | Status: SHIPPED | OUTPATIENT
Start: 2022-04-06 | End: 2022-12-14

## 2022-05-10 ENCOUNTER — TELEPHONE (OUTPATIENT)
Dept: GASTROENTEROLOGY | Facility: CLINIC | Age: 67
End: 2022-05-10

## 2022-05-10 RX ORDER — BENAZEPRIL HYDROCHLORIDE 10 MG/1
TABLET ORAL
Qty: 90 TABLET | Refills: 1 | Status: SHIPPED | OUTPATIENT
Start: 2022-05-10 | End: 2022-10-07

## 2022-05-10 NOTE — TELEPHONE ENCOUNTER
Call from Cristina Knox with [facility]ccartney, office @ [phone] opt 2.   Referral sent 3/23 (see referral tab) for c/s with Dr Kelly.  Pt has not heard from this office.   Cristina checking status of f/u.     Message to Manager.

## 2022-05-11 RX ORDER — ATORVASTATIN CALCIUM 40 MG/1
TABLET, FILM COATED ORAL
Qty: 90 TABLET | Refills: 1 | Status: SHIPPED | OUTPATIENT
Start: 2022-05-11 | End: 2022-10-07

## 2022-05-11 NOTE — TELEPHONE ENCOUNTER
OA paperwork sent to pt via [employer].     Call to Cristina.  Advise of above.      Update to Manager and Doreen.

## 2022-05-12 ENCOUNTER — PRE-PROCEDURE SCREENING (OUTPATIENT)
Dept: GASTROENTEROLOGY | Facility: CLINIC | Age: 67
End: 2022-05-12

## 2022-05-12 NOTE — TELEPHONE ENCOUNTER
Last scope:  March 2017 @ [facility] with Dr. Kelly  Does patient has hx of polyps: yes   Family hx of polys: No  Taking ASA of blood thinners: Baby Aspirin  List of medications:    atorvastatin (LIPITOR) 40 MG tablet   benazepril (LOTENSIN) 10 MG tablet   tadalafil (Cialis) 10 MG tablet   B-D ULTRAFINE III SHORT PEN 31G X 8 MM   Insulin Glargine (BASAGLAR KWIKPEN) 100 UNIT/ML injection pen   insulin aspart (NovoLOG FlexPen) 100 UNIT/ML solution pen-injector sc pen     Scope is scanned into the media

## 2022-05-23 ENCOUNTER — OFFICE VISIT (OUTPATIENT)
Dept: FAMILY MEDICINE CLINIC | Facility: CLINIC | Age: 67
End: 2022-05-23

## 2022-05-23 VITALS
HEIGHT: 73 IN | OXYGEN SATURATION: 97 % | HEART RATE: 62 BPM | WEIGHT: 212 LBS | TEMPERATURE: 97.3 F | BODY MASS INDEX: 28.1 KG/M2 | SYSTOLIC BLOOD PRESSURE: 108 MMHG | DIASTOLIC BLOOD PRESSURE: 72 MMHG

## 2022-05-23 DIAGNOSIS — R19.7 DIARRHEA OF PRESUMED INFECTIOUS ORIGIN: ICD-10-CM

## 2022-05-23 DIAGNOSIS — M48.02 SPINAL STENOSIS IN CERVICAL REGION: ICD-10-CM

## 2022-05-23 DIAGNOSIS — E11.65 UNCONTROLLED TYPE 2 DIABETES MELLITUS WITH HYPERGLYCEMIA: Primary | ICD-10-CM

## 2022-05-23 DIAGNOSIS — R97.20 ELEVATED PSA: ICD-10-CM

## 2022-05-23 DIAGNOSIS — M54.16 RADICULOPATHY, LUMBAR REGION: ICD-10-CM

## 2022-05-23 DIAGNOSIS — R29.898 WEAKNESS OF RIGHT HAND: ICD-10-CM

## 2022-05-23 DIAGNOSIS — E53.8 VITAMIN B 12 DEFICIENCY: ICD-10-CM

## 2022-05-23 DIAGNOSIS — E55.9 VITAMIN D DEFICIENCY: ICD-10-CM

## 2022-05-23 DIAGNOSIS — M62.542 ATROPHY OF MUSCLE OF LEFT HAND: ICD-10-CM

## 2022-05-23 DIAGNOSIS — E11.43 TYPE 2 DIABETES MELLITUS WITH DIABETIC AUTONOMIC (POLY)NEUROPATHY: ICD-10-CM

## 2022-05-23 DIAGNOSIS — M47.26 OSTEOARTHRITIS OF SPINE WITH RADICULOPATHY, LUMBAR REGION: ICD-10-CM

## 2022-05-23 DIAGNOSIS — G56.01 CARPAL TUNNEL SYNDROME OF RIGHT WRIST: ICD-10-CM

## 2022-05-23 DIAGNOSIS — M51.36 DEGENERATIVE DISC DISEASE, LUMBAR: ICD-10-CM

## 2022-05-23 DIAGNOSIS — G56.21 CUBITAL TUNNEL SYNDROME ON RIGHT: ICD-10-CM

## 2022-05-23 DIAGNOSIS — M62.541 ATROPHY OF MUSCLE OF RIGHT HAND: ICD-10-CM

## 2022-05-23 DIAGNOSIS — M25.551 RIGHT HIP PAIN: ICD-10-CM

## 2022-05-23 DIAGNOSIS — M54.16 LUMBAR RADICULOPATHY: ICD-10-CM

## 2022-05-23 DIAGNOSIS — E78.5 HYPERLIPIDEMIA, UNSPECIFIED HYPERLIPIDEMIA TYPE: ICD-10-CM

## 2022-05-23 DIAGNOSIS — R94.131 ABNORMAL EMG: ICD-10-CM

## 2022-05-23 PROCEDURE — 99214 OFFICE O/P EST MOD 30 MIN: CPT | Performed by: PHYSICIAN ASSISTANT

## 2022-05-25 ENCOUNTER — PRE-PROCEDURE SCREENING (OUTPATIENT)
Dept: GASTROENTEROLOGY | Facility: CLINIC | Age: 67
End: 2022-05-25

## 2022-05-28 DIAGNOSIS — A05.8 YERSINIA ENTEROCOLITICA FOOD POISONING: Primary | ICD-10-CM

## 2022-05-28 LAB

## 2022-05-28 RX ORDER — SULFAMETHOXAZOLE AND TRIMETHOPRIM 800; 160 MG/1; MG/1
1 TABLET ORAL 2 TIMES DAILY
Qty: 20 TABLET | Refills: 0 | Status: SHIPPED | OUTPATIENT
Start: 2022-05-28 | End: 2022-06-21 | Stop reason: SDUPTHER

## 2022-05-30 ENCOUNTER — PREP FOR SURGERY (OUTPATIENT)
Dept: OTHER | Facility: HOSPITAL | Age: 67
End: 2022-05-30

## 2022-05-30 DIAGNOSIS — Z86.010 HISTORY OF ADENOMATOUS POLYP OF COLON: Primary | ICD-10-CM

## 2022-05-31 ENCOUNTER — TELEPHONE (OUTPATIENT)
Dept: GASTROENTEROLOGY | Facility: CLINIC | Age: 67
End: 2022-05-31

## 2022-05-31 NOTE — TELEPHONE ENCOUNTER
savi Velasco for 08/17 arrive at 700/cs- mailing out prep inst on 5/31, advised pt time is subject to change [facility] will call.

## 2022-05-31 NOTE — TELEPHONE ENCOUNTER
----- Message from TAD Mccartney sent at 5/23/2022 10:15 AM EDT -----  Per notes, paperwork was sent to patient 5/11/2022 for colonoscopy. I saw him in the office today and he reports he has not received paperwork. I just wanted to check on his colonoscopy paperwork.

## 2022-06-20 ENCOUNTER — PATIENT MESSAGE (OUTPATIENT)
Dept: FAMILY MEDICINE CLINIC | Facility: CLINIC | Age: 67
End: 2022-06-20

## 2022-06-20 DIAGNOSIS — A05.8 YERSINIA ENTEROCOLITICA FOOD POISONING: ICD-10-CM

## 2022-06-20 DIAGNOSIS — Z79.4 TYPE 2 DIABETES MELLITUS WITHOUT COMPLICATION, WITH LONG-TERM CURRENT USE OF INSULIN: ICD-10-CM

## 2022-06-20 DIAGNOSIS — E11.9 TYPE 2 DIABETES MELLITUS WITHOUT COMPLICATION, WITH LONG-TERM CURRENT USE OF INSULIN: ICD-10-CM

## 2022-06-21 RX ORDER — SULFAMETHOXAZOLE AND TRIMETHOPRIM 800; 160 MG/1; MG/1
1 TABLET ORAL 2 TIMES DAILY
Qty: 20 TABLET | Refills: 0 | Status: SHIPPED | OUTPATIENT
Start: 2022-06-21 | End: 2022-11-25 | Stop reason: HOSPADM

## 2022-06-21 RX ORDER — INSULIN GLARGINE 100 [IU]/ML
25 INJECTION, SOLUTION SUBCUTANEOUS NIGHTLY
Qty: 8 PEN | Refills: 0 | Status: SHIPPED | OUTPATIENT
Start: 2022-06-21 | End: 2022-12-14

## 2022-06-21 NOTE — PROGRESS NOTES
Subjective   Jeff Bernard is a 67 y.o. male.      F/u for dm 2, hyperlipidemia, vitamin d def / testing bs using the freestyle eva / last dm eye exam 12/30/21  with dr Camejo/ last dm foot exam 1/20/22  with dr Juarez         Patient has known diabetes mellitus since 1999 and started on insulin in 2000. He is on Basaglar 25 units every evening and NovoLog 1 unit per 3-4 g of carbohydrate before each meal.  He is using a freestyle eva. He denies hypoglycemia in early AM.  His last meal was 12 PM.    Sensor data reviewed.  Average glucose 152 mg per DL.  Time in target 54%.  Time above target 34%.  Time below target 12%.  He has no early morning hypoglycemia.  He has intermittent hypoglycemia in the early afternoon.     His last eye examination was in December 2021 and he has no retinopathy. He denies any associated nephropathy. Urine microalbumin was normal in 1/22. He is on benazepril. He denies any numbness, tingling or burning in his feet.      He has hyperlipidemia and has been on Lipitor 40 mg once a day. He denies any muscle pain.      He has hypertension and is on benazepril 10 mg/day.  He denies any previous history of myocardial infarction, heart failure, or stroke. He denies chest pain, shortness of breath or pedal edema.  He had a normal nuclear stress test in 4/18     He had a colonoscopy done in March 2017 and polyps were removed by Dr. Kelly.  Pathology report was read as tubular adenoma with low-grade dysplasia and hyperplastic polyps.  He denies bowel complaints.  He will have a colonoscopy in August 2022.     He has vitamin D deficiency is on vitamin D3 1000 units/day.     He had right carpal tunnel and right cubital tunnel release in June 2021.  He has not recovered full muscle strength of his hand.  He is doing home physical therapy.      The following portions of the patient's history were reviewed and updated as appropriate: allergies, current medications, past family history, past medical  "history, past social history, past surgical history and problem list.    Review of Systems   Eyes: Negative for visual disturbance.   Respiratory: Negative for shortness of breath.    Cardiovascular: Negative.  Negative for chest pain and palpitations.   Gastrointestinal: Negative.    Endocrine: Negative for cold intolerance and heat intolerance.   Genitourinary: Negative.    Musculoskeletal: Negative for myalgias.   Neurological: Negative for numbness.     Vitals:    06/22/22 1318   BP: 114/72   Pulse: 66   Temp: 98.5 °F (36.9 °C)   SpO2: 98%   Weight: 95.3 kg (210 lb)   Height: 185.4 cm (72.99\")      Objective   Physical Exam  Constitutional:       General: He is not in acute distress.     Appearance: Normal appearance. He is not ill-appearing, toxic-appearing or diaphoretic.   Eyes:      General: No scleral icterus.        Right eye: No discharge.         Left eye: No discharge.      Extraocular Movements: Extraocular movements intact.      Conjunctiva/sclera: Conjunctivae normal.   Neck:      Vascular: No carotid bruit.   Cardiovascular:      Rate and Rhythm: Normal rate and regular rhythm.      Heart sounds: Normal heart sounds. No murmur heard.    No friction rub.   Pulmonary:      Effort: Pulmonary effort is normal.      Breath sounds: Normal breath sounds. No rales.   Chest:      Chest wall: No tenderness.   Abdominal:      General: Bowel sounds are normal. There is no distension.      Palpations: Abdomen is soft. There is no mass.      Tenderness: There is no right CVA tenderness or left CVA tenderness.   Musculoskeletal:         General: Normal range of motion.      Cervical back: Normal range of motion and neck supple. No rigidity or tenderness.      Right lower leg: No edema.      Left lower leg: No edema.   Lymphadenopathy:      Cervical: No cervical adenopathy.   Skin:     General: Skin is warm.   Neurological:      Mental Status: He is alert and oriented to person, place, and time.      Comments: " Intact light touch on lower ext.       Office Visit on 05/23/2022   Component Date Value Ref Range Status   • Campylobacter 05/24/2022 Not Detected  Not Detected Final   • C.difficile toxin A/B 05/24/2022 Not Detected  Not Detected Final   • Plesiomonas shigelloides 05/24/2022 Not Detected  Not Detected Final   • Salmonella 05/24/2022 Not Detected  Not Detected Final   • Vibrio 05/24/2022 Not Detected  Not Detected Final   • Vibrio cholerae 05/24/2022 Not Detected  Not Detected Final   • Yersinia enterocolitica 05/24/2022 Detected (A) Not Detected Final   • Enteroaggregative E. coli 05/24/2022 Not Detected  Not Detected Final   • Enteropathogenic E. coli 05/24/2022 Not Detected  Not Detected Final   • Enterotoxigenic E. coli 05/24/2022 Not Detected  Not Detected Final   • Shiga-like toxin-producing E. coli  05/24/2022 Not Detected  Not Detected Final   • E. coli O157 05/24/2022 Not applicable  Not Detected Final   • Shigella enteroinvasive E. coli 05/24/2022 Not Detected  Not Detected Final   • Cryptosporidium 05/24/2022 Not Detected  Not Detected Final   • Cyclospora cayetanensis 05/24/2022 Not Detected  Not Detected Final   • Entamoeba Histolytica Ag 05/24/2022 Not Detected  Not Detected Final   • Giardia lamblia 05/24/2022 Not Detected  Not Detected Final   • Adenovirus 40/41 Ag 05/24/2022 Not Detected  Not Detected Final   • Astrovirus 05/24/2022 Not Detected  Not Detected Final   • Norovirus GI/GII 05/24/2022 Not Detected  Not Detected Final   • Rotavirus A 05/24/2022 Not Detected  Not Detected Final   • Sapovirus 05/24/2022 Not Detected  Not Detected Final     Assessment & Plan   Diagnoses and all orders for this visit:    1. Type 2 diabetes mellitus without complication, with long-term current use of insulin (HCC) (Primary)  -     Comprehensive Metabolic Panel  -     Lipid Panel  -     Hemoglobin A1c  -     TSH  -     T4, Free  -     Vitamin D 25 Hydroxy    2. Benign essential hypertension  -      Comprehensive Metabolic Panel    3. Hyperlipidemia, unspecified hyperlipidemia type  -     Comprehensive Metabolic Panel  -     Lipid Panel  -     TSH  -     T4, Free    4. Vitamin D deficiency  -     Vitamin D 25 Hydroxy      Continue Basaglar 25 units every evening.  Change NovoLog to 1 unit per 5 g carbohydrate at lunch  Continue NovoLog 1 unit per 4 g carbohydrate at breakfast and supper.  Continue Lipitor 40 mg/day.  Continue benazepril.  Continue vitamin D3 1000 units/day.    Copy of my note sent to T[facility] Mccartney.    RTC 4 mos

## 2022-06-22 ENCOUNTER — OFFICE VISIT (OUTPATIENT)
Dept: ENDOCRINOLOGY | Age: 67
End: 2022-06-22

## 2022-06-22 VITALS
HEIGHT: 73 IN | WEIGHT: 210 LBS | DIASTOLIC BLOOD PRESSURE: 72 MMHG | BODY MASS INDEX: 27.83 KG/M2 | SYSTOLIC BLOOD PRESSURE: 114 MMHG | HEART RATE: 66 BPM | OXYGEN SATURATION: 98 % | TEMPERATURE: 98.5 F

## 2022-06-22 DIAGNOSIS — E11.9 TYPE 2 DIABETES MELLITUS WITHOUT COMPLICATION, WITH LONG-TERM CURRENT USE OF INSULIN: Primary | ICD-10-CM

## 2022-06-22 DIAGNOSIS — I10 BENIGN ESSENTIAL HYPERTENSION: ICD-10-CM

## 2022-06-22 DIAGNOSIS — Z79.4 TYPE 2 DIABETES MELLITUS WITHOUT COMPLICATION, WITH LONG-TERM CURRENT USE OF INSULIN: Primary | ICD-10-CM

## 2022-06-22 DIAGNOSIS — E78.5 HYPERLIPIDEMIA, UNSPECIFIED HYPERLIPIDEMIA TYPE: ICD-10-CM

## 2022-06-22 DIAGNOSIS — E55.9 VITAMIN D DEFICIENCY: ICD-10-CM

## 2022-06-22 PROCEDURE — 99214 OFFICE O/P EST MOD 30 MIN: CPT | Performed by: INTERNAL MEDICINE

## 2022-06-22 PROCEDURE — 95251 CONT GLUC MNTR ANALYSIS I&R: CPT | Performed by: INTERNAL MEDICINE

## 2022-06-23 LAB
25(OH)D3+25(OH)D2 SERPL-MCNC: 38.4 NG/ML (ref 30–100)
ALBUMIN SERPL-MCNC: 4.2 G/DL (ref 3.8–4.8)
ALBUMIN/GLOB SERPL: 1.8 {RATIO} (ref 1.2–2.2)
ALP SERPL-CCNC: 68 IU/L (ref 44–121)
ALT SERPL-CCNC: 24 IU/L (ref 0–44)
AST SERPL-CCNC: 23 IU/L (ref 0–40)
BILIRUB SERPL-MCNC: 0.5 MG/DL (ref 0–1.2)
BUN SERPL-MCNC: 12 MG/DL (ref 8–27)
BUN/CREAT SERPL: 15 (ref 10–24)
CALCIUM SERPL-MCNC: 9 MG/DL (ref 8.6–10.2)
CHLORIDE SERPL-SCNC: 105 MMOL/L (ref 96–106)
CHOLEST SERPL-MCNC: 139 MG/DL (ref 100–199)
CO2 SERPL-SCNC: 25 MMOL/L (ref 20–29)
CREAT SERPL-MCNC: 0.81 MG/DL (ref 0.76–1.27)
EGFRCR SERPLBLD CKD-EPI 2021: 95 ML/MIN/1.73
GLOBULIN SER CALC-MCNC: 2.4 G/DL (ref 1.5–4.5)
GLUCOSE SERPL-MCNC: 91 MG/DL (ref 65–99)
HBA1C MFR BLD: 8.2 % (ref 4.8–5.6)
HDLC SERPL-MCNC: 46 MG/DL
IMP & REVIEW OF LAB RESULTS: NORMAL
LDLC SERPL CALC-MCNC: 82 MG/DL (ref 0–99)
POTASSIUM SERPL-SCNC: 4.2 MMOL/L (ref 3.5–5.2)
PROT SERPL-MCNC: 6.6 G/DL (ref 6–8.5)
SODIUM SERPL-SCNC: 143 MMOL/L (ref 134–144)
T4 FREE SERPL-MCNC: 1 NG/DL (ref 0.82–1.77)
TRIGL SERPL-MCNC: 47 MG/DL (ref 0–149)
TSH SERPL DL<=0.005 MIU/L-ACNC: 1.7 UIU/ML (ref 0.45–4.5)
VLDLC SERPL CALC-MCNC: 11 MG/DL (ref 5–40)

## 2022-06-27 NOTE — PROGRESS NOTES
LDL 82.  HDL 46.  Continue Lipitor 40 mg daily.  Hemoglobin A1c 8.2%.  Normal thyroid function tests.  Normal vitamin D.  Continue vitamin D3 1000 units/day.  Copy of labs sent to Esperanza MISHRA and to patient through [facility].

## 2022-08-17 ENCOUNTER — ANESTHESIA (OUTPATIENT)
Dept: GASTROENTEROLOGY | Facility: HOSPITAL | Age: 67
End: 2022-08-17

## 2022-08-17 ENCOUNTER — HOSPITAL ENCOUNTER (OUTPATIENT)
Facility: HOSPITAL | Age: 67
Setting detail: HOSPITAL OUTPATIENT SURGERY
Discharge: HOME | End: 2022-08-17
Attending: INTERNAL MEDICINE | Admitting: INTERNAL MEDICINE

## 2022-08-17 ENCOUNTER — ANESTHESIA EVENT (OUTPATIENT)
Dept: GASTROENTEROLOGY | Facility: HOSPITAL | Age: 67
End: 2022-08-17

## 2022-08-17 VITALS
SYSTOLIC BLOOD PRESSURE: 126 MMHG | HEART RATE: 67 BPM | RESPIRATION RATE: 16 BRPM | OXYGEN SATURATION: 95 % | DIASTOLIC BLOOD PRESSURE: 68 MMHG | BODY MASS INDEX: 28.76 KG/M2 | TEMPERATURE: 97.1 F | WEIGHT: 217 LBS | HEIGHT: 73 IN

## 2022-08-17 DIAGNOSIS — Z86.010 HISTORY OF ADENOMATOUS POLYP OF COLON: ICD-10-CM

## 2022-08-17 LAB — GLUCOSE BLDC GLUCOMTR-MCNC: 195 MG/DL (ref 70–130)

## 2022-08-17 PROCEDURE — 25010000002 PROPOFOL 10 MG/ML EMULSION: Performed by: ANESTHESIOLOGY

## 2022-08-17 PROCEDURE — 82962 GLUCOSE BLOOD TEST: CPT

## 2022-08-17 PROCEDURE — 88305 TISSUE EXAM BY PATHOLOGIST: CPT | Performed by: INTERNAL MEDICINE

## 2022-08-17 PROCEDURE — 45385 COLONOSCOPY W/LESION REMOVAL: CPT | Performed by: INTERNAL MEDICINE

## 2022-08-17 PROCEDURE — S0260 H&P FOR SURGERY: HCPCS | Performed by: INTERNAL MEDICINE

## 2022-08-17 RX ORDER — PROMETHAZINE HYDROCHLORIDE 25 MG/1
25 SUPPOSITORY RECTAL ONCE AS NEEDED
Status: DISCONTINUED | OUTPATIENT
Start: 2022-08-17 | End: 2022-08-17 | Stop reason: HOSPADM

## 2022-08-17 RX ORDER — SODIUM CHLORIDE 0.9 % (FLUSH) 0.9 %
10 SYRINGE (ML) INJECTION EVERY 12 HOURS SCHEDULED
Status: DISCONTINUED | OUTPATIENT
Start: 2022-08-17 | End: 2022-08-17 | Stop reason: HOSPADM

## 2022-08-17 RX ORDER — PROMETHAZINE HYDROCHLORIDE 25 MG/1
25 TABLET ORAL ONCE AS NEEDED
Status: DISCONTINUED | OUTPATIENT
Start: 2022-08-17 | End: 2022-08-17 | Stop reason: HOSPADM

## 2022-08-17 RX ORDER — SODIUM CHLORIDE, SODIUM LACTATE, POTASSIUM CHLORIDE, CALCIUM CHLORIDE 600; 310; 30; 20 MG/100ML; MG/100ML; MG/100ML; MG/100ML
30 INJECTION, SOLUTION INTRAVENOUS CONTINUOUS PRN
Status: DISCONTINUED | OUTPATIENT
Start: 2022-08-17 | End: 2022-08-17 | Stop reason: HOSPADM

## 2022-08-17 RX ORDER — SODIUM CHLORIDE 0.9 % (FLUSH) 0.9 %
10 SYRINGE (ML) INJECTION AS NEEDED
Status: DISCONTINUED | OUTPATIENT
Start: 2022-08-17 | End: 2022-08-17 | Stop reason: HOSPADM

## 2022-08-17 RX ORDER — PROPOFOL 10 MG/ML
VIAL (ML) INTRAVENOUS CONTINUOUS PRN
Status: DISCONTINUED | OUTPATIENT
Start: 2022-08-17 | End: 2022-08-17 | Stop reason: SURG

## 2022-08-17 RX ADMIN — SODIUM CHLORIDE, POTASSIUM CHLORIDE, SODIUM LACTATE AND CALCIUM CHLORIDE 30 ML/HR: 600; 310; 30; 20 INJECTION, SOLUTION INTRAVENOUS at 07:44

## 2022-08-17 RX ADMIN — PROPOFOL 200 MCG/KG/MIN: 10 INJECTION, EMULSION INTRAVENOUS at 08:09

## 2022-08-17 NOTE — ANESTHESIA POSTPROCEDURE EVALUATION
Patient: Jeff Bernard    Procedure Summary     Date: 08/17/22 Room / Location: [facility] ENDOSCOPY 8 / [facility] ENDOSCOPY    Anesthesia Start: 0800 Anesthesia Stop: 0841    Procedure: COLONOSCOPY into cecum and terminal ileum with cold snare polypectomy (N/A ) Diagnosis:       History of adenomatous polyp of colon      Colon polyp      (History of adenomatous polyp of colon [Z86.010])    Surgeons: Christian Kelly MD Provider: Mo Santacruz MD    Anesthesia Type: MAC ASA Status: 3          Anesthesia Type: MAC    Vitals  Vitals Value Taken Time   /68 08/17/22 0900   Temp 36.2 °C (97.1 °F) 08/17/22 0900   Pulse 67 08/17/22 0900   Resp 16 08/17/22 0900   SpO2 95 % 08/17/22 0900           Post Anesthesia Care and Evaluation    Patient location during evaluation: bedside  Pain management: adequate    Airway patency: patent  Anesthetic complications: No anesthetic complications    Cardiovascular status: acceptable  Respiratory status: acceptable  Hydration status: acceptable

## 2022-08-17 NOTE — H&P
[facility]  Pre Procedure History & Physical    Chief Complaint:   History of colon polyps    Subjective     HPI:   Patient 67-year-old male with history hypertension, hyperlipidemia and diabetes who presents for colonoscopy surveillance.  Patient with history of colon polyps last in 2017.    Past Medical History:   Past Medical History:   Diagnosis Date   • Arteriosclerosis of carotid artery    • Colon polyp    • ED (erectile dysfunction)    • Hematuria    • Hyperlipidemia    • Hypertension    • Type 2 diabetes mellitus (HCC)    • Vitamin D deficiency        Past Surgical History:  Past Surgical History:   Procedure Laterality Date   • CARPAL TUNNEL RELEASE WITH CUBITAL TUNNEL RELEASE  06/2021    Dr. Bowers   • COLONOSCOPY N/A 3/20/2017    Procedure: COLONOSCOPY TO CECUM WITH HOT SNARE POLYPECTOMY;  Surgeon: Christian Kelly MD;  Location: [facility] ENDOSCOPY;  Service:    • COLONOSCOPY W/ POLYPECTOMY  MMXIV   • LEG SURGERY Right     Cyst Removal.       Family History:  Family History   Problem Relation Age of Onset   • Stroke Mother    • Aneurysm Mother         BRAIN   • COPD Father    • Heart attack Brother    • Diabetes Maternal Aunt    • Diabetes Paternal Aunt        Social History:   reports that he has never smoked. He has never used smokeless tobacco. He reports current alcohol use of about 4.0 - 6.0 standard drinks of alcohol per week. He reports that he does not use drugs.    Medications:   Facility-Administered Medications Prior to Admission   Medication Dose Route Frequency Provider Last Rate Last Admin   • cyanocobalamin injection 1,000 mcg  1,000 mcg Intramuscular Q28 Days Esperanza Sarmiento PA   1,000 mcg at 04/17/19 1113     Medications Prior to Admission   Medication Sig Dispense Refill Last Dose   • glucose blood (CONTOUR NEXT TEST) test strip Dx code E11.9 testing bs 3 x day 300 each 1 8/17/2022 at Unknown time   • Insulin Glargine (BASAGLAR KWIKPEN) 100 UNIT/ML injection  "pen Inject 25 Units under the skin into the appropriate area as directed Every Night for 90 days. INJECT 25 UNITS EVERY EVENING 8 pen 0 8/16/2022 at Unknown time   • aspirin 81 MG tablet Take 1 tablet by mouth daily.   8/15/2022   • atorvastatin (LIPITOR) 40 MG tablet TAKE 1 TABLET BY MOUTH EVERY NIGHT 90 tablet 1 8/15/2022   • B-D ULTRAFINE III SHORT PEN 31G X 8 MM misc USE 1 PEN NEEDLE FOUR TIMES DAILY 400 each 2    • benazepril (LOTENSIN) 10 MG tablet TAKE 1 TABLET BY MOUTH EVERY DAY 90 tablet 1 8/15/2022   • Cholecalciferol (VITAMIN D-3) 1000 units capsule Take 1,000 Units by mouth Daily.   8/15/2022   • Cyanocobalamin (VITAMIN B 12 PO) Take 1,000 mg by mouth. 2 tablets weelkly   8/15/2022   • insulin aspart (NovoLOG FlexPen) 100 UNIT/ML solution pen-injector sc pen INJECT 8-13 UNITS EVERY MORNING, 9-15 UNITS AT LUNCH AND 10-15 UNITS AT SUPPER PLUS SLIDING SCALE (AVERAGE 75 UNITS A DAY) 60 pen 2 8/15/2022   • sulfamethoxazole-trimethoprim (Bactrim DS) 800-160 MG per tablet Take 1 tablet by mouth 2 (Two) Times a Day. 20 tablet 0    • tadalafil (Cialis) 10 MG tablet Take 1 tablet by mouth Daily As Needed for Erectile Dysfunction. 30 tablet 5 8/15/2022       Allergies:  Patient has no known allergies.    ROS:    Pertinent items are noted in HPI     Objective     Blood pressure 142/65, pulse 66, temperature 98.6 °F (37 °C), temperature source Oral, resp. rate 16, height 185.4 cm (73\"), weight 98.4 kg (217 lb), SpO2 96 %.    Physical Exam   Constitutional: Pt is oriented to person, place, and time and well-developed, well-nourished, and in no distress.   Mouth/Throat: Oropharynx is clear and moist.   Neck: Normal range of motion.   Cardiovascular: Normal rate, regular rhythm and normal heart sounds.    Pulmonary/Chest: Effort normal and breath sounds normal.   Abdominal: Soft. Nontender  Skin: Skin is warm and dry.   Psychiatric: Mood, memory, affect and judgment normal.     Assessment & Plan     Diagnosis:  History " of colon polyps    Anticipated Surgical Procedure:  Colonoscopy    The risks, benefits, and alternatives of this procedure have been discussed with the patient or the responsible party- the patient understands and agrees to proceed.

## 2022-08-17 NOTE — BRIEF OP NOTE
COLONOSCOPY  Progress Note    Jeff Bernard  8/17/2022    Pre-op Diagnosis:   History of adenomatous polyp of colon [Z86.010]       Post-Op Diagnosis Codes:     * History of adenomatous polyp of colon [Z86.010]     * Colon polyp [K63.5]    Procedure/CPT® Codes:        Procedure(s):  COLONOSCOPY into cecum and terminal ileum with cold snare polypectomy    Surgeon(s):  Christian Kelly MD    Anesthesia: Monitored Anesthesia Care    Staff:   Endo Technician: Nevaeh Milligan  Endo Nurse: Malini Ybarra RN         Estimated Blood Loss: minimal    Urine Voided: * No values recorded between 8/17/2022  8:00 AM and 8/17/2022  8:35 AM *    Specimens:                Specimens     ID Source Type Tests Collected By Collected At Frozen?    A Large Intestine, Right / Ascending Colon Polyp · TISSUE PATHOLOGY EXAM   Christian Kelly MD 8/17/22 0821 No    Description: Ascending Colon Polyp                Drains: * No LDAs found *    Findings: Colonoscopy the terminal ileum with normal mucosa.  Ascending colon polyp removed cold snare remainder the colonic mucosa was otherwise unremarkable with 1+ internal hemorrhoids seen.        Complications: None          Christian Kelly MD     Date: 8/17/2022  Time: 08:36 EDT

## 2022-08-17 NOTE — ANESTHESIA PREPROCEDURE EVALUATION
Anesthesia Evaluation     NPO Solid Status: > 8 hours             Airway   Mallampati: I  TM distance: >3 FB  Neck ROM: full  Dental - normal exam     Pulmonary - normal exam   Cardiovascular - normal exam    (+) hypertension, hyperlipidemia,       Neuro/Psych  GI/Hepatic/Renal/Endo    (+)   diabetes mellitus,     Musculoskeletal     Abdominal    Substance History      OB/GYN          Other                        Anesthesia Plan    ASA 3     MAC       Anesthetic plan, risks, benefits, and alternatives have been provided, discussed and informed consent has been obtained with: patient.        CODE STATUS:

## 2022-08-17 NOTE — DISCHARGE INSTRUCTIONS
For the next 24 hours patient needs to be with a responsible adult.    For 24 hours DO NOT drive, operate machinery, appliances, drink alcohol, make important decisions or sign legal documents.    Start with a light or bland diet if you are feeling sick to your stomach otherwise advance to regular diet as tolerated.    Follow recommendations on procedure report if provided by your doctor.    Call Dr Kelly for problems .  Problems may include but not limited to: large amounts of bleeding, trouble breathing, repeated vomiting, severe unrelieved pain, fever or chills.

## 2022-08-18 LAB
LAB AP CASE REPORT: NORMAL
PATH REPORT.FINAL DX SPEC: NORMAL
PATH REPORT.GROSS SPEC: NORMAL

## 2022-09-08 ENCOUNTER — TELEPHONE (OUTPATIENT)
Dept: GASTROENTEROLOGY | Facility: CLINIC | Age: 67
End: 2022-09-08

## 2022-09-08 NOTE — TELEPHONE ENCOUNTER
----- Message from Christian Kelly MD sent at 9/3/2022  4:12 PM EDT -----  Polyp benign repeat: 5 years as discussed

## 2022-09-08 NOTE — TELEPHONE ENCOUNTER
Hub staff attempted to follow warm transfer process and was unsuccessful     Caller: Jeff Bernard    Relationship to patient: Self    Best call back number: [phone]    Patient is needing: PLEASE CALL AFTER 4PM TODAY OR PUT ON MYCHART FOR PATIENT TO READ.

## 2022-10-07 RX ORDER — ATORVASTATIN CALCIUM 40 MG/1
TABLET, FILM COATED ORAL
Qty: 90 TABLET | Refills: 1 | Status: SHIPPED | OUTPATIENT
Start: 2022-10-07

## 2022-10-07 RX ORDER — INSULIN ASPART 100 [IU]/ML
75 INJECTION, SOLUTION INTRAVENOUS; SUBCUTANEOUS DAILY
Qty: 67.5 ML | Refills: 1 | Status: SHIPPED | OUTPATIENT
Start: 2022-10-07 | End: 2023-01-05

## 2022-10-07 RX ORDER — BENAZEPRIL HYDROCHLORIDE 10 MG/1
TABLET ORAL
Qty: 90 TABLET | Refills: 1 | Status: SHIPPED | OUTPATIENT
Start: 2022-10-07 | End: 2022-11-25 | Stop reason: HOSPADM

## 2022-10-13 ENCOUNTER — FLU SHOT (OUTPATIENT)
Dept: FAMILY MEDICINE CLINIC | Facility: CLINIC | Age: 67
End: 2022-10-13

## 2022-10-13 DIAGNOSIS — Z23 NEED FOR INFLUENZA VACCINATION: Primary | ICD-10-CM

## 2022-10-13 PROCEDURE — G0008 ADMIN INFLUENZA VIRUS VAC: HCPCS | Performed by: PHYSICIAN ASSISTANT

## 2022-10-13 PROCEDURE — 90662 IIV NO PRSV INCREASED AG IM: CPT | Performed by: PHYSICIAN ASSISTANT

## 2022-11-22 ENCOUNTER — HOSPITAL ENCOUNTER (INPATIENT)
Facility: HOSPITAL | Age: 67
LOS: 3 days | Discharge: HOME | DRG: 862 | End: 2022-11-25
Attending: EMERGENCY MEDICINE
Payer: MEDICARE

## 2022-11-22 DIAGNOSIS — A41.9 SEPSIS, DUE TO UNSPECIFIED ORGANISM, UNSPECIFIED WHETHER ACUTE ORGAN DYSFUNCTION PRESENT: Primary | ICD-10-CM

## 2022-11-22 PROBLEM — N30.00 ACUTE CYSTITIS: Status: ACTIVE | Noted: 2022-11-22

## 2022-11-22 LAB
ALBUMIN SERPL-MCNC: 4.7 G/DL (ref 3.5–5.2)
ALBUMIN/GLOB SERPL: 1.4 G/DL
ALP SERPL-CCNC: 104 U/L (ref 39–117)
ALT SERPL W P-5'-P-CCNC: 28 U/L (ref 1–41)
ANION GAP SERPL CALCULATED.3IONS-SCNC: 13.8 MMOL/L (ref 5–15)
AST SERPL-CCNC: 33 U/L (ref 1–40)
BACTERIA UR QL AUTO: ABNORMAL /HPF
BASOPHILS # BLD AUTO: 0.01 10*3/MM3 (ref 0–0.2)
BASOPHILS NFR BLD AUTO: 0.2 % (ref 0–1.5)
BILIRUB SERPL-MCNC: 0.7 MG/DL (ref 0–1.2)
BILIRUB UR QL STRIP: NEGATIVE
BUN SERPL-MCNC: 15 MG/DL (ref 8–23)
BUN/CREAT SERPL: 13.8 (ref 7–25)
CALCIUM SPEC-SCNC: 9.2 MG/DL (ref 8.6–10.5)
CHLORIDE SERPL-SCNC: 100 MMOL/L (ref 98–107)
CLARITY UR: ABNORMAL
CO2 SERPL-SCNC: 25.2 MMOL/L (ref 22–29)
COLOR UR: ABNORMAL
CREAT SERPL-MCNC: 1.09 MG/DL (ref 0.76–1.27)
D-LACTATE SERPL-SCNC: 1.9 MMOL/L (ref 0.5–2)
D-LACTATE SERPL-SCNC: 3 MMOL/L (ref 0.5–2)
DEPRECATED RDW RBC AUTO: 40.8 FL (ref 37–54)
EGFRCR SERPLBLD CKD-EPI 2021: 73.5 ML/MIN/1.73
EOSINOPHIL # BLD AUTO: 0 10*3/MM3 (ref 0–0.4)
EOSINOPHIL NFR BLD AUTO: 0 % (ref 0.3–6.2)
ERYTHROCYTE [DISTWIDTH] IN BLOOD BY AUTOMATED COUNT: 12.4 % (ref 12.3–15.4)
FLUAV SUBTYP SPEC NAA+PROBE: NOT DETECTED
FLUBV RNA ISLT QL NAA+PROBE: NOT DETECTED
GLOBULIN UR ELPH-MCNC: 3.3 GM/DL
GLUCOSE SERPL-MCNC: 138 MG/DL (ref 65–99)
GLUCOSE UR STRIP-MCNC: ABNORMAL MG/DL
HBA1C MFR BLD: 8.1 % (ref 4.8–5.6)
HCT VFR BLD AUTO: 47.8 % (ref 37.5–51)
HGB BLD-MCNC: 16.1 G/DL (ref 13–17.7)
HGB UR QL STRIP.AUTO: ABNORMAL
HYALINE CASTS UR QL AUTO: ABNORMAL /LPF
IMM GRANULOCYTES # BLD AUTO: 0.01 10*3/MM3 (ref 0–0.05)
IMM GRANULOCYTES NFR BLD AUTO: 0.2 % (ref 0–0.5)
KETONES UR QL STRIP: ABNORMAL
LEUKOCYTE ESTERASE UR QL STRIP.AUTO: NEGATIVE
LYMPHOCYTES # BLD AUTO: 0.22 10*3/MM3 (ref 0.7–3.1)
LYMPHOCYTES NFR BLD AUTO: 5.2 % (ref 19.6–45.3)
MCH RBC QN AUTO: 30.2 PG (ref 26.6–33)
MCHC RBC AUTO-ENTMCNC: 33.7 G/DL (ref 31.5–35.7)
MCV RBC AUTO: 89.7 FL (ref 79–97)
MONOCYTES # BLD AUTO: 0.01 10*3/MM3 (ref 0.1–0.9)
MONOCYTES NFR BLD AUTO: 0.2 % (ref 5–12)
NEUTROPHILS NFR BLD AUTO: 4 10*3/MM3 (ref 1.7–7)
NEUTROPHILS NFR BLD AUTO: 94.2 % (ref 42.7–76)
NITRITE UR QL STRIP: POSITIVE
NRBC BLD AUTO-RTO: 0 /100 WBC (ref 0–0.2)
PH UR STRIP.AUTO: <=5 [PH] (ref 5–8)
PLATELET # BLD AUTO: 208 10*3/MM3 (ref 140–450)
PMV BLD AUTO: 9 FL (ref 6–12)
POTASSIUM SERPL-SCNC: 3.5 MMOL/L (ref 3.5–5.2)
PROCALCITONIN SERPL-MCNC: 0.92 NG/ML (ref 0–0.25)
PROT SERPL-MCNC: 8 G/DL (ref 6–8.5)
PROT UR QL STRIP: ABNORMAL
RBC # BLD AUTO: 5.33 10*6/MM3 (ref 4.14–5.8)
RBC # UR STRIP: ABNORMAL /HPF
REF LAB TEST METHOD: ABNORMAL
SARS-COV-2 RNA PNL SPEC NAA+PROBE: NOT DETECTED
SODIUM SERPL-SCNC: 139 MMOL/L (ref 136–145)
SP GR UR STRIP: 1.02 (ref 1–1.03)
SQUAMOUS #/AREA URNS HPF: ABNORMAL /HPF
UROBILINOGEN UR QL STRIP: ABNORMAL
WBC # UR STRIP: ABNORMAL /HPF
WBC NRBC COR # BLD: 4.25 10*3/MM3 (ref 3.4–10.8)

## 2022-11-22 PROCEDURE — 87186 SC STD MICRODIL/AGAR DIL: CPT | Performed by: EMERGENCY MEDICINE

## 2022-11-22 PROCEDURE — 87086 URINE CULTURE/COLONY COUNT: CPT | Performed by: NURSE PRACTITIONER

## 2022-11-22 PROCEDURE — 84145 PROCALCITONIN (PCT): CPT | Performed by: EMERGENCY MEDICINE

## 2022-11-22 PROCEDURE — 36415 COLL VENOUS BLD VENIPUNCTURE: CPT | Performed by: EMERGENCY MEDICINE

## 2022-11-22 PROCEDURE — 80053 COMPREHEN METABOLIC PANEL: CPT | Performed by: EMERGENCY MEDICINE

## 2022-11-22 PROCEDURE — 99285 EMERGENCY DEPT VISIT HI MDM: CPT

## 2022-11-22 PROCEDURE — 87150 DNA/RNA AMPLIFIED PROBE: CPT | Performed by: EMERGENCY MEDICINE

## 2022-11-22 PROCEDURE — 87636 SARSCOV2 & INF A&B AMP PRB: CPT | Performed by: EMERGENCY MEDICINE

## 2022-11-22 PROCEDURE — 83036 HEMOGLOBIN GLYCOSYLATED A1C: CPT | Performed by: NURSE PRACTITIONER

## 2022-11-22 PROCEDURE — 83605 ASSAY OF LACTIC ACID: CPT | Performed by: EMERGENCY MEDICINE

## 2022-11-22 PROCEDURE — 85025 COMPLETE CBC W/AUTO DIFF WBC: CPT | Performed by: EMERGENCY MEDICINE

## 2022-11-22 PROCEDURE — 25010000002 CEFTRIAXONE PER 250 MG: Performed by: EMERGENCY MEDICINE

## 2022-11-22 PROCEDURE — 25010000002 KETOROLAC TROMETHAMINE PER 15 MG: Performed by: EMERGENCY MEDICINE

## 2022-11-22 PROCEDURE — 87040 BLOOD CULTURE FOR BACTERIA: CPT | Performed by: EMERGENCY MEDICINE

## 2022-11-22 PROCEDURE — 25010000002 ONDANSETRON PER 1 MG: Performed by: EMERGENCY MEDICINE

## 2022-11-22 PROCEDURE — 81001 URINALYSIS AUTO W/SCOPE: CPT | Performed by: EMERGENCY MEDICINE

## 2022-11-22 RX ORDER — ONDANSETRON 4 MG/1
4 TABLET, FILM COATED ORAL EVERY 6 HOURS PRN
Status: DISCONTINUED | OUTPATIENT
Start: 2022-11-22 | End: 2022-11-25 | Stop reason: HOSPADM

## 2022-11-22 RX ORDER — DEXTROSE MONOHYDRATE 25 G/50ML
25 INJECTION, SOLUTION INTRAVENOUS
Status: DISCONTINUED | OUTPATIENT
Start: 2022-11-22 | End: 2022-11-25 | Stop reason: HOSPADM

## 2022-11-22 RX ORDER — ONDANSETRON 2 MG/ML
4 INJECTION INTRAMUSCULAR; INTRAVENOUS EVERY 6 HOURS PRN
Status: DISCONTINUED | OUTPATIENT
Start: 2022-11-22 | End: 2022-11-25 | Stop reason: HOSPADM

## 2022-11-22 RX ORDER — ALUMINA, MAGNESIA, AND SIMETHICONE 2400; 2400; 240 MG/30ML; MG/30ML; MG/30ML
15 SUSPENSION ORAL EVERY 6 HOURS PRN
Status: DISCONTINUED | OUTPATIENT
Start: 2022-11-22 | End: 2022-11-25 | Stop reason: HOSPADM

## 2022-11-22 RX ORDER — KETOROLAC TROMETHAMINE 15 MG/ML
15 INJECTION, SOLUTION INTRAMUSCULAR; INTRAVENOUS ONCE
Status: COMPLETED | OUTPATIENT
Start: 2022-11-22 | End: 2022-11-22

## 2022-11-22 RX ORDER — NICOTINE POLACRILEX 4 MG
15 LOZENGE BUCCAL
Status: DISCONTINUED | OUTPATIENT
Start: 2022-11-22 | End: 2022-11-25 | Stop reason: HOSPADM

## 2022-11-22 RX ORDER — ACETAMINOPHEN 500 MG
1000 TABLET ORAL ONCE
Status: COMPLETED | OUTPATIENT
Start: 2022-11-22 | End: 2022-11-22

## 2022-11-22 RX ORDER — INSULIN LISPRO 100 [IU]/ML
0-9 INJECTION, SOLUTION INTRAVENOUS; SUBCUTANEOUS
Status: DISCONTINUED | OUTPATIENT
Start: 2022-11-23 | End: 2022-11-24

## 2022-11-22 RX ORDER — SODIUM CHLORIDE 0.9 % (FLUSH) 0.9 %
10 SYRINGE (ML) INJECTION EVERY 12 HOURS SCHEDULED
Status: DISCONTINUED | OUTPATIENT
Start: 2022-11-22 | End: 2022-11-25 | Stop reason: HOSPADM

## 2022-11-22 RX ORDER — ACETAMINOPHEN 325 MG/1
650 TABLET ORAL EVERY 4 HOURS PRN
Status: DISCONTINUED | OUTPATIENT
Start: 2022-11-22 | End: 2022-11-25 | Stop reason: HOSPADM

## 2022-11-22 RX ORDER — SODIUM CHLORIDE 0.9 % (FLUSH) 0.9 %
10 SYRINGE (ML) INJECTION AS NEEDED
Status: DISCONTINUED | OUTPATIENT
Start: 2022-11-22 | End: 2022-11-25 | Stop reason: HOSPADM

## 2022-11-22 RX ORDER — ONDANSETRON 2 MG/ML
4 INJECTION INTRAMUSCULAR; INTRAVENOUS ONCE
Status: COMPLETED | OUTPATIENT
Start: 2022-11-22 | End: 2022-11-22

## 2022-11-22 RX ORDER — SODIUM CHLORIDE, SODIUM LACTATE, POTASSIUM CHLORIDE, CALCIUM CHLORIDE 600; 310; 30; 20 MG/100ML; MG/100ML; MG/100ML; MG/100ML
100 INJECTION, SOLUTION INTRAVENOUS CONTINUOUS
Status: DISCONTINUED | OUTPATIENT
Start: 2022-11-22 | End: 2022-11-24

## 2022-11-22 RX ADMIN — SODIUM CHLORIDE, POTASSIUM CHLORIDE, SODIUM LACTATE AND CALCIUM CHLORIDE 100 ML/HR: 600; 310; 30; 20 INJECTION, SOLUTION INTRAVENOUS at 22:40

## 2022-11-22 RX ADMIN — Medication 10 ML: at 22:51

## 2022-11-22 RX ADMIN — ACETAMINOPHEN 1000 MG: 500 TABLET ORAL at 18:12

## 2022-11-22 RX ADMIN — KETOROLAC TROMETHAMINE 15 MG: 15 INJECTION, SOLUTION INTRAMUSCULAR; INTRAVENOUS at 18:10

## 2022-11-22 RX ADMIN — ONDANSETRON 4 MG: 2 INJECTION INTRAMUSCULAR; INTRAVENOUS at 18:10

## 2022-11-22 RX ADMIN — SODIUM CHLORIDE, POTASSIUM CHLORIDE, SODIUM LACTATE AND CALCIUM CHLORIDE 1000 ML: 600; 310; 30; 20 INJECTION, SOLUTION INTRAVENOUS at 19:41

## 2022-11-22 RX ADMIN — CEFTRIAXONE 2 G: 2 INJECTION, POWDER, FOR SOLUTION INTRAMUSCULAR; INTRAVENOUS at 18:20

## 2022-11-22 NOTE — ED PROVIDER NOTES
EMERGENCY DEPARTMENT ENCOUNTER    Room Number:  P479/1  Date seen:  11/23/2022  PCP: Esperanza Sarmiento PA  Historian: Patient      HPI:  Chief Complaint: Fever, chills  A complete HPI/ROS/PMH/PSH/SH/FH are unobtainable due to: Nothing  Context: Jeff Bernard is a 67 y.o. male who presents to the ED c/o fever and chills onset today.  He had a prostate biopsy performed yesterday with Dr. Fatima.  He was given prophylactic antibiotics which he has been taking.  He has had nausea but not vomiting.  He denies diarrhea.  He denies black or bloody stool.  He denies abdominal pain.  No headache.  No rash.            PAST MEDICAL HISTORY  Active Ambulatory Problems     Diagnosis Date Noted   • Abnormal electrocardiogram 01/27/2016   • Abnormal pituitary follicle stimulating hormone (FSH) 01/27/2016   • Arteriosclerosis of carotid artery 01/27/2016   • Benign colonic polyp 01/27/2016   • Benign essential hypertension 01/27/2016   • Erectile dysfunction of nonorganic origin 01/27/2016   • Type 2 diabetes mellitus without complication, with long-term current use of insulin (HCC) 01/27/2016   • Hyperlipidemia 03/02/2016   • Vitamin D deficiency 02/01/2017   • History of adenomatous polyp of colon 03/20/2017   • Increased prostate specific antigen (PSA) velocity 08/13/2018   • Abnormal testicular exam 08/13/2018   • Spinal stenosis in cervical region 02/07/2020   • Bilateral carpal tunnel syndrome 02/07/2020   • Ulnar neuropathy at elbow 02/07/2020   • Atrophy of muscle of right hand 02/07/2020     Resolved Ambulatory Problems     Diagnosis Date Noted   • Muscle weakness 01/27/2016     Past Medical History:   Diagnosis Date   • Colon polyp    • ED (erectile dysfunction)    • Hematuria    • Hypertension    • Type 2 diabetes mellitus (HCC)          PAST SURGICAL HISTORY  Past Surgical History:   Procedure Laterality Date   • CARPAL TUNNEL RELEASE WITH CUBITAL TUNNEL RELEASE  06/2021    Dr. Bowers   • COLONOSCOPY N/A 3/20/2017     Procedure: COLONOSCOPY TO CECUM WITH HOT SNARE POLYPECTOMY;  Surgeon: Christian eKlly MD;  Location: [facility] ENDOSCOPY;  Service:    • COLONOSCOPY N/A 8/17/2022    Procedure: COLONOSCOPY into cecum and terminal ileum with cold snare polypectomy;  Surgeon: Christian Kelly MD;  Location: [facility] ENDOSCOPY;  Service: Gastroenterology;  Laterality: N/A;  Pre op: History of colon polyps  Post op: Polyp and Hemorrhoids   • COLONOSCOPY W/ POLYPECTOMY  MMXIV   • LEG SURGERY Right     Cyst Removal.         FAMILY HISTORY  Family History   Problem Relation Age of Onset   • Stroke Mother    • Aneurysm Mother         BRAIN   • COPD Father    • Heart attack Brother    • Diabetes Maternal Aunt    • Diabetes Paternal Aunt          SOCIAL HISTORY  Social History     Socioeconomic History   • Marital status:    Tobacco Use   • Smoking status: Never   • Smokeless tobacco: Never   Vaping Use   • Vaping Use: Never used   Substance and Sexual Activity   • Alcohol use: Yes     Alcohol/week: 4.0 - 6.0 standard drinks     Types: 4 - 6 Cans of beer per week     Comment: Socially.   • Drug use: No   • Sexual activity: Not Currently     Partners: Female     Birth control/protection: Condom         ALLERGIES  Patient has no known allergies.        REVIEW OF SYSTEMS  Review of Systems   Review of all 14 systems is negative other than stated in the HPI above.      PHYSICAL EXAM  ED Triage Vitals   Temp Heart Rate Resp BP SpO2   11/22/22 1726 11/22/22 1726 11/22/22 1726 11/22/22 1825 11/22/22 1726   100.4 °F (38 °C) (!) 153 20 137/72 96 %      Temp src Heart Rate Source Patient Position BP Location FiO2 (%)   11/22/22 1825 11/22/22 1825 11/22/22 1825 11/22/22 1825 --   Oral Monitor Lying Right arm          GENERAL: Awake and alert, patient is having rigors and appears mildly uncomfortable, no acute distress  HENT: nares patent  EYES: no scleral icterus, pupils 3 mm reactive to lateral, EOMI  CV: regular rhythm,  tachycardic  RESPIRATORY: normal effort, lungs clear auscultation bilaterally  ABDOMEN: soft, nondistended, nontender throughout  MUSCULOSKELETAL: no deformity  NEURO: alert, moves all extremities, follows commands, cranial nerves II through XII gross intact, speech fluent and clear  PSYCH:  calm, cooperative  SKIN: warm, dry, normal to inspection, no rash    Vital signs and nursing notes reviewed.          LAB RESULTS  Recent Results (from the past 24 hour(s))   Comprehensive Metabolic Panel    Collection Time: 11/22/22  6:07 PM    Specimen: Arm, Left; Blood   Result Value Ref Range    Glucose 138 (H) 65 - 99 mg/dL    BUN 15 8 - 23 mg/dL    Creatinine 1.09 0.76 - 1.27 mg/dL    Sodium 139 136 - 145 mmol/L    Potassium 3.5 3.5 - 5.2 mmol/L    Chloride 100 98 - 107 mmol/L    CO2 25.2 22.0 - 29.0 mmol/L    Calcium 9.2 8.6 - 10.5 mg/dL    Total Protein 8.0 6.0 - 8.5 g/dL    Albumin 4.70 3.50 - 5.20 g/dL    ALT (SGPT) 28 1 - 41 U/L    AST (SGOT) 33 1 - 40 U/L    Alkaline Phosphatase 104 39 - 117 U/L    Total Bilirubin 0.7 0.0 - 1.2 mg/dL    Globulin 3.3 gm/dL    A/G Ratio 1.4 g/dL    BUN/Creatinine Ratio 13.8 7.0 - 25.0    Anion Gap 13.8 5.0 - 15.0 mmol/L    eGFR 73.5 >60.0 mL/min/1.73   Lactic Acid, Plasma    Collection Time: 11/22/22  6:07 PM    Specimen: Arm, Left; Blood   Result Value Ref Range    Lactate 3.0 (C) 0.5 - 2.0 mmol/L   Procalcitonin    Collection Time: 11/22/22  6:07 PM    Specimen: Arm, Left; Blood   Result Value Ref Range    Procalcitonin 0.92 (H) 0.00 - 0.25 ng/mL   CBC Auto Differential    Collection Time: 11/22/22  6:07 PM    Specimen: Arm, Left; Blood   Result Value Ref Range    WBC 4.25 3.40 - 10.80 10*3/mm3    RBC 5.33 4.14 - 5.80 10*6/mm3    Hemoglobin 16.1 13.0 - 17.7 g/dL    Hematocrit 47.8 37.5 - 51.0 %    MCV 89.7 79.0 - 97.0 fL    MCH 30.2 26.6 - 33.0 pg    MCHC 33.7 31.5 - 35.7 g/dL    RDW 12.4 12.3 - 15.4 %    RDW-SD 40.8 37.0 - 54.0 fl    MPV 9.0 6.0 - 12.0 fL    Platelets 208 140 - 450  10*3/mm3    Neutrophil % 94.2 (H) 42.7 - 76.0 %    Lymphocyte % 5.2 (L) 19.6 - 45.3 %    Monocyte % 0.2 (L) 5.0 - 12.0 %    Eosinophil % 0.0 (L) 0.3 - 6.2 %    Basophil % 0.2 0.0 - 1.5 %    Immature Grans % 0.2 0.0 - 0.5 %    Neutrophils, Absolute 4.00 1.70 - 7.00 10*3/mm3    Lymphocytes, Absolute 0.22 (L) 0.70 - 3.10 10*3/mm3    Monocytes, Absolute 0.01 (L) 0.10 - 0.90 10*3/mm3    Eosinophils, Absolute 0.00 0.00 - 0.40 10*3/mm3    Basophils, Absolute 0.01 0.00 - 0.20 10*3/mm3    Immature Grans, Absolute 0.01 0.00 - 0.05 10*3/mm3    nRBC 0.0 0.0 - 0.2 /100 WBC   Hemoglobin A1c    Collection Time: 11/22/22  6:07 PM    Specimen: Arm, Left; Blood   Result Value Ref Range    Hemoglobin A1C 8.10 (H) 4.80 - 5.60 %   Urinalysis With Microscopic If Indicated (No Culture) - Urine, Clean Catch    Collection Time: 11/22/22  6:51 PM    Specimen: Urine, Clean Catch   Result Value Ref Range    Color, UA Dark Yellow (A) Yellow, Straw    Appearance, UA Hazy (A) Clear    pH, UA <=5.0 5.0 - 8.0    Specific Gravity, UA 1.025 1.005 - 1.030    Glucose,  mg/dL (Trace) (A) Negative    Ketones, UA Trace (A) Negative    Bilirubin, UA Negative Negative    Blood, UA Large (3+) (A) Negative    Protein,  mg/dL (2+) (A) Negative    Leuk Esterase, UA Negative Negative    Nitrite, UA Positive (A) Negative    Urobilinogen, UA 0.2 E.U./dL 0.2 - 1.0 E.U./dL   Urinalysis, Microscopic Only - Urine, Clean Catch    Collection Time: 11/22/22  6:51 PM    Specimen: Urine, Clean Catch   Result Value Ref Range    RBC, UA Too Numerous to Count (A) None Seen, 0-2 /HPF    WBC, UA 0-2 None Seen, 0-2 /HPF    Bacteria, UA 2+ (A) None Seen /HPF    Squamous Epithelial Cells, UA None Seen None Seen, 0-2 /HPF    Hyaline Casts, UA None Seen None Seen /LPF    Methodology Automated Microscopy    COVID-19 and FLU A/B PCR - Swab, Nasopharynx    Collection Time: 11/22/22  6:53 PM    Specimen: Nasopharynx; Swab   Result Value Ref Range    COVID19 Not Detected Not  Detected - Ref. Range    Influenza A PCR Not Detected Not Detected    Influenza B PCR Not Detected Not Detected   STAT Lactic Acid, Reflex    Collection Time: 11/22/22  9:56 PM    Specimen: Blood   Result Value Ref Range    Lactate 1.9 0.5 - 2.0 mmol/L       Ordered the above labs and reviewed the results.        RADIOLOGY  No Radiology Exams Resulted Within Past 24 Hours    Ordered the above noted radiological studies. Reviewed by me in PACS.            PROCEDURES  Procedures            MEDICATIONS GIVEN IN ER  Medications   sodium chloride 0.9 % flush 10 mL (has no administration in time range)   sodium chloride 0.9 % flush 10 mL (10 mL Intravenous Given 11/22/22 2251)   acetaminophen (TYLENOL) tablet 650 mg (has no administration in time range)   ondansetron (ZOFRAN) tablet 4 mg (has no administration in time range)     Or   ondansetron (ZOFRAN) injection 4 mg (has no administration in time range)   aluminum-magnesium hydroxide-simethicone (MAALOX MAX) 400-400-40 MG/5ML suspension 15 mL (has no administration in time range)   lactated ringers infusion (100 mL/hr Intravenous New Bag 11/22/22 2240)   cefTRIAXone (ROCEPHIN) 2 g in sodium chloride 0.9 % 100 mL IVPB-VTB (has no administration in time range)   dextrose (GLUTOSE) oral gel 15 g (has no administration in time range)   dextrose (D50W) (25 g/50 mL) IV injection 25 g (has no administration in time range)   glucagon (human recombinant) (GLUCAGEN DIAGNOSTIC) injection 1 mg (has no administration in time range)   insulin lispro (ADMELOG) injection 0-9 Units (has no administration in time range)   atorvastatin (LIPITOR) tablet 40 mg (has no administration in time range)   acetaminophen (TYLENOL) tablet 1,000 mg (1,000 mg Oral Given 11/22/22 1812)   cefTRIAXone (ROCEPHIN) 2 g in sodium chloride 0.9 % 100 mL IVPB-VTB (0 g Intravenous Stopped 11/22/22 1908)   ketorolac (TORADOL) injection 15 mg (15 mg Intravenous Given 11/22/22 1810)   lactated ringers bolus 2,500  mL (1,000 mL Intravenous New Bag 11/22/22 1941)   ondansetron (ZOFRAN) injection 4 mg (4 mg Intravenous Given 11/22/22 1810)                   MEDICAL DECISION MAKING, PROGRESS, and CONSULTS    All labs have been independently reviewed by me.  All radiology studies have been reviewed by me and discussed with radiologist dictating the report.   EKG's independently viewed and interpreted by me.  Discussion below represents my analysis of pertinent findings related to patient's condition, differential diagnosis, treatment plan and final disposition.      Differential diagnosis:  Sepsis  Bacteremia  Viral syndrome  UTI  Pneumonia      ED Course as of 11/23/22 0033 Tue Nov 22, 2022 1911 Procalcitonin(!): 0.92 [JR]   1911 Lactate(!!): 3.0 [JR]   1911 Lactate(!!): 3.0 [JR]   1911 Procalcitonin(!): 0.92 [JR]   1911 Glucose(!): 138 [JR]   1911 Creatinine: 1.09 [JR]   1930 COVID19: Not Detected [JR]   1930 Influenza A PCR: Not Detected [JR]   1955 Discussed with EDIN Soto for A, who agrees to admit.  Urology agrees to consult as well. [JR]      ED Course User Index  [JR] Scott Chino MD     I strongly suspect bacteremia with a gram-negative organism related to patient's prostate biopsy yesterday.  He was covered with empiric Rocephin and will be admitted for continued management.         I wore an N95 mask, face shield, and gloves during this patient encounter.  Patient also wearing a surgical mask.  Hand hygeine performed before and after seeing the patient.    DIAGNOSIS  Final diagnoses:   Sepsis, due to unspecified organism, unspecified whether acute organ dysfunction present (HCC)         DISPOSITION  ADMIT            Latest Documented Vital Signs:  As of 00:33 EST  BP- 98/57 HR- 113 Temp- 99.6 °F (37.6 °C) (Oral) O2 sat- 95%        --    Please note that portions of this were completed with a voice recognition program.          Scott Chino MD  11/23/22 0033

## 2022-11-22 NOTE — ED TRIAGE NOTES
Patient to ER via car from home for fever after procedure yesterday    Patient wearing mask this RN in PPE

## 2022-11-23 PROBLEM — N30.01 ACUTE CYSTITIS WITH HEMATURIA: Status: ACTIVE | Noted: 2022-11-22

## 2022-11-23 LAB
ANION GAP SERPL CALCULATED.3IONS-SCNC: 20.4 MMOL/L (ref 5–15)
BACTERIA BLD CULT: ABNORMAL
BACTERIA SPEC AEROBE CULT: NO GROWTH
BOTTLE TYPE: ABNORMAL
BUN SERPL-MCNC: 22 MG/DL (ref 8–23)
BUN/CREAT SERPL: 17.6 (ref 7–25)
CALCIUM SPEC-SCNC: 8.2 MG/DL (ref 8.6–10.5)
CHLORIDE SERPL-SCNC: 99 MMOL/L (ref 98–107)
CO2 SERPL-SCNC: 15.6 MMOL/L (ref 22–29)
CREAT SERPL-MCNC: 1.25 MG/DL (ref 0.76–1.27)
DEPRECATED RDW RBC AUTO: 38 FL (ref 37–54)
EGFRCR SERPLBLD CKD-EPI 2021: 62.3 ML/MIN/1.73
ERYTHROCYTE [DISTWIDTH] IN BLOOD BY AUTOMATED COUNT: 11.8 % (ref 12.3–15.4)
GLUCOSE BLDC GLUCOMTR-MCNC: 121 MG/DL (ref 70–130)
GLUCOSE BLDC GLUCOMTR-MCNC: 139 MG/DL (ref 70–130)
GLUCOSE BLDC GLUCOMTR-MCNC: 152 MG/DL (ref 70–130)
GLUCOSE BLDC GLUCOMTR-MCNC: 224 MG/DL (ref 70–130)
GLUCOSE BLDC GLUCOMTR-MCNC: 269 MG/DL (ref 70–130)
GLUCOSE BLDC GLUCOMTR-MCNC: 458 MG/DL (ref 70–130)
GLUCOSE BLDC GLUCOMTR-MCNC: 476 MG/DL (ref 70–130)
GLUCOSE BLDC GLUCOMTR-MCNC: 495 MG/DL (ref 70–130)
GLUCOSE BLDC GLUCOMTR-MCNC: 505 MG/DL (ref 70–130)
GLUCOSE SERPL-MCNC: 514 MG/DL (ref 65–99)
HCT VFR BLD AUTO: 38.2 % (ref 37.5–51)
HGB BLD-MCNC: 12.9 G/DL (ref 13–17.7)
MCH RBC QN AUTO: 30.1 PG (ref 26.6–33)
MCHC RBC AUTO-ENTMCNC: 33.8 G/DL (ref 31.5–35.7)
MCV RBC AUTO: 89.3 FL (ref 79–97)
PLATELET # BLD AUTO: 166 10*3/MM3 (ref 140–450)
PMV BLD AUTO: 10.1 FL (ref 6–12)
POTASSIUM SERPL-SCNC: 4.5 MMOL/L (ref 3.5–5.2)
RBC # BLD AUTO: 4.28 10*6/MM3 (ref 4.14–5.8)
SODIUM SERPL-SCNC: 135 MMOL/L (ref 136–145)
WBC NRBC COR # BLD: 21.62 10*3/MM3 (ref 3.4–10.8)

## 2022-11-23 PROCEDURE — 63710000001 INSULIN LISPRO (HUMAN) PER 5 UNITS: Performed by: NURSE PRACTITIONER

## 2022-11-23 PROCEDURE — 85027 COMPLETE CBC AUTOMATED: CPT | Performed by: NURSE PRACTITIONER

## 2022-11-23 PROCEDURE — 25010000002 ERTAPENEM PER 500 MG: Performed by: UROLOGY

## 2022-11-23 PROCEDURE — 63710000001 INSULIN LISPRO (HUMAN) PER 5 UNITS: Performed by: STUDENT IN AN ORGANIZED HEALTH CARE EDUCATION/TRAINING PROGRAM

## 2022-11-23 PROCEDURE — 82962 GLUCOSE BLOOD TEST: CPT

## 2022-11-23 PROCEDURE — 80048 BASIC METABOLIC PNL TOTAL CA: CPT | Performed by: NURSE PRACTITIONER

## 2022-11-23 RX ORDER — INSULIN LISPRO 100 [IU]/ML
30 INJECTION, SOLUTION INTRAVENOUS; SUBCUTANEOUS ONCE
Status: COMPLETED | OUTPATIENT
Start: 2022-11-23 | End: 2022-11-23

## 2022-11-23 RX ORDER — DEXTROSE MONOHYDRATE 25 G/50ML
25 INJECTION, SOLUTION INTRAVENOUS
Status: DISCONTINUED | OUTPATIENT
Start: 2022-11-23 | End: 2022-11-25 | Stop reason: HOSPADM

## 2022-11-23 RX ORDER — INSULIN LISPRO 100 [IU]/ML
8 INJECTION, SOLUTION INTRAVENOUS; SUBCUTANEOUS
Status: DISCONTINUED | OUTPATIENT
Start: 2022-11-23 | End: 2022-11-24

## 2022-11-23 RX ORDER — ATORVASTATIN CALCIUM 20 MG/1
40 TABLET, FILM COATED ORAL DAILY
Status: DISCONTINUED | OUTPATIENT
Start: 2022-11-23 | End: 2022-11-25 | Stop reason: HOSPADM

## 2022-11-23 RX ORDER — NICOTINE POLACRILEX 4 MG
15 LOZENGE BUCCAL
Status: DISCONTINUED | OUTPATIENT
Start: 2022-11-23 | End: 2022-11-25 | Stop reason: HOSPADM

## 2022-11-23 RX ORDER — INSULIN LISPRO 100 [IU]/ML
15 INJECTION, SOLUTION INTRAVENOUS; SUBCUTANEOUS ONCE
Status: COMPLETED | OUTPATIENT
Start: 2022-11-23 | End: 2022-11-23

## 2022-11-23 RX ADMIN — ACETAMINOPHEN 650 MG: 325 TABLET, FILM COATED ORAL at 21:36

## 2022-11-23 RX ADMIN — INSULIN LISPRO 30 UNITS: 100 INJECTION, SOLUTION INTRAVENOUS; SUBCUTANEOUS at 09:44

## 2022-11-23 RX ADMIN — INSULIN LISPRO 8 UNITS: 100 INJECTION, SOLUTION INTRAVENOUS; SUBCUTANEOUS at 17:39

## 2022-11-23 RX ADMIN — ERTAPENEM 1 G: 1 INJECTION INTRAMUSCULAR; INTRAVENOUS at 07:57

## 2022-11-23 RX ADMIN — Medication 10 ML: at 21:36

## 2022-11-23 RX ADMIN — INSULIN LISPRO 4 UNITS: 100 INJECTION, SOLUTION INTRAVENOUS; SUBCUTANEOUS at 13:28

## 2022-11-23 RX ADMIN — INSULIN LISPRO 15 UNITS: 100 INJECTION, SOLUTION INTRAVENOUS; SUBCUTANEOUS at 07:57

## 2022-11-23 RX ADMIN — INSULIN LISPRO 9 UNITS: 100 INJECTION, SOLUTION INTRAVENOUS; SUBCUTANEOUS at 06:35

## 2022-11-23 RX ADMIN — ATORVASTATIN CALCIUM 40 MG: 20 TABLET, FILM COATED ORAL at 08:06

## 2022-11-23 RX ADMIN — Medication 10 ML: at 08:06

## 2022-11-23 RX ADMIN — ACETAMINOPHEN 650 MG: 325 TABLET, FILM COATED ORAL at 15:21

## 2022-11-23 RX ADMIN — INSULIN GLARGINE-YFGN 25 UNITS: 100 INJECTION, SOLUTION SUBCUTANEOUS at 21:28

## 2022-11-23 RX ADMIN — INSULIN LISPRO 8 UNITS: 100 INJECTION, SOLUTION INTRAVENOUS; SUBCUTANEOUS at 13:28

## 2022-11-23 NOTE — PROGRESS NOTES
Name: Jeff Bernard ADMIT: 2022   : 1955  PCP: Esperanza Sarmiento PA    MRN: [id number] LOS: 1 days   AGE/SEX: 67 y.o. male  ROOM: [facility]     Subjective   Subjective   This is a 67-year-old male with type 2 diabetes, hypertension came to the hospital after experiencing fevers and chills after undergoing a prostate biopsy the day prior to presentation.  In the ER lactic acid was 3.0 White blood cell count was 61053, procalcitonin 0.92, temperature was as high as 103, but he reports that it was 106 at home.  Urinalysis showed 2+ bacteria, positive nitrites, negative leukocytes and negative WBCs.  Blood cultures positive for E. coli.  Urology was consulted and he was started on ertapenem.    Blood sugar remained high this morning.        Objective   Objective   Vital Signs  Temp:  [97 °F (36.1 °C)-103 °F (39.4 °C)] 97 °F (36.1 °C)  Heart Rate:  [] 93  Resp:  [16-24] 16  BP: ()/(52-83) 111/52  SpO2:  [90 %-96 %] 96 %  on   ;   Device (Oxygen Therapy): room air  Body mass index is 27.71 kg/m².  Physical Exam  Constitutional:       Appearance: Normal appearance.   Cardiovascular:      Rate and Rhythm: Normal rate and regular rhythm.   Pulmonary:      Effort: Pulmonary effort is normal. No respiratory distress.   Abdominal:      General: There is no distension.      Palpations: Abdomen is soft.      Tenderness: There is no abdominal tenderness.   Neurological:      General: No focal deficit present.      Mental Status: He is alert and oriented to person, place, and time.         Results Review     I reviewed the patient's new clinical results.  Results from last 7 days   Lab Units 22  0701 22  1807   WBC 10*3/mm3 21.62* 4.25   HEMOGLOBIN g/dL 12.9* 16.1   PLATELETS 10*3/mm3 166 208     Results from last 7 days   Lab Units 22  0701 22  1807   SODIUM mmol/L 135* 139   POTASSIUM mmol/L 4.5 3.5   CHLORIDE mmol/L 99 100   CO2 mmol/L 15.6* 25.2   BUN mg/dL 22 15   CREATININE  mg/dL 1.25 1.09   GLUCOSE mg/dL 514* 138*   Estimated Creatinine Clearance: 75.2 mL/min (by C-G formula based on SCr of 1.25 mg/dL).  Results from last 7 days   Lab Units 11/22/22  1807   ALBUMIN g/dL 4.70   BILIRUBIN mg/dL 0.7   ALK PHOS U/L 104   AST (SGOT) U/L 33   ALT (SGPT) U/L 28     Results from last 7 days   Lab Units 11/23/22  0701 11/22/22  1807   CALCIUM mg/dL 8.2* 9.2   ALBUMIN g/dL  --  4.70     Results from last 7 days   Lab Units 11/22/22  2156 11/22/22  1807   PROCALCITONIN ng/mL  --  0.92*   LACTATE mmol/L 1.9 3.0*     COVID19   Date Value Ref Range Status   11/22/2022 Not Detected Not Detected - Ref. Range Final     Hemoglobin A1C   Date/Time Value Ref Range Status   11/22/2022 1807 8.10 (H) 4.80 - 5.60 % Final     Glucose   Date/Time Value Ref Range Status   11/23/2022 1214 224 (H) 70 - 130 mg/dL Final     Comment:     Meter: [id number] : [id number] Binh Zuniga RN   11/23/2022 1126 269 (H) 70 - 130 mg/dL Final     Comment:     Meter: [id number] : [id number] Binh Zuniga RN   11/23/2022 0842 458 (C) 70 - 130 mg/dL Final     Comment:     RN Notified R and V Meter: [id number] : [id number] Binh Zuniga RN   11/23/2022 0724 476 (C) 70 - 130 mg/dL Final     Comment:     RN Notified R and V Meter: [id number] : [id number] Binh Zuniga RN   11/23/2022 0620 495 (C) 70 - 130 mg/dL Final     Comment:     RN Notified R and V Meter: [id number] : [id number] Antoni CHANEL   11/23/2022 0617 505 (C) 70 - 130 mg/dL Final     Comment:     Confirmed by Repeat Meter: [id number] : [id number] Corrales Kaleigh NA       Vascular screening (bundle) CAR  · Carotid Artery Disease and Stroke Screening: The right carotid artery   has plaque without stenosis. The left carotid artery has plaque without   stenosis. Abdominal Aortic Aneurysm (AAA) Screening: Maximum proximal   diameter of 1.9 cm. The artery was normal. Peripheral Artery Disease   (P.A.D.) Screening: The right has normal arterial  pressures. The left has   normal arterial pressures.       Scheduled Medications  atorvastatin, 40 mg, Oral, Daily  ertapenem, 1 g, Intravenous, Q24H  insulin glargine, 10 Units, Subcutaneous, Once  insulin glargine, 25 Units, Subcutaneous, Nightly  insulin lispro, 0-9 Units, Subcutaneous, TID AC  insulin lispro, 8 Units, Subcutaneous, TID With Meals  sodium chloride, 10 mL, Intravenous, Q12H    Infusions  lactated ringers, 100 mL/hr, Last Rate: 100 mL/hr (11/22/22 2240)    Diet  Diet: Cardiac Diets, Diabetic Diets; Healthy Heart (2-3 Na+); Consistent Carbohydrate; Texture: Regular Texture (IDDSI 7); Fluid Consistency: Thin (IDDSI 0)       Assessment/Plan     Active Hospital Problems    Diagnosis  POA   • **Acute cystitis with hematuria [N30.01]  Yes   • Sepsis, due to unspecified organism, unspecified whether acute organ dysfunction present (HCC) [A41.9]  Yes   • Hyperlipidemia [E78.5]  Yes   • Type 2 diabetes mellitus without complication, with long-term current use of insulin (HCC) [E11.9, Z79.4]  Not Applicable   • Benign essential hypertension [I10]  Yes      Resolved Hospital Problems   No resolved problems to display.       67 y.o. male admitted with Acute cystitis with hematuria.    Sepsis secondary to E. coli bacteremia in the setting of recent prostate biopsy  Acute cystitis with hematuria  Urology consulted.  Currently the patient is on ertapenem.  Sensitivities are pending  Procal 0.92  Lactic acid improved after fluid resuscitation     Type 2 diabetes  Takes aspart and scale, and glargine 25 units nightly.  Blood sugars uncontrolled.  He has a pretty high sliding scale that he takes at home as well.  In the hospital, will start glargine 25 units nightly, 8 units of lispro 3 times daily AC, and glargine 25 units nightly    Hypertension  Take benazepril at home- holding 2/2 sepsis an soft pressures     · SCDs for DVT prophylaxis.  · Full code.  · Discussed with patient and nursing staff.  · Anticipate  discharge home next week.      Paul Knight MD  [facility]  11/23/22  12:57 EST    Patient was wearing facemask when I entered the room and throughout our encounter.  I wore protective equipment throughout this patient encounter including a face mask, gloves and protective eyewear.  Hand hygiene was performed before donning protective equipment and after removal when leaving the room.

## 2022-11-23 NOTE — PLAN OF CARE
Goal Outcome Evaluation:  Plan of Care Reviewed With: patient, family        Progress: no change  Outcome Evaluation: VSS. Pt has had no complaints of pain or discomfort. Pt given tylenol for slight fever of 100. Pts bloodsugar has been elevated all day due to not recieving night time glargine. Pt's last bloodsugar was 124. Possible discharge friday. No needs at this time.

## 2022-11-23 NOTE — NURSING NOTE
On call provider Renee Mead notified about blood culture results resulting positive for gram negative bacilli, no new orders placed at this time.

## 2022-11-23 NOTE — PLAN OF CARE
Goal Outcome Evaluation:   Patient admitted from the ED with complaints of fever. No complaints of pain, or nausea/vomiting. Patient oriented to the unit and his room. Will monitor

## 2022-11-23 NOTE — CONSULTS
FIRST UROLOGY CONSULT      Patient Identification:  NAME:  Jeff Bernard  Age:  67 y.o.   Sex:  male   :  1955   MRN:  [id number]       Chief complaint: Fever    History of present illness:  This is a 67 year old man who underwent TRUS biopsy on 22. Perioperatively he received Levaquin. He did well after the procedure, but yesterday he developed shaking chills and subjective fever. He presented to the ER where he had a fever to 103. He was admitted and started on IV ceftriaxone.      Past medical history:  Past Medical History:   Diagnosis Date   • Arteriosclerosis of carotid artery    • Colon polyp    • ED (erectile dysfunction)    • Hematuria    • Hyperlipidemia    • Hypertension    • Type 2 diabetes mellitus (HCC)    • Vitamin D deficiency        Past surgical history:  Past Surgical History:   Procedure Laterality Date   • CARPAL TUNNEL RELEASE WITH CUBITAL TUNNEL RELEASE  2021    Dr. Bowers   • COLONOSCOPY N/A 3/20/2017    Procedure: COLONOSCOPY TO CECUM WITH HOT SNARE POLYPECTOMY;  Surgeon: Christian Kelly MD;  Location: [facility] ENDOSCOPY;  Service:    • COLONOSCOPY N/A 2022    Procedure: COLONOSCOPY into cecum and terminal ileum with cold snare polypectomy;  Surgeon: Christian Kelly MD;  Location: [facility] ENDOSCOPY;  Service: Gastroenterology;  Laterality: N/A;  Pre op: History of colon polyps  Post op: Polyp and Hemorrhoids   • COLONOSCOPY W/ POLYPECTOMY  MMXIV   • LEG SURGERY Right     Cyst Removal.       Allergies:  Patient has no known allergies.    Home medications:  Facility-Administered Medications Prior to Admission   Medication Dose Route Frequency Provider Last Rate Last Admin   • cyanocobalamin injection 1,000 mcg  1,000 mcg Intramuscular Q28 Days Esperanza Sarmiento PA   1,000 mcg at 19 1113     Medications Prior to Admission   Medication Sig Dispense Refill Last Dose   • aspirin 81 MG tablet Take 1 tablet by mouth Daily. On hold for surgery   Past Month    • atorvastatin (LIPITOR) 40 MG tablet TAKE 1 TABLET BY MOUTH AT NIGHT 90 tablet 1 11/22/2022 at 0700   • B-D ULTRAFINE III SHORT PEN 31G X 8 MM misc USE 1 PEN NEEDLE FOUR TIMES DAILY 400 each 2 11/22/2022   • benazepril (LOTENSIN) 10 MG tablet TAKE 1 TABLET BY MOUTH EVERY DAY 90 tablet 1 11/22/2022 at 24265   • Cholecalciferol (VITAMIN D-3) 1000 units capsule Take 1,000 Units by mouth Daily.   11/19/2022 at 0700   • glucose blood (CONTOUR NEXT TEST) test strip Dx code E11.9 testing bs 3 x day 300 each 1 11/22/2022   • insulin aspart (NovoLOG FlexPen) 100 UNIT/ML solution pen-injector sc pen Inject 75 Units under the skin into the appropriate area as directed Daily for 90 days. INJECT 8 TO 13 UNITS EVERY MORNING, 9-15 UNITS AT LUNCH, 10-15 AT SUPPER PLUS SLIDING SCALE (75/DAY) 67.5 mL 1 11/22/2022   • tadalafil (Cialis) 10 MG tablet Take 1 tablet by mouth Daily As Needed for Erectile Dysfunction. 30 tablet 5 11/22/2022   • Cyanocobalamin (VITAMIN B 12 PO) Take 1,000 mg by mouth. 2 tablets weelkly      • Insulin Glargine (BASAGLAR KWIKPEN) 100 UNIT/ML injection pen Inject 25 Units under the skin into the appropriate area as directed Every Night for 90 days. INJECT 25 UNITS EVERY EVENING 8 pen 0    • sulfamethoxazole-trimethoprim (Bactrim DS) 800-160 MG per tablet Take 1 tablet by mouth 2 (Two) Times a Day. 20 tablet 0         Hospital medications:  atorvastatin, 40 mg, Oral, Daily  cefTRIAXone, 2 g, Intravenous, Q24H  insulin lispro, 0-9 Units, Subcutaneous, TID AC  sodium chloride, 10 mL, Intravenous, Q12H      lactated ringers, 100 mL/hr, Last Rate: 100 mL/hr (11/22/22 2240)      •  acetaminophen  •  aluminum-magnesium hydroxide-simethicone  •  dextrose  •  dextrose  •  glucagon (human recombinant)  •  ondansetron **OR** ondansetron  •  [COMPLETED] Insert Peripheral IV **AND** sodium chloride    Family history:  Family History   Problem Relation Age of Onset   • Stroke Mother    • Aneurysm Mother         BRAIN   •  COPD Father    • Heart attack Brother    • Diabetes Maternal Aunt    • Diabetes Paternal Aunt        Social history:  Social History     Tobacco Use   • Smoking status: Never   • Smokeless tobacco: Never   Vaping Use   • Vaping Use: Never used   Substance Use Topics   • Alcohol use: Yes     Alcohol/week: 4.0 - 6.0 standard drinks     Types: 4 - 6 Cans of beer per week     Comment: Socially.   • Drug use: No       Review of systems:      Positive for:  As per HPI  Negative for:  As per HPI    Objective:  TMax 24 hours:   Temp (24hrs), Av.2 °F (37.9 °C), Min:97 °F (36.1 °C), Max:103 °F (39.4 °C)      Vitals Ranges:   Temp:  [97 °F (36.1 °C)-103 °F (39.4 °C)] 97 °F (36.1 °C)  Heart Rate:  [] 93  Resp:  [16-24] 16  BP: ()/(52-83) 111/52    Intake/Output Last 3 shifts:  I/O last 3 completed shifts:  In: 660 [P.O.:560; IV Piggyback:100]  Out: 950 [Urine:950]     Physical Exam:    General Appearance:    Alert, cooperative, NAD   HEENT:    No trauma, pupils reactive, hearing intact   Back:     No CVA tenderness   Lungs:     Respirations unlabored, no wheezing    Heart:    RRR, intact peripheral pulses   Abdomen:     Soft, NDNT, no masses, no guarding   :    deferred   Extremities:   No edema, no deformity   Lymphatic:   No neck or groin LAD   Skin:   No bleeding, bruising or rashes   Neuro/Psych:   Orientation intact, mood/affect pleasant, no focal findings       Results review:   I reviewed the patient's new clinical results.    Data review:  Lab Results (last 24 hours)     Procedure Component Value Units Date/Time    POC Glucose Once [[id number]]  (Abnormal) Collected: 22    Specimen: Blood Updated: 22     Glucose 495 mg/dL      Comment: RN Notified R and V Meter: [id number] : [id number] Antoni CHANEL       POC Glucose Once [[id number]]  (Abnormal) Collected: 22    Specimen: Blood Updated: 22     Glucose 505 mg/dL      Comment: Confirmed by Repeat  Meter: [id number] : [id number] Antoni Farris DARÍO       Blood Culture ID, PCR - Blood, Arm, Left [[id number]]  (Abnormal) Collected: 11/22/22 1807    Specimen: Blood from Arm, Left Updated: 11/23/22 0524     BCID, PCR Escherichia coli. Identification by BCID2 PCR.     BOTTLE TYPE Anaerobic Bottle    Narrative:      No resistance genes detected.    Blood Culture - Blood, Arm, Left [[id number]]  (Abnormal) Collected: 11/22/22 1807    Specimen: Blood from Arm, Left Updated: 11/23/22 0254     Blood Culture Abnormal Stain     Gram Stain Anaerobic Bottle Gram negative bacilli      Aerobic Bottle Gram negative bacilli    Blood Culture - Blood, Arm, Right [[id number]]  (Abnormal) Collected: 11/22/22 1815    Specimen: Blood from Arm, Right Updated: 11/23/22 0254     Blood Culture Abnormal Stain     Gram Stain Aerobic Bottle Gram negative bacilli      Anaerobic Bottle Gram negative bacilli    STAT Lactic Acid, Reflex [[id number]]  (Normal) Collected: 11/22/22 2156    Specimen: Blood Updated: 11/22/22 2237     Lactate 1.9 mmol/L     Hemoglobin A1c [[id number]]  (Abnormal) Collected: 11/22/22 1807    Specimen: Blood from Arm, Left Updated: 11/22/22 2059     Hemoglobin A1C 8.10 %     Narrative:      Hemoglobin A1C Ranges:    Increased Risk for Diabetes  5.7% to 6.4%  Diabetes                     >= 6.5%  Diabetic Goal                < 7.0%    Urine Culture - Urine, Urine, Clean Catch [[id number]] Collected: 11/22/22 1851    Specimen: Urine, Clean Catch Updated: 11/22/22 2023    Urinalysis, Microscopic Only - Urine, Clean Catch [[id number]]  (Abnormal) Collected: 11/22/22 1851    Specimen: Urine, Clean Catch Updated: 11/22/22 1935     RBC, UA Too Numerous to Count /HPF      WBC, UA 0-2 /HPF      Bacteria, UA 2+ /HPF      Squamous Epithelial Cells, UA None Seen /HPF      Hyaline Casts, UA None Seen /LPF      Methodology Automated Microscopy    Urinalysis With Microscopic If Indicated (No Culture) - Urine, Clean Catch [[id number]]   "(Abnormal) Collected: 11/22/22 1851    Specimen: Urine, Clean Catch Updated: 11/22/22 1934     Color, UA Dark Yellow     Appearance, UA Hazy     pH, UA <=5.0     Specific Gravity, UA 1.025     Glucose,  mg/dL (Trace)     Ketones, UA Trace     Bilirubin, UA Negative     Blood, UA Large (3+)     Protein,  mg/dL (2+)     Leuk Esterase, UA Negative     Nitrite, UA Positive     Urobilinogen, UA 0.2 E.U./dL    COVID-19 and FLU A/B PCR - Swab, Nasopharynx [[id number]]  (Normal) Collected: 11/22/22 1853    Specimen: Swab from Nasopharynx Updated: 11/22/22 1925     COVID19 Not Detected     Influenza A PCR Not Detected     Influenza B PCR Not Detected    Narrative:      Fact sheet for providers: https://www.fda.gov/media/372259/download    Fact sheet for patients: https://www.fda.gov/media/125362/download    Test performed by PCR.    Procalcitonin [[id number]]  (Abnormal) Collected: 11/22/22 1807    Specimen: Blood from Arm, Left Updated: 11/22/22 1857     Procalcitonin 0.92 ng/mL     Narrative:      As a Marker for Sepsis (Non-Neonates):    1. <0.5 ng/mL represents a low risk of severe sepsis and/or septic shock.  2. >2 ng/mL represents a high risk of severe sepsis and/or septic shock.    As a Marker for Lower Respiratory Tract Infections that require antibiotic therapy:    PCT on Admission    Antibiotic Therapy       6-12 Hrs later    >0.5                Strongly Recommended  >0.25 - <0.5        Recommended   0.1 - 0.25          Discouraged              Remeasure/reassess PCT  <0.1                Strongly Discouraged     Remeasure/reassess PCT    As 28 day mortality risk marker: \"Change in Procalcitonin Result\" (>80% or <=80%) if Day 0 (or Day 1) and Day 4 values are available. Refer to http://www.[employer]-pct-calculator.com    Change in PCT <=80%  A decrease of PCT levels below or equal to 80% defines a positive change in PCT test result representing a higher risk for 28-day all-cause mortality of patients " diagnosed with severe sepsis for septic shock.    Change in PCT >80%  A decrease of PCT levels of more than 80% defines a negative change in PCT result representing a lower risk for 28-day all-cause mortality of patients diagnosed with severe sepsis or septic shock.       Comprehensive Metabolic Panel [[id number]]  (Abnormal) Collected: 11/22/22 1807    Specimen: Blood from Arm, Left Updated: 11/22/22 1856     Glucose 138 mg/dL      BUN 15 mg/dL      Creatinine 1.09 mg/dL      Sodium 139 mmol/L      Potassium 3.5 mmol/L      Chloride 100 mmol/L      CO2 25.2 mmol/L      Calcium 9.2 mg/dL      Total Protein 8.0 g/dL      Albumin 4.70 g/dL      ALT (SGPT) 28 U/L      AST (SGOT) 33 U/L      Alkaline Phosphatase 104 U/L      Total Bilirubin 0.7 mg/dL      Globulin 3.3 gm/dL      A/G Ratio 1.4 g/dL      BUN/Creatinine Ratio 13.8     Anion Gap 13.8 mmol/L      eGFR 73.5 mL/min/1.73      Comment: National Kidney Foundation and American Society of Nephrology (ASN) Task Force recommended calculation based on the Chronic Kidney Disease Epidemiology Collaboration (CKD-EPI) equation refit without adjustment for race.       Narrative:      GFR Normal >60  Chronic Kidney Disease <60  Kidney Failure <15      Lactic Acid, Plasma [[id number]]  (Abnormal) Collected: 11/22/22 1807    Specimen: Blood from Arm, Left Updated: 11/22/22 1853     Lactate 3.0 mmol/L     CBC & Differential [[id number]]  (Abnormal) Collected: 11/22/22 1807    Specimen: Blood from Arm, Left Updated: 11/22/22 1822    Narrative:      The following orders were created for panel order CBC & Differential.  Procedure                               Abnormality         Status                     ---------                               -----------         ------                     CBC Auto Differential[[id number]]        Abnormal            Final result                 Please view results for these tests on the individual orders.    CBC Auto Differential [[id number]]   (Abnormal) Collected: 11/22/22 1807    Specimen: Blood from Arm, Left Updated: 11/22/22 1822     WBC 4.25 10*3/mm3      RBC 5.33 10*6/mm3      Hemoglobin 16.1 g/dL      Hematocrit 47.8 %      MCV 89.7 fL      MCH 30.2 pg      MCHC 33.7 g/dL      RDW 12.4 %      RDW-SD 40.8 fl      MPV 9.0 fL      Platelets 208 10*3/mm3      Neutrophil % 94.2 %      Lymphocyte % 5.2 %      Monocyte % 0.2 %      Eosinophil % 0.0 %      Basophil % 0.2 %      Immature Grans % 0.2 %      Neutrophils, Absolute 4.00 10*3/mm3      Lymphocytes, Absolute 0.22 10*3/mm3      Monocytes, Absolute 0.01 10*3/mm3      Eosinophils, Absolute 0.00 10*3/mm3      Basophils, Absolute 0.01 10*3/mm3      Immature Grans, Absolute 0.01 10*3/mm3      nRBC 0.0 /100 WBC            Imaging:  Imaging Results (Last 24 Hours)     ** No results found for the last 24 hours. **             Assessment:       Acute cystitis with hematuria    Benign essential hypertension    Type 2 diabetes mellitus without complication, with long-term current use of insulin (HCC)    Hyperlipidemia    Sepsis, due to unspecified organism, unspecified whether acute organ dysfunction present (HCC)    Urosepsis following TRUS prostate biopsy    Plan:     - Typically, urosepsis after biopsy is caused by fluroquinolone resistant and ESBL+ E coli.  - I will start the patient on IV ertapenem while awaiting culture data  - will follow closely    Vishal Chaidez Jr., MD  11/23/22  07:13 EST

## 2022-11-23 NOTE — H&P
Patient Name:  Jeff Bernard  YOB: 1955  MRN:  [id number]  Admit Date:  11/22/2022  Patient Care Team:  Esperanza Sarmiento PA as PCP - General  Esperanza Sarmiento PA as PCP - Family Medicine  Salinas Juarez MD as Consulting Physician (Endocrinology)  Lazarus Gallegos MD as Consulting Physician (Cardiology)  Michael Fatima MD as Consulting Physician (Urology)      Subjective   History Present Illness     Chief Complaint   Patient presents with   • Fever     History of Present Illness   Mr. Bernard is a 67 y.o. former smoker with a history of insulin-dependent type 2 diabetes, HLD, and HTN that presents to [facility] complaining of fevers and chills that began today.  Patient reports he had a prostate biopsy performed by Dr. Fatima yesterday.  He was feeling fine all day until around 2:30 PM when he developed an acute onset of fevers and chills.  He also endorses hematuria.  He denies any dysuria, flank pain, urinary frequency or hesitancy.  He endorses some dry heaves but denies any vomiting.  He was given prophylactic antibiotics prior to his procedure.  Urinalysis obtained emergency department is consistent with acute UTI.  Labs obtained show a lactate of 3.0, Pro-David 0.92, white count 4.  Temperature upon presentation has been as high as 103.  He has been admitted to our service for further evaluation and treatment.    Review of Systems   Constitutional: Positive for chills and fever.   HENT: Negative for congestion and rhinorrhea.    Eyes: Negative for photophobia and visual disturbance.   Respiratory: Negative for cough and shortness of breath.    Cardiovascular: Negative for chest pain and palpitations.   Gastrointestinal: Negative for constipation, diarrhea, nausea and vomiting.   Endocrine: Negative for cold intolerance and heat intolerance.   Genitourinary: Positive for hematuria. Negative for difficulty urinating and dysuria.   Musculoskeletal: Negative for  gait problem and joint swelling.   Skin: Negative for rash and wound.   Neurological: Negative for dizziness, light-headedness and headaches.   Psychiatric/Behavioral: Negative for sleep disturbance and suicidal ideas.        Personal History     Past Medical History:   Diagnosis Date   • Arteriosclerosis of carotid artery    • Colon polyp    • ED (erectile dysfunction)    • Hematuria    • Hyperlipidemia    • Hypertension    • Type 2 diabetes mellitus (HCC)    • Vitamin D deficiency      Past Surgical History:   Procedure Laterality Date   • CARPAL TUNNEL RELEASE WITH CUBITAL TUNNEL RELEASE  06/2021    Dr. Bowers   • COLONOSCOPY N/A 3/20/2017    Procedure: COLONOSCOPY TO CECUM WITH HOT SNARE POLYPECTOMY;  Surgeon: Christian Kelly MD;  Location: [facility] ENDOSCOPY;  Service:    • COLONOSCOPY N/A 8/17/2022    Procedure: COLONOSCOPY into cecum and terminal ileum with cold snare polypectomy;  Surgeon: Christian Kelly MD;  Location: [facility] ENDOSCOPY;  Service: Gastroenterology;  Laterality: N/A;  Pre op: History of colon polyps  Post op: Polyp and Hemorrhoids   • COLONOSCOPY W/ POLYPECTOMY  MMXIV   • LEG SURGERY Right     Cyst Removal.     Family History   Problem Relation Age of Onset   • Stroke Mother    • Aneurysm Mother         BRAIN   • COPD Father    • Heart attack Brother    • Diabetes Maternal Aunt    • Diabetes Paternal Aunt      Social History     Tobacco Use   • Smoking status: Never   • Smokeless tobacco: Never   Vaping Use   • Vaping Use: Never used   Substance Use Topics   • Alcohol use: Yes     Alcohol/week: 4.0 - 6.0 standard drinks     Types: 4 - 6 Cans of beer per week     Comment: Socially.   • Drug use: No     No current facility-administered medications on file prior to encounter.     Current Outpatient Medications on File Prior to Encounter   Medication Sig Dispense Refill   • aspirin 81 MG tablet Take 1 tablet by mouth Daily. On hold for surgery     • atorvastatin (LIPITOR) 40 MG tablet  TAKE 1 TABLET BY MOUTH AT NIGHT 90 tablet 1   • B-D ULTRAFINE III SHORT PEN 31G X 8 MM misc USE 1 PEN NEEDLE FOUR TIMES DAILY 400 each 2   • benazepril (LOTENSIN) 10 MG tablet TAKE 1 TABLET BY MOUTH EVERY DAY 90 tablet 1   • Cholecalciferol (VITAMIN D-3) 1000 units capsule Take 1,000 Units by mouth Daily.     • glucose blood (CONTOUR NEXT TEST) test strip Dx code E11.9 testing bs 3 x day 300 each 1   • insulin aspart (NovoLOG FlexPen) 100 UNIT/ML solution pen-injector sc pen Inject 75 Units under the skin into the appropriate area as directed Daily for 90 days. INJECT 8 TO 13 UNITS EVERY MORNING, 9-15 UNITS AT LUNCH, 10-15 AT SUPPER PLUS SLIDING SCALE (75/DAY) 67.5 mL 1   • tadalafil (Cialis) 10 MG tablet Take 1 tablet by mouth Daily As Needed for Erectile Dysfunction. 30 tablet 5   • Cyanocobalamin (VITAMIN B 12 PO) Take 1,000 mg by mouth. 2 tablets weelkly     • Insulin Glargine (BASAGLAR KWIKPEN) 100 UNIT/ML injection pen Inject 25 Units under the skin into the appropriate area as directed Every Night for 90 days. INJECT 25 UNITS EVERY EVENING 8 pen 0   • sulfamethoxazole-trimethoprim (Bactrim DS) 800-160 MG per tablet Take 1 tablet by mouth 2 (Two) Times a Day. 20 tablet 0     No Known Allergies    Objective    Objective     Vital Signs  Temp:  [99.6 °F (37.6 °C)-103 °F (39.4 °C)] 99.6 °F (37.6 °C)  Heart Rate:  [113-153] 113  Resp:  [16-24] 16  BP: ()/(54-83) 98/57  SpO2:  [90 %-96 %] 95 %  on   ;   Device (Oxygen Therapy): room air  Body mass index is 27.71 kg/m².    Physical Exam  Vitals and nursing note reviewed.   Constitutional:       General: He is not in acute distress.     Appearance: He is well-developed. He is not toxic-appearing.   HENT:      Head: Normocephalic and atraumatic.   Eyes:      General: No scleral icterus.        Right eye: No discharge.         Left eye: No discharge.      Conjunctiva/sclera: Conjunctivae normal.   Neck:      Vascular: No JVD.   Cardiovascular:      Rate and  Rhythm: Regular rhythm. Tachycardia present.      Heart sounds: Normal heart sounds. No murmur heard.    No friction rub. No gallop.   Pulmonary:      Effort: Pulmonary effort is normal. No respiratory distress.      Breath sounds: Normal breath sounds. No wheezing or rales.   Abdominal:      General: Bowel sounds are normal. There is no distension.      Palpations: Abdomen is soft.      Tenderness: There is no abdominal tenderness. There is no guarding.   Musculoskeletal:         General: No tenderness or deformity. Normal range of motion.      Cervical back: Normal range of motion and neck supple.   Skin:     General: Skin is warm and dry.      Capillary Refill: Capillary refill takes less than 2 seconds.   Neurological:      Mental Status: He is alert and oriented to person, place, and time.      Motor: Tremor present.   Psychiatric:         Behavior: Behavior normal.     Results Review:  I reviewed the patient's new clinical results.  I reviewed the patient's new imaging results and agree with the interpretation.  I reviewed the patient's other test results and agree with the interpretation  I personally viewed and interpreted the patient's EKG/Telemetry data  Discussed with ED provider.    Lab Results (last 24 hours)     Procedure Component Value Units Date/Time    CBC & Differential [[id number]]  (Abnormal) Collected: 11/22/22 1807    Specimen: Blood from Arm, Left Updated: 11/22/22 1822    Narrative:      The following orders were created for panel order CBC & Differential.  Procedure                               Abnormality         Status                     ---------                               -----------         ------                     CBC Auto Differential[[id number]]        Abnormal            Final result                 Please view results for these tests on the individual orders.    Comprehensive Metabolic Panel [[id number]]  (Abnormal) Collected: 11/22/22 1807    Specimen: Blood from Arm, Left  "Updated: 11/22/22 1856     Glucose 138 mg/dL      BUN 15 mg/dL      Creatinine 1.09 mg/dL      Sodium 139 mmol/L      Potassium 3.5 mmol/L      Chloride 100 mmol/L      CO2 25.2 mmol/L      Calcium 9.2 mg/dL      Total Protein 8.0 g/dL      Albumin 4.70 g/dL      ALT (SGPT) 28 U/L      AST (SGOT) 33 U/L      Alkaline Phosphatase 104 U/L      Total Bilirubin 0.7 mg/dL      Globulin 3.3 gm/dL      A/G Ratio 1.4 g/dL      BUN/Creatinine Ratio 13.8     Anion Gap 13.8 mmol/L      eGFR 73.5 mL/min/1.73      Comment: National Kidney Foundation and American Society of Nephrology (ASN) Task Force recommended calculation based on the Chronic Kidney Disease Epidemiology Collaboration (CKD-EPI) equation refit without adjustment for race.       Narrative:      GFR Normal >60  Chronic Kidney Disease <60  Kidney Failure <15      Blood Culture - Blood, Arm, Left [[id number]] Collected: 11/22/22 1807    Specimen: Blood from Arm, Left Updated: 11/22/22 1811    Lactic Acid, Plasma [[id number]]  (Abnormal) Collected: 11/22/22 1807    Specimen: Blood from Arm, Left Updated: 11/22/22 1853     Lactate 3.0 mmol/L     Procalcitonin [[id number]]  (Abnormal) Collected: 11/22/22 1807    Specimen: Blood from Arm, Left Updated: 11/22/22 1857     Procalcitonin 0.92 ng/mL     Narrative:      As a Marker for Sepsis (Non-Neonates):    1. <0.5 ng/mL represents a low risk of severe sepsis and/or septic shock.  2. >2 ng/mL represents a high risk of severe sepsis and/or septic shock.    As a Marker for Lower Respiratory Tract Infections that require antibiotic therapy:    PCT on Admission    Antibiotic Therapy       6-12 Hrs later    >0.5                Strongly Recommended  >0.25 - <0.5        Recommended   0.1 - 0.25          Discouraged              Remeasure/reassess PCT  <0.1                Strongly Discouraged     Remeasure/reassess PCT    As 28 day mortality risk marker: \"Change in Procalcitonin Result\" (>80% or <=80%) if Day 0 (or Day 1) and Day 4 " values are available. Refer to http://www.[facility]-pct-calculator.com    Change in PCT <=80%  A decrease of PCT levels below or equal to 80% defines a positive change in PCT test result representing a higher risk for 28-day all-cause mortality of patients diagnosed with severe sepsis for septic shock.    Change in PCT >80%  A decrease of PCT levels of more than 80% defines a negative change in PCT result representing a lower risk for 28-day all-cause mortality of patients diagnosed with severe sepsis or septic shock.       CBC Auto Differential [[id number]]  (Abnormal) Collected: 11/22/22 1807    Specimen: Blood from Arm, Left Updated: 11/22/22 1822     WBC 4.25 10*3/mm3      RBC 5.33 10*6/mm3      Hemoglobin 16.1 g/dL      Hematocrit 47.8 %      MCV 89.7 fL      MCH 30.2 pg      MCHC 33.7 g/dL      RDW 12.4 %      RDW-SD 40.8 fl      MPV 9.0 fL      Platelets 208 10*3/mm3      Neutrophil % 94.2 %      Lymphocyte % 5.2 %      Monocyte % 0.2 %      Eosinophil % 0.0 %      Basophil % 0.2 %      Immature Grans % 0.2 %      Neutrophils, Absolute 4.00 10*3/mm3      Lymphocytes, Absolute 0.22 10*3/mm3      Monocytes, Absolute 0.01 10*3/mm3      Eosinophils, Absolute 0.00 10*3/mm3      Basophils, Absolute 0.01 10*3/mm3      Immature Grans, Absolute 0.01 10*3/mm3      nRBC 0.0 /100 WBC     Hemoglobin A1c [[id number]]  (Abnormal) Collected: 11/22/22 1807    Specimen: Blood from Arm, Left Updated: 11/22/22 2059     Hemoglobin A1C 8.10 %     Narrative:      Hemoglobin A1C Ranges:    Increased Risk for Diabetes  5.7% to 6.4%  Diabetes                     >= 6.5%  Diabetic Goal                < 7.0%    Blood Culture - Blood, Arm, Right [[id number]] Collected: 11/22/22 1815    Specimen: Blood from Arm, Right Updated: 11/22/22 1815    Urinalysis With Microscopic If Indicated (No Culture) - Urine, Clean Catch [[id number]]  (Abnormal) Collected: 11/22/22 1851    Specimen: Urine, Clean Catch Updated: 11/22/22 1934     Color, UA Dark Yellow      Appearance, UA Hazy     pH, UA <=5.0     Specific Gravity, UA 1.025     Glucose,  mg/dL (Trace)     Ketones, UA Trace     Bilirubin, UA Negative     Blood, UA Large (3+)     Protein,  mg/dL (2+)     Leuk Esterase, UA Negative     Nitrite, UA Positive     Urobilinogen, UA 0.2 E.U./dL    Urinalysis, Microscopic Only - Urine, Clean Catch [[id number]]  (Abnormal) Collected: 11/22/22 1851    Specimen: Urine, Clean Catch Updated: 11/22/22 1935     RBC, UA Too Numerous to Count /HPF      WBC, UA 0-2 /HPF      Bacteria, UA 2+ /HPF      Squamous Epithelial Cells, UA None Seen /HPF      Hyaline Casts, UA None Seen /LPF      Methodology Automated Microscopy    Urine Culture - Urine, Urine, Clean Catch [[id number]] Collected: 11/22/22 1851    Specimen: Urine, Clean Catch Updated: 11/22/22 2023    COVID-19 and FLU A/B PCR - Swab, Nasopharynx [[id number]]  (Normal) Collected: 11/22/22 1853    Specimen: Swab from Nasopharynx Updated: 11/22/22 1925     COVID19 Not Detected     Influenza A PCR Not Detected     Influenza B PCR Not Detected    Narrative:      Fact sheet for providers: https://www.fda.gov/media/029950/download    Fact sheet for patients: https://www.fda.gov/media/681907/download    Test performed by PCR.    STAT Lactic Acid, Reflex [[id number]]  (Normal) Collected: 11/22/22 2156    Specimen: Blood Updated: 11/22/22 2237     Lactate 1.9 mmol/L           Imaging Results (Last 24 Hours)     ** No results found for the last 24 hours. **          Results for orders placed during the hospital encounter of 03/26/18    Adult Transthoracic Echo Complete W/ Cont if Necessary Per Protocol    Interpretation Summary  · Left ventricular systolic function is normal. Calculated EF = 62.6%.  · Left ventricular diastolic dysfunction is noted (grade I) consistent with impaired relaxation.  · Right ventricular cavity is mildly dilated.  · The interatrial septum appears redundant.  · Mild mitral valve regurgitation is  present  · There is no evidence of pericardial effusion.      No orders to display        Assessment/Plan     Active Hospital Problems    Diagnosis  POA   • **Acute cystitis with hematuria [N30.01]  Yes   • Sepsis, due to unspecified organism, unspecified whether acute organ dysfunction present (HCC) [A41.9]  Yes   • Hyperlipidemia [E78.5]  Yes   • Type 2 diabetes mellitus without complication, with long-term current use of insulin (HCC) [E11.9, Z79.4]  Not Applicable   • Benign essential hypertension [I10]  Yes      Resolved Hospital Problems   No resolved problems to display.       Mr. Bernard is a 67 y.o. non-smoker with a history of a recent prostate biopsy who presents with fevers and hematuria.    Sepsis secondary to acute cystitis with hematuria  -Lactate 3, Pro-David 0.92, and patient febrile and tachycardic upon presentation.  Continue IV Rocephin daily  -Urine cultures pending, will de-escalate antibiotics accordingly  -Urology consultation  -Supportive care with IV hydration  -Repeat labs in a.m.    Hx of BPH with recent prostate biopsy  -Urology consultation     Benign essential hypertension  -Stable, will hold antihypertensives in setting of sepsis    Type 2 diabetes mellitus  -Accu-Cheks 4 times a day with meals and at bedtime  -Moderate dose correctional factor with hypoglycemic protocol  -Hemoglobin A1c 8.10. Continue basal insulin  -Hold oral diabetic medication    HLD  -Resume statin therapy      I discussed the patient's findings and my recommendations with patient and ED provider.    VTE Prophylaxis - SCDs.  Code Status - Full code.       EDIN Rachel  [facility]  11/23/22  20:00 EST

## 2022-11-23 NOTE — ED NOTES
Nursing report ED to floor  Jeff Bernard  67 y.o.  male    HPI :   Chief Complaint   Patient presents with    Fever       Admitting doctor:   Soren Marquez MD    Admitting diagnosis:   The encounter diagnosis was Sepsis, due to unspecified organism, unspecified whether acute organ dysfunction present (HCC).    Code status:   Current Code Status       Date Active Code Status Order ID Comments User Context       11/22/2022 2010 CPR (Attempt to Resuscitate) [id number]  Sergio Saleh, APRN ED        Question Answer    Code Status (Patient has no pulse and is not breathing) CPR (Attempt to Resuscitate)    Medical Interventions (Patient has pulse or is breathing) Full Support                    Allergies:   Patient has no known allergies.    Isolation:   No active isolations    Intake and Output    Intake/Output Summary (Last 24 hours) at 11/22/2022 2011  Last data filed at 11/22/2022 1908  Gross per 24 hour   Intake 100 ml   Output --   Net 100 ml       Weight:       11/22/22  1826   Weight: 95.3 kg (210 lb)       Most recent vitals:   Vitals:    11/22/22 1836 11/22/22 1906 11/22/22 1936 11/22/22 1943   BP: 129/61 106/83 106/54    BP Location:       Patient Position:       Pulse: (!) 121  119    Resp:       Temp:    100.3 °F (37.9 °C)   TempSrc:    Oral   SpO2: 90%  92%    Weight:       Height:           Active LDAs/IV Access:   Lines, Drains & Airways       Active LDAs       Name Placement date Placement time Site Days    Peripheral IV 11/22/22 1810 Right;Upper Antecubital 11/22/22 1810  Antecubital  less than 1    Peripheral IV 11/22/22 1823 Left Antecubital 11/22/22 1823  Antecubital  less than 1                    Labs (abnormal labs have a star):   Labs Reviewed   COMPREHENSIVE METABOLIC PANEL - Abnormal; Notable for the following components:       Result Value    Glucose 138 (*)     All other components within normal limits    Narrative:     GFR Normal >60  Chronic Kidney Disease <60  Kidney Failure  "<15     URINALYSIS W/ MICROSCOPIC IF INDICATED (NO CULTURE) - Abnormal; Notable for the following components:    Color, UA Dark Yellow (*)     Appearance, UA Hazy (*)     Glucose,  mg/dL (Trace) (*)     Ketones, UA Trace (*)     Blood, UA Large (3+) (*)     Protein,  mg/dL (2+) (*)     Nitrite, UA Positive (*)     All other components within normal limits   LACTIC ACID, PLASMA - Abnormal; Notable for the following components:    Lactate 3.0 (*)     All other components within normal limits   PROCALCITONIN - Abnormal; Notable for the following components:    Procalcitonin 0.92 (*)     All other components within normal limits    Narrative:     As a Marker for Sepsis (Non-Neonates):    1. <0.5 ng/mL represents a low risk of severe sepsis and/or septic shock.  2. >2 ng/mL represents a high risk of severe sepsis and/or septic shock.    As a Marker for Lower Respiratory Tract Infections that require antibiotic therapy:    PCT on Admission    Antibiotic Therapy       6-12 Hrs later    >0.5                Strongly Recommended  >0.25 - <0.5        Recommended   0.1 - 0.25          Discouraged              Remeasure/reassess PCT  <0.1                Strongly Discouraged     Remeasure/reassess PCT    As 28 day mortality risk marker: \"Change in Procalcitonin Result\" (>80% or <=80%) if Day 0 (or Day 1) and Day 4 values are available. Refer to http://www.[facility]-pct-calculator.com    Change in PCT <=80%  A decrease of PCT levels below or equal to 80% defines a positive change in PCT test result representing a higher risk for 28-day all-cause mortality of patients diagnosed with severe sepsis for septic shock.    Change in PCT >80%  A decrease of PCT levels of more than 80% defines a negative change in PCT result representing a lower risk for 28-day all-cause mortality of patients diagnosed with severe sepsis or septic shock.      CBC WITH AUTO DIFFERENTIAL - Abnormal; Notable for the following components:    " Neutrophil % 94.2 (*)     Lymphocyte % 5.2 (*)     Monocyte % 0.2 (*)     Eosinophil % 0.0 (*)     Lymphocytes, Absolute 0.22 (*)     Monocytes, Absolute 0.01 (*)     All other components within normal limits   URINALYSIS, MICROSCOPIC ONLY - Abnormal; Notable for the following components:    RBC, UA Too Numerous to Count (*)     Bacteria, UA 2+ (*)     All other components within normal limits   COVID-19 AND FLU A/B, NP SWAB IN TRANSPORT MEDIA 8-12 HR TAT - Normal    Narrative:     Fact sheet for providers: https://www.fda.gov/media/638940/download    Fact sheet for patients: https://www.fda.gov/media/658841/download    Test performed by PCR.   BLOOD CULTURE   BLOOD CULTURE   LACTIC ACID, REFLEX   CBC AND DIFFERENTIAL    Narrative:     The following orders were created for panel order CBC & Differential.  Procedure                               Abnormality         Status                     ---------                               -----------         ------                     CBC Auto Differential[[id number]]        Abnormal            Final result                 Please view results for these tests on the individual orders.       EKG:   No orders to display       Meds given in ED:   Medications   sodium chloride 0.9 % flush 10 mL (has no administration in time range)   lactated ringers bolus 2,500 mL (1,000 mL Intravenous New Bag 11/22/22 1941)   sodium chloride 0.9 % flush 10 mL (has no administration in time range)   acetaminophen (TYLENOL) tablet 650 mg (has no administration in time range)   ondansetron (ZOFRAN) tablet 4 mg (has no administration in time range)     Or   ondansetron (ZOFRAN) injection 4 mg (has no administration in time range)   aluminum-magnesium hydroxide-simethicone (MAALOX MAX) 400-400-40 MG/5ML suspension 15 mL (has no administration in time range)   lactated ringers infusion (has no administration in time range)   acetaminophen (TYLENOL) tablet 1,000 mg (1,000 mg Oral Given 11/22/22 1812)    cefTRIAXone (ROCEPHIN) 2 g in sodium chloride 0.9 % 100 mL IVPB-VTB (0 g Intravenous Stopped 11/22/22 1908)   ketorolac (TORADOL) injection 15 mg (15 mg Intravenous Given 11/22/22 1810)   ondansetron (ZOFRAN) injection 4 mg (4 mg Intravenous Given 11/22/22 1810)       Imaging results:  No radiology results for the last day    Ambulatory status:   - up with assistance ?    Social issues:   Social History     Socioeconomic History    Marital status:    Tobacco Use    Smoking status: Never    Smokeless tobacco: Never   Vaping Use    Vaping Use: Never used   Substance and Sexual Activity    Alcohol use: Yes     Alcohol/week: 4.0 - 6.0 standard drinks     Types: 4 - 6 Cans of beer per week     Comment: Socially.    Drug use: No    Sexual activity: Not Currently     Partners: Female     Birth control/protection: Condom       NIH Stroke Scale:         Jumana Decker RN  11/22/22 20:11 EST

## 2022-11-24 LAB
ANION GAP SERPL CALCULATED.3IONS-SCNC: 10.9 MMOL/L (ref 5–15)
BUN SERPL-MCNC: 14 MG/DL (ref 8–23)
BUN/CREAT SERPL: 18.9 (ref 7–25)
CALCIUM SPEC-SCNC: 7.9 MG/DL (ref 8.6–10.5)
CHLORIDE SERPL-SCNC: 102 MMOL/L (ref 98–107)
CO2 SERPL-SCNC: 21.1 MMOL/L (ref 22–29)
CREAT SERPL-MCNC: 0.74 MG/DL (ref 0.76–1.27)
DEPRECATED RDW RBC AUTO: 40.2 FL (ref 37–54)
EGFRCR SERPLBLD CKD-EPI 2021: 98.1 ML/MIN/1.73
ERYTHROCYTE [DISTWIDTH] IN BLOOD BY AUTOMATED COUNT: 12.3 % (ref 12.3–15.4)
GLUCOSE BLDC GLUCOMTR-MCNC: 182 MG/DL (ref 70–130)
GLUCOSE BLDC GLUCOMTR-MCNC: 192 MG/DL (ref 70–130)
GLUCOSE BLDC GLUCOMTR-MCNC: 204 MG/DL (ref 70–130)
GLUCOSE BLDC GLUCOMTR-MCNC: 211 MG/DL (ref 70–130)
GLUCOSE SERPL-MCNC: 212 MG/DL (ref 65–99)
HCT VFR BLD AUTO: 36 % (ref 37.5–51)
HGB BLD-MCNC: 12.2 G/DL (ref 13–17.7)
MCH RBC QN AUTO: 30.2 PG (ref 26.6–33)
MCHC RBC AUTO-ENTMCNC: 33.9 G/DL (ref 31.5–35.7)
MCV RBC AUTO: 89.1 FL (ref 79–97)
PLATELET # BLD AUTO: 139 10*3/MM3 (ref 140–450)
PMV BLD AUTO: 9.7 FL (ref 6–12)
POTASSIUM SERPL-SCNC: 3.8 MMOL/L (ref 3.5–5.2)
RBC # BLD AUTO: 4.04 10*6/MM3 (ref 4.14–5.8)
SODIUM SERPL-SCNC: 134 MMOL/L (ref 136–145)
WBC NRBC COR # BLD: 9.05 10*3/MM3 (ref 3.4–10.8)

## 2022-11-24 PROCEDURE — 63710000001 INSULIN LISPRO (HUMAN) PER 5 UNITS: Performed by: STUDENT IN AN ORGANIZED HEALTH CARE EDUCATION/TRAINING PROGRAM

## 2022-11-24 PROCEDURE — 82962 GLUCOSE BLOOD TEST: CPT

## 2022-11-24 PROCEDURE — 85027 COMPLETE CBC AUTOMATED: CPT | Performed by: STUDENT IN AN ORGANIZED HEALTH CARE EDUCATION/TRAINING PROGRAM

## 2022-11-24 PROCEDURE — 63710000001 INSULIN LISPRO (HUMAN) PER 5 UNITS: Performed by: INTERNAL MEDICINE

## 2022-11-24 PROCEDURE — 63710000001 INSULIN LISPRO (HUMAN) PER 5 UNITS: Performed by: NURSE PRACTITIONER

## 2022-11-24 PROCEDURE — 25010000002 ERTAPENEM PER 500 MG: Performed by: UROLOGY

## 2022-11-24 PROCEDURE — 80048 BASIC METABOLIC PNL TOTAL CA: CPT | Performed by: STUDENT IN AN ORGANIZED HEALTH CARE EDUCATION/TRAINING PROGRAM

## 2022-11-24 RX ORDER — INSULIN LISPRO 100 [IU]/ML
9 INJECTION, SOLUTION INTRAVENOUS; SUBCUTANEOUS
Status: DISCONTINUED | OUTPATIENT
Start: 2022-11-24 | End: 2022-11-25 | Stop reason: HOSPADM

## 2022-11-24 RX ORDER — INSULIN LISPRO 100 [IU]/ML
0-14 INJECTION, SOLUTION INTRAVENOUS; SUBCUTANEOUS
Status: DISCONTINUED | OUTPATIENT
Start: 2022-11-24 | End: 2022-11-25 | Stop reason: HOSPADM

## 2022-11-24 RX ADMIN — ATORVASTATIN CALCIUM 40 MG: 20 TABLET, FILM COATED ORAL at 08:21

## 2022-11-24 RX ADMIN — INSULIN LISPRO 8 UNITS: 100 INJECTION, SOLUTION INTRAVENOUS; SUBCUTANEOUS at 08:21

## 2022-11-24 RX ADMIN — INSULIN LISPRO 4 UNITS: 100 INJECTION, SOLUTION INTRAVENOUS; SUBCUTANEOUS at 12:31

## 2022-11-24 RX ADMIN — INSULIN LISPRO 8 UNITS: 100 INJECTION, SOLUTION INTRAVENOUS; SUBCUTANEOUS at 12:31

## 2022-11-24 RX ADMIN — SODIUM CHLORIDE, POTASSIUM CHLORIDE, SODIUM LACTATE AND CALCIUM CHLORIDE 100 ML/HR: 600; 310; 30; 20 INJECTION, SOLUTION INTRAVENOUS at 05:19

## 2022-11-24 RX ADMIN — INSULIN LISPRO 4 UNITS: 100 INJECTION, SOLUTION INTRAVENOUS; SUBCUTANEOUS at 08:22

## 2022-11-24 RX ADMIN — INSULIN LISPRO 3 UNITS: 100 INJECTION, SOLUTION INTRAVENOUS; SUBCUTANEOUS at 17:10

## 2022-11-24 RX ADMIN — ERTAPENEM 1 G: 1 INJECTION INTRAMUSCULAR; INTRAVENOUS at 08:22

## 2022-11-24 RX ADMIN — INSULIN LISPRO 9 UNITS: 100 INJECTION, SOLUTION INTRAVENOUS; SUBCUTANEOUS at 17:10

## 2022-11-24 RX ADMIN — INSULIN GLARGINE-YFGN 25 UNITS: 100 INJECTION, SOLUTION SUBCUTANEOUS at 20:40

## 2022-11-24 RX ADMIN — Medication 10 ML: at 20:40

## 2022-11-24 RX ADMIN — Medication 10 ML: at 08:22

## 2022-11-24 NOTE — PROGRESS NOTES
[facility] Progress Note  urosepsis s/p TRUS bx 11/21/22  ecoli     No pain    AVSS  tmax 24 hr 101.7- given tylenol last night  Afebrile this am  Abd NT ND  WBC 9.05    Continue IV ertapenem  Pending sensitivities  Will follow

## 2022-11-24 NOTE — PLAN OF CARE
Problem: Adult Inpatient Plan of Care  Goal: Patient-Specific Goal (Individualized)  Outcome: Ongoing, Progressing  Flowsheets (Taken 11/24/2022 0180)  Patient-Specific Goals (Include Timeframe): NA  Individualized Care Needs: NA  Anxieties, Fears or Concerns: NA   Goal Outcome Evaluation:  Plan of Care Reviewed With: patient        Progress: no change  Outcome Evaluation: Patient alert and oriented. LR cont. Fever at beginning of this night shift - treated with tylenol and fever recovered. Up ad marlon. AC/HS and insulin admins. Will continue to monitor for s/sx of discomfort.

## 2022-11-24 NOTE — PROGRESS NOTES
Name: Jeff Bernard ADMIT: 2022   : 1955  PCP: Esperanza Sarmiento PA    MRN: [id number] LOS: 2 days   AGE/SEX: 67 y.o. male  ROOM: [facility]     Subjective   Subjective   On IV ABX  On insulin  BG elevated  States he takes more insulin at home    ROS  +f/c  No n/v  No cp/palp  No soa/cough       Objective   Objective   Vital Signs  Temp:  [98.2 °F (36.8 °C)-101.7 °F (38.7 °C)] 98.2 °F (36.8 °C)  Heart Rate:  [] 90  Resp:  [20-24] 20  BP: (104-122)/(53-64) 113/57  SpO2:  [95 %-100 %] 99 %  on   ;   Device (Oxygen Therapy): room air  Body mass index is 27.71 kg/m².  Physical Exam  Constitutional:       Appearance: Normal appearance.   HENT:      Head: Normocephalic and atraumatic.   Eyes:      General: No scleral icterus.  Cardiovascular:      Rate and Rhythm: Normal rate and regular rhythm.      Heart sounds: Normal heart sounds.   Pulmonary:      Effort: Pulmonary effort is normal. No respiratory distress.      Breath sounds: Normal breath sounds.   Abdominal:      General: There is no distension.      Palpations: Abdomen is soft.      Tenderness: There is no abdominal tenderness.   Musculoskeletal:      Cervical back: Neck supple.   Neurological:      General: No focal deficit present.      Mental Status: He is alert and oriented to person, place, and time.   Psychiatric:         Behavior: Behavior normal.         Results Review     I reviewed the patient's new clinical results.  Results from last 7 days   Lab Units 22  0707 22  0701 22  1807   WBC 10*3/mm3 9.05 21.62* 4.25   HEMOGLOBIN g/dL 12.2* 12.9* 16.1   PLATELETS 10*3/mm3 139* 166 208     Results from last 7 days   Lab Units 22  0707 22  0701 22  1807   SODIUM mmol/L 134* 135* 139   POTASSIUM mmol/L 3.8 4.5 3.5   CHLORIDE mmol/L 102 99 100   CO2 mmol/L 21.1* 15.6* 25.2   BUN mg/dL 14  15   CREATININE mg/dL 0.74* 1.25 1.09   GLUCOSE mg/dL 212* 514* 138*   Estimated Creatinine Clearance: 127 mL/min (A)  (by C-G formula based on SCr of 0.74 mg/dL (L)).  Results from last 7 days   Lab Units 11/22/22  1807   ALBUMIN g/dL 4.70   BILIRUBIN mg/dL 0.7   ALK PHOS U/L 104   AST (SGOT) U/L 33   ALT (SGPT) U/L 28     Results from last 7 days   Lab Units 11/24/22  0707 11/23/22  0701 11/22/22  1807   CALCIUM mg/dL 7.9* 8.2* 9.2   ALBUMIN g/dL  --   --  4.70     Results from last 7 days   Lab Units 11/22/22  2156 11/22/22  1807   PROCALCITONIN ng/mL  --  0.92*   LACTATE mmol/L 1.9 3.0*     COVID19   Date Value Ref Range Status   11/22/2022 Not Detected Not Detected - Ref. Range Final     Hemoglobin A1C   Date/Time Value Ref Range Status   11/22/2022 1807 8.10 (H) 4.80 - 5.60 % Final     Glucose   Date/Time Value Ref Range Status   11/24/2022 0550 204 (H) 70 - 130 mg/dL Final     Comment:     Meter: [id number] : [id number] Simon CHANEL   11/23/2022 2051 152 (H) 70 - 130 mg/dL Final     Comment:     Meter: [id number] : [id number] Simon Annie NA   11/23/2022 1629 121 70 - 130 mg/dL Final     Comment:     Meter: [id number] : [id number] Binh Zuniga RN   11/23/2022 1517 139 (H) 70 - 130 mg/dL Final     Comment:     Meter: [id number] : [id number] Binh Zuniga RN   11/23/2022 1214 224 (H) 70 - 130 mg/dL Final     Comment:     Meter: [id number] : [id number] Binh Zuniga RN   11/23/2022 1126 269 (H) 70 - 130 mg/dL Final     Comment:     Meter: [id number] : [id number] Binh Zuniga RN   11/23/2022 0842 458 (C) 70 - 130 mg/dL Final     Comment:     RN Notified R and V Meter: [id number] : [id number] Binh Zuniga RN       Vascular screening (bundle) CAR  · Carotid Artery Disease and Stroke Screening: The right carotid artery   has plaque without stenosis. The left carotid artery has plaque without   stenosis. Abdominal Aortic Aneurysm (AAA) Screening: Maximum proximal   diameter of 1.9 cm. The artery was normal. Peripheral Artery Disease   (P.A.D.) Screening: The right has normal arterial pressures. The left has    normal arterial pressures.       Scheduled Medications  atorvastatin, 40 mg, Oral, Daily  ertapenem, 1 g, Intravenous, Q24H  insulin glargine, 10 Units, Subcutaneous, Once  insulin glargine, 25 Units, Subcutaneous, Nightly  insulin lispro, 0-9 Units, Subcutaneous, TID AC  insulin lispro, 8 Units, Subcutaneous, TID With Meals  sodium chloride, 10 mL, Intravenous, Q12H    Infusions  lactated ringers, 100 mL/hr, Last Rate: 100 mL/hr (11/24/22 0519)    Diet  Diet: Cardiac Diets, Diabetic Diets; Healthy Heart (2-3 Na+); Consistent Carbohydrate; Texture: Regular Texture (IDDSI 7); Fluid Consistency: Thin (IDDSI 0)       Assessment/Plan     Active Hospital Problems    Diagnosis  POA   • **Acute cystitis with hematuria [N30.01]  Yes   • Sepsis, due to unspecified organism, unspecified whether acute organ dysfunction present (HCC) [A41.9]  Yes   • Hyperlipidemia [E78.5]  Yes   • Type 2 diabetes mellitus without complication, with long-term current use of insulin (HCC) [E11.9, Z79.4]  Not Applicable   • Benign essential hypertension [I10]  Yes      Resolved Hospital Problems   No resolved problems to display.       67 y.o. male admitted with Acute cystitis with hematuria.    Sepsis secondary to E. coli bacteremia in the setting of recent prostate biopsy  Acute cystitis with hematuria  Urology consulted.  Currently the patient is on ertapenem.  Sensitivities are pending  Procal 0.92  Lactic acid improved after fluid resuscitation     Type 2 diabetes  Takes aspart and glargine 25 units nightly.  Blood sugars uncontrolled.   Will increase insulin, monitor BG closely    Hypertension  Take benazepril at home- holding 2/2 sepsis an soft pressures     · SCDs for DVT prophylaxis.  · Full code.  · Discussed with patient and nursing staff.  Anticipate discharge in the coming days on PO vs IV abx depending on culture results     Alejandro Sandoval MD  [facility]  11/24/22  10:58 EST

## 2022-11-25 ENCOUNTER — HOME HEALTH ADMISSION (OUTPATIENT)
Dept: HOME HEALTH SERVICES | Facility: HOME HEALTHCARE | Age: 67
End: 2022-11-25

## 2022-11-25 ENCOUNTER — READMISSION MANAGEMENT (OUTPATIENT)
Dept: CALL CENTER | Facility: HOSPITAL | Age: 67
End: 2022-11-25

## 2022-11-25 VITALS
HEART RATE: 87 BPM | WEIGHT: 210 LBS | HEIGHT: 73 IN | OXYGEN SATURATION: 95 % | SYSTOLIC BLOOD PRESSURE: 121 MMHG | RESPIRATION RATE: 16 BRPM | DIASTOLIC BLOOD PRESSURE: 84 MMHG | TEMPERATURE: 98.8 F | BODY MASS INDEX: 27.83 KG/M2

## 2022-11-25 PROBLEM — B96.20 E COLI BACTEREMIA: Status: ACTIVE | Noted: 2022-11-25

## 2022-11-25 PROBLEM — R78.81 E COLI BACTEREMIA: Status: ACTIVE | Noted: 2022-11-25

## 2022-11-25 LAB
BACTERIA SPEC AEROBE CULT: ABNORMAL
BACTERIA SPEC AEROBE CULT: ABNORMAL
GLUCOSE BLDC GLUCOMTR-MCNC: 184 MG/DL (ref 70–130)
GLUCOSE BLDC GLUCOMTR-MCNC: 203 MG/DL (ref 70–130)
GLUCOSE BLDC GLUCOMTR-MCNC: 250 MG/DL (ref 70–130)
GRAM STN SPEC: ABNORMAL
ISOLATED FROM: ABNORMAL
ISOLATED FROM: ABNORMAL

## 2022-11-25 PROCEDURE — 36410 VNPNXR 3YR/> PHY/QHP DX/THER: CPT

## 2022-11-25 PROCEDURE — 63710000001 INSULIN LISPRO (HUMAN) PER 5 UNITS: Performed by: INTERNAL MEDICINE

## 2022-11-25 PROCEDURE — C1751 CATH, INF, PER/CENT/MIDLINE: HCPCS

## 2022-11-25 PROCEDURE — 99223 1ST HOSP IP/OBS HIGH 75: CPT | Performed by: INTERNAL MEDICINE

## 2022-11-25 PROCEDURE — 82962 GLUCOSE BLOOD TEST: CPT

## 2022-11-25 PROCEDURE — 05HC33Z INSERTION OF INFUSION DEVICE INTO LEFT BASILIC VEIN, PERCUTANEOUS APPROACH: ICD-10-PCS | Performed by: UROLOGY

## 2022-11-25 PROCEDURE — 25010000002 ERTAPENEM PER 500 MG: Performed by: UROLOGY

## 2022-11-25 RX ORDER — SODIUM CHLORIDE 0.9 % (FLUSH) 0.9 %
10 SYRINGE (ML) INJECTION AS NEEDED
Status: DISCONTINUED | OUTPATIENT
Start: 2022-11-25 | End: 2022-11-25 | Stop reason: HOSPADM

## 2022-11-25 RX ORDER — SODIUM CHLORIDE 0.9 % (FLUSH) 0.9 %
10 SYRINGE (ML) INJECTION EVERY 12 HOURS SCHEDULED
Status: DISCONTINUED | OUTPATIENT
Start: 2022-11-25 | End: 2022-11-25 | Stop reason: HOSPADM

## 2022-11-25 RX ORDER — POLYETHYLENE GLYCOL 3350 17 G/17G
17 POWDER, FOR SOLUTION ORAL DAILY
Status: DISCONTINUED | OUTPATIENT
Start: 2022-11-25 | End: 2022-11-25 | Stop reason: HOSPADM

## 2022-11-25 RX ORDER — INSULIN LISPRO 100 [IU]/ML
10 INJECTION, SOLUTION INTRAVENOUS; SUBCUTANEOUS ONCE
Status: COMPLETED | OUTPATIENT
Start: 2022-11-25 | End: 2022-11-25

## 2022-11-25 RX ADMIN — ATORVASTATIN CALCIUM 40 MG: 20 TABLET, FILM COATED ORAL at 08:47

## 2022-11-25 RX ADMIN — INSULIN LISPRO 8 UNITS: 100 INJECTION, SOLUTION INTRAVENOUS; SUBCUTANEOUS at 11:35

## 2022-11-25 RX ADMIN — INSULIN LISPRO 9 UNITS: 100 INJECTION, SOLUTION INTRAVENOUS; SUBCUTANEOUS at 08:37

## 2022-11-25 RX ADMIN — INSULIN LISPRO 5 UNITS: 100 INJECTION, SOLUTION INTRAVENOUS; SUBCUTANEOUS at 08:38

## 2022-11-25 RX ADMIN — Medication 10 ML: at 08:47

## 2022-11-25 RX ADMIN — INSULIN LISPRO 10 UNITS: 100 INJECTION, SOLUTION INTRAVENOUS; SUBCUTANEOUS at 11:53

## 2022-11-25 RX ADMIN — INSULIN LISPRO 9 UNITS: 100 INJECTION, SOLUTION INTRAVENOUS; SUBCUTANEOUS at 11:35

## 2022-11-25 RX ADMIN — POLYETHYLENE GLYCOL 3350 17 G: 17 POWDER, FOR SOLUTION ORAL at 12:29

## 2022-11-25 RX ADMIN — ERTAPENEM 1 G: 1 INJECTION INTRAMUSCULAR; INTRAVENOUS at 08:37

## 2022-11-25 NOTE — PROGRESS NOTES
First Urology Progress Note  urosepsis s/p TRUS bx 11/21/22  ecoli     No pain  Ready to go home    AVSS  Afebrile >24 hr  Abd NT ND  WBC 9.05    Medicine consultd ID  Await their abx recs  Ok to DC from urology standpoint and f/u in office as previously scheduled

## 2022-11-25 NOTE — SIGNIFICANT NOTE
11/25/22 1338   Midline Catheter - Single Lumen 11/25/22 Left Basilic   Placement Date/Time: 11/25/22 1337   Hand Hygiene Completed: Yes  Site Prep: Chlorhexidine  All 5 Sterile Barriers Used (Gloves, Gown, Cap, Mask, Large Sterile Drape): Yes  Orientation: Left  Location: Basilic  Size (Fr): 3  Initial Exposed Catheter (...   Site Assessment Clean;Dry;Intact   Line Status Blood return noted;Capped;Flushed;Saline locked   Length guadalupe (cm) 11 cm   Extremity Circumference (cm) 34 cm   Dressing Type Securing device;Antimicrobial dressing/disc   Dressing Status Clean;Dry;Intact   Dressing Change Due 12/02/22   Indication/Daily Review of Necessity intravenous medication therapy   Sharp Count Correct 3 needles, 2 guidewires, 1 scalpel

## 2022-11-25 NOTE — PROGRESS NOTES
Discharge Planning Assessment  [facility]     Patient Name: Jeff Bernard  MRN: [id number]  Today's Date: 11/25/2022    Admit Date: 11/22/2022    Plan: home with [facility] for infusions   Discharge Needs Assessment     Row Name 11/25/22 1152       Living Environment    People in Home alone    Current Living Arrangements home    Primary Care Provided by self    Provides Primary Care For no one    Living Arrangement Comments lives by himself       Resource/Environmental Concerns    Resource/Environmental Concerns none    Transportation Concerns none       Transition Planning    Patient/Family Anticipates Transition to home    Patient/Family Anticipated Services at Transition home health care    Transportation Anticipated car, drives self       Discharge Needs Assessment    Equipment Currently Used at Home none    Anticipated Changes Related to Illness none    Equipment Needed After Discharge none    Provided Post Acute Provider List? Yes    Post Acute Provider List Home Health    Delivered To Patient    Method of Delivery In person    Patient's Choice of Community Agency(s) [facility]    Row Name 11/25/22 1150       Living Environment    People in Home alone    Current Living Arrangements home               Discharge Plan     Row Name 11/25/22 1154       Plan    Plan home with [facility] for infusions    Roadmap to Recovery Yes    Patient/Family in Agreement with Plan yes    Post Acute Provider List Home Health    Provided Post Acute Provider Quality & Resource List? Yes    Post Acute Provider Quality and Resource List Home Health    Delivered To Patient    Method of Delivery In person    Plan Comments Spoke with the patient at bedside and verified the face sheet and the discharge plan is home with [facility] and [facility] infusion if we can get the IVAB's set up for today. Patient to get midline today. Called julito with [facility] and she will check and will call CCP  back if they can accept today. Spoke with the patient at bedside and explained Meds to Bed and he states understanding. Changed in EPIC. TABITHA Lyles RN, CCP.              Continued Care and Services - Admitted Since 11/22/2022     Home Medical Care     Service Provider Request Status Selected Services Address Phone Fax Patient Preferred     [facility] Pending - Request Sent N/A [id number] [address] [phone] --              Expected Discharge Date and Time     Expected Discharge Date Expected Discharge Time    Nov 25, 2022          Demographic Summary     Row Name 11/25/22 1148       General Information    Admission Type inpatient    Arrived From emergency department    Required Notices Provided Important Message from Medicare    Referral Source admission list    Reason for Consult discharge planning    Preferred Language English       Contact Information    Permission Granted to Share Info With     Contact Information Obtained for                Functional Status     Row Name 11/25/22 1154       Functional Status    Usual Activity Tolerance excellent    Current Activity Tolerance excellent       Functional Status, IADL    Medications independent    Meal Preparation independent    Housekeeping independent    Laundry independent    Shopping independent       Mental Status    General Appearance WDL WDL       Mental Status Summary    Recent Changes in Mental Status/Cognitive Functioning no changes       Employment/    Employment Status retired    Employment/ Comments none               Psychosocial    No documentation.                Abuse/Neglect    No documentation.                Legal    No documentation.                Substance Abuse    No documentation.                Patient Forms    No documentation.                   Sofy Lyles RN

## 2022-11-25 NOTE — PROGRESS NOTES
Case Management Discharge Note      Final Note: Discharged to home with [facility] for the IVAB's and teaching. Patient will drive himself home today. TABITHA Lyles, RN, CCP.    Provided Post Acute Provider List?: Yes  Post Acute Provider List: Home Health  Provided Post Acute Provider Quality & Resource List?: Yes  Post Acute Provider Quality and Resource List: Home Health  Delivered To: Patient  Method of Delivery: In person    Selected Continued Care - Admitted Since 11/22/2022     Destination    No services have been selected for the patient.              Durable Medical Equipment    No services have been selected for the patient.              Dialysis/Infusion Coordination complete.    Service Provider Selected Services Address Phone Fax Patient Preferred    [facility] Infusion and IV Therapy [address], [place] [id number] [phone] --          Home Medical Care    No services have been selected for the patient.              Therapy    No services have been selected for the patient.              Community Resources    No services have been selected for the patient.              Community & DME    No services have been selected for the patient.                  Transportation Services  Private: Car    Final Discharge Disposition Code: 01 - home or self-care

## 2022-11-25 NOTE — PROGRESS NOTES
Discharge Planning Assessment  [facility]     Patient Name: Jeff Bernard  MRN: [id number]  Today's Date: 11/25/2022    Admit Date: 11/22/2022    Plan: home with [facility] for infusions   Discharge Needs Assessment     Row Name 11/25/22 1202       Discharge Needs Assessment    Equipment Currently Used at Home glucometer    Equipment Needed After Discharge medication pump    Discharge Facility/Level of Care Needs home with home health    Post Acute Provider List Home Health    Row Name 11/25/22 1152       Living Environment    People in Home alone    Current Living Arrangements home    Primary Care Provided by self    Provides Primary Care For no one    Living Arrangement Comments lives by himself       Resource/Environmental Concerns    Resource/Environmental Concerns none    Transportation Concerns none       Transition Planning    Patient/Family Anticipates Transition to home    Patient/Family Anticipated Services at Transition home health care    Transportation Anticipated car, drives self       Discharge Needs Assessment    Equipment Currently Used at Home none    Anticipated Changes Related to Illness none    Equipment Needed After Discharge none    Provided Post Acute Provider List? Yes    Post Acute Provider List Home Health    Delivered To Patient    Method of Delivery In person    Patient's Choice of Community Agency(s) [facility]    Row Name 11/25/22 1150       Living Environment    People in Home alone    Current Living Arrangements home               Discharge Plan     Row Name 11/25/22 1356       Plan    Plan Comments Dr. Vega put an appointment in for Tuesday November 29th to come to his office and not to go to [facility] for his midline. [facility] updated the patient on this appointment. TABITHA killian, RN, CCP.    Final Discharge Disposition Code 01 - home or self-care    Final Note Discharged to home with [facility] for the IVAB's and teaching. Patient will  drive himself home today. TABITHA Lyles RN, CCP.    Row Name 11/25/22 1214       Plan    Plan Comments [place] will get the IVAB's delivered to room and get someone to teach how to hang the IVAB's and he will come back to ambulatory for the D/C of his midline. Julito/CORINNE will call to have MD place orders for [facility] ambulatory unit. TABITHA Lyles RN, CCP.    Row Name 11/25/22 1152       Plan    Plan home with [facility] for infusions    Roadmap to Recovery Yes    Patient/Family in Agreement with Plan yes    Post Acute Provider List Home Health    Provided Post Acute Provider Quality & Resource List? Yes    Post Acute Provider Quality and Resource List Home Health    Delivered To Patient    Method of Delivery In person    Plan Comments Spoke with the patient at bedside and verified the face sheet and the discharge plan is home with [facility] and [facility] infusion if we can get the IVAB's set up for today. Patient to get midline today. Called julito with [facility] and she will check and will call CCP back if they can accept today. TABITHA Lyles RN, CCP.              Continued Care and Services - Admitted Since 11/22/2022     Dialysis/Infusion Coordination complete.    Service Provider Request Status Selected Services Address Phone Fax Patient Preferred    [facility]  [facility] [address] [facility] [address] [phone] --          Home Medical Care     Service Provider Request Status Selected Services Address Phone Fax Patient Preferred     [facility] Declined  NOT HOMEBOUND N/A [address] [address] [phone] --              Expected Discharge Date and Time     Expected Discharge Date Expected Discharge Time    Nov 25, 2022          Demographic Summary     Row Name 11/25/22 3495       General Information    Admission Type inpatient    Arrived From emergency  department    Required Notices Provided Important Message from Medicare    Referral Source admission list    Reason for Consult discharge planning    Preferred Language English       Contact Information    Permission Granted to Share Info With     Contact Information Obtained for                Functional Status     Row Name 11/25/22 1154       Functional Status    Usual Activity Tolerance excellent    Current Activity Tolerance excellent       Functional Status, IADL    Medications independent    Meal Preparation independent    Housekeeping independent    Laundry independent    Shopping independent       Mental Status    General Appearance WDL WDL       Mental Status Summary    Recent Changes in Mental Status/Cognitive Functioning no changes       Employment/    Employment Status retired    Employment/ Comments none               Psychosocial    No documentation.                Abuse/Neglect    No documentation.                Legal    No documentation.                Substance Abuse    No documentation.                Patient Forms    No documentation.                   Sofy Lyles, STEPHEN

## 2022-11-25 NOTE — PLAN OF CARE
Problem: Adult Inpatient Plan of Care  Goal: Patient-Specific Goal (Individualized)  Outcome: Ongoing, Progressing  Flowsheets (Taken 11/24/2022 8550)  Patient-Specific Goals (Include Timeframe): NA  Individualized Care Needs: NA  Anxieties, Fears or Concerns: NA   Goal Outcome Evaluation:  Plan of Care Reviewed With: patient        Progress: no change  Outcome Evaluation: Patient alert and oriented. Up ad marlon. No c/o pain. Insulin admin. Pt reports bumps on top of mouth that started late afternoon on 11/24. Will continue to monitor for s/sx of discomfort.

## 2022-11-25 NOTE — DISCHARGE PLACEMENT REQUEST
"Marco Antonio, Page MCCURDY (67 y.o. Male)     Date of Birth   1955    Social Security Number       Address   [address]  [facility] [address]    Home Phone   [phone]    MRN   [id number]       [facility]    Marital Status                               Admission Date   11/22/22    Admission Type   Emergency    Admitting Provider       Attending Provider   Alejandro Sandoval MD    Department, Room/Bed   [address], P479/1       Discharge Date       Discharge Disposition       Discharge Destination                               Attending Provider: Alejandro Sandoval MD    Allergies: No Known Allergies    Isolation: None   Infection: None   Code Status: CPR    Ht: 185.4 cm (73\")   Wt: 95.3 kg (210 lb)    Admission Cmt: None   Principal Problem: Acute cystitis with hematuria [N30.01]                 Active Insurance as of 11/22/2022     Primary Coverage     Payor Plan Insurance Group Employer/Plan Group    MEDICARE MEDICARE A & B      Payor Plan Address Payor Plan Phone Number Payor Plan Fax Number Effective Dates    [address] [phone]  6/1/2018 - None Entered    [facility] [id number]       Subscriber Name Subscriber Birth Date Member ID       PAGE OROURKE CALLI 1955 [id number]           Secondary Coverage     Payor Plan Insurance Group Employer/Plan Group    MISC MC SUP   MISC  SUP              PLAN G     Coverage Address Coverage Phone Number Coverage Fax Number Effective Dates    [address] [phone]  12/10/2019 - None Entered    [facility] [id number]       Subscriber Name Subscriber Birth Date Member ID       PAGE OROURKE CALLI 1955 [id number]                 Emergency Contacts      (Rel.) Home Phone Work Phone Mobile Phone    Marco AntonioVishal (Brother) [phone] -- --              "

## 2022-11-25 NOTE — PROGRESS NOTES
Discharge Planning Assessment  [facility]     Patient Name: Jeff Bernard  MRN: [id number]  Today's Date: 11/25/2022    Admit Date: 11/22/2022    Plan: home with [facility] for infusions   Discharge Needs Assessment     Row Name 11/25/22 1202       Discharge Needs Assessment    Equipment Currently Used at Home glucometer    Equipment Needed After Discharge medication pump    Discharge Facility/Level of Care Needs home with home health    Post Acute Provider List Home Health    Row Name 11/25/22 1152       Living Environment    People in Home alone    Current Living Arrangements home    Primary Care Provided by self    Provides Primary Care For no one    Living Arrangement Comments lives by himself       Resource/Environmental Concerns    Resource/Environmental Concerns none    Transportation Concerns none       Transition Planning    Patient/Family Anticipates Transition to home    Patient/Family Anticipated Services at Transition home health care    Transportation Anticipated car, drives self       Discharge Needs Assessment    Equipment Currently Used at Home none    Anticipated Changes Related to Illness none    Equipment Needed After Discharge none    Provided Post Acute Provider List? Yes    Post Acute Provider List Home Health    Delivered To Patient    Method of Delivery In person    Patient's Choice of Community Agency(s) [facility]    Row Name 11/25/22 1150       Living Environment    People in Home alone    Current Living Arrangements home               Discharge Plan     Row Name 11/25/22 1214       Plan    Plan Comments [facility] will get the IVAB's delivered to room and get someone to teach how to hang the IVAB's and he will come back to ambulatory for the D/C of his midline. [facility] will call to have MD place orders for [facility] ambulatory unit. TABITHA Lyles RN, CCP.    Row Name 11/25/22 1156       Plan    Plan home with [facility] for infusions     Roadmap to Recovery Yes    Patient/Family in Agreement with Plan yes    Post Acute Provider List Home Health    Provided Post Acute Provider Quality & Resource List? Yes    Post Acute Provider Quality and Resource List Home Health    Delivered To Patient    Method of Delivery In person    Plan Comments Spoke with the patient at bedside and verified the face sheet and the discharge plan [facility] if we can get the IVAB's set up for today. Patient to get midline today. Called julito with [facility] and she will check and will call CCP back if they can accept today. TABITHA Lyles, RN, CCP.              Continued Care and Services - Admitted Since 11/22/2022     Dialysis/Infusion     Service Provider Request Status Selected Services Address Phone Fax Patient Preferred    [facility] - No [facility] Sent N/A [address] [facility] [address] [phone] --          Home Medical Care     Service Provider Request Status Selected Services Address Phone Fax Patient Preferred     [facility] Pending - Request Sent N/A [address] [address] [phone] --              Expected Discharge Date and Time     Expected Discharge Date Expected Discharge Time    Nov 25, 2022          Demographic Summary     Row Name 11/25/22 1148       General Information    Admission Type inpatient    Arrived From emergency department    Required Notices Provided Important Message from Medicare    Referral Source admission list    Reason for Consult discharge planning    Preferred Language English       Contact Information    Permission Granted to Share Info With     Contact Information Obtained for                Functional Status     Row Name 11/25/22 1154       Functional Status    Usual Activity Tolerance excellent    Current Activity Tolerance excellent       Functional Status, IADL     Medications independent    Meal Preparation independent    Housekeeping independent    Laundry independent    Shopping independent       Mental Status    General Appearance WDL WDL       Mental Status Summary    Recent Changes in Mental Status/Cognitive Functioning no changes       Employment/    Employment Status retired    Employment/ Comments none               Psychosocial    No documentation.                Abuse/Neglect    No documentation.                Legal    No documentation.                Substance Abuse    No documentation.                Patient Forms    No documentation.                   Sofy Lyles RN

## 2022-11-25 NOTE — CONSULTS
Referring Provider: Soren Marquez MD  [address]  [place] [address]    Reason for Consultation: e coli bactremia    History of present illness:  Jeff Bernard is a 67 y.o. who I am asked to evaluate and give opinion for e coli bactremia. History is obtained from the patient and review of the old medical records which I summarize/synthesize as follows: He underwent prostate biopsy on 11/21/22. He says he was taking either levofloxacin or Bactrim before and after the procedure. No fevers or chills or  symptoms prior to procedure. The day following he had sudden onset fever and shaking chills. NO alleviating factors. No history of sepsis. He came to the ER where he had a fever to 103 F with tachycardia to the 130s. He had a normal WBC but procal 0.9 and LA 3. Blood cultures turned positive for E coli. He has improved on ertapenem. ID is being asked to provide final recommendations.     Past Medical History:   Diagnosis Date   • Arteriosclerosis of carotid artery    • Colon polyp    • ED (erectile dysfunction)    • Hematuria    • Hyperlipidemia    • Hypertension    • Type 2 diabetes mellitus (HCC)    • Vitamin D deficiency        Past Surgical History:   Procedure Laterality Date   • CARPAL TUNNEL RELEASE WITH CUBITAL TUNNEL RELEASE  06/2021    Dr. Bowers   • COLONOSCOPY N/A 3/20/2017    Procedure: COLONOSCOPY TO CECUM WITH HOT SNARE POLYPECTOMY;  Surgeon: Christian Kelly MD;  Location: [facility] ENDOSCOPY;  Service:    • COLONOSCOPY N/A 8/17/2022    Procedure: COLONOSCOPY into cecum and terminal ileum with cold snare polypectomy;  Surgeon: Christian Kelly MD;  Location: [facility] ENDOSCOPY;  Service: Gastroenterology;  Laterality: N/A;  Pre op: History of colon polyps  Post op: Polyp and Hemorrhoids   • COLONOSCOPY W/ POLYPECTOMY  MMXIV   • LEG SURGERY Right     Cyst Removal.       Social History:  Retired   Lives alone    Family History:  No 1st degree relatives w/ prostate  problems    Antibiotic allergies and intolerances:  None    Medications:    Current Facility-Administered Medications:   •  acetaminophen (TYLENOL) tablet 650 mg, 650 mg, Oral, Q4H PRN, Sergio Saleh APRN, 650 mg at 11/23/22 2136  •  aluminum-magnesium hydroxide-simethicone (MAALOX MAX) 400-400-40 MG/5ML suspension 15 mL, 15 mL, Oral, Q6H PRN, Sergio Saleh APRN  •  atorvastatin (LIPITOR) tablet 40 mg, 40 mg, Oral, Daily, Sergio Saleh APRN, 40 mg at 11/25/22 0847  •  dextrose (D50W) (25 g/50 mL) IV injection 25 g, 25 g, Intravenous, Q15 Min PRN, Sergio Saleh APRN  •  dextrose (D50W) (25 g/50 mL) IV injection 25 g, 25 g, Intravenous, Q15 Min PRN, Paul Knight MD  •  dextrose (GLUTOSE) oral gel 15 g, 15 g, Oral, Q15 Min PRN, Sergio Saleh APRN  •  dextrose (GLUTOSE) oral gel 15 g, 15 g, Oral, Q15 Min PRN, Paul Knight MD  •  ertapenem (INVanz) 1 g in sodium chloride 0.9 % 100 mL IVPB-VTB, 1 g, Intravenous, Q24H, Vishal Chaidez Jr., MD, Last Rate: 200 mL/hr at 11/25/22 0837, 1 g at 11/25/22 0837  •  glucagon (human recombinant) (GLUCAGEN DIAGNOSTIC) injection 1 mg, 1 mg, Intramuscular, Q15 Min PRN, Sergio Saleh APRN  •  glucagon (human recombinant) (GLUCAGEN DIAGNOSTIC) injection 1 mg, 1 mg, Intramuscular, Q15 Min PRN, Paul Knight MD  •  insulin glargine (LANTUS, SEMGLEE) injection 10 Units, 10 Units, Subcutaneous, Once, Paul Knight MD  •  insulin glargine (LANTUS, SEMGLEE) injection 25 Units, 25 Units, Subcutaneous, Nightly, Paul Knight MD, 25 Units at 11/24/22 2040  •  insulin lispro (ADMELOG) injection 0-14 Units, 0-14 Units, Subcutaneous, TID AC, Alejandro Sandoval MD, 5 Units at 11/25/22 0838  •  insulin lispro (ADMELOG) injection 9 Units, 9 Units, Subcutaneous, TID With Meals, Alejandro Sandoval MD, 9 Units at 11/25/22 0837  •  ondansetron (ZOFRAN) tablet 4 mg, 4 mg, Oral, Q6H PRN **OR** ondansetron (ZOFRAN) injection 4 mg, 4 mg, Intravenous, Q6H PRN,  Sergio Saleh APRN  •  polyethylene glycol (MIRALAX) packet 17 g, 17 g, Oral, Daily, Alejandro Sandoval MD  •  [COMPLETED] Insert Peripheral IV, , , Once **AND** sodium chloride 0.9 % flush 10 mL, 10 mL, Intravenous, PRN, Scott Chino MD  •  sodium chloride 0.9 % flush 10 mL, 10 mL, Intravenous, Q12H, Sergio Saleh APRN, 10 mL at 11/25/22 0847    Review of Systems  All systems were reviewed and are negative unless otherwise stated above in the HPI    Objective   Vital Signs   Temp:  [97.5 °F (36.4 °C)-100.1 °F (37.8 °C)] 98.8 °F (37.1 °C)  Heart Rate:  [82-98] 87  Resp:  [16-18] 16  BP: (111-143)/(58-84) 121/84    Physical Exam:   General: awake, alert, NAD   Head: atraumatic  Eyes: no scleral icterus  ENT: MMM, no thrush  Neck: Supple  Cardiovascular: NR, RR, no murmurs, no LE edema  Respiratory: Lungs are clear to auscultation bilaterally, no rales or wheezing; normal work of breathing on ambient air  GI: Abdomen is soft, nontender, nondistended, normal bowel sounds in all four quadrants  :  no Chinchilla catheter  Musculoskeletal: no joint effusions, normal musculature  Skin: No rashes, lesions, or embolic phenomenon  Neurological: Alert and oriented x 3, motor strength 5/5 in all four extremities  Psychiatric: Normal mood and affect   Vasc: no cyanosis; PIV w/o erythema    Labs:     Lab Results   Component Value Date    WBC 9.05 11/24/2022    HGB 12.2 (L) 11/24/2022    HCT 36.0 (L) 11/24/2022    MCV 89.1 11/24/2022     (L) 11/24/2022       Lab Results   Component Value Date    GLUCOSE 212 (H) 11/24/2022    BUN 14 11/24/2022    CREATININE 0.74 (L) 11/24/2022    EGFRIFNONA 92 01/20/2022    EGFRIFAFRI 106 01/20/2022    BCR 18.9 11/24/2022    CO2 21.1 (L) 11/24/2022    CALCIUM 7.9 (L) 11/24/2022    PROTENTOTREF 6.6 06/22/2022    ALBUMIN 4.70 11/22/2022    LABIL2 1.8 06/22/2022    AST 33 11/22/2022    ALT 28 11/22/2022     Lab Results   Component Value Date    HGBA1C 8.10 (H) 11/22/2022        Microbiology:  11/22 BCx: E coli (susc ceftriaxone; R levofloxacin and bactrim)  11/22 Flu and COVID: negative  11/22 UCx: no growth    Radiology (personally reviewed report):  none    ASSESSMENT/PLAN:  1. E coli septicemia due to  source  2. S/P prostate biopsy  3. Uncontrolled DM2    He is improved on antibiotics. His isolate is resistant to effective oral antibiotics but is susceptible to ceftriaxone. Therefore I will start ceftriaxone 2 g IV q24h through 11/28/22 to complete a 7-day course. I spoke w/ Dr Sandoval who will order a midline catheter for him. No monitoring labs needed given short course. HH may pull PICC after last dose.     I will repeat BCx to document sterility but he does not have to stay in the hospital just to follow these up. I will follow them up even after discharge.     Thank you for allowing me to be involved in the care of this patient. Infectious diseases will sign off at this time with antibiotics plan in place, but please call me at [phone] if any further ID questions or new ID concerns.

## 2022-11-25 NOTE — PLAN OF CARE
Goal Outcome Evaluation:      Patient has meet goals for discharge, midline has been placed and antibiotics to be taken at home, instructions provided, home health will visit to admin antibiotics and patient will follow up with doctor for mid line removal, patient verbalized understanding.         Patient will be administering the antibiotics himself and he received teaching on how to administer and flush his line before discharge.

## 2022-11-25 NOTE — DISCHARGE SUMMARY
NAME: Jeff Bernard ADMIT: 2022   : 1955  PCP: Esperanza Sarmiento PA    MRN: [id number] LOS: 3 days   AGE/SEX: 67 y.o. male  ROOM: [facility]     Date of Admission:  2022  Date of Discharge:  2022    PCP: Esperanza Sarmiento PA    CHIEF COMPLAINT  Fever      DISCHARGE DIAGNOSIS  Active Hospital Problems    Diagnosis  POA   • **E coli bacteremia [R78.81, B96.20]  Yes   • Sepsis, due to unspecified organism, unspecified whether acute organ dysfunction present (HCC) [A41.9]  Yes   • Acute cystitis with hematuria [N30.01]  Yes   • Hyperlipidemia [E78.5]  Yes   • Type 2 diabetes mellitus without complication, with long-term current use of insulin (HCC) [E11.9, Z79.4]  Not Applicable   • Benign essential hypertension [I10]  Yes      Resolved Hospital Problems   No resolved problems to display.       SECONDARY DIAGNOSES  Past Medical History:   Diagnosis Date   • Arteriosclerosis of carotid artery    • Colon polyp    • ED (erectile dysfunction)    • Hematuria    • Hyperlipidemia    • Hypertension    • Type 2 diabetes mellitus (HCC)    • Vitamin D deficiency        CONSULTS   Urology  ID    HOSPITAL COURSE  Patient is a 67 y.o. male presented to [facility] complaining of fever after recent prostate biopsy.  He had been on either Levaquin or Bactrim before and after the procedure.  He was found to have E. coli bacteremia.  He was given IV or ertapenem while awaiting cultures.  His bacteria is resistant to effective oral antibiotics but discussed with infectious disease Dr. Veag who recommended ceftriaxone through  to finish a 7-day total course.  Have ordered a midline catheter and he will receive IV antibiotics at home as directed by infectious disease/arranged by case management.  His midline should be removed after completion of antibiotics.  He should follow-up as outpatient with primary care as well as urology.    DIAGNOSTICS  2022 0707 2022 0740 CBC (No  Diff) [[id number]]   (Abnormal)   Blood    Final result Component Value Units   WBC 9.05 10*3/mm3   RBC 4.04 Low  10*6/mm3   Hemoglobin 12.2 Low  g/dL   Hematocrit 36.0 Low  %   MCV 89.1 fL   MCH 30.2 pg   MCHC 33.9 g/dL   RDW 12.3 %   RDW-SD 40.2 fl   MPV 9.7 fL   Platelets 139 Low  10*3/mm3          11/24/2022 0707 11/24/2022 0818 Basic Metabolic Panel [[id number]]    (Abnormal)   Blood    Final result Component Value Units   Glucose 212 High  mg/dL   BUN 14 mg/dL   Creatinine 0.74 Low  mg/dL   Sodium 134 Low  mmol/L   Potassium 3.8 mmol/L   Chloride 102 mmol/L   CO2 21.1 Low  mmol/L   Calcium 7.9 Low  mg/dL   BUN/Creatinine Ratio 18.9    Anion Gap 10.9 mmol/L   eGFR 98.1  mL/min/1.73          /22/2022 1807 11/25/2022 0606 Blood Culture - Blood, Arm, Left [[id number]]    (Abnormal)   Blood from Arm, Left    Final result Component Value   Blood Culture Escherichia coli Critical    Isolated from Aerobic and Anaerobic Bottles   Gram Stain Anaerobic Bottle Gram negative bacilli Critical     Aerobic Bottle Gram negative bacilli Critical        Susceptibility    Escherichia coli (1)    Antibiotic Interpretation Method Status    Ampicillin Susceptible TAL Final    Ampicillin + Sulbactam Susceptible TAL Final    Cefepime Susceptible TAL Final    Ceftazidime Susceptible TAL Final    Ceftriaxone Susceptible TAL Final    Gentamicin Susceptible TAL Final    Levofloxacin Resistant TAL Final    Piperacillin + Tazobactam Susceptible TAL Final    Trimethoprim + Sulfamethoxazole Resistant TAL Final    Susceptibility Comments    Cefazolin sensitivity will not be reported for Enterobacteriaceae in non-urine isolates. If cefazolin is preferred, please call the microbiology lab to request an E-test.   With the exception of urinary-sourced infections, aminoglycosides should not be used as monotherapy.               PHYSICAL EXAM  Objective    Alert  nad  No resp distress  Soft, nt  Wishing for discharge today    CONDITION ON  DISCHARGE  Stable.      DISCHARGE DISPOSITION   Home or Self Care      DISCHARGE MEDICATIONS       Your medication list      START taking these medications      Instructions Last Dose Given Next Dose Due   cefTRIAXone 2 g in sodium chloride 0.9 % 100 mL IVPB  Start taking on: November 26, 2022      Infuse 2 g into a venous catheter Daily for 3 doses. Indications: Bacteria in the Blood, Urinary Tract Infection          CONTINUE taking these medications      Instructions Last Dose Given Next Dose Due   atorvastatin 40 MG tablet  Commonly known as: LIPITOR      TAKE 1 TABLET BY MOUTH AT NIGHT       B-D ULTRAFINE III SHORT PEN 31G X 8 MM misc  Generic drug: Insulin Pen Needle      USE 1 PEN NEEDLE FOUR TIMES DAILY       BASAGLAR KWIKPEN 100 UNIT/ML injection pen      Inject 25 Units under the skin into the appropriate area as directed Every Night for 90 days. INJECT 25 UNITS EVERY EVENING       glucose blood test strip  Commonly known as: Contour Next Test      Dx code E11.9 testing bs 3 x day       NovoLOG FlexPen 100 UNIT/ML solution pen-injector sc pen  Generic drug: insulin aspart      Inject 75 Units under the skin into the appropriate area as directed Daily for 90 days. INJECT 8 TO 13 UNITS EVERY MORNING, 9-15 UNITS AT LUNCH, 10-15 AT SUPPER PLUS SLIDING SCALE (75/DAY)       tadalafil 10 MG tablet  Commonly known as: Cialis      Take 1 tablet by mouth Daily As Needed for Erectile Dysfunction.       VITAMIN B 12 PO      Take 1,000 mg by mouth. 2 tablets weelkly       Vitamin D-3 25 MCG (1000 UT) capsule      Take 1,000 Units by mouth Daily.          STOP taking these medications    aspirin 81 MG tablet        benazepril 10 MG tablet  Commonly known as: LOTENSIN        sulfamethoxazole-trimethoprim 800-160 MG per tablet  Commonly known as: Bactrim DS              Where to Get Your Medications      Information about where to get these medications is not yet available    Ask your nurse or doctor about these  medications  · cefTRIAXone 2 g in sodium chloride 0.9 % 100 mL IVPB          Future Appointments   Date Time Provider Department Center   11/30/2022 10:30 AM Esperanza Sarmiento PA [facility]   12/21/2022  2:00 PM Salinas Juarez MD [facility]     Additional Instructions for the Follow-ups that You Need to Schedule     Discharge Follow-up with Specialty: [facility] 1 week, PCP in 1-2 weeks. Have Midline pulled after antibiotics are completed   As directed      Specialty: [facility] 1 week, PCP in 1-2 weeks. Have Midline pulled after antibiotics are completed         Discharge Follow-up with Specialty: Restart ASA when ok with Urologist. Wait to restart benazepril until directed by PCP or Urology (with low normal BP while in hospital and not taking it)   As directed      Specialty: Restart ASA when ok with Urologist. Wait to restart benazepril until directed by PCP or Urology (with low normal BP while in hospital and not taking it)            Follow-up Information     Esperanza Sarmiento PA .    Specialty: Family Medicine  Contact information:  [address] [place] [id number] [phone]                         TEST  RESULTS PENDING AT DISCHARGE         Alejandro Sandoval MD  [facility]  11/25/22  13:32 EST      Time: greater than 32 minutes on discharge  It was a pleasure taking care of this patient while in the hospital.

## 2022-11-26 NOTE — OUTREACH NOTE
Prep Survey    Flowsheet Row Responses   [facility] facility patient discharged from? [facility]   Is LACE score < 7 ? No   Emergency Room discharge w/ pulse ox? No   Eligibility [facility]   Date of Admission 11/22/22   Date of Discharge 11/25/22   Discharge Disposition Home or Self Care   Discharge diagnosis sepsis, E coli bacteremia, Acute cystitis with hematuria, T2DM   Does the patient have one of the following disease processes/diagnoses(primary or secondary)? Sepsis   Does the patient have Home health ordered? No   Is there a DME ordered? No   Prep survey completed? Yes          SHADE Mcfarland Registered Nurse

## 2022-11-28 ENCOUNTER — TRANSITIONAL CARE MANAGEMENT TELEPHONE ENCOUNTER (OUTPATIENT)
Dept: CALL CENTER | Facility: HOSPITAL | Age: 67
End: 2022-11-28

## 2022-11-28 NOTE — OUTREACH NOTE
Call Center TCM Note    Flowsheet Row Responses   [facility] patient discharged from? [facility]   Does the patient have one of the following disease processes/diagnoses(primary or secondary)? Sepsis   TCM attempt successful? Yes   Call start time 0900   Call end time 0906   Discharge diagnosis sepsis, E coli bacteremia, Acute cystitis with hematuria, T2DM   Meds reviewed with patient/caregiver? Yes   Is the patient having any side effects they believe may be caused by any medication additions or changes? No   Does the patient have all medications related to this admission filled (includes all antibiotics, inhalers, nebulizers,steroids,etc.) Yes   Is the patient taking all medications as directed (includes completed medication regime)? Yes   Comments Hospital F/U Appt is 11/30/22 @ 1030 am. TCM complete.    Does the patient have an appointment with their PCP within 7 days of discharge? Yes   Has home health visited the patient within 72 hours of discharge? N/A   Psychosocial issues? No   Did the patient receive a copy of their discharge instructions? Yes   Nursing interventions Reviewed instructions with patient   What is the patient's perception of their health status since discharge? Improving   Nursing interventions Nurse provided patient education   Is the patient/caregiver able to teach back TIME? T emperature - higher or lower than normal, I nfection - may have signs and symptoms of an infection, M ental Decline - confused, sleepy, difficult to arouse, E xtremely Ill - severe pain, discomfort, shortness of breath   Nursing interventions Nurse provided reassurance to patient, Nurse provided patient education   Is patient/caregiver able to teach back steps to recovery at home? Set small, achievable goals for return to baseline health, Rest and regain strength, Talk about feelings with family/friends, Exercise as tolerated, Make a list of questions for PCP appoinment, Eat a balanced diet   Is the  patient/caregiver able to teach back signs and symptoms of worsening condition: Fever, Edema, Shortness of breath/rapid respiratory rate   If the patient is a current smoker, are they able to teach back resources for cessation? Not a smoker   Is the patient/caregiver able to teach back the hierarchy of who to call/visit for symptoms/problems? PCP, Specialist, Home health nurse, Urgent Care, ED, 911 Yes   Additional teach back comments He is making improvement. He will keep appts this week. He is finishing his IV ABX today he says.   TCM call completed? Yes   Wrap up additional comments He is feeling better, anxious to know if infection is gone.   Call end time 0906   Would this patient benefit from a Referral to [facility] Social Work? No   Is the patient interested in additional calls from an ambulatory ?  NOTE:  applies to high risk patients requiring additional follow-up. No          Cassi Arora RN    11/28/2022, 09:07 EST

## 2022-11-29 ENCOUNTER — CLINICAL SUPPORT (OUTPATIENT)
Dept: INFECTIOUS DISEASES | Facility: CLINIC | Age: 67
End: 2022-11-29

## 2022-11-29 VITALS — HEART RATE: 78 BPM | SYSTOLIC BLOOD PRESSURE: 130 MMHG | TEMPERATURE: 97.1 F | DIASTOLIC BLOOD PRESSURE: 77 MMHG

## 2022-11-29 NOTE — PROGRESS NOTES
Patient was seen in office for IV midline removal. Vital signs taken. IV midline site at left upper arm without redness or drainage. Midline removed and patient tolerated well. Midline catheter was intact at removal on inspection. Site covered w/ xeroform & tegaderm and patient informed to keep intact for 24-48 hours and then can remove and may apply bandaid daily if needed.

## 2022-11-29 NOTE — PROGRESS NOTES
Enter Query Response Below      Query Response:         Type 2 diabetes with hyperglycemia   Electronically signed by Alejandro Sandoval MD, 22, 8:40 PM EST.       If applicable, please update the problem list.     Patient: Jeff Bernard        : 1955  Account: [id number]           Admit Date: 2022        How to Respond to this query:       a. Click New Note     b. Answer query within the yellow box.                c. Update the Problem List, if applicable.      If you have any questions about this query contact me at: [email or web address]     Dr. Sandoval:     Patient admitted on  with sepsis secondary to E. coli bacteremia, and acute cystitis with hematuria. Patient has type 2 diabetes mellitus. Infectious disease physician noted that the patient had uncontrolled type 2 diabetes mellitus. Patient had elevated blood glucose levels during this hospital stay as follows: 505 on  at 0617, 458 on  at 0842, 211 on  at 1226, and 250 on  at 1049. Patient was treated with subcutaneous insulin and blood glucose monitoring.    After study, can you further clarify the uncontrolled type 2 diabetes as:    Type 2 diabetes with hyperglycemia   Type 2 diabetes with hypoglycemia   Other (please specify) __________________  Clinically indeterminable     By submitting this query, we are merely seeking further clarification of documentation to accurately reflect all conditions that you are monitoring, evaluating, treating or that extend the hospitalization or utilize additional resources of care. Please utilize your independent clinical judgment when addressing the question(s) above.     This query and your response, once completed, will be entered into the legal medical record.    Sincerely,  Mike MCCOLLUM, RN, CCDS   Clinical Documentation Integrity Program

## 2022-11-30 ENCOUNTER — OFFICE VISIT (OUTPATIENT)
Dept: FAMILY MEDICINE CLINIC | Facility: CLINIC | Age: 67
End: 2022-11-30

## 2022-11-30 VITALS
RESPIRATION RATE: 16 BRPM | OXYGEN SATURATION: 99 % | SYSTOLIC BLOOD PRESSURE: 120 MMHG | HEIGHT: 73 IN | DIASTOLIC BLOOD PRESSURE: 60 MMHG | HEART RATE: 70 BPM | TEMPERATURE: 98.6 F | BODY MASS INDEX: 29.55 KG/M2 | WEIGHT: 223 LBS

## 2022-11-30 DIAGNOSIS — B96.20 E COLI BACTEREMIA: ICD-10-CM

## 2022-11-30 DIAGNOSIS — R78.81 E COLI BACTEREMIA: ICD-10-CM

## 2022-11-30 DIAGNOSIS — A41.9 SEPSIS: ICD-10-CM

## 2022-11-30 DIAGNOSIS — R52 BODY ACHES: Primary | ICD-10-CM

## 2022-11-30 DIAGNOSIS — R19.7 DIARRHEA, UNSPECIFIED TYPE: ICD-10-CM

## 2022-11-30 DIAGNOSIS — R97.20 INCREASED PROSTATE SPECIFIC ANTIGEN (PSA) VELOCITY: ICD-10-CM

## 2022-11-30 DIAGNOSIS — A41.9 SEPSIS, DUE TO UNSPECIFIED ORGANISM, UNSPECIFIED WHETHER ACUTE ORGAN DYSFUNCTION PRESENT: ICD-10-CM

## 2022-11-30 DIAGNOSIS — N30.01 ACUTE CYSTITIS WITH HEMATURIA: ICD-10-CM

## 2022-11-30 LAB
EXPIRATION DATE: NORMAL
FLUAV AG UPPER RESP QL IA.RAPID: NOT DETECTED
FLUBV AG UPPER RESP QL IA.RAPID: NOT DETECTED
INTERNAL CONTROL: NORMAL
Lab: NORMAL
SARS-COV-2 AG UPPER RESP QL IA.RAPID: NOT DETECTED

## 2022-11-30 PROCEDURE — 99214 OFFICE O/P EST MOD 30 MIN: CPT | Performed by: PHYSICIAN ASSISTANT

## 2022-11-30 PROCEDURE — 87428 SARSCOV & INF VIR A&B AG IA: CPT | Performed by: PHYSICIAN ASSISTANT

## 2022-11-30 RX ORDER — EPINEPHRINE 0.3 MG/.3ML
INJECTION SUBCUTANEOUS
COMMUNITY
Start: 2022-11-25 | End: 2022-11-30

## 2022-12-02 LAB
ALBUMIN SERPL-MCNC: 3.6 G/DL (ref 3.5–5.2)
ALBUMIN/GLOB SERPL: 1.4 G/DL
ALP SERPL-CCNC: 94 U/L (ref 39–117)
ALT SERPL-CCNC: 42 U/L (ref 1–41)
APPEARANCE UR: CLEAR
AST SERPL-CCNC: 37 U/L (ref 1–40)
BACTERIA #/AREA URNS HPF: NORMAL /HPF
BACTERIA UR CULT: NO GROWTH
BACTERIA UR CULT: NORMAL
BASOPHILS # BLD AUTO: 0.03 10*3/MM3 (ref 0–0.2)
BASOPHILS NFR BLD AUTO: 0.3 % (ref 0–1.5)
BILIRUB SERPL-MCNC: 0.5 MG/DL (ref 0–1.2)
BILIRUB UR QL STRIP: NEGATIVE
BUN SERPL-MCNC: 10 MG/DL (ref 8–23)
BUN/CREAT SERPL: 14.9 (ref 7–25)
CALCIUM SERPL-MCNC: 8.5 MG/DL (ref 8.6–10.5)
CASTS URNS MICRO: NORMAL
CHLORIDE SERPL-SCNC: 106 MMOL/L (ref 98–107)
CO2 SERPL-SCNC: 26.1 MMOL/L (ref 22–29)
COLOR UR: YELLOW
CREAT SERPL-MCNC: 0.67 MG/DL (ref 0.76–1.27)
EGFRCR SERPLBLD CKD-EPI 2021: 101.1 ML/MIN/1.73
EOSINOPHIL # BLD AUTO: 0.04 10*3/MM3 (ref 0–0.4)
EOSINOPHIL NFR BLD AUTO: 0.5 % (ref 0.3–6.2)
EPI CELLS #/AREA URNS HPF: NORMAL /HPF
ERYTHROCYTE [DISTWIDTH] IN BLOOD BY AUTOMATED COUNT: 12.2 % (ref 12.3–15.4)
GLOBULIN SER CALC-MCNC: 2.6 GM/DL
GLUCOSE SERPL-MCNC: 102 MG/DL (ref 65–99)
GLUCOSE UR QL STRIP: NEGATIVE
HCT VFR BLD AUTO: 39.1 % (ref 37.5–51)
HGB BLD-MCNC: 13.5 G/DL (ref 13–17.7)
HGB UR QL STRIP: ABNORMAL
IMM GRANULOCYTES # BLD AUTO: 0.04 10*3/MM3 (ref 0–0.05)
IMM GRANULOCYTES NFR BLD AUTO: 0.5 % (ref 0–0.5)
KETONES UR QL STRIP: NEGATIVE
LEUKOCYTE ESTERASE UR QL STRIP: NEGATIVE
LYMPHOCYTES # BLD AUTO: 0.88 10*3/MM3 (ref 0.7–3.1)
LYMPHOCYTES NFR BLD AUTO: 10.2 % (ref 19.6–45.3)
MCH RBC QN AUTO: 30.6 PG (ref 26.6–33)
MCHC RBC AUTO-ENTMCNC: 34.5 G/DL (ref 31.5–35.7)
MCV RBC AUTO: 88.7 FL (ref 79–97)
MONOCYTES # BLD AUTO: 0.42 10*3/MM3 (ref 0.1–0.9)
MONOCYTES NFR BLD AUTO: 4.9 % (ref 5–12)
NEUTROPHILS # BLD AUTO: 7.18 10*3/MM3 (ref 1.7–7)
NEUTROPHILS NFR BLD AUTO: 83.6 % (ref 42.7–76)
NITRITE UR QL STRIP: NEGATIVE
NRBC BLD AUTO-RTO: 0 /100 WBC (ref 0–0.2)
PH UR STRIP: 6.5 [PH] (ref 5–8)
PLATELET # BLD AUTO: 315 10*3/MM3 (ref 140–450)
POTASSIUM SERPL-SCNC: 4.2 MMOL/L (ref 3.5–5.2)
PROT SERPL-MCNC: 6.2 G/DL (ref 6–8.5)
PROT UR QL STRIP: NEGATIVE
RBC # BLD AUTO: 4.41 10*6/MM3 (ref 4.14–5.8)
RBC #/AREA URNS HPF: NORMAL /HPF
SODIUM SERPL-SCNC: 140 MMOL/L (ref 136–145)
SP GR UR STRIP: 1.02 (ref 1–1.03)
UROBILINOGEN UR STRIP-MCNC: ABNORMAL MG/DL
WBC # BLD AUTO: 8.59 10*3/MM3 (ref 3.4–10.8)
WBC #/AREA URNS HPF: NORMAL /HPF

## 2022-12-05 ENCOUNTER — TRANSCRIBE ORDERS (OUTPATIENT)
Dept: ADMINISTRATIVE | Facility: HOSPITAL | Age: 67
End: 2022-12-05

## 2022-12-05 DIAGNOSIS — C61 PROSTATE CANCER: Primary | ICD-10-CM

## 2022-12-06 ENCOUNTER — READMISSION MANAGEMENT (OUTPATIENT)
Dept: CALL CENTER | Facility: HOSPITAL | Age: 67
End: 2022-12-06

## 2022-12-06 DIAGNOSIS — A04.72 C. DIFFICILE COLITIS: Primary | ICD-10-CM

## 2022-12-06 LAB

## 2022-12-06 RX ORDER — VANCOMYCIN HYDROCHLORIDE 125 MG/1
125 CAPSULE ORAL 4 TIMES DAILY
Qty: 40 CAPSULE | Refills: 0 | Status: SHIPPED | OUTPATIENT
Start: 2022-12-06 | End: 2022-12-08

## 2022-12-06 NOTE — OUTREACH NOTE
Sepsis Week 2 Survey    Flowsheet Row Responses   [facility], [place], operated by [facility] patient discharged from? [facility]   Does the patient have one of the following disease processes/diagnoses(primary or secondary)? Sepsis   Week 2 attempt successful? Yes   Call start time 1542   Call end time 1551   Discharge diagnosis sepsis, E coli bacteremia, Acute cystitis with hematuria, T2DM   Person spoke with today (if not patient) and relationship patient   Meds reviewed with patient/caregiver? Yes   Is the patient taking all medications as directed (includes completed medication regime)? Yes   Medication comments patient reports primary care has ordered medication to treat cdiff.    Comments regarding appointments Patient reports that he has also followed up with urology.    Does the patient have a primary care provider?  Yes   Comments regarding PCP TAD Mccartney PCP. Patient has seen PCP since discharge.    Has the patient kept scheduled appointments due by today? Yes   Has home health visited the patient within 72 hours of discharge? N/A   Psychosocial issues? No   Did the patient receive a copy of their discharge instructions? Yes   Nursing interventions Reviewed instructions with patient   What is the patient's perception of their health status since discharge? Improving   Nursing interventions Nurse provided patient education   Nursing interventions Nurse provided reassurance to patient, Nurse provided patient education   Is patient/caregiver able to teach back steps to recovery at home? Set small, achievable goals for return to baseline health, Rest and regain strength, Eat a balanced diet   Is the patient/caregiver able to teach back signs and symptoms of worsening condition: Fever   If the patient is a current smoker, are they able to teach back resources for cessation? Not a smoker   Is the patient/caregiver able to teach back the hierarchy of who to call/visit for symptoms/problems? PCP, Specialist, Home health nurse,  Urgent Care, ED, 911 Yes   Week 2 call completed? Yes          KRISTINE MEDELLIN - Registered Nurse

## 2022-12-08 ENCOUNTER — TELEPHONE (OUTPATIENT)
Dept: FAMILY MEDICINE CLINIC | Facility: CLINIC | Age: 67
End: 2022-12-08

## 2022-12-08 RX ORDER — METRONIDAZOLE 500 MG/1
500 TABLET ORAL 3 TIMES DAILY
Qty: 30 TABLET | Refills: 0 | Status: SHIPPED | OUTPATIENT
Start: 2022-12-08 | End: 2022-12-21

## 2022-12-08 NOTE — TELEPHONE ENCOUNTER
Caller: Jeff Bernard    Relationship: Self    Best call back number: [phone]    Who are you requesting to speak with (clinical staff, provider,  specific staff member): HAIDER PRICE     What was the call regarding: PATIENT IS CALLING IN REGARDS TO vancomycin (VANCOCIN) 125 MG capsule. PATIENT STATED THAT THE COST OF THE MEDICATION IS OVER $200 AND HE WOULD LIKE TO DISCUSS AN ALTERNATIVE MEDICATION. PATIENT STATED THAT HE IS LOOKING AT [facility] AND WOULD LIKE TO DISCUSS THAT. PLEASE ADVISE.     Do you require a callback: YES

## 2022-12-12 ENCOUNTER — HOSPITAL ENCOUNTER (OUTPATIENT)
Dept: NUCLEAR MEDICINE | Facility: HOSPITAL | Age: 67
End: 2022-12-12
Payer: MEDICARE

## 2022-12-12 ENCOUNTER — APPOINTMENT (OUTPATIENT)
Dept: NUCLEAR MEDICINE | Facility: HOSPITAL | Age: 67
End: 2022-12-12
Payer: MEDICARE

## 2022-12-14 ENCOUNTER — OFFICE VISIT (OUTPATIENT)
Dept: FAMILY MEDICINE CLINIC | Facility: CLINIC | Age: 67
End: 2022-12-14
Payer: MEDICARE

## 2022-12-14 VITALS
RESPIRATION RATE: 16 BRPM | DIASTOLIC BLOOD PRESSURE: 68 MMHG | TEMPERATURE: 98 F | HEIGHT: 73 IN | BODY MASS INDEX: 28.63 KG/M2 | HEART RATE: 78 BPM | SYSTOLIC BLOOD PRESSURE: 128 MMHG | OXYGEN SATURATION: 96 % | WEIGHT: 216 LBS

## 2022-12-14 DIAGNOSIS — G56.21 CUBITAL TUNNEL SYNDROME ON RIGHT: ICD-10-CM

## 2022-12-14 DIAGNOSIS — M51.36 DEGENERATIVE DISC DISEASE, LUMBAR: ICD-10-CM

## 2022-12-14 DIAGNOSIS — M47.26 OSTEOARTHRITIS OF SPINE WITH RADICULOPATHY, LUMBAR REGION: ICD-10-CM

## 2022-12-14 DIAGNOSIS — B96.20 E COLI BACTEREMIA: ICD-10-CM

## 2022-12-14 DIAGNOSIS — G56.01 CARPAL TUNNEL SYNDROME OF RIGHT WRIST: ICD-10-CM

## 2022-12-14 DIAGNOSIS — R78.81 E COLI BACTEREMIA: ICD-10-CM

## 2022-12-14 DIAGNOSIS — M54.16 RADICULOPATHY, LUMBAR REGION: ICD-10-CM

## 2022-12-14 DIAGNOSIS — E78.5 HYPERLIPIDEMIA, UNSPECIFIED HYPERLIPIDEMIA TYPE: ICD-10-CM

## 2022-12-14 DIAGNOSIS — M48.02 SPINAL STENOSIS IN CERVICAL REGION: ICD-10-CM

## 2022-12-14 DIAGNOSIS — J06.9 ACUTE URI: ICD-10-CM

## 2022-12-14 DIAGNOSIS — E11.43 TYPE 2 DIABETES MELLITUS WITH DIABETIC AUTONOMIC NEUROPATHY, WITH LONG-TERM CURRENT USE OF INSULIN: ICD-10-CM

## 2022-12-14 DIAGNOSIS — C61 PROSTATE CANCER: ICD-10-CM

## 2022-12-14 DIAGNOSIS — Z00.00 MEDICARE ANNUAL WELLNESS VISIT, SUBSEQUENT: Primary | ICD-10-CM

## 2022-12-14 DIAGNOSIS — M62.541 ATROPHY OF MUSCLE OF RIGHT HAND: ICD-10-CM

## 2022-12-14 DIAGNOSIS — N30.01 ACUTE CYSTITIS WITH HEMATURIA: ICD-10-CM

## 2022-12-14 DIAGNOSIS — E55.9 VITAMIN D DEFICIENCY: ICD-10-CM

## 2022-12-14 DIAGNOSIS — A04.72 C. DIFFICILE COLITIS: ICD-10-CM

## 2022-12-14 DIAGNOSIS — E11.65 UNCONTROLLED TYPE 2 DIABETES MELLITUS WITH HYPERGLYCEMIA: ICD-10-CM

## 2022-12-14 DIAGNOSIS — R94.131 ABNORMAL EMG: ICD-10-CM

## 2022-12-14 DIAGNOSIS — Z79.4 TYPE 2 DIABETES MELLITUS WITH DIABETIC AUTONOMIC NEUROPATHY, WITH LONG-TERM CURRENT USE OF INSULIN: ICD-10-CM

## 2022-12-14 DIAGNOSIS — M54.16 LUMBAR RADICULOPATHY: ICD-10-CM

## 2022-12-14 DIAGNOSIS — A41.9 SEPSIS, DUE TO UNSPECIFIED ORGANISM, UNSPECIFIED WHETHER ACUTE ORGAN DYSFUNCTION PRESENT: ICD-10-CM

## 2022-12-14 DIAGNOSIS — M25.551 RIGHT HIP PAIN: ICD-10-CM

## 2022-12-14 DIAGNOSIS — R29.898 WEAKNESS OF RIGHT HAND: ICD-10-CM

## 2022-12-14 DIAGNOSIS — E53.8 VITAMIN B 12 DEFICIENCY: ICD-10-CM

## 2022-12-14 DIAGNOSIS — M62.542 ATROPHY OF MUSCLE OF LEFT HAND: ICD-10-CM

## 2022-12-14 PROCEDURE — 1159F MED LIST DOCD IN RCRD: CPT | Performed by: PHYSICIAN ASSISTANT

## 2022-12-14 PROCEDURE — 99214 OFFICE O/P EST MOD 30 MIN: CPT | Performed by: PHYSICIAN ASSISTANT

## 2022-12-14 PROCEDURE — 1170F FXNL STATUS ASSESSED: CPT | Performed by: PHYSICIAN ASSISTANT

## 2022-12-14 PROCEDURE — G0439 PPPS, SUBSEQ VISIT: HCPCS | Performed by: PHYSICIAN ASSISTANT

## 2022-12-14 PROCEDURE — 87428 SARSCOV & INF VIR A&B AG IA: CPT | Performed by: PHYSICIAN ASSISTANT

## 2022-12-14 RX ORDER — INSULIN GLARGINE 100 [IU]/ML
INJECTION, SOLUTION SUBCUTANEOUS
COMMUNITY
End: 2022-12-21

## 2022-12-14 RX ORDER — TAMSULOSIN HYDROCHLORIDE 0.4 MG/1
1 CAPSULE ORAL DAILY
COMMUNITY

## 2022-12-14 RX ORDER — BENAZEPRIL HYDROCHLORIDE 10 MG/1
10 TABLET ORAL DAILY
COMMUNITY

## 2022-12-14 RX ORDER — TAMSULOSIN HYDROCHLORIDE 0.4 MG/1
1 CAPSULE ORAL
COMMUNITY
Start: 2022-12-05 | End: 2022-12-14

## 2022-12-14 NOTE — PROGRESS NOTES
Subjective   Jeff Bernard is a 67 y.o. male who presents today for follow-up of renal protection from diabetes, hyperlipidemia, B12 deficiency, vitamin D deficiency, weakness and atrophy of right hand after a shoulder injury, and specialists.     HPI    Trouble with diarrhea- 2 weeks was ok then will have BM with urination. Sometimes gas and sometimes water. Started a couple months ago and occurs for 2-3 days then was ok for a couple weeks. Eating better since meeting a lady. Lapses sometimes. No pain or blood with BM. No antibiotics. No fever. No other symptoms.     Blood pressure/renal protection- has been stable on Lotensin- tolerating ok. BP at home- 125/74. Checking 4 x monthly- always normal.  No low BP at home.   Hyperlipidemia- continues medication as directed and is tolerating without AE.   B12- taking supplement twice weekly.  Vitamin D-taking Vitamin D 1000 IU daily  PSA- was > 4. Prostate biopsy positive for cancer. Awaiting CT and further testing for recommendations for treatment.      Taking ASA 81 mg   No change in weight at home.     Right shoulder pain, right arm and hand weakness-may have a little more use of the hands- 2nd and 3rd fingers straightened out more. He hit his elbow and had sensation. He thinks he may have some improvement   · 12-13 years ago, he hurt his right shoulder at work. Patient slipped and swung around, caught himself by his hand with injury, and had physical therapy.   · He then had another job, hurt his shoulder, and had to have PT again. He continues doing exercises he was given by PT.   · He has atrophy and reports he is dropping things. He does notice the weakness and has atrophy noted on exam.   · I referred for EMG/NCS with median and ulnar neuropathy and he was seen by Dr Quiroz, hand surgery, then was referred to neurosurgery. He had MRI cervical spine with prominent diffuse cervical spondylosis, generalized reversal of the normal cervical lordosis centered at  C4-5, mild left facet overgrowth contributing to mild left foraminal narrowing at C2-3 and C3-4, prominent disc space narrowing and degenerative disc and endplate changes from C4 to T1, diffuse spurring and diffuse posterior disc bulge that abuts and mildly deforms the ventral surface of the cord moderately narrowing the canal at C4-5 and bilateral uncovertebral joint hypertrophy with moderate-to-severe bilateral foraminal narrowing at C4-5 that could affect the exiting C5 nerve roots bilaterally, at C5-6, a posterior disc osteophyte complex abuts and flattens the cord contributing to moderate-to-severe narrowing of the anterior posterior dimension of the canal with uncovertebral joint spurs contributing to moderate-to-severe bilateral bony foraminal narrowing that could affect the exiting C6 nerve roots bilaterally, questionable very minimal T2 high signal in the right lateral aspect of the cord at the C5-6 level, could be artifact or some minimal myelomalacia in the right lateral cord at the C5-6 level, posterior spurs and uncovertebral joint hypertrophy contributing to mild canal and moderate bilateral bony foraminal narrowing at C6-7 and uncovertebral joint spurring contributes to mild-to-moderate bilateral bony foraminal narrowing at C7-T1.   · He was referred him to Dr Herzog, hand surgery,  · Patient was seen by Dr. Lyons last 9/2020 following EMG with severe cubital tunnel and carpal tunnel syndrome.  Recommended surgery.  Patient advised to think about it and go back after the first of 2021.  · Dr Herzog-wanted to do surgery on wrist and elbow. He was not sure if it would get worse if did not do it. Not sure if it will improve with surgery.   · 8/3/2021-underwent carpal tunnel and cubital tunnel surgery with Dr. Herzog. He was not sure he noticed improvement. It seemed like he has a little more strength.     Lumbar radiculopathy, Right hip pain- has been ok. PT helped and did not have any other issues.    · Patient had pain after lifting 350 pound family member out of their car 6/22/2021. Started with pain and thought it was related to bursitis. Previously he was treated with steroid with improvement. He put his arms around patient's neck and he got him out of the car. He initially felt pain in right low back. He took Tylenol and back pain resolved. He then developed pain in lateral and posterior right hip and sometimes pain in anterior leg. Dull pain that made him sit down. Sitting and laying down helped. Getting up and down worsened it. Tried Naproxen and extra strength Tylenol.   · He had hip and back pain, however, it appeared to be lumbar radiculopathy. He wanted to try steroid. I discussed with him that this would usually not be indicated with uncontrolled DM. I gave medrol dose pack as directed and Baclofen as needed. He was advised to monitor glucose carefully, use sliding scale, and control glucose levels.  Consideration of PT for treatment and consider imaging if no resolution of pain.  · Abnormal lumbar spine x-ray with degenerative disc and osteoarthritis.  I placed order for MRI.  He did improve with physical therapy and did not have MRI.    DDD cervical spine- has been seen by neurosurgery.  Advised to see hand surgeon and follow-up in 6 weeks.  He did not return for follow-up.    Patient's specialists:  Cardiology- Dr Gallegos- last seen 9/2018-they confirmed no need for follow-up after last visit.  Endocrinology-Dr. Juarez last visit 1/2022 for diabetes mellitus type 2-continue Basaglar to 23 units every evening and NovoLog 1 unit per 5 g of carbohydrate, Lipitor, and vitamin D.  Follow-up in 4 months.  · Patient had a major discrepancy with his blood sugars.  He is using the freestyle eva machine and when he scanned his meter for his sugars the morning of his labs with endocrinology, he had readings between 80s and 151.  However, the glucose reading from his labs was 283.  He has started checking  fingerstick glucose as well as standing his meter and reports the readings are only about 20 points off.  He is not having that significant of the discrepancy.  He discussed this with the endocrinologist and they advised that he call the freestyle company.  They just sent in the new sensor.  He is a little concerned about how to accurately track his blood sugars.  He stated his average per his meter was about 145 which would not be quite 8.7% A1c.  Neurosurgery- Evi Burden PA-C-He had MRI cervical spine with prominent diffuse cervical spondylosis, generalized reversal of the normal cervical lordosis centered at C4-5, mild left facet overgrowth contributing to mild left foraminal narrowing at C2-3 and C3-4, prominent disc space narrowing and degenerative disc and endplate changes from C4 to T1, diffuse spurring and diffuse posterior disc bulge that abuts and mildly deforms the ventral surface of the cord moderately narrowing the canal at C4-5 and bilateral uncovertebral joint hypertrophy with moderate-to-severe bilateral foraminal narrowing at C4-5 that could affect the exiting C5 nerve roots bilaterally, at C5-6, a posterior disc osteophyte complex abuts and flattens the cord contributing to moderate-to-severe narrowing of the anterior posterior dimension of the canal with uncovertebral joint spurs contributing to moderate-to-severe bilateral bony foraminal narrowing that could affect the exiting C6 nerve roots bilaterally, questionable very minimal T2 high signal in the right lateral aspect of the cord at the C5-6 level, could be artifact or some minimal myelomalacia in the right lateral cord at the C5-6 level, posterior spurs and uncovertebral joint hypertrophy contributing to mild canal and moderate bilateral bony foraminal narrowing at C6-7 and uncovertebral joint spurring contributes to mild-to-moderate bilateral bony foraminal narrowing at C7-T1. Advised to see Dr Herzog and follow up in 6 weeks.    Urology-Dr. Fatima- last seen 5/2022 for elevated PSA, BPH, undescended right testicle, ED.  Still > 4 (had improved some) but since stable- recheck in 6 months. He is discussing biopsy.  Prescribed Cialis.  Follow-up 6 months.  Ophthalmology- Dr Camejo- last appt 12/2021 in [facility].  No DMR.  Follow-up 1 year.  Hand surgery- Dr Herzog- last visit 8/2021 for carpal tunnel syndrome and a little tunnel syndrome-s/p right CTR release.  Advised course of home physical therapy with active and passive ROM.  Return to normal activity in 2 weeks as tolerated.  Follow-up 3 to 4 months.  · EMG confirmed severe carpal tunnel and cubital tunnel syndromes-discussed surgical options.  Patient wanted to think about surgery and will let them know.   · Prior Dr Quiroz who referred to neurosurgery    The following portions of the patient's history were reviewed and updated as appropriate: allergies, current medications, past family history, past medical history, past social history, past surgical history and problem list.    Review of Systems   HENT: Negative.    Respiratory: Negative.    Cardiovascular: Negative.    Gastrointestinal: Positive for abdominal pain and diarrhea.   Endocrine: Negative.    Genitourinary: Negative.    Musculoskeletal: Positive for arthralgias.   Skin: Negative.    Neurological: Positive for weakness.   Psychiatric/Behavioral: Negative.      Objective    Vitals:    12/14/22 1042   BP: 128/68   Pulse: 78   Resp: 16   Temp: 98 °F (36.7 °C)   SpO2: 96%     Body mass index is 28.5 kg/m².    Physical Exam   Constitutional: He is oriented to person, place, and time. He appears well-developed. No distress.   HENT:   Head: Normocephalic and atraumatic.   Right Ear: External ear normal.   Left Ear: External ear normal.   Eyes: Conjunctivae are normal.   Neck: Carotid bruit is not present. No tracheal deviation present. No thyroid mass and no thyromegaly present.   Cardiovascular: Normal rate, regular rhythm,  normal heart sounds and normal pulses.   Pulmonary/Chest: Effort normal and breath sounds normal.   Abdominal: Normal appearance.   Musculoskeletal:      Comments: Significant atrophy and weakness RUE and right hand   Neurological: He is alert and oriented to person, place, and time. Gait normal.   Skin: Skin is warm and dry.   Psychiatric: His behavior is normal. Mood, judgment and thought content normal.   Nursing note and vitals reviewed.        Assessment & Plan   Diagnoses and all orders for this visit:    1. Medicare annual wellness visit, subsequent (Primary)    2. Acute URI  -     POCT SARS-CoV-2 Antigen OPAL    3. Uncontrolled type 2 diabetes mellitus with hyperglycemia (HCC)    4. Type 2 diabetes mellitus with diabetic autonomic neuropathy, with long-term current use of insulin (HCC)    5. Hyperlipidemia, unspecified hyperlipidemia type    6. Vitamin B 12 deficiency    7. Vitamin D deficiency    8. Weakness of right hand    9. Carpal tunnel syndrome of right wrist    10. Cubital tunnel syndrome on right    11. Atrophy of muscle of right hand    12. Atrophy of muscle of left hand    13. Abnormal EMG    14. Radiculopathy, lumbar region    15. Lumbar radiculopathy    16. Osteoarthritis of spine with radiculopathy, lumbar region    17. Degenerative disc disease, lumbar    18. Right hip pain    19. Spinal stenosis in cervical region    20. C. difficile colitis    21. Acute cystitis with hematuria    22. E coli bacteremia    23. Sepsis, due to unspecified organism, unspecified whether acute organ dysfunction present (HCC)    24. Prostate cancer (HCC)         Assessment and plan  AWV completed today. He is up-to-date on colonoscopy and is up-to-date on flu shot, Prevnar, Pneumovax, Tdap, Shingrix, and hepatitis A. He should have Covid 19 booster. Last carotid duplex and triple arterial screening with bilateral sided plaque without stenosis 1/2021.  Patient has triple arterial screen card to call for screening  today. He has just received a diagnosis of prostate cancer and is awaiting further testing to determine treatment. Continue follow up with urology as directed by them.     Patient has fasting labs with endocrinology. Labs have all been reviewed.  He is doing well and has been very stable.  Follow-up in 1 year for AWV and follow up or sooner if any concerns.     · Blood pressure-Blood pressure is slightly low today but has been stable. He is on Lotensin for renal protection with diabetes mellitus. Continue Lotensin 10 mg once daily. Monitor BP and call if low or elevated. Ensure changing positions carefully and return if low BP. We will consider decreasing to 5 mg daily if low.   · B12 deficiency-Continue B12 1000 mcg twice weekly. I will monitor at follow up.   · Vitamin D deficiency-Continue Vitamin D 1000IU once daily.   · Diabetes mellitus type II uncontrolled, hyperlipidemia, and vitamin D deficiency- Last A1C remained uncontrolled at 8.1% 1/2022.  He should continue treatment and follow up with endocrinology as directed by them.   · Right upper extremity atrophy-cubital tunnel syndrome and carpal tunnel syndrome- Follow up with hand surgery, Dr. Lyons as recommended.  · Lumbar radiculopathy, Right hip pain- Patient has been stable. Follow up if worsening, new, or changing symptoms.   · DDD cervical spine- To be seen if worsening, new or changing symptoms.   · Elevated PSA, BPH, ED- Patient to follow with urology as directed.  · Diarrhea- He has had intermittent diarrhea x months. He has had decreased appetite. I will check stool culture and make further recommendations. To see GI or follow up if worsening, new, or changing symptoms.     Patient continues to see specialist as noted above.  I have reviewed available records, including consult notes, labs, and imaging/testing.  Patient to continue follow-up with specialist as directed by them.    I spent 45 minutes caring for Jeff MCCURDY Marco Antonio on this date of  service, including 15 minutes for AWV and 30 minutes for follow-up/problem focused visit. This time includes time spent by me in the following activities as necessary: preparing for the visit, reviewing tests, specialists records and previous visits, obtaining and/or reviewing a separately obtained history, performing a medically appropriate exam and/or evaluation, counseling and educating the patient, family, caregiver, referring and/or communicating with other healthcare professionals, documenting information in the medical record, independently interpreting results and communicating that information with the patient, family, caregiver, and developing a medically appropriate treatment plan with consideration of other conditions, medications, and treatments.

## 2022-12-14 NOTE — PROGRESS NOTES
The ABCs of the Annual Wellness Visit  Subsequent Medicare Wellness Visit    Chief Complaint   Patient presents with   • Medicare Wellness-subsequent       Subjective   History of Present Illness:  Jeff Bernard is a 67 y.o. male who presents for a Subsequent Medicare Wellness Visit.    LAST COLONOSCOPY-Dr. Kelly- 8/2022- 1 polyp- tubular adenoma. Recheck 5 years.   · 2/2014-polypectomy.  10/3/2017-recheck in 5 years.    LAST TDAP- 11/13/13  FLU SHOT-10/2022  PREVNAR- 8/2/2017  PNEUMOVAX-11/16/2018  ZOSTAVAX- 8/3/17. SHINGRIX-11/27/2019, 4/27/2020  HEPATITIS A- 5/7/18, 12/15/2018  COVID 19- MODERNA 3/4/2021, 4/1/2021, 11/17/2021    HEALTH RISK ASSESSMENT    Recent Hospitalizations:  Recently treated at the following:  [facility]    Current Medical Providers:  Patient Care Team:  Esperanza Sarmiento PA as PCP - General  Esperanza Sarmiento PA as PCP - Family Medicine  Salinas Juarez MD as Consulting Physician (Endocrinology)  Lazarus Gallegos MD as Consulting Physician (Cardiology)  Michael Fatima MD as Consulting Physician (Urology)    Smoking Status:  Social History     Tobacco Use   Smoking Status Never   Smokeless Tobacco Never       Alcohol Consumption:  Social History     Substance and Sexual Activity   Alcohol Use Yes   • Alcohol/week: 4.0 - 6.0 standard drinks   • Types: 4 - 6 Cans of beer per week    Comment: Socially.       Depression Screen:   PHQ-2/PHQ-9 Depression Screening 12/14/2022   Retired PHQ-9 Total Score -   Retired Total Score -   Little Interest or Pleasure in Doing Things 0-->not at all   Feeling Down, Depressed or Hopeless 0-->not at all   PHQ-9: Brief Depression Severity Measure Score 0       Fall Risk Screen:  CHRISTINE Fall Risk Assessment has not been completed.    Health Habits and Functional and Cognitive Screening:  Functional & Cognitive Status 12/14/2022   Do you have difficulty preparing food and eating? No   Do you have difficulty bathing yourself, getting  dressed or grooming yourself? No   Do you have difficulty using the toilet? No   Do you have difficulty moving around from place to place? No   Do you have trouble with steps or getting out of a bed or a chair? No   Current Diet Well Balanced Diet   Dental Exam Not up to date   Eye Exam Not up to date   Exercise (times per week) 7 times per week   Current Exercises Include Walking   Current Exercise Activities Include -   Do you need help using the phone?  No   Are you deaf or do you have serious difficulty hearing?  No   Do you need help with transportation? No   Do you need help shopping? No   Do you need help preparing meals?  No   Do you need help with housework?  No   Do you need help with laundry? No   Do you need help taking your medications? No   Do you need help managing money? No   Do you ever drive or ride in a car without wearing a seat belt? No   Have you felt unusual stress, anger or loneliness in the last month? No   Who do you live with? Alone   If you need help, do you have trouble finding someone available to you? No   Have you been bothered in the last four weeks by sexual problems? No   Do you have difficulty concentrating, remembering or making decisions? No         Does the patient have evidence of cognitive impairment? No    Asprin use counseling:Taking ASA appropriately as indicated    Age-appropriate Screening Schedule:  Refer to the list below for future screening recommendations based on patient's age, sex and/or medical conditions. Orders for these recommended tests are listed in the plan section. The patient has been provided with a written plan.    Health Maintenance   Topic Date Due   • DIABETIC EYE EXAM  12/30/2022   • DIABETIC FOOT EXAM  01/20/2023   • URINE MICROALBUMIN  01/20/2023   • HEMOGLOBIN A1C  05/22/2023   • TDAP/TD VACCINES (2 - Td or Tdap) 11/13/2023   • LIPID PANEL  12/21/2023   • INFLUENZA VACCINE  Completed   • ZOSTER VACCINE  Completed          The following portions  of the patient's history were reviewed and updated as appropriate: allergies, current medications, past family history, past medical history, past social history, past surgical history and problem list.    Outpatient Medications Prior to Visit   Medication Sig Dispense Refill   • atorvastatin (LIPITOR) 40 MG tablet TAKE 1 TABLET BY MOUTH AT NIGHT 90 tablet 1   • benazepril (LOTENSIN) 10 MG tablet Take 10 mg by mouth Daily.     • Cholecalciferol (VITAMIN D-3) 1000 units capsule Take 1,000 Units by mouth Daily.     • Cyanocobalamin (VITAMIN B 12 PO) Take 1,000 mg by mouth. 2 tablets weelkly     • glucose blood (CONTOUR NEXT TEST) test strip Dx code E11.9 testing bs 3 x day 300 each 1   • insulin aspart (NovoLOG FlexPen) 100 UNIT/ML solution pen-injector sc pen Inject 75 Units under the skin into the appropriate area as directed Daily for 90 days. INJECT 8 TO 13 UNITS EVERY MORNING, 9-15 UNITS AT LUNCH, 10-15 AT SUPPER PLUS SLIDING SCALE (75/DAY) 67.5 mL 1   • tamsulosin (FLOMAX) 0.4 MG capsule 24 hr capsule Take 1 capsule by mouth Daily. Prescribed by urology     • Insulin Glargine (BASAGLAR KWIKPEN) 100 UNIT/ML injection pen Inject  under the skin into the appropriate area as directed. 25 units at night     • metroNIDAZOLE (Flagyl) 500 MG tablet Take 1 tablet by mouth 3 (Three) Times a Day. 30 tablet 0   • Insulin Glargine (BASAGLAR KWIKPEN) 100 UNIT/ML injection pen Inject 25 Units under the skin into the appropriate area as directed Every Night for 90 days. INJECT 25 UNITS EVERY EVENING 8 pen 0   • tadalafil (Cialis) 10 MG tablet Take 1 tablet by mouth Daily As Needed for Erectile Dysfunction. 30 tablet 5   • tamsulosin (FLOMAX) 0.4 MG capsule 24 hr capsule Take 1 capsule by mouth every night at bedtime.       Facility-Administered Medications Prior to Visit   Medication Dose Route Frequency Provider Last Rate Last Admin   • cyanocobalamin injection 1,000 mcg  1,000 mcg Intramuscular Q28 Days Esperanza Sarmiento PA    1,000 mcg at 04/17/19 1113       Patient Active Problem List   Diagnosis   • Abnormal electrocardiogram   • Abnormal pituitary follicle stimulating hormone (FSH)   • Arteriosclerosis of carotid artery   • Benign colonic polyp   • Benign essential hypertension   • Erectile dysfunction of nonorganic origin   • Type 2 diabetes mellitus without complication, with long-term current use of insulin (HCC)   • Hyperlipidemia   • Vitamin D deficiency   • History of adenomatous polyp of colon   • Increased prostate specific antigen (PSA) velocity   • Abnormal testicular exam   • Spinal stenosis in cervical region   • Bilateral carpal tunnel syndrome   • Ulnar neuropathy at elbow   • Atrophy of muscle of right hand   • Sepsis, due to unspecified organism, unspecified whether acute organ dysfunction present (HCC)   • Acute cystitis with hematuria   • E coli bacteremia       Advanced Care Planning:  ACP discussion was held with the patient during this visit. Patient does not have an advance directive, information provided.    Review of Systems    Compared to one year ago, the patient feels his physical health is worse. Sepsis from prostate biopsy, URI, and now dx prostate cancer.   Compared to one year ago, the patient feels his mental health is the same.    Reviewed chart for potential of high risk medication in the elderly: not applicable  Reviewed chart for potential of harmful drug interactions in the elderly:not applicable    Objective         Vitals:    12/14/22 1042   BP: 128/68   Pulse: 78   Resp: 16   Temp: 98 °F (36.7 °C)   SpO2: 96%   Weight: 98 kg (216 lb)   Height: 185.4 cm (73\")       Body mass index is 28.5 kg/m².  Discussed the patient's BMI with him. The BMI is above average; BMI management plan is completed.    Physical Exam    Lab Results   Component Value Date    CHLPL 126 12/21/2022    TRIG 63 12/21/2022    HDL 44 12/21/2022    LDL 69 12/21/2022    VLDL 13 12/21/2022    HGBA1C 8.10 (H) 11/22/2022         Assessment & Plan   Medicare Risks and Personalized Health Plan  CMS Preventative Services Quick Reference  Advance Directive Discussion  Immunizations Discussed/Encouraged (specific immunizations; COVID19 )  Polypharmacy    The above risks/problems have been discussed with the patient.  Pertinent information has been shared with the patient in the After Visit Summary.  Follow up plans and orders are seen below in the Assessment/Plan Section.    Diagnoses and all orders for this visit:    1. Medicare annual wellness visit, subsequent (Primary)    2. Acute URI  -     POCT SARS-CoV-2 Antigen OPAL    3. Uncontrolled type 2 diabetes mellitus with hyperglycemia (HCC)    4. Type 2 diabetes mellitus with diabetic autonomic neuropathy, with long-term current use of insulin (HCC)    5. Hyperlipidemia, unspecified hyperlipidemia type    6. Vitamin B 12 deficiency    7. Vitamin D deficiency    8. Weakness of right hand    9. Carpal tunnel syndrome of right wrist    10. Cubital tunnel syndrome on right    11. Atrophy of muscle of right hand    12. Atrophy of muscle of left hand    13. Abnormal EMG    14. Radiculopathy, lumbar region    15. Lumbar radiculopathy    16. Osteoarthritis of spine with radiculopathy, lumbar region    17. Degenerative disc disease, lumbar    18. Right hip pain    19. Spinal stenosis in cervical region    20. C. difficile colitis    21. Acute cystitis with hematuria    22. E coli bacteremia    23. Sepsis, due to unspecified organism, unspecified whether acute organ dysfunction present (HCC)    24. Prostate cancer (HCC)      Follow Up:  Return in about 1 year (around 12/14/2023) for Medicare Wellness.     An After Visit Summary and PPPS were given to the patient.

## 2022-12-15 ENCOUNTER — READMISSION MANAGEMENT (OUTPATIENT)
Dept: CALL CENTER | Facility: HOSPITAL | Age: 67
End: 2022-12-15

## 2022-12-15 NOTE — OUTREACH NOTE
Sepsis Week 3 Survey    Flowsheet Row Responses   [facility] patient discharged from? [place]   Does the patient have one of the following disease processes/diagnoses(primary or secondary)? Sepsis   Week 3 attempt successful? Yes   Call start time 0956   Call end time 1007   Discharge diagnosis sepsis, E coli bacteremia, Acute cystitis with hematuria, T2DM   Person spoke with today (if not patient) and relationship patient   Meds reviewed with patient/caregiver? Yes   Is the patient having any side effects they believe may be caused by any medication additions or changes? No   Does the patient have all medications related to this admission filled (includes all antibiotics, inhalers, nebulizers,steroids,etc.) Yes   Is the patient taking all medications as directed (includes completed medication regime)? Yes   Does the patient have a primary care provider?  Yes   Does the patient have an appointment with their PCP within 7 days of discharge? Yes   Has the patient kept scheduled appointments due by today? Yes   Psychosocial issues? No   What is the patient's perception of their health status since discharge? Improving   Nursing interventions Nurse provided patient education   Is the patient/caregiver able to teach back TIME? T emperature - higher or lower than normal, I nfection - may have signs and symptoms of an infection, M ental Decline - confused, sleepy, difficult to arouse, E xtremely Ill - severe pain, discomfort, shortness of breath   Nursing interventions Nurse provided patient education   Is patient/caregiver able to teach back steps to recovery at home? Rest and regain strength, Eat a balanced diet, Learn about sepsis([email or web address])   Is the patient/caregiver able to teach back signs and symptoms of worsening condition: Fever, Shortness of breath/rapid respiratory rate, Altered mental status(confusion/coma)   If the patient is a current smoker, are they able to teach back resources for cessation? Not  a smoker   Is the patient/caregiver able to teach back the hierarchy of who to call/visit for symptoms/problems? PCP, Specialist, Home health nurse, Urgent Care, ED, 911 Yes   Week 3 call completed? Yes   Wrap up additional comments Pt states he is feeling better, but not back to his baseline. Pt is now taking Flagyl/Florastor r/t cdiff. Pt additionally is taking flomax per urologist. Pt had PCP fu appt.          REINALDO JONES - Registered Nurse

## 2022-12-20 ENCOUNTER — HOSPITAL ENCOUNTER (OUTPATIENT)
Dept: NUCLEAR MEDICINE | Facility: HOSPITAL | Age: 67
Discharge: HOME | End: 2022-12-20

## 2022-12-20 DIAGNOSIS — C61 PROSTATE CANCER: ICD-10-CM

## 2022-12-20 PROCEDURE — A9503 TC99M MEDRONATE: HCPCS | Performed by: UROLOGY

## 2022-12-20 PROCEDURE — 0 TECHNETIUM MEDRONATE KIT: Performed by: UROLOGY

## 2022-12-20 PROCEDURE — 78306 BONE IMAGING WHOLE BODY: CPT

## 2022-12-20 RX ORDER — TC 99M MEDRONATE 20 MG/10ML
21.8 INJECTION, POWDER, LYOPHILIZED, FOR SOLUTION INTRAVENOUS
Status: COMPLETED | OUTPATIENT
Start: 2022-12-20 | End: 2022-12-20

## 2022-12-20 RX ADMIN — Medication 21.8 MILLICURIE: at 08:50

## 2022-12-20 NOTE — PROGRESS NOTES
Subjective   Jeff Bernard is a 67 y.o. male.     History of Present Illness      F/u for dm 2, hyperlipidemia, vitamin d def / testing bs using the freestyle eva / last dm eye exam 12/30/21  with dr Camejo/ last dm foot exam 1/20/22  with dr Juarez         Patient has known diabetes mellitus since 1999 and started on insulin in 2000. He is on Basaglar 25 units every evening and NovoLog 1 unit per 3-4 g of carbohydrate before each meal.  He is using a freestyle eva.  Blood sugars were higher when he was admitted for sepsis in November 2022.  Hemoglobin A1c done in November 2022 was 8.1%.  His last meal was last night.     Sensor data reviewed.  Average glucose 165.  Time in range 53%.  Time above range 39%.  Time below target 8%.  He has few hypoglycemia in the early morning.  He has multiple hypoglycemic episodes after supper     His last eye examination was in December 2021 and he has no retinopathy. He denies any associated nephropathy. Urine microalbumin was normal in 1/22. He is on benazepril. He denies any numbness, tingling or burning in his feet.      He has hyperlipidemia and has been on Lipitor 40 mg once a day. He denies any muscle pain.      He has hypertension and is on benazepril 10 mg/day.  He denies any previous history of myocardial infarction, heart failure, or stroke. He denies chest pain, shortness of breath or pedal edema.  He had a normal nuclear stress test in 4/18     He had a colonoscopy done in March 2017 and polyps were removed by Dr. Kelly.  Pathology report was read as tubular adenoma with low-grade dysplasia and hyperplastic polyps.     He had tubular adenoma with low-grade dysplasia removed by colonoscopy in August 2022.  He was advised repeat colonoscopy in 2027.     He has vitamin D deficiency is on vitamin D3 1000 units/day.     He had right carpal tunnel and right cubital tunnel release in June 2021.  He has not recovered full muscle strength of his hand.  He is doing home  "physical therapy.    He has prostate cancer and biopsy done in November 2022.  He was complicated by sepsis.  He has completed IV antibiotic therapy.  He has completed treatment for C. difficile colitis.  He is on Flomax 0.4 mg daily.  He denies urinary symptoms.  He follows with Dr. Fatima.      The following portions of the patient's history were reviewed and updated as appropriate: allergies, current medications, past family history, past medical history, past social history, past surgical history and problem list.    Review of Systems   Eyes: Negative for visual disturbance.   Respiratory: Negative for shortness of breath.    Cardiovascular: Negative for chest pain and palpitations.   Gastrointestinal: Negative for abdominal pain, anal bleeding, blood in stool, constipation and diarrhea.   Endocrine: Negative for cold intolerance and heat intolerance.   Genitourinary: Negative.    Musculoskeletal: Negative for joint swelling.   Neurological: Negative for numbness.     Vitals:    12/21/22 1356   BP: 110/60   Pulse: 74   Temp: 98.2 °F (36.8 °C)   TempSrc: Temporal   SpO2: 98%   Weight: 97.9 kg (215 lb 12.8 oz)   Height: 185.4 cm (72.99\")      Objective   Physical Exam  Constitutional:       General: He is not in acute distress.     Appearance: Normal appearance. He is not ill-appearing or toxic-appearing.   Eyes:      General: No scleral icterus.        Right eye: No discharge.         Left eye: No discharge.      Extraocular Movements: Extraocular movements intact.      Conjunctiva/sclera: Conjunctivae normal.   Neck:      Vascular: No carotid bruit.   Cardiovascular:      Rate and Rhythm: Normal rate and regular rhythm.      Heart sounds: Normal heart sounds. No murmur heard.    No friction rub.   Pulmonary:      Effort: No respiratory distress.      Breath sounds: Normal breath sounds. No stridor. No wheezing or rales.   Chest:      Chest wall: No tenderness.   Abdominal:      General: Bowel sounds are normal. "      Palpations: Abdomen is soft.      Tenderness: There is no right CVA tenderness or left CVA tenderness.   Musculoskeletal:      Cervical back: No rigidity or tenderness.   Skin:     General: Skin is warm.   Neurological:      General: No focal deficit present.      Mental Status: He is alert and oriented to person, place, and time.   Psychiatric:         Mood and Affect: Mood normal.         Behavior: Behavior normal.       Office Visit on 12/14/2022   Component Date Value Ref Range Status   • SARS Antigen 12/14/2022 Not Detected  Not Detected, Presumptive Negative Final   • Influenza A Antigen OPAL 12/14/2022 Not Detected  Not Detected Final   • Influenza B Antigen OPAL 12/14/2022 Not Detected  Not Detected Final   • Internal Control 12/14/2022 Passed  Passed Final   • Lot Number 12/14/2022 [id number]   Final   • Expiration Date 12/14/2022 02/06/2023   Final     Assessment & Plan   Diagnoses and all orders for this visit:    1. Type 2 diabetes mellitus without complication, with long-term current use of insulin (HCC) (Primary)  -     Comprehensive Metabolic Panel  -     Lipid Panel  -     TSH    2. Hyperlipidemia, unspecified hyperlipidemia type  -     Comprehensive Metabolic Panel  -     Lipid Panel  -     TSH    3. Vitamin D deficiency  -     Vitamin D,25-Hydroxy    4. Primary hypertension  -     Comprehensive Metabolic Panel    5. History of colon polyps    6. Prostate cancer (HCC)      Decrease Basaglar to 22 units every evening.  Continue mealtime NovoLog.  Continue Lipitor 40 mg a day.  Continue benazepril 10 mg/day.  Continue vitamin D3 1000 units/day.  Follow-up with Dr. Fatima.    Copy of my note sent to Dr. Fatima and Esperanza Sarmiento PA.    RTC 4 mos with ALISA Ricci NP.

## 2022-12-21 ENCOUNTER — OFFICE VISIT (OUTPATIENT)
Dept: ENDOCRINOLOGY | Age: 67
End: 2022-12-21

## 2022-12-21 VITALS
OXYGEN SATURATION: 98 % | BODY MASS INDEX: 28.6 KG/M2 | TEMPERATURE: 98.2 F | HEIGHT: 73 IN | HEART RATE: 74 BPM | WEIGHT: 215.8 LBS | SYSTOLIC BLOOD PRESSURE: 110 MMHG | DIASTOLIC BLOOD PRESSURE: 60 MMHG

## 2022-12-21 DIAGNOSIS — C61 PROSTATE CANCER: ICD-10-CM

## 2022-12-21 DIAGNOSIS — Z79.4 TYPE 2 DIABETES MELLITUS WITHOUT COMPLICATION, WITH LONG-TERM CURRENT USE OF INSULIN: Primary | ICD-10-CM

## 2022-12-21 DIAGNOSIS — Z86.010 HISTORY OF COLON POLYPS: ICD-10-CM

## 2022-12-21 DIAGNOSIS — I10 PRIMARY HYPERTENSION: ICD-10-CM

## 2022-12-21 DIAGNOSIS — E11.9 TYPE 2 DIABETES MELLITUS WITHOUT COMPLICATION, WITH LONG-TERM CURRENT USE OF INSULIN: Primary | ICD-10-CM

## 2022-12-21 DIAGNOSIS — E78.5 HYPERLIPIDEMIA, UNSPECIFIED HYPERLIPIDEMIA TYPE: ICD-10-CM

## 2022-12-21 DIAGNOSIS — E55.9 VITAMIN D DEFICIENCY: ICD-10-CM

## 2022-12-21 PROCEDURE — 95251 CONT GLUC MNTR ANALYSIS I&R: CPT | Performed by: INTERNAL MEDICINE

## 2022-12-21 PROCEDURE — 99214 OFFICE O/P EST MOD 30 MIN: CPT | Performed by: INTERNAL MEDICINE

## 2022-12-21 RX ORDER — ASPIRIN 81 MG/1
81 TABLET ORAL DAILY
COMMUNITY

## 2022-12-21 RX ORDER — INSULIN GLARGINE 100 [IU]/ML
22 INJECTION, SOLUTION SUBCUTANEOUS NIGHTLY
Status: SHIPPED
Start: 2022-12-21 | End: 2023-01-10 | Stop reason: SDUPTHER

## 2022-12-22 LAB
25(OH)D3+25(OH)D2 SERPL-MCNC: 35.7 NG/ML (ref 30–100)
ALBUMIN SERPL-MCNC: 3.8 G/DL (ref 3.5–5.2)
ALBUMIN/GLOB SERPL: 1.3 G/DL
ALP SERPL-CCNC: 69 U/L (ref 39–117)
ALT SERPL-CCNC: 20 U/L (ref 1–41)
AST SERPL-CCNC: 18 U/L (ref 1–40)
BILIRUB SERPL-MCNC: 0.4 MG/DL (ref 0–1.2)
BUN SERPL-MCNC: 10 MG/DL (ref 8–23)
BUN/CREAT SERPL: 14.7 (ref 7–25)
CALCIUM SERPL-MCNC: 9 MG/DL (ref 8.6–10.5)
CHLORIDE SERPL-SCNC: 101 MMOL/L (ref 98–107)
CHOLEST SERPL-MCNC: 126 MG/DL (ref 0–200)
CO2 SERPL-SCNC: 29.7 MMOL/L (ref 22–29)
CREAT SERPL-MCNC: 0.68 MG/DL (ref 0.76–1.27)
EGFRCR SERPLBLD CKD-EPI 2021: 100.6 ML/MIN/1.73
GLOBULIN SER CALC-MCNC: 3 GM/DL
GLUCOSE SERPL-MCNC: 151 MG/DL (ref 65–99)
HDLC SERPL-MCNC: 44 MG/DL (ref 40–60)
IMP & REVIEW OF LAB RESULTS: NORMAL
LDLC SERPL CALC-MCNC: 69 MG/DL (ref 0–100)
POTASSIUM SERPL-SCNC: 4 MMOL/L (ref 3.5–5.2)
PROT SERPL-MCNC: 6.8 G/DL (ref 6–8.5)
SODIUM SERPL-SCNC: 140 MMOL/L (ref 136–145)
TRIGL SERPL-MCNC: 63 MG/DL (ref 0–150)
TSH SERPL DL<=0.005 MIU/L-ACNC: 1.5 UIU/ML (ref 0.27–4.2)
VLDLC SERPL CALC-MCNC: 13 MG/DL (ref 5–40)

## 2022-12-24 DIAGNOSIS — E11.9 TYPE 2 DIABETES MELLITUS WITHOUT COMPLICATION, WITH LONG-TERM CURRENT USE OF INSULIN: Primary | ICD-10-CM

## 2022-12-24 DIAGNOSIS — Z79.4 TYPE 2 DIABETES MELLITUS WITHOUT COMPLICATION, WITH LONG-TERM CURRENT USE OF INSULIN: Primary | ICD-10-CM

## 2022-12-24 DIAGNOSIS — E55.9 VITAMIN D DEFICIENCY: ICD-10-CM

## 2022-12-24 DIAGNOSIS — E78.5 HYPERLIPIDEMIA, UNSPECIFIED HYPERLIPIDEMIA TYPE: ICD-10-CM

## 2022-12-24 NOTE — PROGRESS NOTES
LDL 69.  HDL 44.  Continue Lipitor 40 mg daily.  Normal thyroid function tests.  Normal vitamin D.  Continue vitamin D3 2000 units/day.  Copy of labs sent to TAD Mccartney and to patient through [employer].

## 2022-12-27 ENCOUNTER — READMISSION MANAGEMENT (OUTPATIENT)
Dept: CALL CENTER | Facility: HOSPITAL | Age: 67
End: 2022-12-27

## 2022-12-27 NOTE — OUTREACH NOTE
Sepsis Week 4 Survey    Flowsheet Row Responses   [facility] patient discharged from? [place]   Does the patient have one of the following disease processes/diagnoses(primary or secondary)? Sepsis   Week 4 attempt successful? Yes   Call start time 1225   Call end time 1226   Discharge diagnosis sepsis, E coli bacteremia, Acute cystitis with hematuria, T2DM   Meds reviewed with patient/caregiver? Yes   Is the patient having any side effects they believe may be caused by any medication additions or changes? No   Is the patient taking all medications as directed (includes completed medication regime)? Yes   Has the patient kept scheduled appointments due by today? Yes   What is the patient's perception of their health status since discharge? Improving   Week 4 call completed? Yes          EDMAR JONES - Registered Nurse

## 2022-12-30 DIAGNOSIS — Z79.4 TYPE 2 DIABETES MELLITUS WITHOUT COMPLICATION, WITH LONG-TERM CURRENT USE OF INSULIN: Primary | ICD-10-CM

## 2022-12-30 DIAGNOSIS — E11.9 TYPE 2 DIABETES MELLITUS WITHOUT COMPLICATION, WITH LONG-TERM CURRENT USE OF INSULIN: Primary | ICD-10-CM

## 2022-12-30 RX ORDER — BLOOD SUGAR DIAGNOSTIC
STRIP MISCELLANEOUS
Qty: 500 EACH | Refills: 4 | Status: SHIPPED | OUTPATIENT
Start: 2022-12-30

## 2022-12-30 RX ORDER — PEN NEEDLE, DIABETIC 31 GX5/16"
NEEDLE, DISPOSABLE MISCELLANEOUS
Qty: 400 EACH | Refills: 2 | Status: SHIPPED | OUTPATIENT
Start: 2022-12-30

## 2023-01-09 DIAGNOSIS — E11.9 TYPE 2 DIABETES MELLITUS WITHOUT COMPLICATIONS: ICD-10-CM

## 2023-01-10 DIAGNOSIS — Z79.4 TYPE 2 DIABETES MELLITUS WITHOUT COMPLICATION, WITH LONG-TERM CURRENT USE OF INSULIN: Primary | ICD-10-CM

## 2023-01-10 DIAGNOSIS — E11.9 TYPE 2 DIABETES MELLITUS WITHOUT COMPLICATION, WITH LONG-TERM CURRENT USE OF INSULIN: Primary | ICD-10-CM

## 2023-01-10 RX ORDER — INSULIN GLARGINE 100 [IU]/ML
22 INJECTION, SOLUTION SUBCUTANEOUS NIGHTLY
Qty: 20 ML | Refills: 1 | Status: SHIPPED | OUTPATIENT
Start: 2023-01-10

## 2023-01-10 RX ORDER — INSULIN GLARGINE 100 [IU]/ML
INJECTION, SOLUTION SUBCUTANEOUS
OUTPATIENT
Start: 2023-01-10

## 2023-01-21 NOTE — PROGRESS NOTES
Enter Query Response Below      Query Response: E. coli sepsis due to prostate biopsy              If applicable, please update the problem list.     Patient: Jeff Bernard        : 1955  Account: [id number]           Admit Date: 2022        How to Respond to this query:       a. Click New Note     b. Answer query within the yellow box.                c. Update the Problem List, if applicable.      If you have any questions about this query contact me at: [email or web address]     Jumana Rodriguez APRN:     Patient admitted on  with sepsis secondary to E.coli bacteremia, and acute cystitis with hematuria. Patient underwent a transrectal ultrasound biopsy of prostate on . Urology physician documented on the  consultation note that urosepsis after biopsy is typically caused by fluroquinolone resistant and ESBL + E coli. Patient was treated with IV Invanz and discharged on IV ceftriaxone.     After study, can you further clarify the patient's condition as:    E. coli sepsis due to prostate biopsy   E. coli sepsis unrelated to prostate biopsy   Other (please specify) ________________  Clinically indeterminable     By submitting this query, we are merely seeking further clarification of documentation to accurately reflect all conditions that you are monitoring, evaluating, treating or that extend the hospitalization or utilize additional resources of care. Please utilize your independent clinical judgment when addressing the question(s) above.     This query and your response, once completed, will be entered into the legal medical record.    Sincerely,  Mike MCCOLLUM, STEPHEN, CCDS   Clinical Documentation Integrity Program

## 2023-01-26 ENCOUNTER — TELEPHONE (OUTPATIENT)
Dept: FAMILY MEDICINE CLINIC | Facility: CLINIC | Age: 68
End: 2023-01-26
Payer: MEDICARE

## 2023-01-26 NOTE — TELEPHONE ENCOUNTER
Caller: MERI    Relationship to patient: [facility]    Best call back number: [phone]  OPTION, 2 , OPTION 1   REF #[id number]    Patient is needing:       [employer] [facility]   DIAGNOSIS CODE RECEIVED DID NOT COVER GI TEST DATE OF SERVICE 12/2/2022. DIFFERENT DIAGNOSIS CODES NEEDED

## 2023-01-27 NOTE — TELEPHONE ENCOUNTER
Added Code A04.7 Colitis due to C. Diff    [facility] is resubmitting and it will take 30-45 days. If that patient gets any bill between now and then to disregard it.     Christine

## 2023-01-27 NOTE — TELEPHONE ENCOUNTER
Did we add C. difficile colitis to the diagnosis for the test?  The result was positive for C. difficile.

## 2023-04-27 RX ORDER — BENAZEPRIL HYDROCHLORIDE 10 MG/1
TABLET ORAL
Qty: 90 TABLET | Refills: 2 | Status: SHIPPED | OUTPATIENT
Start: 2023-04-27

## 2023-04-28 RX ORDER — ATORVASTATIN CALCIUM 40 MG/1
TABLET, FILM COATED ORAL
Qty: 90 TABLET | Refills: 1 | Status: SHIPPED | OUTPATIENT
Start: 2023-04-28

## 2023-05-22 ENCOUNTER — PATIENT MESSAGE (OUTPATIENT)
Dept: FAMILY MEDICINE CLINIC | Facility: CLINIC | Age: 68
End: 2023-05-22
Payer: MEDICARE

## 2023-05-22 DIAGNOSIS — Z79.4 TYPE 2 DIABETES MELLITUS WITH DIABETIC AUTONOMIC NEUROPATHY, WITH LONG-TERM CURRENT USE OF INSULIN: ICD-10-CM

## 2023-05-22 DIAGNOSIS — A04.72 C. DIFFICILE COLITIS: Primary | ICD-10-CM

## 2023-05-22 DIAGNOSIS — E11.65 UNCONTROLLED TYPE 2 DIABETES MELLITUS WITH HYPERGLYCEMIA: ICD-10-CM

## 2023-05-22 DIAGNOSIS — E11.43 TYPE 2 DIABETES MELLITUS WITH DIABETIC AUTONOMIC NEUROPATHY, WITH LONG-TERM CURRENT USE OF INSULIN: ICD-10-CM

## 2023-05-22 DIAGNOSIS — R19.7 DIARRHEA, UNSPECIFIED TYPE: ICD-10-CM

## 2023-05-30 DIAGNOSIS — R19.7 DIARRHEA, UNSPECIFIED TYPE: ICD-10-CM

## 2023-05-30 DIAGNOSIS — A05.8 YERSINIA ENTEROCOLITICA FOOD POISONING: ICD-10-CM

## 2023-05-30 DIAGNOSIS — A04.72 C. DIFFICILE COLITIS: Primary | ICD-10-CM

## 2023-05-30 LAB

## 2023-06-12 DIAGNOSIS — E11.9 TYPE 2 DIABETES MELLITUS WITHOUT COMPLICATION, WITH LONG-TERM CURRENT USE OF INSULIN: ICD-10-CM

## 2023-06-12 DIAGNOSIS — Z79.4 TYPE 2 DIABETES MELLITUS WITHOUT COMPLICATION, WITH LONG-TERM CURRENT USE OF INSULIN: ICD-10-CM

## 2023-06-12 RX ORDER — INSULIN GLARGINE 100 [IU]/ML
22 INJECTION, SOLUTION SUBCUTANEOUS NIGHTLY
Qty: 19.8 ML | Refills: 1 | Status: SHIPPED | OUTPATIENT
Start: 2023-06-12 | End: 2023-09-10

## 2023-06-13 ENCOUNTER — OFFICE VISIT (OUTPATIENT)
Dept: ENDOCRINOLOGY | Age: 68
End: 2023-06-13
Payer: MEDICARE

## 2023-06-13 VITALS
TEMPERATURE: 98.6 F | SYSTOLIC BLOOD PRESSURE: 113 MMHG | HEIGHT: 73 IN | WEIGHT: 220.6 LBS | HEART RATE: 77 BPM | DIASTOLIC BLOOD PRESSURE: 60 MMHG | OXYGEN SATURATION: 95 % | BODY MASS INDEX: 29.24 KG/M2

## 2023-06-13 DIAGNOSIS — E78.5 HYPERLIPIDEMIA, UNSPECIFIED HYPERLIPIDEMIA TYPE: ICD-10-CM

## 2023-06-13 DIAGNOSIS — Z79.4 TYPE 2 DIABETES MELLITUS WITHOUT COMPLICATION, WITH LONG-TERM CURRENT USE OF INSULIN: Primary | ICD-10-CM

## 2023-06-13 DIAGNOSIS — E11.9 TYPE 2 DIABETES MELLITUS WITHOUT COMPLICATION, WITH LONG-TERM CURRENT USE OF INSULIN: Primary | ICD-10-CM

## 2023-06-13 DIAGNOSIS — E55.9 VITAMIN D DEFICIENCY: ICD-10-CM

## 2023-06-13 DIAGNOSIS — I10 BENIGN ESSENTIAL HYPERTENSION: ICD-10-CM

## 2023-06-13 RX ORDER — INSULIN ASPART 100 [IU]/ML
INJECTION, SOLUTION INTRAVENOUS; SUBCUTANEOUS
Qty: 68 ML | Refills: 2
Start: 2023-06-13

## 2023-06-13 NOTE — LETTER
June 13, 2023     TAD Mccartney  [address] [facility] [address]    Patient: Jeff Bernard   YOB: 1955   Date of Visit: 6/13/2023       Dear TAD Colindres:    Thank you for referring Jeff Bernard to me for evaluation. Below are the relevant portions of my assessment and plan of care.    If you have questions, please do not hesitate to call me. I look forward to following Jeff along with you.         Sincerely,        Salinas Juarez MD        CC: MD Larry Oreilly, Salinas LI MD  06/13/23 1246  Sign when Signing Visit  Subjective  Jeff Bernard is a 68 y.o. male.     History of Present Illness        Patient has known diabetes mellitus since 1999 and started on insulin in 2000. He is on Basaglar 22 units every evening and NovoLog 1 unit per 3-4 g of carbohydrate before each meal.  He is using a freestyle eva 14 but the  broke down.  .  Lunchtime glucose .  Suppertime glucose .    His last eye examination was in December 2022 and he has no retinopathy.  Urine microalbumin was normal in 1/22. He is on benazepril. He denies any numbness, tingling or burning in his feet.      He has hyperlipidemia and has been on Lipitor 40 mg once a day. He denies any muscle pain.      He has hypertension and is on benazepril 10 mg/day.  He denies any previous history of myocardial infarction, heart failure, or stroke. He denies chest pain, shortness of breath or pedal edema.  He had a normal nuclear stress test in 4/18     He had a colonoscopy done in March 2017 and polyps were removed by Dr. Kelly.  Pathology report was read as tubular adenoma with low-grade dysplasia and hyperplastic polyps.      He had tubular adenoma with low-grade dysplasia removed by colonoscopy in August 2022.  He was advised repeat colonoscopy in 2027.     He has vitamin D deficiency is on vitamin D3 1000 units/day.     He had right carpal tunnel and right cubital tunnel  "release in June 2021.  He has not recovered full muscle strength of his hand.  He is doing home physical therapy.     He has prostate cancer and biopsy done in November 2022.  It was complicated by sepsis.  He has completed IV antibiotic therapy. He is on Flomax 0.4 mg daily.  He denies urinary symptoms.  He follows with Dr. Fatima.    He is currently under for C. difficile colitis with Flagyl.  He will be seeing a GI and infectious disease specialist in the near future.  He denies abdominal pain, melena, hematochezia, or fever.    The following portions of the patient's history were reviewed and updated as appropriate: allergies, current medications, past family history, past medical history, past social history, past surgical history, and problem list.    Review of Systems   Eyes:  Negative for visual disturbance.   Respiratory:  Negative for shortness of breath.    Cardiovascular:  Negative for chest pain and palpitations.   Gastrointestinal: Negative.    Endocrine: Negative for cold intolerance and heat intolerance.   Genitourinary: Negative.    Musculoskeletal:  Negative for myalgias.   Neurological:  Negative for numbness.   Vitals:    06/13/23 1142   BP: 113/60   BP Location: Right arm   Patient Position: Sitting   Cuff Size: Adult   Pulse: 77   Temp: 98.6 °F (37 °C)   SpO2: 95%   Weight: 100 kg (220 lb 9.6 oz)   Height: 185.4 cm (72.99\")      Objective  Physical Exam  Constitutional:       General: He is not in acute distress.     Appearance: Normal appearance. He is not ill-appearing or toxic-appearing.   Eyes:      General: No scleral icterus.        Right eye: No discharge.         Left eye: No discharge.   Neck:      Vascular: No carotid bruit.   Cardiovascular:      Rate and Rhythm: Normal rate and regular rhythm.      Heart sounds: Normal heart sounds. No murmur heard.    No friction rub. No gallop.   Pulmonary:      Breath sounds: Normal breath sounds. No wheezing or rales.   Chest:      Chest wall: " No tenderness.   Abdominal:      General: Bowel sounds are normal.      Palpations: Abdomen is soft.      Tenderness: There is no right CVA tenderness or left CVA tenderness.   Musculoskeletal:         General: Normal range of motion.   Lymphadenopathy:      Cervical: No cervical adenopathy.   Neurological:      Mental Status: He is alert and oriented to person, place, and time.      Comments: Intact light touch on lower ext     Patient Message on 05/22/2023   Component Date Value Ref Range Status   • Campylobacter 05/26/2023 Not Detected  Not Detected Final   • C.difficile toxin A/B 05/26/2023 Detected (A)  Not Detected Final   • Plesiomonas shigelloides 05/26/2023 Not Detected  Not Detected Final   • Salmonella 05/26/2023 Not Detected  Not Detected Final   • Vibrio 05/26/2023 Not Detected  Not Detected Final   • Vibrio cholerae 05/26/2023 Not Detected  Not Detected Final   • Yersinia enterocolitica 05/26/2023 Not Detected  Not Detected Final   • Enteroaggregative E. coli 05/26/2023 Not Detected  Not Detected Final   • Enteropathogenic E. coli 05/26/2023 Not Detected  Not Detected Final   • Enterotoxigenic E. coli 05/26/2023 Not Detected  Not Detected Final   • Shiga-like toxin-producing E. coli  05/26/2023 Not Detected  Not Detected Final   • E. coli O157 05/26/2023 Not applicable  Not Detected Final   • Shigella enteroinvasive E. coli 05/26/2023 Not Detected  Not Detected Final   • Cryptosporidium 05/26/2023 Not Detected  Not Detected Final   • Cyclospora cayetanensis 05/26/2023 Not Detected  Not Detected Final   • Entamoeba Histolytica Ag 05/26/2023 Not Detected  Not Detected Final   • Giardia lamblia 05/26/2023 Not Detected  Not Detected Final   • Adenovirus 40/41 Ag 05/26/2023 Not Detected  Not Detected Final   • Astrovirus 05/26/2023 Not Detected  Not Detected Final   • Norovirus GI/GII 05/26/2023 Not Detected  Not Detected Final   • Rotavirus A 05/26/2023 Not Detected  Not Detected Final   • Sapovirus  05/26/2023 Not Detected  Not Detected Final     Assessment & Plan  Problems Addressed this Visit          Other    Benign essential hypertension    Relevant Orders    Comprehensive Metabolic Panel    Type 2 diabetes mellitus without complication, with long-term current use of insulin - Primary    Relevant Medications    insulin aspart (NovoLOG FlexPen) 100 UNIT/ML solution pen-injector sc pen    Other Relevant Orders    Comprehensive Metabolic Panel    Hemoglobin A1c    Lipid Panel    TSH    Urinalysis With Microscopic If Indicated (No Culture) - Urine, Clean Catch    Microalbumin / Creatinine Urine Ratio - Urine, Clean Catch    Hyperlipidemia    Relevant Orders    Comprehensive Metabolic Panel    Lipid Panel    TSH    Vitamin D deficiency    Relevant Orders    Vitamin D,25-Hydroxy     Diagnoses         Codes Comments    Type 2 diabetes mellitus without complication, with long-term current use of insulin    -  Primary ICD-10-CM: E11.9, Z79.4  ICD-9-CM: 250.00, V58.67     Hyperlipidemia, unspecified hyperlipidemia type     ICD-10-CM: E78.5  ICD-9-CM: 272.4     Vitamin D deficiency     ICD-10-CM: E55.9  ICD-9-CM: 268.9     Benign essential hypertension     ICD-10-CM: I10  ICD-9-CM: 401.1           Continue Basaglar and mealtime NovoLog.  Continue atorvastatin 40 mg a day.  Continue benazepril 10 mg/day.  Continue vitamin D3 1000 units/day.  Follow-up with Dr. Fatima.  Prescription for freestyle eva 2 sensor was sent to the pharmacy.    Copy of my note sent to TAD Mccartney and Dr. Fatima.    RTC 4 mos.

## 2023-06-13 NOTE — PROGRESS NOTES
Subjective   Jeff Bernard is a 68 y.o. male.     History of Present Illness        Patient has known diabetes mellitus since 1999 and started on insulin in 2000. He is on Basaglar 22 units every evening and NovoLog 1 unit per 3-4 g of carbohydrate before each meal.  He is using a freestyle eva 14 but the  broke down.  .  Lunchtime glucose .  Suppertime glucose .    His last eye examination was in December 2022 and he has no retinopathy.  Urine microalbumin was normal in 1/22. He is on benazepril. He denies any numbness, tingling or burning in his feet.      He has hyperlipidemia and has been on Lipitor 40 mg once a day. He denies any muscle pain.      He has hypertension and is on benazepril 10 mg/day.  He denies any previous history of myocardial infarction, heart failure, or stroke. He denies chest pain, shortness of breath or pedal edema.  He had a normal nuclear stress test in 4/18     He had a colonoscopy done in March 2017 and polyps were removed by Dr. Kelly.  Pathology report was read as tubular adenoma with low-grade dysplasia and hyperplastic polyps.      He had tubular adenoma with low-grade dysplasia removed by colonoscopy in August 2022.  He was advised repeat colonoscopy in 2027.     He has vitamin D deficiency is on vitamin D3 1000 units/day.     He had right carpal tunnel and right cubital tunnel release in June 2021.  He has not recovered full muscle strength of his hand.  He is doing home physical therapy.     He has prostate cancer and biopsy done in November 2022.  It was complicated by sepsis.  He has completed IV antibiotic therapy. He is on Flomax 0.4 mg daily.  He denies urinary symptoms.  He follows with Dr. Fatima.    He is currently under for C. difficile colitis with Flagyl.  He will be seeing a GI and infectious disease specialist in the near future.  He denies abdominal pain, melena, hematochezia, or fever.    The following portions of the patient's  "history were reviewed and updated as appropriate: allergies, current medications, past family history, past medical history, past social history, past surgical history, and problem list.    Review of Systems   Eyes:  Negative for visual disturbance.   Respiratory:  Negative for shortness of breath.    Cardiovascular:  Negative for chest pain and palpitations.   Gastrointestinal: Negative.    Endocrine: Negative for cold intolerance and heat intolerance.   Genitourinary: Negative.    Musculoskeletal:  Negative for myalgias.   Neurological:  Negative for numbness.   Vitals:    06/13/23 1142   BP: 113/60   BP Location: Right arm   Patient Position: Sitting   Cuff Size: Adult   Pulse: 77   Temp: 98.6 °F (37 °C)   SpO2: 95%   Weight: 100 kg (220 lb 9.6 oz)   Height: 185.4 cm (72.99\")      Objective   Physical Exam  Constitutional:       General: He is not in acute distress.     Appearance: Normal appearance. He is not ill-appearing or toxic-appearing.   Eyes:      General: No scleral icterus.        Right eye: No discharge.         Left eye: No discharge.   Neck:      Vascular: No carotid bruit.   Cardiovascular:      Rate and Rhythm: Normal rate and regular rhythm.      Heart sounds: Normal heart sounds. No murmur heard.    No friction rub. No gallop.   Pulmonary:      Breath sounds: Normal breath sounds. No wheezing or rales.   Chest:      Chest wall: No tenderness.   Abdominal:      General: Bowel sounds are normal.      Palpations: Abdomen is soft.      Tenderness: There is no right CVA tenderness or left CVA tenderness.   Musculoskeletal:         General: Normal range of motion.   Lymphadenopathy:      Cervical: No cervical adenopathy.   Neurological:      Mental Status: He is alert and oriented to person, place, and time.      Comments: Intact light touch on lower ext     Patient Message on 05/22/2023   Component Date Value Ref Range Status    Campylobacter 05/26/2023 Not Detected  Not Detected Final    C.difficile " toxin A/B 05/26/2023 Detected (A)  Not Detected Final    Plesiomonas shigelloides 05/26/2023 Not Detected  Not Detected Final    Salmonella 05/26/2023 Not Detected  Not Detected Final    Vibrio 05/26/2023 Not Detected  Not Detected Final    Vibrio cholerae 05/26/2023 Not Detected  Not Detected Final    Yersinia enterocolitica 05/26/2023 Not Detected  Not Detected Final    Enteroaggregative E. coli 05/26/2023 Not Detected  Not Detected Final    Enteropathogenic E. coli 05/26/2023 Not Detected  Not Detected Final    Enterotoxigenic E. coli 05/26/2023 Not Detected  Not Detected Final    Shiga-like toxin-producing E. coli  05/26/2023 Not Detected  Not Detected Final    E. coli O157 05/26/2023 Not applicable  Not Detected Final    Shigella enteroinvasive E. coli 05/26/2023 Not Detected  Not Detected Final    Cryptosporidium 05/26/2023 Not Detected  Not Detected Final    Cyclospora cayetanensis 05/26/2023 Not Detected  Not Detected Final    Entamoeba Histolytica Ag 05/26/2023 Not Detected  Not Detected Final    Giardia lamblia 05/26/2023 Not Detected  Not Detected Final    Adenovirus 40/41 Ag 05/26/2023 Not Detected  Not Detected Final    Astrovirus 05/26/2023 Not Detected  Not Detected Final    Norovirus GI/GII 05/26/2023 Not Detected  Not Detected Final    Rotavirus A 05/26/2023 Not Detected  Not Detected Final    Sapovirus 05/26/2023 Not Detected  Not Detected Final     Assessment & Plan   Problems Addressed this Visit          Other    Benign essential hypertension    Relevant Orders    Comprehensive Metabolic Panel    Type 2 diabetes mellitus without complication, with long-term current use of insulin - Primary    Relevant Medications    insulin aspart (NovoLOG FlexPen) 100 UNIT/ML solution pen-injector sc pen    Other Relevant Orders    Comprehensive Metabolic Panel    Hemoglobin A1c    Lipid Panel    TSH    Urinalysis With Microscopic If Indicated (No Culture) - Urine, Clean Catch    Microalbumin / Creatinine Urine  Ratio - Urine, Clean Catch    Hyperlipidemia    Relevant Orders    Comprehensive Metabolic Panel    Lipid Panel    TSH    Vitamin D deficiency    Relevant Orders    Vitamin D,25-Hydroxy     Diagnoses         Codes Comments    Type 2 diabetes mellitus without complication, with long-term current use of insulin    -  Primary ICD-10-CM: E11.9, Z79.4  ICD-9-CM: 250.00, V58.67     Hyperlipidemia, unspecified hyperlipidemia type     ICD-10-CM: E78.5  ICD-9-CM: 272.4     Vitamin D deficiency     ICD-10-CM: E55.9  ICD-9-CM: 268.9     Benign essential hypertension     ICD-10-CM: I10  ICD-9-CM: 401.1           Continue Basaglar and mealtime NovoLog.  Continue atorvastatin 40 mg a day.  Continue benazepril 10 mg/day.  Continue vitamin D3 1000 units/day.  Follow-up with Dr. Fatima.  Prescription for freestyle eva 2 sensor was sent to the pharmacy.    Copy of my note sent to TAD Mccartney and Dr. Fatima.    RTC 4 mos.

## 2023-06-14 LAB
25(OH)D3+25(OH)D2 SERPL-MCNC: 41.8 NG/ML (ref 30–100)
ALBUMIN SERPL-MCNC: 4.3 G/DL (ref 3.8–4.8)
ALBUMIN/CREAT UR: 15 MG/G CREAT (ref 0–29)
ALBUMIN/GLOB SERPL: 1.7 {RATIO} (ref 1.2–2.2)
ALP SERPL-CCNC: 64 IU/L (ref 44–121)
ALT SERPL-CCNC: 31 IU/L (ref 0–44)
APPEARANCE UR: CLEAR
AST SERPL-CCNC: 27 IU/L (ref 0–40)
BILIRUB SERPL-MCNC: 0.3 MG/DL (ref 0–1.2)
BILIRUB UR QL STRIP: NEGATIVE
BUN SERPL-MCNC: 11 MG/DL (ref 8–27)
BUN/CREAT SERPL: 15 (ref 10–24)
CALCIUM SERPL-MCNC: 8.8 MG/DL (ref 8.6–10.2)
CHLORIDE SERPL-SCNC: 103 MMOL/L (ref 96–106)
CHOLEST SERPL-MCNC: 129 MG/DL (ref 100–199)
CO2 SERPL-SCNC: 24 MMOL/L (ref 20–29)
COLOR UR: YELLOW
CREAT SERPL-MCNC: 0.71 MG/DL (ref 0.76–1.27)
CREAT UR-MCNC: 157 MG/DL
EGFRCR SERPLBLD CKD-EPI 2021: 99 ML/MIN/1.73
GLOBULIN SER CALC-MCNC: 2.5 G/DL (ref 1.5–4.5)
GLUCOSE SERPL-MCNC: 63 MG/DL (ref 70–99)
GLUCOSE UR QL STRIP: ABNORMAL
HBA1C MFR BLD: 8.5 % (ref 4.8–5.6)
HDLC SERPL-MCNC: 50 MG/DL
HGB UR QL STRIP: NEGATIVE
IMP & REVIEW OF LAB RESULTS: NORMAL
KETONES UR QL STRIP: NEGATIVE
LDLC SERPL CALC-MCNC: 66 MG/DL (ref 0–99)
LEUKOCYTE ESTERASE UR QL STRIP: NEGATIVE
MICRO URNS: ABNORMAL
MICROALBUMIN UR-MCNC: 23.5 UG/ML
NITRITE UR QL STRIP: NEGATIVE
PH UR STRIP: 7.5 [PH] (ref 5–7.5)
POTASSIUM SERPL-SCNC: 3.9 MMOL/L (ref 3.5–5.2)
PROT SERPL-MCNC: 6.8 G/DL (ref 6–8.5)
PROT UR QL STRIP: ABNORMAL
SODIUM SERPL-SCNC: 141 MMOL/L (ref 134–144)
SP GR UR STRIP: >=1.03 (ref 1–1.03)
TRIGL SERPL-MCNC: 59 MG/DL (ref 0–149)
TSH SERPL DL<=0.005 MIU/L-ACNC: 1.84 UIU/ML (ref 0.45–4.5)
UROBILINOGEN UR STRIP-MCNC: 1 MG/DL (ref 0.2–1)
VLDLC SERPL CALC-MCNC: 13 MG/DL (ref 5–40)

## 2023-06-16 NOTE — PROGRESS NOTES
Hemoglobin A1c higher at 8.5%.  Stay on diet.  Sensor download in 2 to 3 weeks.  Normal vitamin D.  Continue vitamin D3 1000 units/day.  LDL 66.  HDL 50.  Continue Lipitor 40 mg a day.  Normal TSH.  Normal thyroid function tests.  Normal urine microalbumin.  Copy of labs sent to TAD Mccartney and Dr. Fatima.  Please notify patient with results and instructions.

## 2023-08-15 ENCOUNTER — OFFICE VISIT (OUTPATIENT)
Dept: GASTROENTEROLOGY | Facility: CLINIC | Age: 68
End: 2023-08-15
Payer: MEDICARE

## 2023-08-15 VITALS
HEART RATE: 80 BPM | TEMPERATURE: 97 F | DIASTOLIC BLOOD PRESSURE: 74 MMHG | HEIGHT: 73 IN | BODY MASS INDEX: 29.18 KG/M2 | SYSTOLIC BLOOD PRESSURE: 125 MMHG | WEIGHT: 220.2 LBS

## 2023-08-15 DIAGNOSIS — A04.72 COLITIS, CLOSTRIDIUM DIFFICILE: Primary | ICD-10-CM

## 2023-08-15 PROCEDURE — 1159F MED LIST DOCD IN RCRD: CPT | Performed by: INTERNAL MEDICINE

## 2023-08-15 PROCEDURE — 3078F DIAST BP <80 MM HG: CPT | Performed by: INTERNAL MEDICINE

## 2023-08-15 PROCEDURE — 3074F SYST BP LT 130 MM HG: CPT | Performed by: INTERNAL MEDICINE

## 2023-08-15 PROCEDURE — 1160F RVW MEDS BY RX/DR IN RCRD: CPT | Performed by: INTERNAL MEDICINE

## 2023-08-15 PROCEDURE — 99212 OFFICE O/P EST SF 10 MIN: CPT | Performed by: INTERNAL MEDICINE

## 2023-08-15 NOTE — PROGRESS NOTES
Chief Complaint   Patient presents with    Diarrhea    cdiff       Jeff Bernard is a  68 y.o. male here for a follow up visit for history of C. difficile colitis.    HPI    Patient 68-year-old male with history of prostate cancer, hypertension, hyperlipidemia diabetes referred for evaluation for history of C. difficile colitis.  Patient apparently was treated in November last year for E. coli sepsis felt secondary to urinary tract issue followed by food poisoning with Yersinia.  Patient developed acute diarrhea found C. difficile positive.  Patient status post 2 courses of Flagyl though at this point Flagyl is not on the recommendation list for treatment for C. difficile any longer.  Patient however did improve clinically and diarrhea has resolved.    Past Medical History:   Diagnosis Date    Arteriosclerosis of carotid artery     Cancer 12/2022    Prostrate    Colon polyp     ED (erectile dysfunction)     Hematuria     Hyperlipidemia     Hypertension     Sepsis     after prostate procedure    Type 2 diabetes mellitus     Vitamin D deficiency          Current Outpatient Medications:     aspirin 81 MG EC tablet, Take 1 tablet by mouth Daily., Disp: , Rfl:     atorvastatin (LIPITOR) 40 MG tablet, TAKE 1 TABLET BY MOUTH EVERY NIGHT, Disp: 90 tablet, Rfl: 1    B-D ULTRAFINE III SHORT PEN 31G X 8 MM misc, USE 1 PEN NEEDLE FOUR TIMES DAILY, Disp: 400 each, Rfl: 2    benazepril (LOTENSIN) 10 MG tablet, TAKE 1 TABLET BY MOUTH EVERY DAY, Disp: 90 tablet, Rfl: 2    Cholecalciferol (VITAMIN D-3) 1000 units capsule, Take 1,000 Units by mouth Daily., Disp: , Rfl:     Continuous Blood Gluc  (FreeStyle Celsa 2 Benavides) device, 1 each Daily., Disp: 1 each, Rfl: 1    Continuous Blood Gluc Sensor (FreeStyle Celsa 2 Sensor) misc, 1 each Every 14 (Fourteen) Days., Disp: 2 each, Rfl: 6    Cyanocobalamin (VITAMIN B 12 PO), Take 1,000 mg by mouth. 2 tablets weelkly, Disp: , Rfl:     glucose blood (CONTOUR  NEXT TEST) test strip, Dx code E11.9 testing bs 3 x day, Disp: 300 each, Rfl: 1    insulin aspart (NovoLOG FlexPen) 100 UNIT/ML solution pen-injector sc pen, INJECT 75 UNITS UNDER THE SKIN INTO THE APPROPRIATE AREA AS DIRECTED DAILY FOR 90 DAYS. INJECT 8 TO 13 UNITS EVERY MORNING, 9-15 UNITS AT LUNCH, 10-15 AT SUPPER PLUS SLIDING SCALE (75/DAY), Disp: 68 mL, Rfl: 2    Insulin Glargine (BASAGLAR KWIKPEN) 100 UNIT/ML injection pen, Inject 22 Units under the skin into the appropriate area as directed Every Night for 90 days. Indications: Type 2 Diabetes, Disp: 19.8 mL, Rfl: 1    tamsulosin (FLOMAX) 0.4 MG capsule 24 hr capsule, Take 1 capsule by mouth Daily. Prescribed by urology, Disp: , Rfl:     No Known Allergies    Social History     Socioeconomic History    Marital status:    Tobacco Use    Smoking status: Never     Passive exposure: Never    Smokeless tobacco: Never   Vaping Use    Vaping Use: Never used   Substance and Sexual Activity    Alcohol use: Yes     Alcohol/week: 4.0 - 6.0 standard drinks     Types: 4 - 6 Cans of beer per week     Comment: Socially.    Drug use: Never    Sexual activity: Yes     Partners: Female     Birth control/protection: Condom       Family History   Problem Relation Age of Onset    Stroke Mother     Aneurysm Mother         BRAIN    COPD Father     Heart attack Brother     Diabetes Maternal Aunt     Diabetes Paternal Aunt        Review of Systems   Constitutional: Negative.    Respiratory: Negative.     Cardiovascular: Negative.    Gastrointestinal: Negative.    Musculoskeletal: Negative.    Skin: Negative.    Hematological: Negative.      Vitals:    08/15/23 1307   BP: 125/74   Pulse: 80   Temp: 97 øF (36.1 øC)       Physical Exam  Vitals reviewed.   Constitutional:       Appearance: Normal appearance. He is well-developed and normal weight.   HENT:      Head: Normocephalic and atraumatic.   Eyes:      General: No scleral icterus.     Pupils: Pupils are  equal, round, and reactive to light.   Cardiovascular:      Rate and Rhythm: Normal rate and regular rhythm.      Heart sounds: No murmur heard.    No friction rub. No gallop.   Pulmonary:      Effort: Pulmonary effort is normal. No respiratory distress.      Breath sounds: Normal breath sounds.   Abdominal:      General: Bowel sounds are normal. There is no distension.      Palpations: Abdomen is soft.      Tenderness: There is no abdominal tenderness.   Skin:     General: Skin is warm and dry.      Coloration: Skin is not jaundiced.      Findings: No rash.   Neurological:      General: No focal deficit present.      Mental Status: He is alert and oriented to person, place, and time.      Cranial Nerves: No cranial nerve deficit.   Psychiatric:         Mood and Affect: Mood normal.         Behavior: Behavior normal.         Thought Content: Thought content normal.     No visits with results within 2 Month(s) from this visit.   Latest known visit with results is:   Office Visit on 06/13/2023   Component Date Value Ref Range Status    Glucose 06/13/2023 63 (L)  70 - 99 mg/dL Final    BUN 06/13/2023 11  8 - 27 mg/dL Final    Creatinine 06/13/2023 0.71 (L)  0.76 - 1.27 mg/dL Final    EGFR Result 06/13/2023 99  >59 mL/min/1.73 Final    BUN/Creatinine Ratio 06/13/2023 15  10 - 24 Final    Sodium 06/13/2023 141  134 - 144 mmol/L Final    Potassium 06/13/2023 3.9  3.5 - 5.2 mmol/L Final    Chloride 06/13/2023 103  96 - 106 mmol/L Final    Total CO2 06/13/2023 24  20 - 29 mmol/L Final    Calcium 06/13/2023 8.8  8.6 - 10.2 mg/dL Final    Total Protein 06/13/2023 6.8  6.0 - 8.5 g/dL Final    Albumin 06/13/2023 4.3  3.8 - 4.8 g/dL Final    Globulin 06/13/2023 2.5  1.5 - 4.5 g/dL Final    A/G Ratio 06/13/2023 1.7  1.2 - 2.2 Final    Total Bilirubin 06/13/2023 0.3  0.0 - 1.2 mg/dL Final    Alkaline Phosphatase 06/13/2023 64  44 - 121 IU/L Final    AST (SGOT) 06/13/2023 27  0 - 40 IU/L Final    ALT (SGPT)  06/13/2023 31  0 - 44 IU/L Final    Hemoglobin A1C 06/13/2023 8.5 (H)  4.8 - 5.6 % Final    Total Cholesterol 06/13/2023 129  100 - 199 mg/dL Final    Triglycerides 06/13/2023 59  0 - 149 mg/dL Final    HDL Cholesterol 06/13/2023 50  >39 mg/dL Final    VLDL Cholesterol David 06/13/2023 13  5 - 40 mg/dL Final    LDL Chol Calc (NIH) 06/13/2023 66  0 - 99 mg/dL Final    25 Hydroxy, Vitamin D 06/13/2023 41.8  30.0 - 100.0 ng/mL Final    TSH 06/13/2023 1.840  0.450 - 4.500 uIU/mL Final    Specific Gravity, UA 06/13/2023      >=1.030 (A)  1.005 - 1.030 Final    pH, UA 06/13/2023 7.5  5.0 - 7.5 Final    Color, UA 06/13/2023 Yellow  Yellow Final    Appearance, UA 06/13/2023 Clear  Clear Final    Leukocytes, UA 06/13/2023 Negative  Negative Final    Protein 06/13/2023 Trace  Negative/Trace Final    Glucose, UA 06/13/2023 3+ (A)  Negative Final    Ketones 06/13/2023 Negative  Negative Final    Blood, UA 06/13/2023 Negative  Negative Final    Bilirubin, UA 06/13/2023 Negative  Negative Final    Urobilinogen, UA 06/13/2023 1.0  0.2 - 1.0 mg/dL Final    Nitrite, UA 06/13/2023 Negative  Negative Final    Microscopic Examination 06/13/2023 Comment   Final    Creatinine, Urine 06/13/2023 157.0  Not Estab. mg/dL Final    Microalbumin, Urine 06/13/2023 23.5  Not Estab. ug/mL Final    Microalbumin/Creatinine Ratio 06/13/2023 15  0 - 29 mg/g creat Final    Interpretation 06/13/2023 Note   Final       Diagnoses and all orders for this visit:    1. Colitis, Clostridium difficile (Primary)      Patient 68-year-old male with history of diabetes, hypertension and hyperlipidemia status post admission for E. coli sepsis reportedly related to urinary tract followed by Yersinia infection related to food poisoning.  Patient's status post multiple antibiotic courses unfortunately developed C. difficile colitis.  Patient treated in the hospital by ID was given Flagyl due to cost of vancomycin.  Patient reports 2 courses of  Flagyl later now for the last month has been doing well.  Patient denies any nausea vomiting no bright red blood per rectum denies any further diarrhea.  At this point no further GI intervention required though would recommend if recurrent C. difficile were to occur patient recommended to call here to arrange Dificid treatment which has minimal if any cost to the patient and has a much higher rate of success in the [facility] does.

## 2023-09-13 DIAGNOSIS — Z79.4 TYPE 2 DIABETES MELLITUS WITHOUT COMPLICATION, WITH LONG-TERM CURRENT USE OF INSULIN: ICD-10-CM

## 2023-09-13 DIAGNOSIS — E11.9 TYPE 2 DIABETES MELLITUS WITHOUT COMPLICATION, WITH LONG-TERM CURRENT USE OF INSULIN: ICD-10-CM

## 2023-09-13 RX ORDER — INSULIN GLARGINE 100 [IU]/ML
22 INJECTION, SOLUTION SUBCUTANEOUS NIGHTLY
Qty: 15 ML | Refills: 2 | Status: SHIPPED | OUTPATIENT
Start: 2023-09-13

## 2023-10-19 ENCOUNTER — OFFICE VISIT (OUTPATIENT)
Dept: ENDOCRINOLOGY | Age: 68
End: 2023-10-19
Payer: MEDICARE

## 2023-10-19 VITALS
BODY MASS INDEX: 29.1 KG/M2 | OXYGEN SATURATION: 100 % | DIASTOLIC BLOOD PRESSURE: 58 MMHG | HEIGHT: 73 IN | TEMPERATURE: 96.8 F | WEIGHT: 219.6 LBS | SYSTOLIC BLOOD PRESSURE: 118 MMHG | HEART RATE: 55 BPM

## 2023-10-19 DIAGNOSIS — Z79.4 TYPE 2 DIABETES MELLITUS WITHOUT COMPLICATION, WITH LONG-TERM CURRENT USE OF INSULIN: Primary | ICD-10-CM

## 2023-10-19 DIAGNOSIS — E55.9 VITAMIN D DEFICIENCY: ICD-10-CM

## 2023-10-19 DIAGNOSIS — I10 ESSENTIAL HYPERTENSION: ICD-10-CM

## 2023-10-19 DIAGNOSIS — E78.5 HYPERLIPIDEMIA, UNSPECIFIED HYPERLIPIDEMIA TYPE: ICD-10-CM

## 2023-10-19 DIAGNOSIS — E11.9 TYPE 2 DIABETES MELLITUS WITHOUT COMPLICATION, WITH LONG-TERM CURRENT USE OF INSULIN: Primary | ICD-10-CM

## 2023-10-19 RX ORDER — INSULIN GLARGINE 100 [IU]/ML
21 INJECTION, SOLUTION SUBCUTANEOUS NIGHTLY
Status: SHIPPED
Start: 2023-10-19

## 2023-10-19 NOTE — PROGRESS NOTES
Subjective   Jeff Bernard is a 68 y.o. male.     History of Present Illness     Patient has known diabetes mellitus since 1999 and started on insulin in 2000. He is on Basaglar 25 units every evening and NovoLog 1 unit per 3-4 g of carbohydrate before each meal.  He is using a freestyle eva 14.  His last meal was last night.     Sensor data reviewed.  Average glucose 156 mg per DL.  Time in target 60%.  Time above target 33%.  Time below target 7%.  He has intermittent hypoglycemia in the early morning.  He has occasional hypoglycemia in the evening.    His last eye examination was in December 2022 and he has no retinopathy. He denies any associated nephropathy. Urine microalbumin was normal in 6/23. He is on benazepril. He denies any numbness, tingling or burning in his feet.      He has hyperlipidemia and has been on Lipitor 40 mg once a day. He denies any muscle pain.      He has hypertension and is on benazepril 10 mg/day.  He denies any previous history of myocardial infarction, heart failure, or stroke. He denies chest pain, shortness of breath or pedal edema.  He had a normal nuclear stress test in 4/18     He had a colonoscopy done in March 2017 and polyps were removed by Dr. Kelly.  Pathology report was read as tubular adenoma with low-grade dysplasia and hyperplastic polyps.      He had tubular adenoma with low-grade dysplasia removed by colonoscopy in August 2022.  He was advised repeat colonoscopy in 2027.     He has vitamin D deficiency  and is on vitamin D3 1000 units/day.     He had right carpal tunnel and right cubital tunnel release in June 2021.  He has not recovered full muscle strength of his hand.  He is doing home physical therapy.     He has prostate cancer and biopsy done in November 2022.  He was complicated by sepsis.  He has completed IV antibiotic therapy.  He has completed treatment for C. difficile colitis.  He is on Flomax 0.4 mg daily.  He denies urinary symptoms.  He follows  "with Dr. Fatima.    The following portions of the patient's history were reviewed and updated as appropriate: allergies, current medications, past family history, past medical history, past social history, past surgical history, and problem list.    Review of Systems   Eyes:  Negative for visual disturbance.   Respiratory:  Negative for shortness of breath and wheezing.    Cardiovascular:  Negative for chest pain and palpitations.   Gastrointestinal: Negative.    Genitourinary: Negative.    Neurological:  Negative for numbness.     Vitals:    10/19/23 1225   BP: 118/58   Pulse: 55   Temp: 96.8 °F (36 °C)   TempSrc: Temporal   SpO2: 100%   Weight: 99.6 kg (219 lb 9.6 oz)   Height: 185.4 cm (72.99\")      Objective   Physical Exam  Constitutional:       Appearance: Normal appearance. He is not toxic-appearing or diaphoretic.   Eyes:      General: No scleral icterus.        Right eye: No discharge.         Left eye: No discharge.      Extraocular Movements: Extraocular movements intact.   Neck:      Vascular: No carotid bruit.   Cardiovascular:      Rate and Rhythm: Normal rate and regular rhythm.      Pulses: Normal pulses.      Heart sounds: Normal heart sounds. No murmur heard.     No friction rub.   Pulmonary:      Effort: No respiratory distress.      Breath sounds: Normal breath sounds. No stridor. No rales.   Chest:      Chest wall: No tenderness.   Abdominal:      General: Bowel sounds are normal.      Palpations: Abdomen is soft.      Tenderness: There is no right CVA tenderness or left CVA tenderness.   Musculoskeletal:      Right lower leg: No edema.      Left lower leg: No edema.   Lymphadenopathy:      Cervical: No cervical adenopathy.   Skin:     General: Skin is warm and dry.   Neurological:      Mental Status: He is alert and oriented to person, place, and time.      Comments: Intact light touch on lower ext       Office Visit on 06/13/2023   Component Date Value Ref Range Status    Glucose 06/13/2023 " 63 (L)  70 - 99 mg/dL Final    BUN 06/13/2023 11  8 - 27 mg/dL Final    Creatinine 06/13/2023 0.71 (L)  0.76 - 1.27 mg/dL Final    EGFR Result 06/13/2023 99  >59 mL/min/1.73 Final    BUN/Creatinine Ratio 06/13/2023 15  10 - 24 Final    Sodium 06/13/2023 141  134 - 144 mmol/L Final    Potassium 06/13/2023 3.9  3.5 - 5.2 mmol/L Final    Chloride 06/13/2023 103  96 - 106 mmol/L Final    Total CO2 06/13/2023 24  20 - 29 mmol/L Final    Calcium 06/13/2023 8.8  8.6 - 10.2 mg/dL Final    Total Protein 06/13/2023 6.8  6.0 - 8.5 g/dL Final    Albumin 06/13/2023 4.3  3.8 - 4.8 g/dL Final    Globulin 06/13/2023 2.5  1.5 - 4.5 g/dL Final    A/G Ratio 06/13/2023 1.7  1.2 - 2.2 Final    Total Bilirubin 06/13/2023 0.3  0.0 - 1.2 mg/dL Final    Alkaline Phosphatase 06/13/2023 64  44 - 121 IU/L Final    AST (SGOT) 06/13/2023 27  0 - 40 IU/L Final    ALT (SGPT) 06/13/2023 31  0 - 44 IU/L Final    Hemoglobin A1C 06/13/2023 8.5 (H)  4.8 - 5.6 % Final    Comment:          Prediabetes: 5.7 - 6.4           Diabetes: >6.4           Glycemic control for adults with diabetes: <7.0      Total Cholesterol 06/13/2023 129  100 - 199 mg/dL Final    Triglycerides 06/13/2023 59  0 - 149 mg/dL Final    HDL Cholesterol 06/13/2023 50  >39 mg/dL Final    VLDL Cholesterol David 06/13/2023 13  5 - 40 mg/dL Final    LDL Chol Calc (NIH) 06/13/2023 66  0 - 99 mg/dL Final    25 Hydroxy, Vitamin D 06/13/2023 41.8  30.0 - 100.0 ng/mL Final    Comment: Vitamin D deficiency has been defined by the [place] of  Medicine and an Endocrine Society practice guideline as a  level of serum 25-OH vitamin D less than 20 ng/mL (1,2).  The Endocrine Society went on to further define vitamin D  insufficiency as a level between 21 and 29 ng/mL (2).  1. IOM ([place] of Medicine). 2010. Dietary reference     intakes for calcium and D. [place]: The     National Academies Press.  2. Taniya CALDERON, Navid JEROME, Rodney FERNÁNDEZ, et al.     Evaluation, treatment, and  prevention of vitamin D     deficiency: an Endocrine Society clinical practice     guideline. JCEM. 2011 Jul; 96(7):1911-30.      TSH 06/13/2023 1.840  0.450 - 4.500 uIU/mL Final    Specific Marvin, UA 06/13/2023      >=1.030 (A)  1.005 - 1.030 Final    pH, UA 06/13/2023 7.5  5.0 - 7.5 Final    Color, UA 06/13/2023 Yellow  Yellow Final    Appearance, UA 06/13/2023 Clear  Clear Final    Leukocytes, UA 06/13/2023 Negative  Negative Final    Protein 06/13/2023 Trace  Negative/Trace Final    Glucose, UA 06/13/2023 3+ (A)  Negative Final    Ketones 06/13/2023 Negative  Negative Final    Blood, UA 06/13/2023 Negative  Negative Final    Bilirubin, UA 06/13/2023 Negative  Negative Final    Urobilinogen, UA 06/13/2023 1.0  0.2 - 1.0 mg/dL Final    Nitrite, UA 06/13/2023 Negative  Negative Final    Microscopic Examination 06/13/2023 Comment   Final    Microscopic not indicated and not performed.    Creatinine, Urine 06/13/2023 157.0  Not Estab. mg/dL Final    Microalbumin, Urine 06/13/2023 23.5  Not Estab. ug/mL Final    Microalbumin/Creatinine Ratio 06/13/2023 15  0 - 29 mg/g creat Final    Comment:                        Normal:                0 -  29                         Moderately increased: 30 - 300                         Severely increased:       >300      Interpretation 06/13/2023 Note   Final    Supplemental report is available.     Assessment & Plan   Diagnoses and all orders for this visit:    1. Type 2 diabetes mellitus without complication, with long-term current use of insulin (Primary)  -     Comprehensive Metabolic Panel  -     Lipid Panel  -     Hemoglobin A1c  -     TSH    2. Hyperlipidemia, unspecified hyperlipidemia type  -     Comprehensive Metabolic Panel  -     Lipid Panel    3. Essential hypertension  -     Comprehensive Metabolic Panel    4. Vitamin D deficiency  -     Vitamin D,25-Hydroxy      Decrease Basaglar to 21 units every evening.  Continue mealtime NovoLog.  Continue Lipitor 40  mg/day.  Continue benazepril.  Continue vitamin D3 1000 units/day.  Flu vaccine this fall.    Copy of my notes sent to Esperanza MISHRA.

## 2023-10-20 LAB
25(OH)D3+25(OH)D2 SERPL-MCNC: 39.4 NG/ML (ref 30–100)
ALBUMIN SERPL-MCNC: 4.2 G/DL (ref 3.9–4.9)
ALBUMIN/GLOB SERPL: 1.8 {RATIO} (ref 1.2–2.2)
ALP SERPL-CCNC: 74 IU/L (ref 44–121)
ALT SERPL-CCNC: 18 IU/L (ref 0–44)
AST SERPL-CCNC: 18 IU/L (ref 0–40)
BILIRUB SERPL-MCNC: 0.6 MG/DL (ref 0–1.2)
BUN SERPL-MCNC: 11 MG/DL (ref 8–27)
BUN/CREAT SERPL: 14 (ref 10–24)
CALCIUM SERPL-MCNC: 8.9 MG/DL (ref 8.6–10.2)
CHLORIDE SERPL-SCNC: 104 MMOL/L (ref 96–106)
CHOLEST SERPL-MCNC: 124 MG/DL (ref 100–199)
CO2 SERPL-SCNC: 23 MMOL/L (ref 20–29)
CREAT SERPL-MCNC: 0.76 MG/DL (ref 0.76–1.27)
EGFRCR SERPLBLD CKD-EPI 2021: 97 ML/MIN/1.73
GLOBULIN SER CALC-MCNC: 2.4 G/DL (ref 1.5–4.5)
GLUCOSE SERPL-MCNC: 179 MG/DL (ref 70–99)
HBA1C MFR BLD: 8.6 % (ref 4.8–5.6)
HDLC SERPL-MCNC: 42 MG/DL
IMP & REVIEW OF LAB RESULTS: NORMAL
LDLC SERPL CALC-MCNC: 70 MG/DL (ref 0–99)
POTASSIUM SERPL-SCNC: 4.1 MMOL/L (ref 3.5–5.2)
PROT SERPL-MCNC: 6.6 G/DL (ref 6–8.5)
SODIUM SERPL-SCNC: 140 MMOL/L (ref 134–144)
TRIGL SERPL-MCNC: 53 MG/DL (ref 0–149)
TSH SERPL DL<=0.005 MIU/L-ACNC: 1.34 UIU/ML (ref 0.45–4.5)
VLDLC SERPL CALC-MCNC: 12 MG/DL (ref 5–40)

## 2023-10-22 NOTE — PROGRESS NOTES
Hemoglobin A1c slightly higher at 8.6%.  Sensor download in November 2023.  LDL 70.  HDL 42.  Continue Lipitor 40 mg/day.  Normal thyroid function tests.  Normal vitamin D.  Continue vitamin D3 1000 units/day.  Please notify patient results and instructions.  Copy of labs sent to TAD Mccartney

## 2023-11-08 NOTE — DISCHARGE INSTRUCTIONS
For the next 24 hours patient needs to be with a responsible adult.    For 24 hours DO NOT drive, operate machinery, appliances, drink alcohol, make important decisions or sign legal documents.    Start with a light or bland diet and advance to regular diet as tolerated.    Follow recommendations  provided by your doctor.    Call Dr. Kelly for problems [phone]    Problems may include but not limited to: large amounts of bleeding, trouble breathing, repeated vomiting, severe unrelieved pain, fever or chills.  Call Dr. Kelly in 10 days for biopsy results if he hasn't called you.       No

## 2023-11-10 DIAGNOSIS — E78.5 HYPERLIPIDEMIA, UNSPECIFIED HYPERLIPIDEMIA TYPE: Primary | ICD-10-CM

## 2023-11-10 RX ORDER — ATORVASTATIN CALCIUM 40 MG/1
40 TABLET, FILM COATED ORAL NIGHTLY
Qty: 90 TABLET | Refills: 2 | Status: SHIPPED | OUTPATIENT
Start: 2023-11-10

## 2023-11-20 DIAGNOSIS — Z79.4 TYPE 2 DIABETES MELLITUS WITHOUT COMPLICATION, WITH LONG-TERM CURRENT USE OF INSULIN: Primary | ICD-10-CM

## 2023-11-20 DIAGNOSIS — E11.9 TYPE 2 DIABETES MELLITUS WITHOUT COMPLICATION, WITH LONG-TERM CURRENT USE OF INSULIN: Primary | ICD-10-CM

## 2023-11-20 RX ORDER — PEN NEEDLE, DIABETIC 31 GX5/16"
1 NEEDLE, DISPOSABLE MISCELLANEOUS 4 TIMES DAILY
Qty: 400 EACH | Refills: 0 | Status: SHIPPED | OUTPATIENT
Start: 2023-11-20

## 2023-12-06 ENCOUNTER — HOSPITAL ENCOUNTER (OUTPATIENT)
Dept: CARDIOLOGY | Facility: HOSPITAL | Age: 68
Discharge: HOME | End: 2023-12-06
Payer: MEDICARE

## 2023-12-06 ENCOUNTER — TRANSCRIBE ORDERS (OUTPATIENT)
Dept: ADMINISTRATIVE | Facility: HOSPITAL | Age: 68
End: 2023-12-06
Payer: MEDICARE

## 2023-12-06 ENCOUNTER — LAB (OUTPATIENT)
Dept: LAB | Facility: HOSPITAL | Age: 68
End: 2023-12-06
Payer: MEDICARE

## 2023-12-06 DIAGNOSIS — C61 MALIGNANT NEOPLASM OF PROSTATE: Primary | ICD-10-CM

## 2023-12-06 DIAGNOSIS — C61 MALIGNANT NEOPLASM OF PROSTATE: ICD-10-CM

## 2023-12-06 LAB
ANION GAP SERPL CALCULATED.3IONS-SCNC: 11.1 MMOL/L (ref 5–15)
BASOPHILS # BLD AUTO: 0.04 10*3/MM3 (ref 0–0.2)
BASOPHILS NFR BLD AUTO: 0.6 % (ref 0–1.5)
BUN SERPL-MCNC: 14 MG/DL (ref 8–23)
BUN/CREAT SERPL: 15.1 (ref 7–25)
CALCIUM SPEC-SCNC: 9 MG/DL (ref 8.6–10.5)
CHLORIDE SERPL-SCNC: 104 MMOL/L (ref 98–107)
CO2 SERPL-SCNC: 24.9 MMOL/L (ref 22–29)
CREAT SERPL-MCNC: 0.93 MG/DL (ref 0.76–1.27)
DEPRECATED RDW RBC AUTO: 40.5 FL (ref 37–54)
EGFRCR SERPLBLD CKD-EPI 2021: 88.3 ML/MIN/1.73
EOSINOPHIL # BLD AUTO: 0.07 10*3/MM3 (ref 0–0.4)
EOSINOPHIL NFR BLD AUTO: 1.1 % (ref 0.3–6.2)
ERYTHROCYTE [DISTWIDTH] IN BLOOD BY AUTOMATED COUNT: 12.3 % (ref 12.3–15.4)
GLUCOSE SERPL-MCNC: 296 MG/DL (ref 65–99)
HCT VFR BLD AUTO: 43.2 % (ref 37.5–51)
HGB BLD-MCNC: 14.6 G/DL (ref 13–17.7)
IMM GRANULOCYTES # BLD AUTO: 0.01 10*3/MM3 (ref 0–0.05)
IMM GRANULOCYTES NFR BLD AUTO: 0.2 % (ref 0–0.5)
LYMPHOCYTES # BLD AUTO: 1.66 10*3/MM3 (ref 0.7–3.1)
LYMPHOCYTES NFR BLD AUTO: 25.3 % (ref 19.6–45.3)
MCH RBC QN AUTO: 30.7 PG (ref 26.6–33)
MCHC RBC AUTO-ENTMCNC: 33.8 G/DL (ref 31.5–35.7)
MCV RBC AUTO: 90.9 FL (ref 79–97)
MONOCYTES # BLD AUTO: 0.31 10*3/MM3 (ref 0.1–0.9)
MONOCYTES NFR BLD AUTO: 4.7 % (ref 5–12)
NEUTROPHILS NFR BLD AUTO: 4.48 10*3/MM3 (ref 1.7–7)
NEUTROPHILS NFR BLD AUTO: 68.1 % (ref 42.7–76)
NRBC BLD AUTO-RTO: 0 /100 WBC (ref 0–0.2)
PLATELET # BLD AUTO: 234 10*3/MM3 (ref 140–450)
PMV BLD AUTO: 9.6 FL (ref 6–12)
POTASSIUM SERPL-SCNC: 4.1 MMOL/L (ref 3.5–5.2)
QT INTERVAL: 373 MS
QTC INTERVAL: 423 MS
RBC # BLD AUTO: 4.75 10*6/MM3 (ref 4.14–5.8)
SODIUM SERPL-SCNC: 140 MMOL/L (ref 136–145)
WBC NRBC COR # BLD AUTO: 6.57 10*3/MM3 (ref 3.4–10.8)

## 2023-12-06 PROCEDURE — 80048 BASIC METABOLIC PNL TOTAL CA: CPT

## 2023-12-06 PROCEDURE — 85025 COMPLETE CBC W/AUTO DIFF WBC: CPT

## 2023-12-06 PROCEDURE — 36415 COLL VENOUS BLD VENIPUNCTURE: CPT

## 2023-12-06 PROCEDURE — 93005 ELECTROCARDIOGRAM TRACING: CPT | Performed by: UROLOGY

## 2023-12-22 ENCOUNTER — OFFICE VISIT (OUTPATIENT)
Dept: FAMILY MEDICINE CLINIC | Facility: CLINIC | Age: 68
End: 2023-12-22
Payer: MEDICARE

## 2023-12-22 VITALS
OXYGEN SATURATION: 99 % | SYSTOLIC BLOOD PRESSURE: 120 MMHG | DIASTOLIC BLOOD PRESSURE: 70 MMHG | BODY MASS INDEX: 29.03 KG/M2 | HEART RATE: 82 BPM | WEIGHT: 219 LBS | HEIGHT: 73 IN | TEMPERATURE: 98.9 F | RESPIRATION RATE: 20 BRPM

## 2023-12-22 DIAGNOSIS — R29.898 WEAKNESS OF RIGHT HAND: ICD-10-CM

## 2023-12-22 DIAGNOSIS — R19.7 DIARRHEA, UNSPECIFIED TYPE: ICD-10-CM

## 2023-12-22 DIAGNOSIS — M25.551 RIGHT HIP PAIN: ICD-10-CM

## 2023-12-22 DIAGNOSIS — R94.131 ABNORMAL EMG: ICD-10-CM

## 2023-12-22 DIAGNOSIS — A41.9 SEPSIS, DUE TO UNSPECIFIED ORGANISM, UNSPECIFIED WHETHER ACUTE ORGAN DYSFUNCTION PRESENT: ICD-10-CM

## 2023-12-22 DIAGNOSIS — M62.541 ATROPHY OF MUSCLE OF RIGHT HAND: ICD-10-CM

## 2023-12-22 DIAGNOSIS — A04.72 C. DIFFICILE COLITIS: ICD-10-CM

## 2023-12-22 DIAGNOSIS — E78.5 HYPERLIPIDEMIA, UNSPECIFIED HYPERLIPIDEMIA TYPE: ICD-10-CM

## 2023-12-22 DIAGNOSIS — R97.20 INCREASED PROSTATE SPECIFIC ANTIGEN (PSA) VELOCITY: ICD-10-CM

## 2023-12-22 DIAGNOSIS — E55.9 VITAMIN D DEFICIENCY: ICD-10-CM

## 2023-12-22 DIAGNOSIS — A05.8 YERSINIA ENTEROCOLITICA FOOD POISONING: ICD-10-CM

## 2023-12-22 DIAGNOSIS — R78.81 E COLI BACTEREMIA: ICD-10-CM

## 2023-12-22 DIAGNOSIS — B96.20 E COLI BACTEREMIA: ICD-10-CM

## 2023-12-22 DIAGNOSIS — M54.16 LUMBAR RADICULOPATHY: ICD-10-CM

## 2023-12-22 DIAGNOSIS — C61 PROSTATE CANCER: ICD-10-CM

## 2023-12-22 DIAGNOSIS — M54.16 RADICULOPATHY, LUMBAR REGION: ICD-10-CM

## 2023-12-22 DIAGNOSIS — G56.01 CARPAL TUNNEL SYNDROME OF RIGHT WRIST: ICD-10-CM

## 2023-12-22 DIAGNOSIS — Z00.00 MEDICARE ANNUAL WELLNESS VISIT, SUBSEQUENT: Primary | ICD-10-CM

## 2023-12-22 DIAGNOSIS — E53.8 VITAMIN B 12 DEFICIENCY: ICD-10-CM

## 2023-12-22 DIAGNOSIS — N30.01 ACUTE CYSTITIS WITH HEMATURIA: ICD-10-CM

## 2023-12-22 DIAGNOSIS — M48.02 SPINAL STENOSIS IN CERVICAL REGION: ICD-10-CM

## 2023-12-22 DIAGNOSIS — M62.542 ATROPHY OF MUSCLE OF LEFT HAND: ICD-10-CM

## 2023-12-22 DIAGNOSIS — G56.21 CUBITAL TUNNEL SYNDROME ON RIGHT: ICD-10-CM

## 2023-12-22 DIAGNOSIS — M47.26 OSTEOARTHRITIS OF SPINE WITH RADICULOPATHY, LUMBAR REGION: ICD-10-CM

## 2023-12-22 DIAGNOSIS — M51.36 DEGENERATIVE DISC DISEASE, LUMBAR: ICD-10-CM

## 2023-12-22 NOTE — PROGRESS NOTES
The ABCs of the Annual Wellness Visit  Subsequent Medicare Wellness Visit    Chief Complaint   Patient presents with    Annual Exam       Subjective   History of Present Illness:  Jeff Bernard is a 68 y.o. male who presents for a Subsequent Medicare Wellness Visit.    LAST COLONOSCOPY-Dr. Kelly- 2022- 1 polyp- tubular adenoma. Recheck 5 years.   2014-polypectomy.  10/3/2017-recheck in 5 years.    LAST TDAP- 11  FLU SHOT-10/26/2023  PREVNAR- 2017  PNEUMOVAX-2018  ZOSTAVAX- 8/3/17. SHINGRIX-2019, 2020  HEPATITIS A- 16, 12/15/2018  COVID 19- MODERNA 3/4/2021, 2021, 2021, Spikevax 10/26/2023.  RSV-has not had vaccination.    HEALTH RISK ASSESSMENT    Recent Hospitalizations:  No hospitalization(s) within the last year.    Current Medical Providers:  Patient Care Team:  Esperanza Sarmiento PA as PCP - General  Esperanza Sarmiento PA as PCP - Family Medicine  Salinas Juarez MD as Consulting Physician (Endocrinology)  Lazarus Gallegos MD as Consulting Physician (Cardiology)  Michael Fatima MD as Consulting Physician (Urology)    Smoking Status:  Social History     Tobacco Use   Smoking Status Never    Passive exposure: Never   Smokeless Tobacco Never       Alcohol Consumption:  Social History     Substance and Sexual Activity   Alcohol Use Yes    Alcohol/week: 4.0 - 6.0 standard drinks of alcohol    Types: 4 - 6 Cans of beer per week    Comment: Socially.       Depression Screen:       2023     1:02 PM   PHQ-2/PHQ-9 Depression Screening   Little Interest or Pleasure in Doing Things 0-->not at all   Feeling Down, Depressed or Hopeless 0-->not at all   PHQ-9: Brief Depression Severity Measure Score 0       Fall Risk Screen:  CHRISTINE Fall Risk Assessment was completed, and patient is at LOW risk for falls.Assessment completed on:2023    Health Habits and Functional and Cognitive Screenin/22/2023     1:00 PM   Functional & Cognitive Status   Do you  have difficulty preparing food and eating? No   Do you have difficulty bathing yourself, getting dressed or grooming yourself? No   Do you have difficulty using the toilet? No   Do you have difficulty moving around from place to place? No   Do you have trouble with steps or getting out of a bed or a chair? No   Current Diet Well Balanced Diet   Dental Exam Not up to date   Eye Exam Up to date   Exercise (times per week) 2 times per week   Current Exercises Include Walking   Do you need help using the phone?  No   Are you deaf or do you have serious difficulty hearing?  No   Do you need help to go to places out of walking distance? No   Do you need help shopping? No   Do you need help preparing meals?  No   Do you need help with housework?  No   Do you need help with laundry? No   Do you need help taking your medications? No   Do you need help managing money? No   Do you ever drive or ride in a car without wearing a seat belt? No   Have you felt unusual stress, anger or loneliness in the last month? No   Who do you live with? Alone   If you need help, do you have trouble finding someone available to you? No   Have you been bothered in the last four weeks by sexual problems? No   Do you have difficulty concentrating, remembering or making decisions? No         Does the patient have evidence of cognitive impairment? No    Asprin use counseling:Taking ASA appropriately as indicated    Age-appropriate Screening Schedule:  Refer to the list below for future screening recommendations based on patient's age, sex and/or medical conditions. Orders for these recommended tests are listed in the plan section. The patient has been provided with a written plan.    Health Maintenance   Topic Date Due    DIABETIC FOOT EXAM  01/20/2023    ANNUAL WELLNESS VISIT  12/14/2023    TDAP/TD VACCINES (2 - Td or Tdap) 12/22/2024 (Originally 11/13/2023)    DIABETIC EYE EXAM  12/31/2023    HEMOGLOBIN A1C  04/19/2024    URINE MICROALBUMIN   06/13/2024    LIPID PANEL  10/19/2024    BMI FOLLOWUP  12/22/2024    COLORECTAL CANCER SCREENING  08/17/2027    HEPATITIS C SCREENING  Completed    COVID-19 Vaccine  Completed    INFLUENZA VACCINE  Completed    Pneumococcal Vaccine 65+  Completed    ZOSTER VACCINE  Completed          The following portions of the patient's history were reviewed and updated as appropriate: allergies, current medications, past family history, past medical history, past social history, past surgical history, and problem list.    Outpatient Medications Prior to Visit   Medication Sig Dispense Refill    aspirin 81 MG EC tablet Take 1 tablet by mouth Daily.      atorvastatin (LIPITOR) 40 MG tablet Take 1 tablet by mouth Every Night. Indications: High Amount of Fats in the Blood 90 tablet 2    benazepril (LOTENSIN) 10 MG tablet TAKE 1 TABLET BY MOUTH EVERY DAY 90 tablet 2    Cholecalciferol (VITAMIN D-3) 1000 units capsule Take 1,000 Units by mouth Daily.      Continuous Blood Gluc  (FreeStyle Celsa 2 [place]) device 1 each Daily. 1 each 1    Continuous Blood Gluc Sensor (FreeStyle Celsa 2 Sensor) misc 1 each Every 14 (Fourteen) Days. 2 each 6    Cyanocobalamin (VITAMIN B 12 PO) Take 1,000 mg by mouth. 2 tablets weelkly      glucose blood (CONTOUR NEXT TEST) test strip Dx code E11.9 testing bs 3 x day 300 each 1    insulin aspart (NovoLOG FlexPen) 100 UNIT/ML solution pen-injector sc pen INJECT 75 UNITS UNDER THE SKIN INTO THE APPROPRIATE AREA AS DIRECTED DAILY FOR 90 DAYS. INJECT 8 TO 13 UNITS EVERY MORNING, 9-15 UNITS AT LUNCH, 10-15 AT SUPPER PLUS SLIDING SCALE (75/DAY) (Patient taking differently: INJECT 75 UNITS max  UNDER THE SKIN INTO THE APPROPRIATE AREA AS DIRECTED DAILY INJECT 1 unit per 3 grams of carbs) 68 mL 2    Insulin Glargine (BASAGLAR KWIKPEN) 100 UNIT/ML injection pen Inject 21 Units under the skin into the appropriate area as directed Every Night. Indications: Type 2 Diabetes      Insulin Pen Needle (B-D  "ULTRAFINE III SHORT PEN) 31G X 8 MM misc 1 each by Other route 4 (Four) Times a Day. USE 1 PEN NEEDLE FOUR TIMES DAILY  Indications: Type 2 Diabetes 400 each 0    tamsulosin (FLOMAX) 0.4 MG capsule 24 hr capsule Take 1 capsule by mouth Daily. Prescribed by urology       No facility-administered medications prior to visit.       Patient Active Problem List   Diagnosis    Abnormal electrocardiogram    Abnormal pituitary follicle stimulating hormone (FSH)    Arteriosclerosis of carotid artery    Benign colonic polyp    Benign essential hypertension    Erectile dysfunction of nonorganic origin    Type 2 diabetes mellitus without complication, with long-term current use of insulin    Hyperlipidemia    Vitamin D deficiency    History of adenomatous polyp of colon    Increased prostate specific antigen (PSA) velocity    Abnormal testicular exam    Spinal stenosis in cervical region    Bilateral carpal tunnel syndrome    Ulnar neuropathy at elbow    Atrophy of muscle of right hand    Sepsis, due to unspecified organism, unspecified whether acute organ dysfunction present    Acute cystitis with hematuria    E coli bacteremia       Advanced Care Planning:  ACP discussion was held with the patient during this visit. Patient does not have an advance directive, information provided.    Review of Systems    Compared to one year ago, the patient feels his physical health is worse with dx of prostate cancer but not feeling any worse. He is finally back to baseline.    Compared to one year ago, the patient feels his mental health is the same.    Reviewed chart for potential of high risk medication in the elderly: not applicable  Reviewed chart for potential of harmful drug interactions in the elderly:not applicable    Objective         Vitals:    12/22/23 1259   BP: 120/70   Pulse: 82   Resp: 20   Temp: 98.9 °F (37.2 °C)   TempSrc: Temporal   SpO2: 99%   Weight: 99.3 kg (219 lb)   Height: 185.4 cm (72.99\")       Body mass index is " 28.9 kg/m².  Discussed the patient's BMI with him. The BMI is above average; BMI management plan is completed.    Physical Exam    Lab Results   Component Value Date    CHLPL 124 10/19/2023    TRIG 53 10/19/2023    HDL 42 10/19/2023    LDL 70 10/19/2023    VLDL 12 10/19/2023    HGBA1C 8.6 (H) 10/19/2023        Assessment & Plan   Medicare Risks and Personalized Health Plan  CMS Preventative Services Quick Reference  Advance Directive Discussion  Immunizations Discussed/Encouraged (specific immunizations; Tdap and RSV (Respiratory Syncytial Virus) )    The above risks/problems have been discussed with the patient.  Pertinent information has been shared with the patient in the After Visit Summary.  Follow up plans and orders are seen below in the Assessment/Plan Section.    Diagnoses and all orders for this visit:    1. Medicare annual wellness visit, subsequent (Primary)    2. Hyperlipidemia, unspecified hyperlipidemia type    3. Vitamin B 12 deficiency  -     Vitamin B12 & Folate    4. Vitamin D deficiency    5. Increased prostate specific antigen (PSA) velocity    6. Weakness of right hand    7. Carpal tunnel syndrome of right wrist    8. Cubital tunnel syndrome on right    9. Atrophy of muscle of right hand    10. Atrophy of muscle of left hand    11. Abnormal EMG    12. Spinal stenosis in cervical region    13. Radiculopathy, lumbar region    14. Lumbar radiculopathy    15. Osteoarthritis of spine with radiculopathy, lumbar region    16. Degenerative disc disease, lumbar    17. Right hip pain    18. Acute cystitis with hematuria    19. E coli bacteremia    20. Sepsis, due to unspecified organism, unspecified whether acute organ dysfunction present    21. Prostate cancer    22. C. difficile colitis    23. Yersinia enterocolitica food poisoning    24. Diarrhea, unspecified type        Follow Up:  Return in about 1 year (around 12/22/2024) for Medicare Wellness.     An After Visit Summary and PPPS were given to the  patient.

## 2023-12-22 NOTE — PROGRESS NOTES
Subjective   Jeff Bernard is a 68 y.o. male who presents today for follow-up of renal protection from diabetes, hyperlipidemia, B12 deficiency, vitamin D deficiency, weakness and atrophy of right hand after a shoulder injury, and specialists.     HPI    EKG 1 week prior to biopsy. Has results to review.   Did not have vascular testing due to more testing and follow up with urology.     He is better- no diarrhea or issues. He has not had recurrence.   Trouble with diarrhea- 2 weeks was ok then will have BM with urination. Sometimes gas and sometimes water. Started a couple months ago and occurs for 2-3 days then was ok for a couple weeks. Eating better since meeting a lady. Lapses sometimes. No pain or blood with BM. No antibiotics. No fever. No other symptoms.     Blood pressure/renal protection- has been stable on Lotensin- tolerating ok. BP at home- 125/74. Checking 4 x monthly- always normal.  No low BP at home.   Hyperlipidemia- continues medication as directed and is tolerating without AE.   B12- taking supplement twice weekly.  Vitamin D-taking Vitamin D 1000 IU daily  PSA- was > 4. Prostate biopsy positive for cancer. Awaiting CT and further testing for recommendations for treatment.      Taking ASA 81 mg   No change in weight at home.     Right shoulder pain, right arm and hand weakness- no changes- seems like not worsening.   may have a little more use of the hands- 2nd and 3rd fingers straightened out more. He hit his elbow and had sensation. He thinks he may have some improvement   12-13 years ago, he hurt his right shoulder at work. Patient slipped and swung around, caught himself by his hand with injury, and had physical therapy.   He then had another job, hurt his shoulder, and had to have PT again. He continues doing exercises he was given by PT.   He has atrophy and reports he is dropping things. He does notice the weakness and has atrophy noted on exam.   I referred for EMG/NCS with median and  ulnar neuropathy and he was seen by Dr Quiroz, hand surgery, then was referred to neurosurgery. He had MRI cervical spine with prominent diffuse cervical spondylosis, generalized reversal of the normal cervical lordosis centered at C4-5, mild left facet overgrowth contributing to mild left foraminal narrowing at C2-3 and C3-4, prominent disc space narrowing and degenerative disc and endplate changes from C4 to T1, diffuse spurring and diffuse posterior disc bulge that abuts and mildly deforms the ventral surface of the cord moderately narrowing the canal at C4-5 and bilateral uncovertebral joint hypertrophy with moderate-to-severe bilateral foraminal narrowing at C4-5 that could affect the exiting C5 nerve roots bilaterally, at C5-6, a posterior disc osteophyte complex abuts and flattens the cord contributing to moderate-to-severe narrowing of the anterior posterior dimension of the canal with uncovertebral joint spurs contributing to moderate-to-severe bilateral bony foraminal narrowing that could affect the exiting C6 nerve roots bilaterally, questionable very minimal T2 high signal in the right lateral aspect of the cord at the C5-6 level, could be artifact or some minimal myelomalacia in the right lateral cord at the C5-6 level, posterior spurs and uncovertebral joint hypertrophy contributing to mild canal and moderate bilateral bony foraminal narrowing at C6-7 and uncovertebral joint spurring contributes to mild-to-moderate bilateral bony foraminal narrowing at C7-T1.   He was referred him to Dr Herzog, hand surgery,  Patient was seen by Dr. Lyons last 9/2020 following EMG with severe cubital tunnel and carpal tunnel syndrome.  Recommended surgery.  Patient advised to think about it and go back after the first of 2021.  Dr Hezrog-wanted to do surgery on wrist and elbow. He was not sure if it would get worse if did not do it. Not sure if it will improve with surgery.   8/3/2021-underwent carpal tunnel and  cubital tunnel surgery with Dr. Herzog. He was not sure he noticed improvement. It seemed like he has a little more strength.     Lumbar radiculopathy, Right hip pain- has been ok. PT helped and did not have any other issues.   Patient had pain after lifting 350 pound family member out of their car 6/22/2021. Started with pain and thought it was related to bursitis. Previously he was treated with steroid with improvement. He put his arms around patient's neck and he got him out of the car. He initially felt pain in right low back. He took Tylenol and back pain resolved. He then developed pain in lateral and posterior right hip and sometimes pain in anterior leg. Dull pain that made him sit down. Sitting and laying down helped. Getting up and down worsened it. Tried Naproxen and extra strength Tylenol.   He had hip and back pain, however, it appeared to be lumbar radiculopathy. He wanted to try steroid. I discussed with him that this would usually not be indicated with uncontrolled DM. I gave medrol dose pack as directed and Baclofen as needed. He was advised to monitor glucose carefully, use sliding scale, and control glucose levels.  Consideration of PT for treatment and consider imaging if no resolution of pain.  Abnormal lumbar spine x-ray with degenerative disc and osteoarthritis.  I placed order for MRI.  He did improve with physical therapy and did not have MRI.    DDD cervical spine- has been seen by neurosurgery.  Advised to see hand surgeon and follow-up in 6 weeks.  He did not return for follow-up.    Patient's specialists:  Cardiology- Dr Gallegos- last seen 9/2018-they confirmed no need for follow-up after last visit.  Endocrinology-Dr. Juarez last visit 10/2023 for diabetes mellitus type 2, hyperlipidemia, hypertension, vitamin D deficiency.  Decrease Basaglar to 21 units every evening and continue mealtime NovoLog, benazepril, Lipitor, and vitamin D 1000 IU daily.  Follow-up in 6 months.  Patient had a  major discrepancy with his blood sugars.  He is using the freestyle eva machine and when he scanned his meter for his sugars the morning of his labs with endocrinology, he had readings between 80s and 151.  However, the glucose reading from his labs was 283.  He has started checking fingerstick glucose as well as standing his meter and reports the readings are only about 20 points off.  He is not having that significant of the discrepancy.  He discussed this with the endocrinologist and they advised that he call the freestyle company.  They just sent in the new sensor.  He is a little concerned about how to accurately track his blood sugars.  He stated his average per his meter was about 145 which would not be quite 8.7% A1c.  GI-Dr. Kelly-last appointment 8/2023 for C. difficile colitis reportedly related to UTI followed by Yersinia infection with food poisoning.  He was treated by hospital ID and given Flagyl due to cost of vancomycin.  2 courses of Flagyl later and was doing well for about a month.  No nausea, vomiting, bright red blood per rectum, or diarrhea.  No further GI intervention required.  If recurrent C. difficile, advised follow-up there to arrange Dificid treatment.  Follow-up as needed.  Neurosurgery- Evi Burden PA-C-He had MRI cervical spine with prominent diffuse cervical spondylosis, generalized reversal of the normal cervical lordosis centered at C4-5, mild left facet overgrowth contributing to mild left foraminal narrowing at C2-3 and C3-4, prominent disc space narrowing and degenerative disc and endplate changes from C4 to T1, diffuse spurring and diffuse posterior disc bulge that abuts and mildly deforms the ventral surface of the cord moderately narrowing the canal at C4-5 and bilateral uncovertebral joint hypertrophy with moderate-to-severe bilateral foraminal narrowing at C4-5 that could affect the exiting C5 nerve roots bilaterally, at C5-6, a posterior disc osteophyte complex abuts  and flattens the cord contributing to moderate-to-severe narrowing of the anterior posterior dimension of the canal with uncovertebral joint spurs contributing to moderate-to-severe bilateral bony foraminal narrowing that could affect the exiting C6 nerve roots bilaterally, questionable very minimal T2 high signal in the right lateral aspect of the cord at the C5-6 level, could be artifact or some minimal myelomalacia in the right lateral cord at the C5-6 level, posterior spurs and uncovertebral joint hypertrophy contributing to mild canal and moderate bilateral bony foraminal narrowing at C6-7 and uncovertebral joint spurring contributes to mild-to-moderate bilateral bony foraminal narrowing at C7-T1. Advised to see Dr Herzog and follow up in 6 weeks.   Urology-Dr. Fatima- last seen 12/2023 for elevated PSA, BPH, undescended right testicle, ED. biopsy 12/2023.  Continue Flomax.  Prescribed Cialis.  Follow-up 6 months.  Ophthalmology- Dr Camejo- last appt 12/2021 in [facility].  No DMR.  Follow-up 1 year.  Hand surgery- Dr Herzog- last visit 8/2021 for carpal tunnel syndrome and a little tunnel syndrome-s/p right CTR release.  Advised course of home physical therapy with active and passive ROM.  Return to normal activity in 2 weeks as tolerated.  Follow-up 3 to 4 months.  EMG confirmed severe carpal tunnel and cubital tunnel syndromes-discussed surgical options.  Patient wanted to think about surgery and will let them know.   Prior Dr Quiroz who referred to neurosurgery    The following portions of the patient's history were reviewed and updated as appropriate: allergies, current medications, past family history, past medical history, past social history, past surgical history and problem list.    Review of Systems   HENT: Negative.     Respiratory: Negative.     Cardiovascular: Negative.    Gastrointestinal:  Positive for abdominal pain and diarrhea.   Endocrine: Negative.    Genitourinary: Negative.     Musculoskeletal:  Positive for arthralgias.   Skin: Negative.    Neurological:  Positive for weakness.   Psychiatric/Behavioral: Negative.       Objective    Vitals:    12/22/23 1259   BP: 120/70   Pulse: 82   Resp: 20   Temp: 98.9 °F (37.2 °C)   SpO2: 99%     Body mass index is 28.9 kg/m².      Physical Exam   Constitutional: He is oriented to person, place, and time. He appears well-developed. No distress.   HENT:   Head: Normocephalic and atraumatic.   Right Ear: External ear normal.   Left Ear: External ear normal.   Eyes: Conjunctivae are normal.   Neck: Carotid bruit is not present. No tracheal deviation present. No thyroid mass and no thyromegaly present.   Cardiovascular: Normal rate, regular rhythm, normal heart sounds and normal pulses.   Pulmonary/Chest: Effort normal and breath sounds normal.   Abdominal: Normal appearance.   Musculoskeletal:      Comments: Significant atrophy and weakness RUE and right hand   Neurological: He is alert and oriented to person, place, and time. Gait normal.   Skin: Skin is warm and dry.   Psychiatric: His behavior is normal. Mood, judgment and thought content normal.   Nursing note and vitals reviewed.        Assessment & Plan   There are no diagnoses linked to this encounter.       Assessment and plan  AWV completed today. He is up-to-date on colonoscopy until 2027 and is up-to-date on flu shot, Prevnar, Pneumovax, Shingrix, and hepatitis A, and Covid 19 booster.  He is due for Tdap this year.  Last carotid duplex and triple arterial screening with bilateral sided plaque without stenosis 1/2021.  Patient has triple arterial screen card to call for screening today.  I will also refer to cardiology with borderline preop EKG.  He received diagnosis of prostate cancer 12/2022 and continues follow-up with urology as directed by them.     Patient has fasting labs with endocrinology and had preop labs for biopsy of prostate. Labs have all been reviewed.  He does still need B12  recheck.  I will check today.  He is doing well and has been very stable.  Follow-up in 1 year for AWV and follow up or sooner if any concerns.     Blood pressure- Blood pressure is stable today. He is on Lotensin for renal protection with diabetes mellitus. Continue Lotensin 10 mg once daily. Monitor BP and call if low or elevated. Ensure changing positions carefully and return if low BP. We will consider decreasing to 5 mg daily if low.   B12 deficiency-Continue B12 1000 mcg twice weekly. I will monitor and make further recommendations.   Vitamin D deficiency-Continue Vitamin D 1000IU once daily per endocrinology.   Diabetes mellitus type II uncontrolled, hyperlipidemia, and vitamin D deficiency- Last A1C remained uncontrolled at 8.6%.  He should continue treatment and follow up with endocrinology as directed by them.   Right upper extremity atrophy-cubital tunnel syndrome and carpal tunnel syndrome- Follow up with hand surgery, Dr. Lyons as recommended.  Lumbar radiculopathy, Right hip pain- Patient has been stable. Follow up if worsening, new, or changing symptoms.   DDD cervical spine- To be seen if worsening, new or changing symptoms.   Prostate cancer, BPH, ED- Patient to follow with urology as directed.  C. difficile colitis, Yersinia infection, diarrhea- He had intermittent diarrhea x months and decreased appetite.  Positive stool cultures and he was treated with Flagyl x 2 courses.  He was seen by infectious disease who recommended vancomycin which was too expensive.  He was also seen by GI who advised no further treatment or workup at this time but that they will consider other treatment if recurrence of symptoms.   Borderline EKG- He had preop testing with EKG.  No acute changes.  There are a couple complexes in the inferior and lateral leads with some mild ST depression.  This is very mild and I am unsure the significance.  I will see if his previous cardiologist will over read and make  recommendations if he would like to see him in follow-up or any other recommendations or interpretation.  He is asymptomatic.  Patient to be seen ASAP if he develops any symptoms.    Patient continues to see specialist as noted above.  I have reviewed available records, including consult notes, labs, and imaging/testing.  Patient to continue follow-up with specialist as directed by them.    I spent 45 minutes caring for Jeff Bernard on this date of service, including 15 minutes for AWV and 30 minutes for follow-up/problem focused visit. This time includes time spent by me in the following activities as necessary: preparing for the visit, reviewing tests, specialists records and previous visits, obtaining and/or reviewing a separately obtained history, performing a medically appropriate exam and/or evaluation, counseling and educating the patient, family, caregiver, referring and/or communicating with other healthcare professionals, documenting information in the medical record, independently interpreting results and communicating that information with the patient, family, caregiver, and developing a medically appropriate treatment plan with consideration of other conditions, medications, and treatments.

## 2023-12-23 LAB
FOLATE SERPL-MCNC: 11.9 NG/ML
VIT B12 SERPL-MCNC: 952 PG/ML (ref 232–1245)

## 2023-12-23 NOTE — PATIENT INSTRUCTIONS
Medicare Wellness  Personal Prevention Plan of Service     Date of Office Visit:    Encounter Provider:  TAD Mccartney  Place of Service:  [facility]  Patient Name: Jeff Bernard  :  1955    As part of the Medicare Wellness portion of your visit today, we are providing you with this personalized preventive plan of services (PPPS). This plan is based upon recommendations of the [place] Preventive Services Task Force (USPSTF) and the Advisory Committee on Immunization Practices (ACIP).    This lists the preventive care services that should be considered, and provides dates of when you are due. Items listed as completed are up-to-date and do not require any further intervention.    Health Maintenance   Topic Date Due    DIABETIC FOOT EXAM  2023    ANNUAL WELLNESS VISIT  2023    TDAP/TD VACCINES (2 - Td or Tdap) 2024 (Originally 2023)    DIABETIC EYE EXAM  2023    HEMOGLOBIN A1C  2024    URINE MICROALBUMIN  2024    LIPID PANEL  10/19/2024    BMI FOLLOWUP  2024    COLORECTAL CANCER SCREENING  2027    HEPATITIS C SCREENING  Completed    COVID-19 Vaccine  Completed    INFLUENZA VACCINE  Completed    Pneumococcal Vaccine 65+  Completed    ZOSTER VACCINE  Completed       Orders Placed This Encounter   Procedures    Vitamin B12 & Folate     Order Specific Question:   Release to patient     Answer:   Routine Release [[id number]]       No follow-ups on file.

## 2023-12-27 ENCOUNTER — TELEPHONE (OUTPATIENT)
Dept: FAMILY MEDICINE CLINIC | Facility: CLINIC | Age: 68
End: 2023-12-27
Payer: MEDICARE

## 2023-12-28 ENCOUNTER — TELEPHONE (OUTPATIENT)
Dept: FAMILY MEDICINE CLINIC | Facility: CLINIC | Age: 68
End: 2023-12-28
Payer: MEDICARE

## 2023-12-28 NOTE — TELEPHONE ENCOUNTER
"Name: Jeff Bernard \"Abhishek\"      Relationship: Self      Best Callback Number: [phone]      HUB PROVIDED THE RELAY MESSAGE FROM THE OFFICE      PATIENT: VOICED UNDERSTANDING AND HAS NO FURTHER QUESTIONS AT THIS TIME    ADDITIONAL INFORMATION:     PLEASE ADVISE.  "

## 2023-12-28 NOTE — TELEPHONE ENCOUNTER
----- Message from Lazarus Gallegos MD sent at 12/27/2023  1:07 PM EST -----  Regarding: RE: [place] patient  I'm happy to see, but I don't see anything concerning on the EKG  Let me know  Thanks  ----- Message -----  From: Esperanza Sarmiento PA  Sent: 12/22/2023   1:41 PM EST  To: Lazarus Gallegos MD  Subject: [place] patient                                   This patient was seen 2018 with stress test. He had to follow up if any symptoms per your notes. He had EKG with pre-op testing 12/2023. I wanted to see if you would look at inferolateral leads to see if you

## 2023-12-28 NOTE — TELEPHONE ENCOUNTER
left message to call OK FOR HUB TO RELAY    B12 looks good. Continue B12.     Please let the patient know that cardiologist thinks EKG looks ok.

## 2024-01-02 ENCOUNTER — TELEPHONE (OUTPATIENT)
Dept: FAMILY MEDICINE CLINIC | Facility: CLINIC | Age: 69
End: 2024-01-02
Payer: MEDICARE

## 2024-01-02 NOTE — TELEPHONE ENCOUNTER
----- Message from Lazarus Gallegos MD sent at 12/28/2023  8:59 AM EST -----  Regarding: RE: [place] patient  Sounds good  No issues from my standpoint  ----- Message -----  From: Esperanza Sarmiento PA  Sent: 12/27/2023   8:25 PM EST  To: Lazarus Gallegos MD  Subject: RE: [place] patient                               Thank you. He is asymptomatic. If no concerns, I am not sure that he needs to be seen. I wanted to check to ensure it was ok.   ----- Message -----  From: Lazarus Gallegos MD  Sent: 12/27/2023   1:07 PM EST  To: TAD Mccartney  Subject: RE: [place] patient                               I'm happy to see, but I don't see anything concerning on the EKG  Let me know  Thanks  ----- Message -----  From: Esperanza Sarmiento PA  Sent: 12/22/2023   1:41 PM EST  To: Lazarus Gallegos MD  Subject: [place] patient                                   This patient was seen 2018 with stress test. He had to follow up if any symptoms per your notes. He had EKG with pre-op testing 12/2023. I wanted to see if you would look at inferolateral leads to see if you

## 2024-01-19 ENCOUNTER — TELEPHONE (OUTPATIENT)
Dept: FAMILY MEDICINE CLINIC | Facility: CLINIC | Age: 69
End: 2024-01-19

## 2024-01-19 DIAGNOSIS — Z13.6 ENCOUNTER FOR SCREENING FOR VASCULAR DISEASE: Primary | ICD-10-CM

## 2024-01-19 DIAGNOSIS — R06.00 DYSPNEA, UNSPECIFIED TYPE: ICD-10-CM

## 2024-01-19 NOTE — TELEPHONE ENCOUNTER
"Caller: Jeff Bernard \"Abhishek\"    Relationship: Self    Best call back number: [phone]    What orders are you requesting (i.e. lab or imaging): ABDOMINAL AORTIC ANEURYSM, CAROTID ARTERY DISEASE AND PERIFERAL ARTERY DISEASE     In what timeframe would the patient need to come in: AS SOON AS POSSIBLE    Where will you receive your lab/imaging services: [facility] AT [place]     Additional notes: PATIENT STATES HE TRIED TO CALL AND SCHEDULE AN APPOINTMENT FOR THE 3 TEST AND WAS UNABLE TO DUE TO THEM NEEDING AN ORDER TO BE ABLE TO SCHEDULE HIM.     PATIENT IS REQUESTING A CALL BACK TO LET HIM KNOW WHEN THE ORDER IS PLACED .   "

## 2024-01-19 NOTE — TELEPHONE ENCOUNTER
I tried to call patient to let him know that his orders are in his chart. Patient did not answer. [facility]   HUB OKAY TO READ

## 2024-01-19 NOTE — TELEPHONE ENCOUNTER
"Name: Marco AntonioJeff \"Abhishek\"      Relationship: Self      Best Callback Number: [phone]      HUB PROVIDED THE RELAY MESSAGE FROM THE OFFICE  I tried to call patient to let him know that his orders are in his chart. Patient did not answer. [facility]   HUB OKAY TO READ           PATIENT: VOICED UNDERSTANDING AND HAS NO FURTHER QUESTIONS AT THIS TIME    ADDITIONAL INFORMATION:    "

## 2024-02-09 RX ORDER — BENAZEPRIL HYDROCHLORIDE 10 MG/1
TABLET ORAL
Qty: 90 TABLET | Refills: 3 | Status: SHIPPED | OUTPATIENT
Start: 2024-02-09

## 2024-02-12 DIAGNOSIS — Z79.4 TYPE 2 DIABETES MELLITUS WITHOUT COMPLICATION, WITH LONG-TERM CURRENT USE OF INSULIN: ICD-10-CM

## 2024-02-12 DIAGNOSIS — E11.9 TYPE 2 DIABETES MELLITUS WITHOUT COMPLICATION, WITH LONG-TERM CURRENT USE OF INSULIN: ICD-10-CM

## 2024-02-12 RX ORDER — PEN NEEDLE, DIABETIC 31 GX5/16"
1 NEEDLE, DISPOSABLE MISCELLANEOUS 4 TIMES DAILY
Qty: 400 EACH | Refills: 0 | Status: SHIPPED | OUTPATIENT
Start: 2024-02-12

## 2024-03-11 DIAGNOSIS — E11.9 TYPE 2 DIABETES MELLITUS WITHOUT COMPLICATION, WITH LONG-TERM CURRENT USE OF INSULIN: ICD-10-CM

## 2024-03-11 DIAGNOSIS — Z79.4 TYPE 2 DIABETES MELLITUS WITHOUT COMPLICATION, WITH LONG-TERM CURRENT USE OF INSULIN: ICD-10-CM

## 2024-03-12 RX ORDER — INSULIN GLARGINE 100 [IU]/ML
INJECTION, SOLUTION SUBCUTANEOUS
Qty: 7 ML | Refills: 5 | Status: SHIPPED | OUTPATIENT
Start: 2024-03-12

## 2024-03-12 NOTE — TELEPHONE ENCOUNTER
Rx Refill Note  Requested Prescriptions     Pending Prescriptions Disp Refills    Insulin Glargine (BASAGLAR KWIKPEN) 100 UNIT/ML injection pen [Pharmacy Med Name: BASAGLAR 100 UNIT/ML KWIKPEN]  2     Sig: INJECT 22 UNITS SUBCUTANEOUSLY AS DIRECTED EVERY NIGHT      Last office visit with prescribing clinician: 10/19/2023   Last telemedicine visit with prescribing clinician: Visit date not found   Next office visit with prescribing clinician: 4/4/2024                         Would you like a call back once the refill request has been completed: [] Yes [] No    If the office needs to give you a call back, can they leave a voicemail: [] Yes [] No    Shantell Jj MA  03/12/24, 07:52 EDT

## 2024-04-04 ENCOUNTER — OFFICE VISIT (OUTPATIENT)
Dept: ENDOCRINOLOGY | Age: 69
End: 2024-04-04
Payer: MEDICARE

## 2024-04-04 VITALS
OXYGEN SATURATION: 97 % | TEMPERATURE: 97.1 F | HEIGHT: 73 IN | BODY MASS INDEX: 29.21 KG/M2 | DIASTOLIC BLOOD PRESSURE: 78 MMHG | SYSTOLIC BLOOD PRESSURE: 118 MMHG | HEART RATE: 82 BPM | WEIGHT: 220.4 LBS

## 2024-04-04 DIAGNOSIS — Z85.46 HISTORY OF PROSTATE CANCER: ICD-10-CM

## 2024-04-04 DIAGNOSIS — E78.5 HYPERLIPIDEMIA, UNSPECIFIED HYPERLIPIDEMIA TYPE: ICD-10-CM

## 2024-04-04 DIAGNOSIS — E55.9 VITAMIN D DEFICIENCY: ICD-10-CM

## 2024-04-04 DIAGNOSIS — E11.3293 TYPE 2 DIABETES MELLITUS WITH BOTH EYES AFFECTED BY MILD NONPROLIFERATIVE RETINOPATHY WITHOUT MACULAR EDEMA, WITHOUT LONG-TERM CURRENT USE OF INSULIN: Primary | ICD-10-CM

## 2024-04-04 DIAGNOSIS — I10 ESSENTIAL (PRIMARY) HYPERTENSION: ICD-10-CM

## 2024-04-04 PROCEDURE — 3074F SYST BP LT 130 MM HG: CPT | Performed by: INTERNAL MEDICINE

## 2024-04-04 PROCEDURE — 3078F DIAST BP <80 MM HG: CPT | Performed by: INTERNAL MEDICINE

## 2024-04-04 PROCEDURE — 99214 OFFICE O/P EST MOD 30 MIN: CPT | Performed by: INTERNAL MEDICINE

## 2024-04-04 NOTE — PROGRESS NOTES
Subjective   Jeff Bernard is a 68 y.o. male.     History of Present Illness     Patient has known diabetes mellitus since 1999 and started on insulin in 2000. He is on Basaglar 21 units every evening and NovoLog 1 unit per 3-4 g of carbohydrate before each meal.  He is using a freestyle eva 14.  His last meal was 6:30 AM.     Sensor data from March 22, 2024 through April 4, 2024 reviewed.  Average glucose 148 mg per DL.  GMI 6.9%.  Time in target 57%  Time above target 32%.  Time below target 11%  He has intermittent hypoglycemia before breakfast and after meals.    His last eye examination was in January 2024.  He has nonproliferative diabetic retinopathy and early cataracts.  He denies any associated nephropathy. Urine microalbumin was normal in 6/23. He is on benazepril. He denies any numbness, tingling or burning in his feet.      He has hyperlipidemia and has been on Lipitor 40 mg once a day. He denies any muscle pain.      He has hypertension and is on benazepril 10 mg/day.  He denies any previous history of myocardial infarction, heart failure, or stroke. He denies chest pain, shortness of breath or pedal edema.  He had a normal nuclear stress test in 4/18     He had a colonoscopy done in March 2017 and polyps were removed by Dr. Kelly.  Pathology report was read as tubular adenoma with low-grade dysplasia and hyperplastic polyps.      He had tubular adenoma with low-grade dysplasia removed by colonoscopy in August 2022.  He was advised repeat colonoscopy in 2027.     He has vitamin D deficiency  and is on vitamin D3 1000 units/day.     He had right carpal tunnel and right cubital tunnel release in June 2021.       He has prostate cancer and biopsy done in November 2022.  He was complicated by sepsis.  He has completed IV antibiotic therapy.  He has completed treatment for C. difficile colitis.  He is on Flomax 0.4 mg daily.  He denies urinary symptoms.  He had a prostate biopsy in December 2023.  He  "follows with Dr. Fatima.    The following portions of the patient's history were reviewed and updated as appropriate: allergies, current medications, past family history, past medical history, past social history, past surgical history, and problem list.    Review of Systems   Eyes:  Negative for visual disturbance.   Respiratory:  Negative for shortness of breath and wheezing.    Cardiovascular:  Negative for chest pain and palpitations.   Gastrointestinal: Negative.    Genitourinary:  Positive for frequency.   Musculoskeletal:  Negative for myalgias.   Neurological:  Negative for numbness.     Vitals:    04/04/24 1256   BP: 118/78   Pulse: 82   Temp: 97.1 °F (36.2 °C)   TempSrc: Temporal   SpO2: 97%   Weight: 100 kg (220 lb 6.4 oz)   Height: 185.4 cm (72.99\")      Objective   Physical Exam  Constitutional:       General: He is not in acute distress.     Appearance: Normal appearance. He is not ill-appearing or toxic-appearing.   Eyes:      General: No scleral icterus.        Right eye: No discharge.         Left eye: No discharge.      Extraocular Movements: Extraocular movements intact.      Conjunctiva/sclera: Conjunctivae normal.   Neck:      Vascular: No carotid bruit.   Cardiovascular:      Rate and Rhythm: Regular rhythm.      Heart sounds: Normal heart sounds.   Pulmonary:      Breath sounds: Normal breath sounds. No rales.   Chest:      Chest wall: No tenderness.   Abdominal:      Palpations: Abdomen is soft.      Tenderness: There is no right CVA tenderness or left CVA tenderness.   Musculoskeletal:      Comments: Feet warm.  Palpable dorsalis pedis pulses bilaterally.  No plantar ulcers.   Lymphadenopathy:      Cervical: No cervical adenopathy.   Neurological:      Mental Status: He is alert and oriented to person, place, and time.      Comments: Intact light touch in lower extremities.       Office Visit on 12/22/2023   Component Date Value Ref Range Status    Vitamin B-12 12/22/2023 952  518 - 1,394 " pg/mL Final    Folate 12/22/2023 11.9  >3.0 ng/mL Final    Comment: A serum folate concentration of less than 3.1 ng/mL is  considered to represent clinical deficiency.       Assessment & Plan   Diagnoses and all orders for this visit:    1. Type 2 diabetes mellitus with both eyes affected by mild nonproliferative retinopathy without macular edema, without long-term current use of insulin (Primary)  -     Comprehensive Metabolic Panel  -     Hemoglobin A1c  -     TSH    2. Hyperlipidemia, unspecified hyperlipidemia type  -     Comprehensive Metabolic Panel  -     TSH  -     Lipid Panel    3. Essential (primary) hypertension  -     Comprehensive Metabolic Panel    4. Vitamin D deficiency  -     Vitamin D,25-Hydroxy    5. History of prostate cancer      Decrease Basaglar to 17 units every evening.  Change NovoLog to 1 unit per 5 g of carbohydrate before each meal.    Continue Lipitor 40 mg/day.    Continue benazepril 10 mg/day.    Continue Flomax per Dr. Fatima.    RTC 4 mos     Copy of my note sent to TAD Mccartney

## 2024-04-05 LAB
25(OH)D3+25(OH)D2 SERPL-MCNC: 29.6 NG/ML (ref 30–100)
ALBUMIN SERPL-MCNC: 4.3 G/DL (ref 3.5–5.2)
ALBUMIN/GLOB SERPL: 1.6 G/DL
ALP SERPL-CCNC: 91 U/L (ref 39–117)
ALT SERPL-CCNC: 21 U/L (ref 1–41)
AST SERPL-CCNC: 18 U/L (ref 1–40)
BILIRUB SERPL-MCNC: 0.4 MG/DL (ref 0–1.2)
BUN SERPL-MCNC: 16 MG/DL (ref 8–23)
BUN/CREAT SERPL: 19.3 (ref 7–25)
CALCIUM SERPL-MCNC: 9 MG/DL (ref 8.6–10.5)
CHLORIDE SERPL-SCNC: 106 MMOL/L (ref 98–107)
CHOLEST SERPL-MCNC: 135 MG/DL (ref 0–200)
CO2 SERPL-SCNC: 27.7 MMOL/L (ref 22–29)
CREAT SERPL-MCNC: 0.83 MG/DL (ref 0.76–1.27)
EGFRCR SERPLBLD CKD-EPI 2021: 94.2 ML/MIN/1.73
GLOBULIN SER CALC-MCNC: 2.7 GM/DL
GLUCOSE SERPL-MCNC: 96 MG/DL (ref 65–99)
HBA1C MFR BLD: 8.1 % (ref 4.8–5.6)
HDLC SERPL-MCNC: 43 MG/DL (ref 40–60)
IMP & REVIEW OF LAB RESULTS: NORMAL
LDLC SERPL CALC-MCNC: 80 MG/DL (ref 0–100)
POTASSIUM SERPL-SCNC: 4.1 MMOL/L (ref 3.5–5.2)
PROT SERPL-MCNC: 7 G/DL (ref 6–8.5)
SODIUM SERPL-SCNC: 143 MMOL/L (ref 136–145)
TRIGL SERPL-MCNC: 57 MG/DL (ref 0–150)
TSH SERPL DL<=0.005 MIU/L-ACNC: 1.37 UIU/ML (ref 0.27–4.2)
VLDLC SERPL CALC-MCNC: 12 MG/DL (ref 5–40)

## 2024-04-09 RX ORDER — INSULIN ASPART 100 [IU]/ML
INJECTION, SOLUTION INTRAVENOUS; SUBCUTANEOUS
Qty: 60 ML | Refills: 2 | Status: SHIPPED | OUTPATIENT
Start: 2024-04-09

## 2024-04-09 NOTE — TELEPHONE ENCOUNTER
Rx Refill Note  Requested Prescriptions     Pending Prescriptions Disp Refills    insulin aspart (NovoLOG FlexPen) 100 UNIT/ML solution pen-injector sc pen [Pharmacy Med Name: NOVOLOG 100 UNIT/ML FLEXPEN] 75 mL 2     Sig: PLEASE SEE ATTACHED FOR DETAILED DIRECTIONS      Last office visit with prescribing clinician: Visit date not found   Last telemedicine visit with prescribing clinician: Visit date not found   Next office visit with prescribing clinician: Visit date not found                         Would you like a call back once the refill request has been completed: [] Yes [] No    If the office needs to give you a call back, can they leave a voicemail: [] Yes [] No    Peggy Jj  04/09/24, 07:49 EDT

## 2024-04-12 ENCOUNTER — HOSPITAL ENCOUNTER (OUTPATIENT)
Dept: CARDIOLOGY | Facility: HOSPITAL | Age: 69
Discharge: HOME | End: 2024-04-12

## 2024-04-12 VITALS
HEART RATE: 70 BPM | HEIGHT: 71 IN | BODY MASS INDEX: 30.38 KG/M2 | DIASTOLIC BLOOD PRESSURE: 63 MMHG | WEIGHT: 217 LBS | SYSTOLIC BLOOD PRESSURE: 115 MMHG

## 2024-04-12 DIAGNOSIS — R06.00 DYSPNEA, UNSPECIFIED TYPE: ICD-10-CM

## 2024-04-12 DIAGNOSIS — Z13.6 ENCOUNTER FOR SCREENING FOR VASCULAR DISEASE: ICD-10-CM

## 2024-04-12 LAB
BH CV ECHO MEAS - DIST AO DIAM: 1.48 CM
BH CV VAS BP LEFT ARM: NORMAL MMHG
BH CV VAS BP RIGHT ARM: NORMAL MMHG
BH CV VAS SCREENING CAROTID CCA LEFT: NORMAL CM/SEC
BH CV VAS SCREENING CAROTID CCA RIGHT: NORMAL CM/SEC
BH CV VAS SCREENING CAROTID ICA LEFT: NORMAL CM/SEC
BH CV VAS SCREENING CAROTID ICA RIGHT: NORMAL CM/SEC
BH CV XLRA MEAS - MID AO DIAM: 1.68 CM
BH CV XLRA MEAS - PAD LEFT ABI DP: 1.2
BH CV XLRA MEAS - PAD LEFT ABI PT: 1.28
BH CV XLRA MEAS - PAD LEFT ARM: 115 MMHG
BH CV XLRA MEAS - PAD LEFT LEG DP: 138 MMHG
BH CV XLRA MEAS - PAD LEFT LEG PT: 148 MMHG
BH CV XLRA MEAS - PAD RIGHT ABI DP: 1.22
BH CV XLRA MEAS - PAD RIGHT ABI PT: 1.17
BH CV XLRA MEAS - PAD RIGHT ARM: 115 MMHG
BH CV XLRA MEAS - PAD RIGHT LEG DP: 141 MMHG
BH CV XLRA MEAS - PAD RIGHT LEG PT: 135 MMHG
BH CV XLRA MEAS - PROX AO DIAM: 1.87 CM
BH CV XLRA MEAS LEFT ICA/CCA RATIO: 1.27
BH CV XLRA MEAS LEFT PROX ECA PSV: NORMAL CM/SEC
BH CV XLRA MEAS RIGHT ICA/CCA RATIO: 1.12
BH CV XLRA MEAS RIGHT PROX ECA PSV: NORMAL CM/SEC
MAXIMAL PREDICTED HEART RATE: 150 BPM
STRESS TARGET HR: 128 BPM

## 2024-04-12 PROCEDURE — 93799 UNLISTED CV SVC/PROCEDURE: CPT

## 2024-05-14 DIAGNOSIS — Z79.4 TYPE 2 DIABETES MELLITUS WITHOUT COMPLICATION, WITH LONG-TERM CURRENT USE OF INSULIN: ICD-10-CM

## 2024-05-14 DIAGNOSIS — E11.9 TYPE 2 DIABETES MELLITUS WITHOUT COMPLICATION, WITH LONG-TERM CURRENT USE OF INSULIN: ICD-10-CM

## 2024-05-14 RX ORDER — PEN NEEDLE, DIABETIC 31 GX5/16"
1 NEEDLE, DISPOSABLE MISCELLANEOUS 4 TIMES DAILY
Qty: 400 EACH | Refills: 3 | Status: SHIPPED | OUTPATIENT
Start: 2024-05-14

## 2024-05-14 NOTE — TELEPHONE ENCOUNTER
Rx Refill Note  Requested Prescriptions     Pending Prescriptions Disp Refills    B-D ULTRAFINE III SHORT PEN 31G X 8 MM misc [Pharmacy Med Name: BD UF SHORT PEN NEEDLE [id number]] 400 each 0     Si EACH BY OTHER ROUTE 4 (FOUR) TIMES A DAY. USE 1 PEN NEEDLE FOUR TIMES DAILY INDICATIONS: TYPE 2 DIABETES      Last office visit with prescribing clinician: Visit date not found   Last telemedicine visit with prescribing clinician: Visit date not found   Next office visit with prescribing clinician: Visit date not found                         Would you like a call back once the refill request has been completed: [] Yes [] No    If the office needs to give you a call back, can they leave a voicemail: [] Yes [] No    Peggy Jj  24, 14:46 EDT

## 2024-08-11 DIAGNOSIS — E78.5 HYPERLIPIDEMIA, UNSPECIFIED HYPERLIPIDEMIA TYPE: ICD-10-CM

## 2024-08-12 ENCOUNTER — PATIENT MESSAGE (OUTPATIENT)
Dept: ENDOCRINOLOGY | Age: 69
End: 2024-08-12
Payer: MEDICARE

## 2024-08-12 DIAGNOSIS — E78.5 HYPERLIPIDEMIA, UNSPECIFIED HYPERLIPIDEMIA TYPE: ICD-10-CM

## 2024-08-12 RX ORDER — ATORVASTATIN CALCIUM 40 MG/1
40 TABLET, FILM COATED ORAL NIGHTLY
Qty: 90 TABLET | Refills: 2 | OUTPATIENT
Start: 2024-08-12

## 2024-08-12 RX ORDER — ATORVASTATIN CALCIUM 40 MG/1
40 TABLET, FILM COATED ORAL NIGHTLY
Qty: 90 TABLET | Refills: 2 | Status: SHIPPED | OUTPATIENT
Start: 2024-08-12

## 2024-08-12 NOTE — TELEPHONE ENCOUNTER
Rx Refill Note  Requested Prescriptions     Pending Prescriptions Disp Refills    atorvastatin (LIPITOR) 40 MG tablet [Pharmacy Med Name: ATORVASTATIN 40 MG TABLET] 90 tablet 2     Sig: TAKE 1 TABLET BY MOUTH EVERY NIGHT. INDICATIONS: HIGH AMOUNT OF FATS IN THE BLOOD      Last office visit with prescribing clinician: 4/4/2024   Last telemedicine visit with prescribing clinician: Visit date not found   Next office visit with prescribing clinician: 10/4/2024                         Would you like a call back once the refill request has been completed: [] Yes [] No    If the office needs to give you a call back, can they leave a voicemail: [] Yes [] No    Shantell Jj MA  08/12/24, 08:12 EDT

## 2024-08-12 NOTE — TELEPHONE ENCOUNTER
From: Jeff Bernard  To: Salinas Juarez  Sent: 8/12/2024 11:23 AM EDT  Subject: Refill    [facility] tells me they have been trying to get in touch with you for authorization for a refill of my atorvastatin. Please contact so i can get the refill. Thanks

## 2024-10-03 ENCOUNTER — FLU SHOT (OUTPATIENT)
Dept: FAMILY MEDICINE CLINIC | Facility: CLINIC | Age: 69
End: 2024-10-03
Payer: MEDICARE

## 2024-10-03 DIAGNOSIS — Z23 NEED FOR INFLUENZA VACCINATION: Primary | ICD-10-CM

## 2024-10-03 PROCEDURE — 90662 IIV NO PRSV INCREASED AG IM: CPT | Performed by: PHYSICIAN ASSISTANT

## 2024-10-03 PROCEDURE — G0008 ADMIN INFLUENZA VIRUS VAC: HCPCS | Performed by: PHYSICIAN ASSISTANT

## 2024-10-04 ENCOUNTER — OFFICE VISIT (OUTPATIENT)
Dept: ENDOCRINOLOGY | Age: 69
End: 2024-10-04
Payer: MEDICARE

## 2024-10-04 VITALS
DIASTOLIC BLOOD PRESSURE: 80 MMHG | HEART RATE: 67 BPM | TEMPERATURE: 97.4 F | SYSTOLIC BLOOD PRESSURE: 122 MMHG | WEIGHT: 223 LBS | OXYGEN SATURATION: 99 % | BODY MASS INDEX: 31.22 KG/M2 | HEIGHT: 71 IN

## 2024-10-04 DIAGNOSIS — E55.9 VITAMIN D DEFICIENCY: ICD-10-CM

## 2024-10-04 DIAGNOSIS — E11.3299 TYPE 2 DIABETES MELLITUS WITH MILD NONPROLIFERATIVE RETINOPATHY WITHOUT MACULAR EDEMA, WITH LONG-TERM CURRENT USE OF INSULIN, UNSPECIFIED LATERALITY: ICD-10-CM

## 2024-10-04 DIAGNOSIS — Z79.4 TYPE 2 DIABETES MELLITUS WITHOUT COMPLICATION, WITH LONG-TERM CURRENT USE OF INSULIN: ICD-10-CM

## 2024-10-04 DIAGNOSIS — E78.5 HYPERLIPIDEMIA, UNSPECIFIED HYPERLIPIDEMIA TYPE: ICD-10-CM

## 2024-10-04 DIAGNOSIS — I10 ESSENTIAL (PRIMARY) HYPERTENSION: ICD-10-CM

## 2024-10-04 DIAGNOSIS — C61 PROSTATE CANCER: ICD-10-CM

## 2024-10-04 DIAGNOSIS — Z86.0100 HISTORY OF COLON POLYPS: ICD-10-CM

## 2024-10-04 DIAGNOSIS — E11.9 TYPE 2 DIABETES MELLITUS WITHOUT COMPLICATION, WITH LONG-TERM CURRENT USE OF INSULIN: ICD-10-CM

## 2024-10-04 DIAGNOSIS — E11.8 TYPE 2 DIABETES MELLITUS WITH COMPLICATIONS: Primary | ICD-10-CM

## 2024-10-04 DIAGNOSIS — Z79.4 TYPE 2 DIABETES MELLITUS WITH MILD NONPROLIFERATIVE RETINOPATHY WITHOUT MACULAR EDEMA, WITH LONG-TERM CURRENT USE OF INSULIN, UNSPECIFIED LATERALITY: ICD-10-CM

## 2024-10-04 RX ORDER — INSULIN GLARGINE 100 [IU]/ML
INJECTION, SOLUTION SUBCUTANEOUS
Status: SHIPPED
Start: 2024-10-04

## 2024-10-04 NOTE — PROGRESS NOTES
Subjective   Jeff Bernard is a 69 y.o. male.     History of Present Illness     Patient has known diabetes mellitus since 1999 and started on insulin in 2000. He is on Basaglar 20 units every evening and NovoLog 1 unit per 5 g of carbohydrate before each meal.  He is using a freestyle eva 2.  His last meal was 7 AM     Sensor data from September 21, 2020 24th October 4, 2024 reviewed.  Average glucose 172 mg per DL.  GMI 7.4%.  Time in range 53%.  Time above range 43%.  Time below range 4%.  He has occasional hypoglycemia in the early morning.  He has intermittent postprandial hyperglycemia mostly after breakfast and after supper.    His last eye examination was in January 2024.  He has nonproliferative diabetic retinopathy and early cataracts.  He denies any associated nephropathy. Urine microalbumin was normal in 6/23. He is on benazepril. He denies any numbness, tingling or burning in his feet.      He has hyperlipidemia and has been on Lipitor 40 mg once a day. He denies any muscle pain.      He has hypertension and is on benazepril 10 mg/day.  He denies any previous history of myocardial infarction, heart failure, or stroke. He denies chest pain, shortness of breath or pedal edema.  He had a normal nuclear stress test in 4/18     He had a colonoscopy done in March 2017 and polyps were removed by Dr. Kelly.  Pathology report was read as tubular adenoma with low-grade dysplasia and hyperplastic polyps.      He had tubular adenoma with low-grade dysplasia removed by colonoscopy in August 2022.  He was advised repeat colonoscopy in 2027.     He has vitamin D deficiency  and is on vitamin D3 1000 units/day.     He has prostate cancer on biopsy done in November 2022.  He was complicated by sepsis.  He has completed IV antibiotic therapy.  He has completed treatment for C. difficile colitis.  He is on Flomax 0.4 mg daily.  He denies urinary symptoms.  He had a prostate biopsy in December 2023.  He follows with  "Dr. Fatima.    The following portions of the patient's history were reviewed and updated as appropriate: allergies, current medications, past family history, past medical history, past social history, past surgical history, and problem list.    Review of Systems   Eyes:  Negative for visual disturbance.   Respiratory:  Negative for shortness of breath and wheezing.    Cardiovascular:  Negative for chest pain and palpitations.   Gastrointestinal: Negative.    Genitourinary: Negative.    Musculoskeletal:  Negative for myalgias.   Neurological:  Negative for numbness.     Vitals:    10/04/24 1205   BP: 122/80   Pulse: 67   Temp: 97.4 °F (36.3 °C)   TempSrc: Temporal   SpO2: 99%   Weight: 101 kg (223 lb)   Height: 180.3 cm (70.98\")      Objective   Physical Exam  Constitutional:       Appearance: Normal appearance. He is not toxic-appearing or diaphoretic.   Eyes:      General: No scleral icterus.        Right eye: No discharge.         Left eye: No discharge.      Extraocular Movements: Extraocular movements intact.      Conjunctiva/sclera: Conjunctivae normal.   Cardiovascular:      Rate and Rhythm: Normal rate and regular rhythm.      Heart sounds: Normal heart sounds. No murmur heard.  Pulmonary:      Breath sounds: Normal breath sounds. No rales.   Chest:      Chest wall: No tenderness.   Abdominal:      Palpations: Abdomen is soft.      Tenderness: There is no right CVA tenderness or left CVA tenderness.   Musculoskeletal:      Right lower leg: No edema.      Left lower leg: No edema.   Neurological:      Mental Status: He is alert and oriented to person, place, and time.       Hospital Outpatient Visit on 04/12/2024   Component Date Value Ref Range Status    Max. Pred. HR (100%) 04/12/2024 150  bpm Final    Target HR (85%) 04/12/2024 128  bpm Final    BH CV VAS BP RIGHT ARM 04/12/2024 115/61  mmHg Final    BH CV VAS BP LEFT ARM 04/12/2024 115/63  mmHg Final    Screening Right CCA  04/12/2024 50/13  cm/sec Final "    Screening Left CCA 04/12/2024 51/15  cm/sec Final    Screening Right ICA 04/12/2024 56/20  cm/sec Final    Screening Left ICA 04/12/2024 65/24  cm/sec Final    Prox ECA PSV 04/12/2024 99/12  cm/sec Final    Prox ECA PSV 04/12/2024 99/14  cm/sec Final    ICA/CCA ratio 04/12/2024 1.12   Final    ICA/CCA ratio 04/12/2024 1.27   Final    Prox Ao Diam 04/12/2024 1.87  cm Final    Mid Ao Diam 04/12/2024 1.68  cm Final    Dist Ao Diam 04/12/2024 1.48  cm Final    PAD Right Arm 04/12/2024 115  mmHg Final    PAD Left Arm 04/12/2024 115  mmHg Final    PAD Right Leg PT 04/12/2024 135  mmHg Final    PAD Left Leg PT 04/12/2024 148  mmHg Final    PAD Right Leg DP 04/12/2024 141  mmHg Final    PAD Left Leg DP 04/12/2024 138  mmHg Final    PAD Right KWAN PT 04/12/2024 1.17   Final    PAD Left KWAN PT 04/12/2024 1.28   Final    PAD Right KWAN DP 04/12/2024 1.22   Final    PAD Left KWAN DP 04/12/2024 1.20   Final     Assessment & Plan   Diagnoses and all orders for this visit:    1. Type 2 diabetes mellitus with complications (Primary)  -     Comprehensive Metabolic Panel  -     Hemoglobin A1c  -     Lipid Panel  -     TSH Rfx On Abnormal To Free T4    2. Type 2 diabetes mellitus with mild nonproliferative retinopathy without macular edema, with long-term current use of insulin, unspecified laterality  -     Comprehensive Metabolic Panel  -     Hemoglobin A1c  -     Lipid Panel  -     TSH Rfx On Abnormal To Free T4    3. Hyperlipidemia, unspecified hyperlipidemia type  -     Lipid Panel  -     TSH Rfx On Abnormal To Free T4    4. Essential (primary) hypertension  -     Comprehensive Metabolic Panel    5. History of colon polyps    6. Vitamin D deficiency  -     Comprehensive Metabolic Panel  -     Vitamin D,25-Hydroxy    7. Prostate cancer    8. Type 2 diabetes mellitus without complication, with long-term current use of insulin  -     Insulin Glargine (BASAGLAR KWIKPEN) 100 UNIT/ML injection pen; 18 units every evening.  Prime needle  with 2 units before each use      Increase Basaglar to 18 units every evening.  Continue mealtime NovoLog.  Follow-up with ophthalmologist as scheduled.    Continue Lipitor 40 mg/day.    Continue benazepril.    Follow-up colonoscopy in 2027.    Continue vitamin D3 1000 units/day.    Follow-up with Dr. Fatima as scheduled.    RTC 6 mos.    Copy of my note sent to Dr. Fatima and TAD Mccartney

## 2024-10-05 LAB
25(OH)D3+25(OH)D2 SERPL-MCNC: 41.3 NG/ML (ref 30–100)
ALBUMIN SERPL-MCNC: 4.1 G/DL (ref 3.5–5.2)
ALBUMIN/GLOB SERPL: 1.7 G/DL
ALP SERPL-CCNC: 100 U/L (ref 39–117)
ALT SERPL-CCNC: 29 U/L (ref 1–41)
AST SERPL-CCNC: 24 U/L (ref 1–40)
BILIRUB SERPL-MCNC: 0.4 MG/DL (ref 0–1.2)
BUN SERPL-MCNC: 17 MG/DL (ref 8–23)
BUN/CREAT SERPL: 23.6 (ref 7–25)
CALCIUM SERPL-MCNC: 8.9 MG/DL (ref 8.6–10.5)
CHLORIDE SERPL-SCNC: 105 MMOL/L (ref 98–107)
CHOLEST SERPL-MCNC: 134 MG/DL (ref 0–200)
CO2 SERPL-SCNC: 26.4 MMOL/L (ref 22–29)
CREAT SERPL-MCNC: 0.72 MG/DL (ref 0.76–1.27)
EGFRCR SERPLBLD CKD-EPI 2021: 97.7 ML/MIN/1.73
GLOBULIN SER CALC-MCNC: 2.4 GM/DL
GLUCOSE SERPL-MCNC: 63 MG/DL (ref 65–99)
HBA1C MFR BLD: 7.8 % (ref 4.8–5.6)
HDLC SERPL-MCNC: 41 MG/DL (ref 40–60)
IMP & REVIEW OF LAB RESULTS: NORMAL
LDLC SERPL CALC-MCNC: 80 MG/DL (ref 0–100)
POTASSIUM SERPL-SCNC: 4.2 MMOL/L (ref 3.5–5.2)
PROT SERPL-MCNC: 6.5 G/DL (ref 6–8.5)
SODIUM SERPL-SCNC: 142 MMOL/L (ref 136–145)
TRIGL SERPL-MCNC: 65 MG/DL (ref 0–150)
TSH SERPL DL<=0.005 MIU/L-ACNC: 1.68 UIU/ML (ref 0.27–4.2)
VLDLC SERPL CALC-MCNC: 13 MG/DL (ref 5–40)

## 2024-10-06 NOTE — PROGRESS NOTES
Hemoglobin A1c improved at 7.8%.  LDL 80.  LDL 41.  Triglycerides 65.  Continue Lipitor 40 mg/day.  Normal thyroid function test.  Normal vitamin D.  Continue vitamin D3 1000 units/day.  Copy of labs sent to TAD Mccartney and Dr. Fatima.  [facility]

## 2024-12-31 ENCOUNTER — OFFICE VISIT (OUTPATIENT)
Dept: FAMILY MEDICINE CLINIC | Facility: CLINIC | Age: 69
End: 2024-12-31
Payer: MEDICARE

## 2024-12-31 VITALS
RESPIRATION RATE: 14 BRPM | HEIGHT: 71 IN | TEMPERATURE: 98.4 F | DIASTOLIC BLOOD PRESSURE: 72 MMHG | WEIGHT: 220 LBS | HEART RATE: 70 BPM | BODY MASS INDEX: 30.8 KG/M2 | OXYGEN SATURATION: 99 % | SYSTOLIC BLOOD PRESSURE: 110 MMHG

## 2024-12-31 DIAGNOSIS — E55.9 VITAMIN D DEFICIENCY: ICD-10-CM

## 2024-12-31 DIAGNOSIS — M54.16 LUMBAR RADICULOPATHY: ICD-10-CM

## 2024-12-31 DIAGNOSIS — M25.551 RIGHT HIP PAIN: ICD-10-CM

## 2024-12-31 DIAGNOSIS — E78.5 HYPERLIPIDEMIA, UNSPECIFIED HYPERLIPIDEMIA TYPE: ICD-10-CM

## 2024-12-31 DIAGNOSIS — R97.20 INCREASED PROSTATE SPECIFIC ANTIGEN (PSA) VELOCITY: ICD-10-CM

## 2024-12-31 DIAGNOSIS — Z00.00 MEDICARE ANNUAL WELLNESS VISIT, SUBSEQUENT: Primary | ICD-10-CM

## 2024-12-31 DIAGNOSIS — M62.542 ATROPHY OF MUSCLE OF LEFT HAND: ICD-10-CM

## 2024-12-31 DIAGNOSIS — M51.369 DEGENERATION OF INTERVERTEBRAL DISC OF LUMBAR REGION, UNSPECIFIED WHETHER PAIN PRESENT: ICD-10-CM

## 2024-12-31 DIAGNOSIS — R29.898 WEAKNESS OF RIGHT HAND: ICD-10-CM

## 2024-12-31 DIAGNOSIS — E53.8 VITAMIN B 12 DEFICIENCY: ICD-10-CM

## 2024-12-31 DIAGNOSIS — M47.26 OSTEOARTHRITIS OF SPINE WITH RADICULOPATHY, LUMBAR REGION: ICD-10-CM

## 2024-12-31 DIAGNOSIS — M48.02 SPINAL STENOSIS IN CERVICAL REGION: ICD-10-CM

## 2024-12-31 DIAGNOSIS — R94.131 ABNORMAL EMG: ICD-10-CM

## 2024-12-31 DIAGNOSIS — G56.21 CUBITAL TUNNEL SYNDROME ON RIGHT: ICD-10-CM

## 2024-12-31 DIAGNOSIS — G56.01 CARPAL TUNNEL SYNDROME OF RIGHT WRIST: ICD-10-CM

## 2024-12-31 DIAGNOSIS — M62.541 ATROPHY OF MUSCLE OF RIGHT HAND: ICD-10-CM

## 2024-12-31 DIAGNOSIS — M54.16 RADICULOPATHY, LUMBAR REGION: ICD-10-CM

## 2024-12-31 LAB
BASOPHILS # BLD AUTO: 0.06 10*3/MM3 (ref 0–0.2)
BASOPHILS NFR BLD AUTO: 0.9 % (ref 0–1.5)
EOSINOPHIL # BLD AUTO: 0.07 10*3/MM3 (ref 0–0.4)
EOSINOPHIL NFR BLD AUTO: 1 % (ref 0.3–6.2)
ERYTHROCYTE [DISTWIDTH] IN BLOOD BY AUTOMATED COUNT: 12.1 % (ref 12.3–15.4)
FOLATE SERPL-MCNC: 11.6 NG/ML (ref 4.78–24.2)
HCT VFR BLD AUTO: 47.3 % (ref 37.5–51)
HGB BLD-MCNC: 16.6 G/DL (ref 13–17.7)
IMM GRANULOCYTES # BLD AUTO: 0.02 10*3/MM3 (ref 0–0.05)
IMM GRANULOCYTES NFR BLD AUTO: 0.3 % (ref 0–0.5)
LYMPHOCYTES # BLD AUTO: 1.58 10*3/MM3 (ref 0.7–3.1)
LYMPHOCYTES NFR BLD AUTO: 23.3 % (ref 19.6–45.3)
MCH RBC QN AUTO: 31 PG (ref 26.6–33)
MCHC RBC AUTO-ENTMCNC: 35.1 G/DL (ref 31.5–35.7)
MCV RBC AUTO: 88.2 FL (ref 79–97)
MONOCYTES # BLD AUTO: 0.44 10*3/MM3 (ref 0.1–0.9)
MONOCYTES NFR BLD AUTO: 6.5 % (ref 5–12)
NEUTROPHILS # BLD AUTO: 4.6 10*3/MM3 (ref 1.7–7)
NEUTROPHILS NFR BLD AUTO: 68 % (ref 42.7–76)
NRBC BLD AUTO-RTO: 0 /100 WBC (ref 0–0.2)
PLATELET # BLD AUTO: 287 10*3/MM3 (ref 140–450)
RBC # BLD AUTO: 5.36 10*6/MM3 (ref 4.14–5.8)
VIT B12 SERPL-MCNC: 564 PG/ML (ref 211–946)
WBC # BLD AUTO: 6.77 10*3/MM3 (ref 3.4–10.8)

## 2024-12-31 PROCEDURE — G0439 PPPS, SUBSEQ VISIT: HCPCS | Performed by: PHYSICIAN ASSISTANT

## 2024-12-31 PROCEDURE — 1170F FXNL STATUS ASSESSED: CPT | Performed by: PHYSICIAN ASSISTANT

## 2024-12-31 PROCEDURE — 3051F HG A1C>EQUAL 7.0%<8.0%: CPT | Performed by: PHYSICIAN ASSISTANT

## 2024-12-31 PROCEDURE — 3078F DIAST BP <80 MM HG: CPT | Performed by: PHYSICIAN ASSISTANT

## 2024-12-31 PROCEDURE — 1126F AMNT PAIN NOTED NONE PRSNT: CPT | Performed by: PHYSICIAN ASSISTANT

## 2024-12-31 PROCEDURE — 3074F SYST BP LT 130 MM HG: CPT | Performed by: PHYSICIAN ASSISTANT

## 2024-12-31 PROCEDURE — 99214 OFFICE O/P EST MOD 30 MIN: CPT | Performed by: PHYSICIAN ASSISTANT

## 2024-12-31 NOTE — PROGRESS NOTES
The ABCs of the Annual Wellness Visit  Subsequent Medicare Wellness Visit    Chief Complaint   Patient presents with    Annual Exam       Subjective   History of Present Illness:  Jeff Bernard is a 69 y.o. male who presents for a Subsequent Medicare Wellness Visit.    LAST COLONOSCOPY-Dr. Kelly- 2022- 1 polyp- tubular adenoma. Recheck 5 years.   2014-polypectomy.  10/3/2017-recheck in 5 years.    LAST TDAP- 11  FLU SHOT-10/26/2023  PREVNAR- 2017  PNEUMOVAX-2018  ZOSTAVAX- 8/3/17. SHINGRIX-2019, 2020  HEPATITIS A- 16, 12/15/2018  COVID 19- MODERNA 3/4/2021, 2021, 2021, Spikevax 10/26/2023.  RSV-has not had vaccination.    HEALTH RISK ASSESSMENT    Recent Hospitalizations:  No hospitalization(s) within the last year.    Current Medical Providers:  Patient Care Team:  Esperanza Sarmiento PA as PCP - General  Esperanza Sarmiento PA as PCP - Family Medicine  Salinas Juarez MD as Consulting Physician (Endocrinology)  Lazarus Gallegos MD as Consulting Physician (Cardiology)  Michael Fatima MD as Consulting Physician (Urology)    Smoking Status:  Social History     Tobacco Use   Smoking Status Never    Passive exposure: Never   Smokeless Tobacco Never       Alcohol Consumption:  Social History     Substance and Sexual Activity   Alcohol Use Yes    Alcohol/week: 4.0 - 6.0 standard drinks of alcohol    Types: 4 - 6 Cans of beer per week    Comment: Socially.       Depression Screen:       2024    10:21 AM   PHQ-2/PHQ-9 Depression Screening   Little interest or pleasure in doing things Not at all   Feeling down, depressed, or hopeless Not at all       Fall Risk Screen:  STEADI Fall Risk Assessment was completed, and patient is at LOW risk for falls.Assessment completed on:2024    Health Habits and Functional and Cognitive Screenin/31/2024    10:00 AM   Functional & Cognitive Status   Do you have difficulty preparing food and eating? No   Do you  have difficulty bathing yourself, getting dressed or grooming yourself? No   Do you have difficulty using the toilet? No   Do you have difficulty moving around from place to place? No   Do you have trouble with steps or getting out of a bed or a chair? No   Current Diet Well Balanced Diet   Dental Exam Not up to date   Eye Exam Up to date   Exercise (times per week) 2 times per week   Current Exercises Include Walking   Do you need help using the phone?  No   Are you deaf or do you have serious difficulty hearing?  No   Do you need help to go to places out of walking distance? No   Do you need help shopping? No   Do you need help preparing meals?  No   Do you need help with housework?  No   Do you need help with laundry? No   Do you need help taking your medications? No   Do you need help managing money? No   Do you ever drive or ride in a car without wearing a seat belt? No   Have you felt unusual stress, anger or loneliness in the last month? No   Who do you live with? Alone   If you need help, do you have trouble finding someone available to you? No   Have you been bothered in the last four weeks by sexual problems? No   Do you have difficulty concentrating, remembering or making decisions? No         Does the patient have evidence of cognitive impairment? No    Asprin use counseling:Taking ASA appropriately as indicated    Age-appropriate Screening Schedule:  Refer to the list below for future screening recommendations based on patient's age, sex and/or medical conditions. Orders for these recommended tests are listed in the plan section. The patient has been provided with a written plan.    Health Maintenance   Topic Date Due    ANNUAL WELLNESS VISIT  12/14/2023    BMI FOLLOWUP  12/22/2024    DIABETIC EYE EXAM  01/18/2025    COVID-19 Vaccine (5 - 2024-25 season) 01/02/2025 (Originally 9/1/2024)    HEMOGLOBIN A1C  04/04/2025    DIABETIC FOOT EXAM  10/04/2025    LIPID PANEL  10/04/2025    COLORECTAL CANCER  SCREENING  08/17/2027    TDAP/TD VACCINES (3 - Td or Tdap) 01/24/2034    HEPATITIS C SCREENING  Completed    INFLUENZA VACCINE  Completed    Pneumococcal Vaccine 65+  Completed    ZOSTER VACCINE  Completed    URINE MICROALBUMIN  Discontinued          The following portions of the patient's history were reviewed and updated as appropriate: allergies, current medications, past family history, past medical history, past social history, past surgical history, and problem list.    Outpatient Medications Prior to Visit   Medication Sig Dispense Refill    aspirin 81 MG EC tablet Take 1 tablet by mouth Daily.      atorvastatin (LIPITOR) 40 MG tablet Take 1 tablet by mouth Every Night. Indications: High Amount of Fats in the Blood 90 tablet 2    benazepril (LOTENSIN) 10 MG tablet TAKE 1 TABLET BY MOUTH EVERY DAY 90 tablet 3    Cholecalciferol (VITAMIN D-3) 1000 units capsule Take 1,000 Units by mouth Daily.      Continuous Blood Gluc  (FreeStyle Celsa 2 [place]) device 1 each Daily. 1 each 1    Continuous Blood Gluc Sensor (FreeStyle Celsa 2 Sensor) misc 1 each Every 14 (Fourteen) Days. 2 each 6    Cyanocobalamin (VITAMIN B 12 PO) Take 1,000 mg by mouth. 2 tablets weelkly      glucose blood (CONTOUR NEXT TEST) test strip Dx code E11.9 testing bs 3 x day 300 each 1    insulin aspart (NovoLOG FlexPen) 100 UNIT/ML solution pen-injector sc pen 1 unit per 5 g of carbohydrate 3 times a day with meals.  Maximum of 20 units per dose.  Prime needle with 2 units before each use 60 mL 2    Insulin Glargine (BASAGLAR KWIKPEN) 100 UNIT/ML injection pen 18 units every evening.  Prime needle with 2 units before each use      Insulin Pen Needle (B-D ULTRAFINE III SHORT PEN) 31G X 8 MM misc 1 each by Other route 4 (Four) Times a Day. Indications: Type 2 Diabetes 400 each 3    tamsulosin (FLOMAX) 0.4 MG capsule 24 hr capsule Take 1 capsule by mouth Daily. Prescribed by urology      Insulin Glargine (LANTUS SOLOSTAR) 100 UNIT/ML  "injection pen 18 units every evening. Prime needle with 2 units before each use.  May use Lantus or Basaglar whichever is covered by insurance (Patient not taking: Reported on 12/31/2024) 18 mL 2     No facility-administered medications prior to visit.       Patient Active Problem List   Diagnosis    Abnormal electrocardiogram    Abnormal pituitary follicle stimulating hormone (FSH)    Arteriosclerosis of carotid artery    Benign colonic polyp    Essential (primary) hypertension    Erectile dysfunction of nonorganic origin    Hyperlipidemia    Vitamin D deficiency    History of adenomatous polyp of colon    Increased prostate specific antigen (PSA) velocity    Abnormal testicular exam    Spinal stenosis in cervical region    Bilateral carpal tunnel syndrome    Ulnar neuropathy at elbow    Atrophy of muscle of right hand    Sepsis, due to unspecified organism, unspecified whether acute organ dysfunction present    Acute cystitis with hematuria    E coli bacteremia       Advanced Care Planning:  ACP discussion was held with the patient during this visit. Patient does not have an advance directive, information provided.    Review of Systems    Compared to one year ago, the patient feels his physical health is worse with dx of prostate cancer but not feeling any worse. He is finally back to baseline.    Compared to one year ago, the patient feels his mental health is the same.    Reviewed chart for potential of high risk medication in the elderly: not applicable  Reviewed chart for potential of harmful drug interactions in the elderly:not applicable    Objective         Vitals:    12/31/24 1019   BP: 110/72   Pulse: 70   Resp: 14   Temp: 98.4 °F (36.9 °C)   TempSrc: Temporal   SpO2: 99%   Weight: 99.8 kg (220 lb)   Height: 180.3 cm (70.98\")       Body mass index is 30.7 kg/m².  Discussed the patient's BMI with him. The BMI is above average; BMI management plan is completed.    Physical Exam    Lab Results   Component " Value Date    CHLPL 134 10/04/2024    TRIG 65 10/04/2024    HDL 41 10/04/2024    LDL 80 10/04/2024    VLDL 13 10/04/2024    HGBA1C 7.80 (H) 10/04/2024        Assessment & Plan   Medicare Risks and Personalized Health Plan  CMS Preventative Services Quick Reference  Advance Directive Discussion  Immunizations Discussed/Encouraged (specific immunizations; Tdap and RSV (Respiratory Syncytial Virus) )    The above risks/problems have been discussed with the patient.  Pertinent information has been shared with the patient in the After Visit Summary.  Follow up plans and orders are seen below in the Assessment/Plan Section.    There are no diagnoses linked to this encounter.      Follow Up:  No follow-ups on file.     An After Visit Summary and PPPS were given to the patient.

## 2024-12-31 NOTE — PROGRESS NOTES
Subjective   The ABCs of the Annual Wellness Visit  Medicare Wellness Visit      Jeff Bernard is a 69 y.o. patient who presents for a Medicare Wellness Visit.    LAST COLONOSCOPY-Dr. Kelly- 8/2022- 1 polyp- tubular adenoma. Recheck 5 years.   2/2014-polypectomy.  10/3/2017-recheck in 5 years.    LAST TDAP- 1/2024, 11/13/13  FLU SHOT-10/2024  PREVNAR- 8/2/2017  PNEUMOVAX-11/16/2018  ZOSTAVAX- 8/3/17. SHINGRIX-11/27/2019, 4/27/2020  HEPATITIS A- 5/7/18, 12/15/2018  COVID 19- MODERNA 3/4/2021, 4/1/2021, 11/17/2021, Spikevax 10/26/2023.  RSV-has not had vaccination.    The following portions of the patient's history were reviewed and   updated as appropriate: allergies, current medications, past family history, past medical history, past social history, past surgical history, and problem list.    Compared to one year ago, the patient's physical   health is the same or better.  Compared to one year ago, the patient's mental   health is the same.    Recent Hospitalizations:  He was not admitted to the hospital during the last year.     Current Medical Providers:  Patient Care Team:  sEperanza Sarmiento PA as PCP - General  Esperanza Sarmiento PA as PCP - Family Medicine  Salinas Juarez MD as Consulting Physician (Endocrinology)  Lazarus Gallegos MD as Consulting Physician (Cardiology)  Michael Fatima MD as Consulting Physician (Urology)    Outpatient Medications Prior to Visit   Medication Sig Dispense Refill    aspirin 81 MG EC tablet Take 1 tablet by mouth Daily.      atorvastatin (LIPITOR) 40 MG tablet Take 1 tablet by mouth Every Night. Indications: High Amount of Fats in the Blood 90 tablet 2    benazepril (LOTENSIN) 10 MG tablet TAKE 1 TABLET BY MOUTH EVERY DAY 90 tablet 3    Cholecalciferol (VITAMIN D-3) 1000 units capsule Take 1,000 Units by mouth Daily.      Continuous Blood Gluc  (FreeStyle Celsa 2 Hamilton) device 1 each Daily. 1 each 1    Continuous Blood Gluc Sensor (FreeStyle Celsa 2  Sensor) misc 1 each Every 14 (Fourteen) Days. 2 each 6    Cyanocobalamin (VITAMIN B 12 PO) Take 1,000 mg by mouth. 2 tablets weelkly      glucose blood (CONTOUR NEXT TEST) test strip Dx code E11.9 testing bs 3 x day 300 each 1    insulin aspart (NovoLOG FlexPen) 100 UNIT/ML solution pen-injector sc pen 1 unit per 5 g of carbohydrate 3 times a day with meals.  Maximum of 20 units per dose.  Prime needle with 2 units before each use 60 mL 2    Insulin Glargine (BASAGLAR KWIKPEN) 100 UNIT/ML injection pen 18 units every evening.  Prime needle with 2 units before each use      Insulin Pen Needle (B-D ULTRAFINE III SHORT PEN) 31G X 8 MM misc 1 each by Other route 4 (Four) Times a Day. Indications: Type 2 Diabetes 400 each 3    tamsulosin (FLOMAX) 0.4 MG capsule 24 hr capsule Take 1 capsule by mouth Daily. Prescribed by urology      Insulin Glargine (LANTUS SOLOSTAR) 100 UNIT/ML injection pen 18 units every evening. Prime needle with 2 units before each use.  May use Lantus or Basaglar whichever is covered by insurance (Patient not taking: Reported on 12/31/2024) 18 mL 2     No facility-administered medications prior to visit.     No opioid medication identified on active medication list. I have reviewed chart for other potential  high risk medication/s and harmful drug interactions in the elderly.      Aspirin is on active medication list. Aspirin use is indicated based on review of current medical condition/s. Pros and cons of this therapy have been discussed today. Benefits of this medication outweigh potential harm.  Patient has been encouraged to continue taking this medication.  .      Patient Active Problem List   Diagnosis    Abnormal electrocardiogram    Abnormal pituitary follicle stimulating hormone (FSH)    Arteriosclerosis of carotid artery    Benign colonic polyp    Essential (primary) hypertension    Erectile dysfunction of nonorganic origin    Hyperlipidemia    Vitamin D deficiency    History of  "adenomatous polyp of colon    Increased prostate specific antigen (PSA) velocity    Abnormal testicular exam    Spinal stenosis in cervical region    Bilateral carpal tunnel syndrome    Ulnar neuropathy at elbow    Atrophy of muscle of right hand    Sepsis, due to unspecified organism, unspecified whether acute organ dysfunction present    Acute cystitis with hematuria    E coli bacteremia     Advance Care Planning Advance Directive is not on file.  ACP discussion was held with the patient during this visit. Patient does not have an advance directive, information provided.            Objective   Vitals:    12/31/24 1019   BP: 110/72   Pulse: 70   Resp: 14   Temp: 98.4 °F (36.9 °C)   TempSrc: Temporal   SpO2: 99%   Weight: 99.8 kg (220 lb)   Height: 180.3 cm (70.98\")       Estimated body mass index is 30.7 kg/m² as calculated from the following:    Height as of this encounter: 180.3 cm (70.98\").    Weight as of this encounter: 99.8 kg (220 lb).    BMI is >= 30 and <35. (Class 1 Obesity). The following options were offered after discussion;: exercise counseling/recommendations and nutrition counseling/recommendations           Does the patient have evidence of cognitive impairment? No                                                                                                 Health  Risk Assessment    Smoking Status:  Social History     Tobacco Use   Smoking Status Never    Passive exposure: Never   Smokeless Tobacco Never     Alcohol Consumption:  Social History     Substance and Sexual Activity   Alcohol Use Yes    Alcohol/week: 4.0 - 6.0 standard drinks of alcohol    Types: 4 - 6 Cans of beer per week    Comment: Socially.       Fall Risk Screen  NNEKAADI Fall Risk Assessment has not been completed.    Depression Screening   Little interest or pleasure in doing things? Not at all   Feeling down, depressed, or hopeless? Not at all   PHQ-2 Total Score 0      Health Habits and Functional and Cognitive Screening:     "  12/31/2024    10:00 AM   Functional & Cognitive Status   Do you have difficulty preparing food and eating? No   Do you have difficulty bathing yourself, getting dressed or grooming yourself? No   Do you have difficulty using the toilet? No   Do you have difficulty moving around from place to place? No   Do you have trouble with steps or getting out of a bed or a chair? No   Current Diet Well Balanced Diet   Dental Exam Not up to date   Eye Exam Up to date   Exercise (times per week) 2 times per week   Current Exercises Include Walking   Do you need help using the phone?  No   Are you deaf or do you have serious difficulty hearing?  No   Do you need help to go to places out of walking distance? No   Do you need help shopping? No   Do you need help preparing meals?  No   Do you need help with housework?  No   Do you need help with laundry? No   Do you need help taking your medications? No   Do you need help managing money? No   Do you ever drive or ride in a car without wearing a seat belt? No   Have you felt unusual stress, anger or loneliness in the last month? No   Who do you live with? Alone   If you need help, do you have trouble finding someone available to you? No   Have you been bothered in the last four weeks by sexual problems? No   Do you have difficulty concentrating, remembering or making decisions? No           Age-appropriate Screening Schedule:  Refer to the list below for future screening recommendations based on patient's age, sex and/or medical conditions. Orders for these recommended tests are listed in the plan section. The patient has been provided with a written plan.    Health Maintenance List  Health Maintenance   Topic Date Due    COVID-19 Vaccine (5 - 2024-25 season) 09/01/2024    DIABETIC EYE EXAM  01/18/2025    HEMOGLOBIN A1C  04/04/2025    DIABETIC FOOT EXAM  10/04/2025    LIPID PANEL  10/04/2025    ANNUAL WELLNESS VISIT  12/31/2025    BMI FOLLOWUP  12/31/2025    COLORECTAL CANCER  SCREENING  08/17/2027    TDAP/TD VACCINES (3 - Td or Tdap) 01/24/2034    HEPATITIS C SCREENING  Completed    INFLUENZA VACCINE  Completed    Pneumococcal Vaccine 65+  Completed    ZOSTER VACCINE  Completed    URINE MICROALBUMIN  Discontinued                                                                                                                                                CMS Preventative Services Quick Reference  Risk Factors Identified During Encounter  Immunizations Discussed/Encouraged: COVID19 and RSV (Respiratory Syncytial Virus)    The above risks/problems have been discussed with the patient.  Pertinent information has been shared with the patient in the After Visit Summary.  An After Visit Summary and PPPS were made available to the patient.    Follow Up:   Next Medicare Wellness visit to be scheduled in 1 year.     Assessment & Plan  Medicare annual wellness visit, subsequent         Hyperlipidemia, unspecified hyperlipidemia type            Vitamin B 12 deficiency    Orders:    CBC & Differential    Vitamin B12 & Folate    Vitamin D deficiency         Increased prostate specific antigen (PSA) velocity         Weakness of right hand         Carpal tunnel syndrome of right wrist         Cubital tunnel syndrome on right         Atrophy of muscle of right hand         Atrophy of muscle of left hand         Abnormal EMG         Spinal stenosis in cervical region         Radiculopathy, lumbar region         Lumbar radiculopathy         Osteoarthritis of spine with radiculopathy, lumbar region         Degeneration of intervertebral disc of lumbar region, unspecified whether pain present         Right hip pain              Follow Up:   No follow-ups on file.

## 2024-12-31 NOTE — PROGRESS NOTES
Subjective   Jeff Bernard is a 69 y.o. male who presents today for follow-up of renal protection from diabetes, hyperlipidemia, B12 deficiency, vitamin D deficiency, weakness and atrophy of right hand after a shoulder injury, and specialists.     HPI    EKG 1 week prior to biopsy. Has results to review.   Did not have vascular testing due to more testing and follow up with urology.     Thinks spicy foods cause him to have diarrhea. Chinese or Mexican- done after 1-2 x.   He is better- no diarrhea or issues. He has not had recurrence.   Trouble with diarrhea- 2 weeks was ok then will have BM with urination. Sometimes gas and sometimes water. Started a couple months ago and occurs for 2-3 days then was ok for a couple weeks. Eating better since meeting a lady. Lapses sometimes. No pain or blood with BM. No antibiotics. No fever. No other symptoms.     Blood pressure/renal protection- has been stable on Lotensin- tolerating ok. BP at home- 115-120s/70s-80. Checking 4 x monthly- always normal.  No low BP at home.   Hyperlipidemia- continues medication as directed and is tolerating without AE.   B12- taking supplement twice weekly.  Vitamin D-taking Vitamin D 1000 IU daily  PSA- PSA was 6- last appt 7/2024- recheck in 6 months.    It was > 4. Prostate biopsy positive for cancer. Awaiting CT and further testing for recommendations for treatment.      Taking ASA 81 mg   No change in weight at home.     Right shoulder pain, right arm and hand weakness- no changes- seems like not worsening.   may have a little more use of the hands- 2nd and 3rd fingers straightened out more. He hit his elbow and had sensation. He thinks he may have some improvement   12-13 years ago, he hurt his right shoulder at work. Patient slipped and swung around, caught himself by his hand with injury, and had physical therapy.   He then had another job, hurt his shoulder, and had to have PT again. He continues doing exercises he was given by PT.    He has atrophy and reports he is dropping things. He does notice the weakness and has atrophy noted on exam.   I referred for EMG/NCS with median and ulnar neuropathy and he was seen by Dr Quiroz, hand surgery, then was referred to neurosurgery. He had MRI cervical spine with prominent diffuse cervical spondylosis, generalized reversal of the normal cervical lordosis centered at C4-5, mild left facet overgrowth contributing to mild left foraminal narrowing at C2-3 and C3-4, prominent disc space narrowing and degenerative disc and endplate changes from C4 to T1, diffuse spurring and diffuse posterior disc bulge that abuts and mildly deforms the ventral surface of the cord moderately narrowing the canal at C4-5 and bilateral uncovertebral joint hypertrophy with moderate-to-severe bilateral foraminal narrowing at C4-5 that could affect the exiting C5 nerve roots bilaterally, at C5-6, a posterior disc osteophyte complex abuts and flattens the cord contributing to moderate-to-severe narrowing of the anterior posterior dimension of the canal with uncovertebral joint spurs contributing to moderate-to-severe bilateral bony foraminal narrowing that could affect the exiting C6 nerve roots bilaterally, questionable very minimal T2 high signal in the right lateral aspect of the cord at the C5-6 level, could be artifact or some minimal myelomalacia in the right lateral cord at the C5-6 level, posterior spurs and uncovertebral joint hypertrophy contributing to mild canal and moderate bilateral bony foraminal narrowing at C6-7 and uncovertebral joint spurring contributes to mild-to-moderate bilateral bony foraminal narrowing at C7-T1.   He was referred him to Dr Herzog, hand surgery,  Patient was seen by Dr. Lyons last 9/2020 following EMG with severe cubital tunnel and carpal tunnel syndrome.  Recommended surgery.  Patient advised to think about it and go back after the first of 2021.  Dr Herzog-wanted to do surgery on  wrist and elbow. He was not sure if it would get worse if did not do it. Not sure if it will improve with surgery.   8/3/2021-underwent carpal tunnel and cubital tunnel surgery with Dr. Herzog. He was not sure he noticed improvement. It seemed like he has a little more strength.     Lumbar radiculopathy, Right hip pain- has been ok. PT helped and did not have any other issues.   Patient had pain after lifting 350 pound family member out of their car 6/22/2021. Started with pain and thought it was related to bursitis. Previously he was treated with steroid with improvement. He put his arms around patient's neck and he got him out of the car. He initially felt pain in right low back. He took Tylenol and back pain resolved. He then developed pain in lateral and posterior right hip and sometimes pain in anterior leg. Dull pain that made him sit down. Sitting and laying down helped. Getting up and down worsened it. Tried Naproxen and extra strength Tylenol.   He had hip and back pain, however, it appeared to be lumbar radiculopathy. He wanted to try steroid. I discussed with him that this would usually not be indicated with uncontrolled DM. I gave medrol dose pack as directed and Baclofen as needed. He was advised to monitor glucose carefully, use sliding scale, and control glucose levels.  Consideration of PT for treatment and consider imaging if no resolution of pain.  Abnormal lumbar spine x-ray with degenerative disc and osteoarthritis.  I placed order for MRI.  He did improve with physical therapy and did not have MRI.    DDD cervical spine- has been seen by neurosurgery.  Advised to see hand surgeon and follow-up in 6 weeks.  He did not return for follow-up.    Patient's specialists:  Cardiology- Dr Gallegos- last seen 9/2018-they confirmed no need for follow-up after last visit.  Endocrinology-Dr. Juarez last visit 10/2023 for diabetes mellitus type 2, hyperlipidemia, hypertension, vitamin D deficiency.  Decrease  Basaglar to 21 units every evening and continue mealtime NovoLog, benazepril, Lipitor, and vitamin D 1000 IU daily.  Follow-up in 6 months.  Patient had a major discrepancy with his blood sugars.  He is using the freestyle eva machine and when he scanned his meter for his sugars the morning of his labs with endocrinology, he had readings between 80s and 151.  However, the glucose reading from his labs was 283.  He has started checking fingerstick glucose as well as standing his meter and reports the readings are only about 20 points off.  He is not having that significant of the discrepancy.  He discussed this with the endocrinologist and they advised that he call the freestyle company.  They just sent in the new sensor.  He is a little concerned about how to accurately track his blood sugars.  He stated his average per his meter was about 145 which would not be quite 8.7% A1c.  GI-Dr. Kelly-last appointment 8/2023 for C. difficile colitis reportedly related to UTI followed by Yersinia infection with food poisoning.  He was treated by hospital ID and given Flagyl due to cost of vancomycin.  2 courses of Flagyl later and was doing well for about a month.  No nausea, vomiting, bright red blood per rectum, or diarrhea.  No further GI intervention required.  If recurrent C. difficile, advised follow-up there to arrange Dificid treatment.  Follow-up as needed.  Neurosurgery- Evi Burden PA-C-He had MRI cervical spine with prominent diffuse cervical spondylosis, generalized reversal of the normal cervical lordosis centered at C4-5, mild left facet overgrowth contributing to mild left foraminal narrowing at C2-3 and C3-4, prominent disc space narrowing and degenerative disc and endplate changes from C4 to T1, diffuse spurring and diffuse posterior disc bulge that abuts and mildly deforms the ventral surface of the cord moderately narrowing the canal at C4-5 and bilateral uncovertebral joint hypertrophy with  moderate-to-severe bilateral foraminal narrowing at C4-5 that could affect the exiting C5 nerve roots bilaterally, at C5-6, a posterior disc osteophyte complex abuts and flattens the cord contributing to moderate-to-severe narrowing of the anterior posterior dimension of the canal with uncovertebral joint spurs contributing to moderate-to-severe bilateral bony foraminal narrowing that could affect the exiting C6 nerve roots bilaterally, questionable very minimal T2 high signal in the right lateral aspect of the cord at the C5-6 level, could be artifact or some minimal myelomalacia in the right lateral cord at the C5-6 level, posterior spurs and uncovertebral joint hypertrophy contributing to mild canal and moderate bilateral bony foraminal narrowing at C6-7 and uncovertebral joint spurring contributes to mild-to-moderate bilateral bony foraminal narrowing at C7-T1. Advised to see Dr Herzog and follow up in 6 weeks.   Urology-Dr. Fatima- last seen 12/2023 for elevated PSA, BPH, undescended right testicle, ED. biopsy 12/2023.  Continue Flomax.  Prescribed Cialis.  Follow-up 6 months.  Ophthalmology- Dr Camejo- last appt 12/2021 in [facility].  No DMR.  Follow-up 1 year.  Hand surgery- Dr Herzog- last visit 8/2021 for carpal tunnel syndrome and a little tunnel syndrome-s/p right CTR release.  Advised course of home physical therapy with active and passive ROM.  Return to normal activity in 2 weeks as tolerated.  Follow-up 3 to 4 months.  EMG confirmed severe carpal tunnel and cubital tunnel syndromes-discussed surgical options.  Patient wanted to think about surgery and will let them know.   Prior Dr Quiroz who referred to neurosurgery    The following portions of the patient's history were reviewed and updated as appropriate: allergies, current medications, past family history, past medical history, past social history, past surgical history and problem list.    Review of Systems   HENT: Negative.     Respiratory:  Negative.     Cardiovascular: Negative.    Gastrointestinal:  Positive for abdominal pain and diarrhea.   Endocrine: Negative.    Genitourinary: Negative.    Musculoskeletal:  Positive for arthralgias.   Skin: Negative.    Neurological:  Positive for weakness.   Psychiatric/Behavioral: Negative.       Objective    Vitals:    12/31/24 1019   BP: 110/72   Pulse: 70   Resp: 14   Temp: 98.4 °F (36.9 °C)   SpO2: 99%     Body mass index is 30.7 kg/m².      Physical Exam   Constitutional: He is oriented to person, place, and time. He appears well-developed. No distress.   HENT:   Head: Normocephalic and atraumatic.   Right Ear: External ear normal.   Left Ear: External ear normal.   Eyes: Conjunctivae are normal.   Neck: Carotid bruit is not present. No tracheal deviation present. No thyroid mass and no thyromegaly present.   Cardiovascular: Normal rate, regular rhythm, normal heart sounds and normal pulses.   Pulmonary/Chest: Effort normal and breath sounds normal.   Abdominal: Normal appearance.   Musculoskeletal:      Comments: Significant atrophy and weakness RUE and right hand   Neurological: He is alert and oriented to person, place, and time. Gait normal.   Skin: Skin is warm and dry.   Psychiatric: His behavior is normal. Mood, judgment and thought content normal.   Nursing note and vitals reviewed.        Assessment & Plan   Diagnoses and all orders for this visit:    1. Medicare annual wellness visit, subsequent (Primary)    2. Hyperlipidemia, unspecified hyperlipidemia type  Assessment & Plan:                3. Vitamin B 12 deficiency  -     CBC & Differential  -     Vitamin B12 & Folate    4. Vitamin D deficiency  Assessment & Plan:             5. Increased prostate specific antigen (PSA) velocity  Assessment & Plan:             6. Weakness of right hand    7. Carpal tunnel syndrome of right wrist    8. Cubital tunnel syndrome on right    9. Atrophy of muscle of right hand  Assessment & Plan:             10.  Atrophy of muscle of left hand    11. Abnormal EMG    12. Spinal stenosis in cervical region  Assessment & Plan:             13. Radiculopathy, lumbar region    14. Lumbar radiculopathy    15. Osteoarthritis of spine with radiculopathy, lumbar region    16. Degeneration of intervertebral disc of lumbar region, unspecified whether pain present    17. Right hip pain           Assessment and plan  AWV completed today. He is up-to-date on colonoscopy until 2027 and is up-to-date on flu shot, Prevnar, Pneumovax, Shingrix, and hepatitis A, and Tdap.  Patient will find out date of last COVID-19 booster.  He will also consider RSV vaccination.  Last carotid duplex and triple arterial screening with bilateral sided plaque without stenosis 4/2024. He received diagnosis of prostate cancer 12/2022 and continues follow-up with urology as directed by them.     Patient has fasting labs with endocrinology and had preop labs for biopsy of prostate. Labs have all been reviewed.  He does still need CBC and B12 recheck.  I will check today.  He is doing well and has been very stable.  Follow-up in 1 year for AWV and follow up or sooner if any concerns.     Blood pressure- Blood pressure is stable today. He is on Lotensin for renal protection with diabetes mellitus. Continue Lotensin 10 mg once daily. Monitor BP and call if low or elevated. Ensure changing positions carefully and return if low BP. We will consider decreasing to 5 mg daily if low.   B12 deficiency-Continue B12 1000 mcg twice weekly. I will monitor and make further recommendations.   Vitamin D deficiency-Continue Vitamin D 1000IU once daily per endocrinology.   Diabetes mellitus type II uncontrolled, hyperlipidemia, and vitamin D deficiency- Last A1C remained uncontrolled at 8.6%.  He should continue treatment and follow up with endocrinology as directed by them.   Right upper extremity atrophy-cubital tunnel syndrome and carpal tunnel syndrome- Follow up with hand  surgery, Dr. Lyons as recommended.  Lumbar radiculopathy, Right hip pain- Patient has been stable. Follow up if worsening, new, or changing symptoms.   DDD cervical spine- To be seen if worsening, new or changing symptoms.   Prostate cancer, BPH, ED- Patient to follow with urology as directed.  C. difficile colitis, Yersinia infection, diarrhea- He had intermittent diarrhea x months and decreased appetite.  Positive stool cultures and he was treated with Flagyl x 2 courses.  He was seen by infectious disease who recommended vancomycin which was too expensive.  He was also seen by GI who advised no further treatment or workup at this time but that they will consider other treatment if recurrence of symptoms.   Borderline EKG- He had preop testing with EKG.  No acute changes.  There are a couple complexes in the inferior and lateral leads with some mild ST depression.  This is very mild and I am unsure the significance.  I asked if his previous cardiologist will over read and make recommendations if he would like to see him in follow-up or any other recommendations or interpretation.  He is asymptomatic.  Patient to be seen ASAP if he develops any symptoms.    Patient continues to see specialist as noted above.  I have reviewed available records, including consult notes, labs, and imaging/testing.  Patient to continue follow-up with specialist as directed by them.    I spent 45 minutes caring for Jeff Bernard on this date of service, including 15 minutes for AWV and 30 minutes for follow-up/problem focused visit. This time includes time spent by me in the following activities as necessary: preparing for the visit, reviewing tests, specialists records and previous visits, obtaining and/or reviewing a separately obtained history, performing a medically appropriate exam and/or evaluation, counseling and educating the patient, family, caregiver, referring and/or communicating with other healthcare professionals,  documenting information in the medical record, independently interpreting results and communicating that information with the patient, family, caregiver, and developing a medically appropriate treatment plan with consideration of other conditions, medications, and treatments.

## 2025-01-13 DIAGNOSIS — Z79.4 TYPE 2 DIABETES MELLITUS WITH MILD NONPROLIFERATIVE RETINOPATHY WITHOUT MACULAR EDEMA, WITH LONG-TERM CURRENT USE OF INSULIN, UNSPECIFIED LATERALITY: Primary | ICD-10-CM

## 2025-01-13 DIAGNOSIS — E11.9 TYPE 2 DIABETES MELLITUS WITHOUT COMPLICATION, WITH LONG-TERM CURRENT USE OF INSULIN: ICD-10-CM

## 2025-01-13 DIAGNOSIS — E11.3299 TYPE 2 DIABETES MELLITUS WITH MILD NONPROLIFERATIVE RETINOPATHY WITHOUT MACULAR EDEMA, WITH LONG-TERM CURRENT USE OF INSULIN, UNSPECIFIED LATERALITY: Primary | ICD-10-CM

## 2025-01-13 DIAGNOSIS — Z79.4 TYPE 2 DIABETES MELLITUS WITHOUT COMPLICATION, WITH LONG-TERM CURRENT USE OF INSULIN: ICD-10-CM

## 2025-01-13 RX ORDER — INSULIN DEGLUDEC 100 U/ML
INJECTION, SOLUTION SUBCUTANEOUS
Qty: 30 ML | Refills: 2 | Status: SHIPPED | OUTPATIENT
Start: 2025-01-13 | End: 2025-01-16 | Stop reason: ALTCHOICE

## 2025-01-17 ENCOUNTER — PRIOR AUTHORIZATION (OUTPATIENT)
Dept: ENDOCRINOLOGY | Age: 70
End: 2025-01-17
Payer: MEDICARE

## 2025-01-17 RX ORDER — BENAZEPRIL HYDROCHLORIDE 10 MG/1
TABLET ORAL
Qty: 90 TABLET | Refills: 3 | Status: SHIPPED | OUTPATIENT
Start: 2025-01-17

## 2025-02-12 ENCOUNTER — APPOINTMENT (OUTPATIENT)
Dept: MRI IMAGING | Facility: HOSPITAL | Age: 70
DRG: 552 | End: 2025-02-12
Payer: MEDICARE

## 2025-02-12 ENCOUNTER — APPOINTMENT (OUTPATIENT)
Dept: CT IMAGING | Facility: HOSPITAL | Age: 70
DRG: 552 | End: 2025-02-12
Payer: MEDICARE

## 2025-02-12 ENCOUNTER — APPOINTMENT (OUTPATIENT)
Dept: GENERAL RADIOLOGY | Facility: HOSPITAL | Age: 70
DRG: 552 | End: 2025-02-12
Payer: MEDICARE

## 2025-02-12 ENCOUNTER — HOSPITAL ENCOUNTER (INPATIENT)
Facility: HOSPITAL | Age: 70
LOS: 3 days | Discharge: HOME | DRG: 552 | End: 2025-02-15
Attending: STUDENT IN AN ORGANIZED HEALTH CARE EDUCATION/TRAINING PROGRAM | Admitting: INTERNAL MEDICINE
Payer: MEDICARE

## 2025-02-12 DIAGNOSIS — R29.898 RIGHT ARM WEAKNESS: Primary | ICD-10-CM

## 2025-02-12 DIAGNOSIS — R07.9 NONSPECIFIC CHEST PAIN: ICD-10-CM

## 2025-02-12 LAB
ALBUMIN SERPL-MCNC: 3.8 G/DL (ref 3.5–5.2)
ALBUMIN/GLOB SERPL: 1.2 G/DL
ALP SERPL-CCNC: 98 U/L (ref 39–117)
ALT SERPL W P-5'-P-CCNC: 18 U/L (ref 1–41)
ANION GAP SERPL CALCULATED.3IONS-SCNC: 9.6 MMOL/L (ref 5–15)
APTT PPP: 25.1 SECONDS (ref 22.7–35.4)
AST SERPL-CCNC: 16 U/L (ref 1–40)
BASOPHILS # BLD AUTO: 0.05 10*3/MM3 (ref 0–0.2)
BASOPHILS NFR BLD AUTO: 0.7 % (ref 0–1.5)
BILIRUB SERPL-MCNC: 0.4 MG/DL (ref 0–1.2)
BUN SERPL-MCNC: 14 MG/DL (ref 8–23)
BUN/CREAT SERPL: 16.3 (ref 7–25)
CALCIUM SPEC-SCNC: 9 MG/DL (ref 8.6–10.5)
CHLORIDE SERPL-SCNC: 105 MMOL/L (ref 98–107)
CO2 SERPL-SCNC: 24.4 MMOL/L (ref 22–29)
CREAT SERPL-MCNC: 0.86 MG/DL (ref 0.76–1.27)
DEPRECATED RDW RBC AUTO: 41.8 FL (ref 37–54)
EGFRCR SERPLBLD CKD-EPI 2021: 92.6 ML/MIN/1.73
EOSINOPHIL # BLD AUTO: 0.09 10*3/MM3 (ref 0–0.4)
EOSINOPHIL NFR BLD AUTO: 1.2 % (ref 0.3–6.2)
ERYTHROCYTE [DISTWIDTH] IN BLOOD BY AUTOMATED COUNT: 12.1 % (ref 12.3–15.4)
GEN 5 1HR TROPONIN T REFLEX: 13 NG/L
GLOBULIN UR ELPH-MCNC: 3.3 GM/DL
GLUCOSE BLDC GLUCOMTR-MCNC: 92 MG/DL (ref 70–130)
GLUCOSE SERPL-MCNC: 254 MG/DL (ref 65–99)
HCT VFR BLD AUTO: 46.4 % (ref 37.5–51)
HGB BLD-MCNC: 14.7 G/DL (ref 13–17.7)
HOLD SPECIMEN: NORMAL
HOLD SPECIMEN: NORMAL
IMM GRANULOCYTES # BLD AUTO: 0.02 10*3/MM3 (ref 0–0.05)
IMM GRANULOCYTES NFR BLD AUTO: 0.3 % (ref 0–0.5)
INR PPP: 1.18 (ref 0.9–1.1)
LYMPHOCYTES # BLD AUTO: 1.83 10*3/MM3 (ref 0.7–3.1)
LYMPHOCYTES NFR BLD AUTO: 25 % (ref 19.6–45.3)
MCH RBC QN AUTO: 29.3 PG (ref 26.6–33)
MCHC RBC AUTO-ENTMCNC: 31.7 G/DL (ref 31.5–35.7)
MCV RBC AUTO: 92.6 FL (ref 79–97)
MONOCYTES # BLD AUTO: 0.35 10*3/MM3 (ref 0.1–0.9)
MONOCYTES NFR BLD AUTO: 4.8 % (ref 5–12)
NEUTROPHILS NFR BLD AUTO: 4.98 10*3/MM3 (ref 1.7–7)
NEUTROPHILS NFR BLD AUTO: 68 % (ref 42.7–76)
NRBC BLD AUTO-RTO: 0 /100 WBC (ref 0–0.2)
NT-PROBNP SERPL-MCNC: 48.2 PG/ML (ref 0–900)
PLATELET # BLD AUTO: 270 10*3/MM3 (ref 140–450)
PMV BLD AUTO: 9.4 FL (ref 6–12)
POTASSIUM SERPL-SCNC: 4 MMOL/L (ref 3.5–5.2)
PROT SERPL-MCNC: 7.1 G/DL (ref 6–8.5)
PROTHROMBIN TIME: 15 SECONDS (ref 11.7–14.2)
RBC # BLD AUTO: 5.01 10*6/MM3 (ref 4.14–5.8)
SODIUM SERPL-SCNC: 139 MMOL/L (ref 136–145)
TROPONIN T NUMERIC DELTA: 2 NG/L
TROPONIN T SERPL HS-MCNC: 11 NG/L
WBC NRBC COR # BLD AUTO: 7.32 10*3/MM3 (ref 3.4–10.8)
WHOLE BLOOD HOLD COAG: NORMAL
WHOLE BLOOD HOLD SPECIMEN: NORMAL

## 2025-02-12 PROCEDURE — 83880 ASSAY OF NATRIURETIC PEPTIDE: CPT | Performed by: STUDENT IN AN ORGANIZED HEALTH CARE EDUCATION/TRAINING PROGRAM

## 2025-02-12 PROCEDURE — 25510000001 IOPAMIDOL PER 1 ML: Performed by: INTERNAL MEDICINE

## 2025-02-12 PROCEDURE — 70551 MRI BRAIN STEM W/O DYE: CPT

## 2025-02-12 PROCEDURE — 36415 COLL VENOUS BLD VENIPUNCTURE: CPT

## 2025-02-12 PROCEDURE — 80053 COMPREHEN METABOLIC PANEL: CPT | Performed by: STUDENT IN AN ORGANIZED HEALTH CARE EDUCATION/TRAINING PROGRAM

## 2025-02-12 PROCEDURE — 85025 COMPLETE CBC W/AUTO DIFF WBC: CPT | Performed by: STUDENT IN AN ORGANIZED HEALTH CARE EDUCATION/TRAINING PROGRAM

## 2025-02-12 PROCEDURE — 82948 REAGENT STRIP/BLOOD GLUCOSE: CPT

## 2025-02-12 PROCEDURE — 70498 CT ANGIOGRAPHY NECK: CPT

## 2025-02-12 PROCEDURE — 71045 X-RAY EXAM CHEST 1 VIEW: CPT

## 2025-02-12 PROCEDURE — 84484 ASSAY OF TROPONIN QUANT: CPT | Performed by: STUDENT IN AN ORGANIZED HEALTH CARE EDUCATION/TRAINING PROGRAM

## 2025-02-12 PROCEDURE — 93005 ELECTROCARDIOGRAM TRACING: CPT | Performed by: STUDENT IN AN ORGANIZED HEALTH CARE EDUCATION/TRAINING PROGRAM

## 2025-02-12 PROCEDURE — 99222 1ST HOSP IP/OBS MODERATE 55: CPT

## 2025-02-12 PROCEDURE — 85610 PROTHROMBIN TIME: CPT | Performed by: STUDENT IN AN ORGANIZED HEALTH CARE EDUCATION/TRAINING PROGRAM

## 2025-02-12 PROCEDURE — 93010 ELECTROCARDIOGRAM REPORT: CPT | Performed by: INTERNAL MEDICINE

## 2025-02-12 PROCEDURE — 70496 CT ANGIOGRAPHY HEAD: CPT

## 2025-02-12 PROCEDURE — 99291 CRITICAL CARE FIRST HOUR: CPT

## 2025-02-12 PROCEDURE — 85730 THROMBOPLASTIN TIME PARTIAL: CPT | Performed by: STUDENT IN AN ORGANIZED HEALTH CARE EDUCATION/TRAINING PROGRAM

## 2025-02-12 RX ORDER — BISACODYL 10 MG
10 SUPPOSITORY, RECTAL RECTAL DAILY PRN
Status: DISCONTINUED | OUTPATIENT
Start: 2025-02-12 | End: 2025-02-15 | Stop reason: HOSPADM

## 2025-02-12 RX ORDER — IBUPROFEN 600 MG/1
1 TABLET ORAL
Status: DISCONTINUED | OUTPATIENT
Start: 2025-02-12 | End: 2025-02-15 | Stop reason: HOSPADM

## 2025-02-12 RX ORDER — DEXTROSE MONOHYDRATE 25 G/50ML
25 INJECTION, SOLUTION INTRAVENOUS
Status: DISCONTINUED | OUTPATIENT
Start: 2025-02-12 | End: 2025-02-15 | Stop reason: HOSPADM

## 2025-02-12 RX ORDER — ACETAMINOPHEN 325 MG/1
650 TABLET ORAL EVERY 4 HOURS PRN
Status: DISCONTINUED | OUTPATIENT
Start: 2025-02-12 | End: 2025-02-15 | Stop reason: HOSPADM

## 2025-02-12 RX ORDER — ASPIRIN 325 MG
325 TABLET ORAL DAILY
Status: DISCONTINUED | OUTPATIENT
Start: 2025-02-12 | End: 2025-02-15 | Stop reason: HOSPADM

## 2025-02-12 RX ORDER — NICOTINE POLACRILEX 4 MG
15 LOZENGE BUCCAL
Status: DISCONTINUED | OUTPATIENT
Start: 2025-02-12 | End: 2025-02-15 | Stop reason: HOSPADM

## 2025-02-12 RX ORDER — INSULIN LISPRO 100 [IU]/ML
2-7 INJECTION, SOLUTION INTRAVENOUS; SUBCUTANEOUS
Status: DISCONTINUED | OUTPATIENT
Start: 2025-02-12 | End: 2025-02-13

## 2025-02-12 RX ORDER — ASPIRIN 300 MG/1
300 SUPPOSITORY RECTAL DAILY
Status: DISCONTINUED | OUTPATIENT
Start: 2025-02-12 | End: 2025-02-15 | Stop reason: HOSPADM

## 2025-02-12 RX ORDER — ONDANSETRON 2 MG/ML
4 INJECTION INTRAMUSCULAR; INTRAVENOUS EVERY 6 HOURS PRN
Status: DISCONTINUED | OUTPATIENT
Start: 2025-02-12 | End: 2025-02-15 | Stop reason: HOSPADM

## 2025-02-12 RX ORDER — IOPAMIDOL 755 MG/ML
100 INJECTION, SOLUTION INTRAVASCULAR
Status: COMPLETED | OUTPATIENT
Start: 2025-02-12 | End: 2025-02-12

## 2025-02-12 RX ORDER — ACETAMINOPHEN 650 MG/1
650 SUPPOSITORY RECTAL EVERY 4 HOURS PRN
Status: DISCONTINUED | OUTPATIENT
Start: 2025-02-12 | End: 2025-02-15 | Stop reason: HOSPADM

## 2025-02-12 RX ORDER — ATORVASTATIN CALCIUM 80 MG/1
80 TABLET, FILM COATED ORAL NIGHTLY
Status: DISCONTINUED | OUTPATIENT
Start: 2025-02-12 | End: 2025-02-15 | Stop reason: HOSPADM

## 2025-02-12 RX ORDER — SODIUM CHLORIDE 9 MG/ML
75 INJECTION, SOLUTION INTRAVENOUS CONTINUOUS
Status: ACTIVE | OUTPATIENT
Start: 2025-02-12 | End: 2025-02-13

## 2025-02-12 RX ADMIN — IOPAMIDOL 95 ML: 755 INJECTION, SOLUTION INTRAVENOUS at 17:29

## 2025-02-12 NOTE — ED PROVIDER NOTES
EMERGENCY DEPARTMENT ENCOUNTER  Room Number:  18/18  PCP: Esperanza Sarmiento PA  Independent Historians: Patient      HPI:  Chief Complaint: had concerns including Chest Pain and Extremity Weakness.     A complete HPI/ROS/PMH/PSH/SH/FH are unobtainable due to: None    Chronic or social conditions impacting patient care (Social Determinants of Health): None      Context: Jeff Bernard is a 69 y.o. male with a medical history of hypertension, hyperlipidemia, insulin-dependent diabetes, who presents to the ED c/o acute chest pain, right arm discomfort.  Patient states symptoms started at 2 AM yesterday, 37 hours prior to arrival.  States it woke him up from sleep with right-sided chest heaviness and right arm pain and heaviness.  He went back to sleep but his symptoms never resolved.  He presents to the ED today for persistent symptoms.  He states he was unable to lift a 10 pound weight with his right arm.  It feels like it is asleep.  He is right-hand dominant.  No history of prior.    Patient states he is active and walks and does not have chest discomfort with walking.      Review of prior external notes (non-ED) -and- Review of prior external test results outside of this encounter:  Stress test 4/11/2018, normal myocardial perfusion study.  No evidence of ischemia.  LVEF 61%    Prescription drug monitoring program review:         PAST MEDICAL HISTORY  Active Ambulatory Problems     Diagnosis Date Noted    Abnormal electrocardiogram 01/27/2016    Abnormal pituitary follicle stimulating hormone (FSH) 01/27/2016    Arteriosclerosis of carotid artery 01/27/2016    Benign colonic polyp 01/27/2016    Essential (primary) hypertension 01/27/2016    Erectile dysfunction of nonorganic origin 01/27/2016    Hyperlipidemia 03/02/2016    Vitamin D deficiency 02/01/2017    History of adenomatous polyp of colon 03/20/2017    Increased prostate specific antigen (PSA) velocity 08/13/2018    Abnormal testicular exam 08/13/2018     Spinal stenosis in cervical region 02/07/2020    Bilateral carpal tunnel syndrome 02/07/2020    Ulnar neuropathy at elbow 02/07/2020    Atrophy of muscle of right hand 02/07/2020    Sepsis, due to unspecified organism, unspecified whether acute organ dysfunction present 11/22/2022    Acute cystitis with hematuria 11/22/2022    E coli bacteremia 11/25/2022     Resolved Ambulatory Problems     Diagnosis Date Noted    Muscle weakness 01/27/2016    Type 2 diabetes mellitus without complication, with long-term current use of insulin 01/27/2016     Past Medical History:   Diagnosis Date    Cancer 12/2022    Colon polyp     ED (erectile dysfunction)     Hematuria     Hypertension     Sepsis     Type 2 diabetes mellitus          PAST SURGICAL HISTORY  Past Surgical History:   Procedure Laterality Date    CARPAL TUNNEL RELEASE WITH CUBITAL TUNNEL RELEASE  06/2021    Dr. Bowers    COLONOSCOPY N/A 03/20/2017    Procedure: COLONOSCOPY TO CECUM WITH HOT SNARE POLYPECTOMY;  Surgeon: Christian Kelly MD;  Location: [facility] ENDOSCOPY;  Service:     COLONOSCOPY N/A 08/17/2022    Procedure: COLONOSCOPY into cecum and terminal ileum with cold snare polypectomy;  Surgeon: Christian Kelly MD;  Location: [facility] ENDOSCOPY;  Service: Gastroenterology;  Laterality: N/A;  Pre op: History of colon polyps  Post op: Polyp and Hemorrhoids    COLONOSCOPY W/ POLYPECTOMY  MMXIV    LEG SURGERY Right     Cyst Removal.    PROSTATE SURGERY  11/2022         FAMILY HISTORY  Family History   Problem Relation Age of Onset    Stroke Mother     Aneurysm Mother         BRAIN    COPD Father     Heart attack Brother     Diabetes Maternal Aunt     Diabetes Paternal Aunt          SOCIAL HISTORY  Social History     Socioeconomic History    Marital status:    Tobacco Use    Smoking status: Never     Passive exposure: Never    Smokeless tobacco: Never   Vaping Use    Vaping status: Never Used   Substance and Sexual Activity    Alcohol use: Yes      Alcohol/week: 4.0 - 6.0 standard drinks of alcohol     Types: 4 - 6 Cans of beer per week     Comment: Socially.    Drug use: Never    Sexual activity: Yes     Partners: Female     Birth control/protection: Condom         ALLERGIES  Patient has no known allergies.      REVIEW OF SYSTEMS  Review of Systems  Included in HPI  All systems reviewed and negative except for those discussed in HPI.      PHYSICAL EXAM    I have reviewed the triage vital signs and nursing notes.    ED Triage Vitals [02/12/25 1457]   Temp Heart Rate Resp BP SpO2   97.6 °F (36.4 °C) 112 16 -- 98 %      Temp src Heart Rate Source Patient Position BP Location FiO2 (%)   Tympanic Monitor -- -- --       Physical Exam  GENERAL: alert, no acute distress  SKIN: Warm, dry  HENT: Normocephalic, atraumatic  EYES: no scleral icterus  CV: regular rhythm, regular rate  RESPIRATORY: normal effort, lungs clear  ABDOMEN: soft, nontender, nondistended  MUSCULOSKELETAL: no deformity  NEURO: alert, moves all extremities, follows commands  Patient with 5/5 strength in bilateral biceps, 4/5 strength in right tricep with 5/5 strength in left tricep.  Endorses decreased sensation to right upper extremity.  No other focal neurologic deficits noted            LAB RESULTS  Recent Results (from the past 24 hours)   ECG 12 Lead Chest Pain    Collection Time: 02/12/25  3:05 PM   Result Value Ref Range    QT Interval 377 ms    QTC Interval 439 ms   Green Top (Gel)    Collection Time: 02/12/25  3:13 PM   Result Value Ref Range    Extra Tube Hold for add-ons.    Lavender Top    Collection Time: 02/12/25  3:13 PM   Result Value Ref Range    Extra Tube hold for add-on    Gold Top - SST    Collection Time: 02/12/25  3:13 PM   Result Value Ref Range    Extra Tube Hold for add-ons.    Light Blue Top    Collection Time: 02/12/25  3:13 PM   Result Value Ref Range    Extra Tube Hold for add-ons.    aPTT    Collection Time: 02/12/25  3:13 PM    Specimen: Blood   Result Value Ref Range     PTT 25.1 22.7 - 35.4 seconds   BNP    Collection Time: 02/12/25  3:13 PM    Specimen: Blood   Result Value Ref Range    proBNP 48.2 0.0 - 900.0 pg/mL   Comprehensive Metabolic Panel    Collection Time: 02/12/25  3:13 PM    Specimen: Blood   Result Value Ref Range    Glucose 254 (H) 65 - 99 mg/dL    BUN 14 8 - 23 mg/dL    Creatinine 0.86 0.76 - 1.27 mg/dL    Sodium 139 136 - 145 mmol/L    Potassium 4.0 3.5 - 5.2 mmol/L    Chloride 105 98 - 107 mmol/L    CO2 24.4 22.0 - 29.0 mmol/L    Calcium 9.0 8.6 - 10.5 mg/dL    Total Protein 7.1 6.0 - 8.5 g/dL    Albumin 3.8 3.5 - 5.2 g/dL    ALT (SGPT) 18 1 - 41 U/L    AST (SGOT) 16 1 - 40 U/L    Alkaline Phosphatase 98 39 - 117 U/L    Total Bilirubin 0.4 0.0 - 1.2 mg/dL    Globulin 3.3 gm/dL    A/G Ratio 1.2 g/dL    BUN/Creatinine Ratio 16.3 7.0 - 25.0    Anion Gap 9.6 5.0 - 15.0 mmol/L    eGFR 92.6 >60.0 mL/min/1.73   High Sensitivity Troponin T    Collection Time: 02/12/25  3:13 PM    Specimen: Blood   Result Value Ref Range    HS Troponin T 11 <22 ng/L   Protime-INR    Collection Time: 02/12/25  3:13 PM    Specimen: Blood   Result Value Ref Range    Protime 15.0 (H) 11.7 - 14.2 Seconds    INR 1.18 (H) 0.90 - 1.10   CBC Auto Differential    Collection Time: 02/12/25  3:13 PM    Specimen: Blood   Result Value Ref Range    WBC 7.32 3.40 - 10.80 10*3/mm3    RBC 5.01 4.14 - 5.80 10*6/mm3    Hemoglobin 14.7 13.0 - 17.7 g/dL    Hematocrit 46.4 37.5 - 51.0 %    MCV 92.6 79.0 - 97.0 fL    MCH 29.3 26.6 - 33.0 pg    MCHC 31.7 31.5 - 35.7 g/dL    RDW 12.1 (L) 12.3 - 15.4 %    RDW-SD 41.8 37.0 - 54.0 fl    MPV 9.4 6.0 - 12.0 fL    Platelets 270 140 - 450 10*3/mm3    Neutrophil % 68.0 42.7 - 76.0 %    Lymphocyte % 25.0 19.6 - 45.3 %    Monocyte % 4.8 (L) 5.0 - 12.0 %    Eosinophil % 1.2 0.3 - 6.2 %    Basophil % 0.7 0.0 - 1.5 %    Immature Grans % 0.3 0.0 - 0.5 %    Neutrophils, Absolute 4.98 1.70 - 7.00 10*3/mm3    Lymphocytes, Absolute 1.83 0.70 - 3.10 10*3/mm3    Monocytes,  Absolute 0.35 0.10 - 0.90 10*3/mm3    Eosinophils, Absolute 0.09 0.00 - 0.40 10*3/mm3    Basophils, Absolute 0.05 0.00 - 0.20 10*3/mm3    Immature Grans, Absolute 0.02 0.00 - 0.05 10*3/mm3    nRBC 0.0 0.0 - 0.2 /100 WBC   High Sensitivity Troponin T 1Hr    Collection Time: 02/12/25  4:34 PM    Specimen: Arm, Left; Blood   Result Value Ref Range    HS Troponin T 13 <22 ng/L    Troponin T Numeric Delta 2 Abnormal if >/=3 ng/L         RADIOLOGY  CT Angiogram Neck, CT Angiogram Head    Result Date: 2/12/2025  CT ANGIOGRAM NECK AND HEAD WITH CONTRAST  HISTORY: Right arm weakness.  COMPARISON: None.  FINDINGS: The brain and ventricles are symmetrical. Mild vascular calcification involving the carotid siphons is appreciated. There is no evidence of acute infarction or of intracranial hemorrhage. A CT angiogram of the neck and head was then performed. Multiplanar as well as three-dimensional reconstructions were generated. The great vessels are arranged in a classic configuration. There is 0% stenosis of the internal carotid arteries using NASCET criteria. The distal aspects of the internal carotid arteries and the proximal aspects of the anterior and middle cerebral arteries appear unremarkable. Both vertebral arteries were opacified. The vertebral arteries are codominant. The basilar artery and the proximal aspects of the posterior cerebral arteries appear unremarkable. There is mild reversal of the normal cervical lordosis. There is moderate to severe foraminal stenosis on the left from C5-T1 and to the right at C5-6 and to lesser extent C4-5.      There is no evidence of acute infarction, intracranial hemorrhage or of carotid stenosis. There is no evidence of proximal intracranial arterial high-grade stenosis or occlusion. Further evaluation could be performed with a MRI examination of the brain.  Note: This is a preliminary report. The 3-dimensional reconstructions are not yet available for review.   Radiation dose  reduction techniques were utilized, including automated exposure control and exposure modulation based on body size.       XR Chest 1 View    Result Date: 2/12/2025  XR CHEST 1 VW-   INDICATION: Chest pain  COMPARISON: None  TECHNIQUE: 1 view chest  FINDINGS:  Small linear opacities seen in the medial right lung base. Calcified pulmonary nodule in the right midlung, consistent with prior granulomatous infection. No effusions. Normal mediastinal contour. Heart is normal in size. Calcified mediastinal and right hilar lymph nodes, consistent with prior granulomatous infection.      Small amount of subsegmental atelectasis seen at the right lung base  This report was finalized on 2/12/2025 3:45 PM by Dr. Edilson Nichols M.D on Workstation: [id number]         MEDICATIONS GIVEN IN ER  Medications   sodium chloride 0.9 % infusion (has no administration in time range)   acetaminophen (TYLENOL) tablet 650 mg (has no administration in time range)     Or   acetaminophen (TYLENOL) suppository 650 mg (has no administration in time range)   aspirin tablet 325 mg (325 mg Oral Not Given 2/12/25 1754)     Or   aspirin suppository 300 mg ( Rectal Not Given:  See Alt 2/12/25 1754)   atorvastatin (LIPITOR) tablet 80 mg (has no administration in time range)   ondansetron (ZOFRAN) injection 4 mg (has no administration in time range)   bisacodyl (DULCOLAX) suppository 10 mg (has no administration in time range)   iopamidol (ISOVUE-370) 76 % injection 100 mL (95 mL Intravenous Given by Other 2/12/25 1729)         ORDERS PLACED DURING THIS VISIT:  Orders Placed This Encounter   Procedures    XR Chest 1 View    CT Angiogram Neck    CT Angiogram Head    MRI Brain Without Contrast    CT Angiogram Head w AI Analysis of LVO    [place] Draw    aPTT    BNP    Comprehensive Metabolic Panel    High Sensitivity Troponin T    Protime-INR    CBC Auto Differential    High Sensitivity Troponin T 1Hr    CBC (No Diff)    Comprehensive Metabolic Panel     Hemoglobin A1c    Lipid Panel    TSH    NPO Diet NPO Type: Strict NPO    Vital Signs    Continuous Pulse Oximetry    Maintain IV Access    Telemetry - Place Orders & Notify Provider of Results When Patient Experiences Acute Chest Pain, Dysrhythmia or Respiratory Distress    Notify physician of changes in level of consciousness, worsening of stroke symptoms, acute headache or severe nausea and vomiting or any of the following vital sign parameters:    Activity As Tolerated    Nursing Dysphagia Screening    RN to Place Order SLP Consult - Eval & Treat Choosing Reason of RN Dysphagia Screen Failed    Nurse to Call MD or Nutrition Services for Diet if Patient Passes Dysphagia Screen    Intake and Output    Neuro Checks    NIHSS Assessment    Order CT Head Without Contrast for Neurological Decline    Provide Stroke Education Material    Tobacco Cessation Education    Place Sequential Compression Device    Maintain Sequential Compression Device    Inpatient Neurology Consult Stroke    Notify Stroke Coordinator    Consult to Case Management     Inpatient Diabetes Educator Consult    Inpatient Neurology Consult Stroke    OT Consult: Eval & Treat    PT Consult: Eval & Treat as tolerated    Oxygen Therapy-    SLP Consult: Eval & Treat Communication Disorder    POC Glucose Q6H    ECG 12 Lead Chest Pain    ECG 12 Lead Stroke Evaluation    Adult transthoracic echo complete    Inpatient Admission    Fall Precautions    Green Top (Gel)    Lavender Top    Gold Top - SST    Light Blue Top    CBC & Differential         OUTPATIENT MEDICATION MANAGEMENT:  Current Facility-Administered Medications Ordered in Epic   Medication Dose Route Frequency Provider Last Rate Last Admin    acetaminophen (TYLENOL) tablet 650 mg  650 mg Oral Q4H PRN Tanya Garber MD        Or    acetaminophen (TYLENOL) suppository 650 mg  650 mg Rectal Q4H PRN Tanya Garber MD        aspirin tablet 325 mg  325 mg Oral Daily Jcarlos  Tanya Melendrez MD        Or    aspirin suppository 300 mg  300 mg Rectal Daily Tanya Garber MD        atorvastatin (LIPITOR) tablet 80 mg  80 mg Oral Nightly Tanya Garber MD        bisacodyl (DULCOLAX) suppository 10 mg  10 mg Rectal Daily PRN Tanya Garber MD        ondansetron (ZOFRAN) injection 4 mg  4 mg Intravenous Q6H PRN Tanya Garber MD        sodium chloride 0.9 % infusion  75 mL/hr Intravenous Continuous Tanya Garber MD         Current Outpatient Medications Ordered in Epic   Medication Sig Dispense Refill    aspirin 81 MG EC tablet Take 1 tablet by mouth Daily.      atorvastatin (LIPITOR) 40 MG tablet Take 1 tablet by mouth Every Night. Indications: High Amount of Fats in the Blood 90 tablet 2    benazepril (LOTENSIN) 10 MG tablet TAKE 1 TABLET BY MOUTH EVERY DAY 90 tablet 3    Cholecalciferol (VITAMIN D-3) 1000 units capsule Take 1,000 Units by mouth Daily.      Continuous Blood Gluc  (FreeStyle Celsa 2 [place]) device 1 each Daily. 1 each 1    Continuous Blood Gluc Sensor (FreeStyle Celsa 2 Sensor) misc 1 each Every 14 (Fourteen) Days. 2 each 6    Cyanocobalamin (VITAMIN B 12 PO) Take 1,000 mg by mouth. 2 tablets weelkly      glucose blood (CONTOUR NEXT TEST) test strip Dx code E11.9 testing bs 3 x day 300 each 1    insulin aspart (NovoLOG FlexPen) 100 UNIT/ML solution pen-injector sc pen 1 unit per 5 g of carbohydrate 3 times a day with meals.  Maximum of 20 units per dose.  Prime needle with 2 units before each use 60 mL 2    Insulin Glargine (LANTUS SOLOSTAR) 100 UNIT/ML injection pen Inject 18 Units under the skin into the appropriate area as directed Every Night. Prime needle with 2 units before each use 18 mL 2    Insulin Pen Needle (B-D ULTRAFINE III SHORT PEN) 31G X 8 MM misc 1 each by Other route 4 (Four) Times a Day. Indications: Type 2 Diabetes 400 each 3    tamsulosin (FLOMAX) 0.4 MG capsule 24 hr capsule Take 1 capsule by mouth Daily.  Prescribed by urology           PROCEDURES  Procedures      Critical care provider statement:    Critical care time (minutes): 35.   Critical care time was exclusive of:  Separately billable procedures and treating other patients   Critical care was necessary to treat or prevent imminent or life-threatening deterioration of the following conditions:     Critical care was time spent personally by me on the following activities:  Development of treatment plan with patient or surrogate, discussions with consultants, evaluation of patient's response to treatment, examination of patient, obtaining history from patient or surrogate, ordering and performing treatments and interventions, ordering and review of laboratory studies, ordering and review of radiographic studies, pulse oximetry, re-evaluation of patient's condition and review of old charts. Critical Care indicators: Stroke       PROGRESS, DATA ANALYSIS, CONSULTS, AND MEDICAL DECISION MAKING  All labs have been independently interpreted by me.  All radiology studies have been reviewed by me. All EKG's have been independently viewed and interpreted by me.  Discussion below represents my analysis of pertinent findings related to patient's condition, differential diagnosis, treatment plan and final disposition.    Differential diagnosis includes but is not limited to ACS, STEMI, NSTEMI, dissection, stroke, metabolic or electrolyte abnormality, cervical radiculopathy.    Clinical Scores:         HEART Score: 4               NIH Stroke Scale: 1                 ED Course as of 02/12/25 1808   Wed Feb 12, 2025   1500 Patient reports he woke up at 2 AM yesterday, 37 hours prior to arrival, with right-sided chest heaviness in her right arm heaviness.  RHD.  He went back to sleep afterwards.  He presented for evaluation today due to persistent symptoms.  He has no chest discomfort at this time.  Patient states he goes on walks frequently without chest discomfort.  He  reports mild weakness in his right arm, unable to lift 10 pound weight as well as some abnormal sensation in his right arm.    Patient endorses mildly decreased sensation in right arm.  He has 5/5 strength in bilateral biceps, but 4/5 strength in right tricep.  No other focal neurologic deficits    Will obtain cardiac evaluation, will discuss with stroke team to workup possible stroke greater than 24-hour onset [DN]   1516 I considered TNK, however due to duration of symptoms patient is not a candidate [DN]   1517 ECG 12 Lead Chest Pain  EKG reviewed, sinus rhythm, rate 82, normal axis and intervals, no significant T ST changes, no STEMI  I compared EKG to prior on 12/6/2023, no significant change, EKGs interpreted by self [DN]   1540 XR Chest 1 View  I reviewed x-ray images, no focal infiltrate, interpreted by self  I reviewed radiologist interpretation of x-ray images [DN]   1542 I discussed patient with Dr. Schroeder, stroke, who recommends routine CT CTAs and MRI.  No intervention due to last known well greater than 24 hours [DN]   1554 WBC: 7.32 [DN]   1555 Hemoglobin: 14.7 [DN]   1721 HS Troponin T: 11 [DN]   1721 Troponin T Numeric Delta: 2 [DN]   1721 I discussed patient with Dr. Perez, [facility], agreeable plan to admit [DN]   1806 CT Angiogram Neck [DN]   1806 CT Angiogram Head  I reviewed CT images, no obvious LVO or ICH, interpreted by self  I reviewed radiologist interpretation of CT images [DN]      ED Course User Index  [DN] Edgar Ramires MD             AS OF 18:08 EST VITALS:    BP - 114/53  HR - 68  TEMP - 97.6 °F (36.4 °C) (Tympanic)  O2 SATS - 97%    COMPLEXITY OF CARE  The patient requires admission.      DIAGNOSIS  Final diagnoses:   Right arm weakness   Nonspecific chest pain         DISPOSITION  ED Disposition       ED Disposition   Decision to Admit    Condition   --    Comment   Level of Care: Telemetry [5]   Diagnosis: Right arm weakness [717261]   Admitting Physician: KWAN PEREZ  [[id number]]   Attending Physician: KWAN PEREZ [[id number]]   Certification: I Certify That Inpatient Hospital Services Are Medically Necessary For Greater Than 2 Midnights                  Please note that portions of this document were completed with a voice recognition program.    Note Disclaimer: At [facility], we believe that sharing information builds trust and better relationships. You are receiving this note because you recently visited [facility]. It is possible you will see health information before a provider has talked with you about it. This kind of information can be easy to misunderstand. To help you fully understand what it means for your health, we urge you to discuss this note with your provider.         Edgar Ramires MD  02/12/25 1522       Edgar Ramires MD  02/12/25 1727       Edgar Ramires MD  02/12/25 1803

## 2025-02-12 NOTE — ED NOTES
Pt states that on Monday he has chest pressure/tightness and pain to his right arm. Pt now has issues using his right hand.

## 2025-02-12 NOTE — PROGRESS NOTES
Clinical Pharmacy Services: Medication History    Jeff Bernard is a 69 y.o. male presenting to [facility] for   Chief Complaint   Patient presents with    Chest Pain    Extremity Weakness       He  has a past medical history of Arteriosclerosis of carotid artery, Cancer (12/2022), Colon polyp, ED (erectile dysfunction), Hematuria, Hyperlipidemia, Hypertension, Sepsis, Type 2 diabetes mellitus, and Vitamin D deficiency.    Allergies as of 02/12/2025    (No Known Allergies)       [facility] [place]   Pharmacy and Phone Number:     Prior to Admission Medications       Prescriptions Last Dose Informant Patient Reported? Taking?    aspirin 81 MG EC tablet  Other Yes Yes    Take 1 tablet by mouth Daily.    atorvastatin (LIPITOR) 40 MG tablet  Pharmacy No Yes    Take 1 tablet by mouth Every Night. Indications: High Amount of Fats in the Blood    benazepril (LOTENSIN) 10 MG tablet  Pharmacy No Yes    TAKE 1 TABLET BY MOUTH EVERY DAY    Patient taking differently:  Take 1 tablet by mouth Daily.    Cholecalciferol (VITAMIN D-3) 1000 units capsule  Other Yes Yes    Take 1,000 Units by mouth Daily.    Cyanocobalamin (VITAMIN B 12 PO)  Other Yes Yes    Take 2 tablets by mouth 1 (One) Time Per Week.    insulin aspart (NovoLOG FlexPen) 100 UNIT/ML solution pen-injector sc pen  Pharmacy No Yes    1 unit per 5 g of carbohydrate 3 times a day with meals.  Maximum of 20 units per dose.  Prime needle with 2 units before each use    Patient taking differently:  3 (Three) Times a Day With Meals. 1 unit per 5 g of carbohydrate 3 times a day with meals.  Maximum of 20 units per dose.  Prime needle with 2 units before each use    Insulin Glargine (LANTUS SOLOSTAR) 100 UNIT/ML injection pen  Pharmacy No Yes    Inject 18 Units under the skin into the appropriate area as directed Every Night. Prime needle with 2 units before each use    tamsulosin (FLOMAX) 0.4 MG capsule 24 hr capsule   Pharmacy Yes Yes    Take 1 capsule by mouth Daily.    Continuous Blood Gluc  (FreeStyle Celsa 2 [place]) device   No No    1 each Daily.    Continuous Blood Gluc Sensor (FreeStyle Celsa 2 Sensor) misc   No No    1 each Every 14 (Fourteen) Days.    glucose blood (CONTOUR NEXT TEST) test strip   No No    Dx code E11.9 testing bs 3 x day    Insulin Pen Needle (B-D ULTRAFINE III SHORT PEN) 31G X 8 MM misc   No No    1 each by Other route 4 (Four) Times a Day. Indications: Type 2 Diabetes              Medication notes:     This medication list is complete to the best of my knowledge as of 2/12/2025    Please call if questions.    Jayro Monroe  Medication History Technician   [phone]    2/12/2025 18:23 EST

## 2025-02-12 NOTE — CONSULTS
Neurology Consult Note    Consult Date: 2/12/2025    Referring MD: Tanya Garber, *    Reason for Consult I have been asked to see the patient in neurological consultation to render advice and opinion regarding concern for stroke.     Jeff Bernard is a 69 y.o. male with a past medical history of prostate cancer, hyperlipidemia, hypertension, type 2 diabetes, carpal tunnel syndrome, bilateral ulnar neuropathy, atrophy of the muscles of the right hand presents to the ED with acute chest pain and right arm weakness.  He States around 2 AM on 2/11 he woke up from his sleep with right-sided chest heaviness and right arm pain and heaviness. He went to be around 11:30 pm that night and was asymptomatic. He went back to sleep but symptoms never resolved.  He comes to the ED today for persistence of symptoms.  He complains that he was unable to lift a 10 pound weight this morning.  He feels as though his arm is asleep.  He denies any leg or face involvement. No blurry vision, double vision, vision loss, slurred speech, dizziness or gait imbalance. He denies headache, neck pain, back pain, incontinence/retention, saddle anesthesia. He is complaining of his right arm feeling cooler and intermittent pain in medial arm since symptoms stared. He is right-hand dominant.  No prior history of stroke.  Blood pressure was 157/80 mmHg and pulse was 112.  Glucose was 254.    He did have EMG with neurology before that showed bilateral median and ulnar neuropathy more severe on the right with right hand atrophy. Patient states he did have decompression surgery of median and ulnar nerve following which gave him some relief.     Past Medical/Surgical Hx:  Past Medical History:   Diagnosis Date    Arteriosclerosis of carotid artery     Cancer 12/2022    Prostrate    Colon polyp     ED (erectile dysfunction)     Hematuria     Hyperlipidemia     Hypertension     Sepsis     after prostate procedure    Type 2 diabetes mellitus      "Vitamin D deficiency      Past Surgical History:   Procedure Laterality Date    CARPAL TUNNEL RELEASE WITH CUBITAL TUNNEL RELEASE  06/2021    Dr. Bowers    COLONOSCOPY N/A 03/20/2017    Procedure: COLONOSCOPY TO CECUM WITH HOT SNARE POLYPECTOMY;  Surgeon: Christian Kelly MD;  Location:  [facility];  Service:     COLONOSCOPY N/A 08/17/2022    Procedure: COLONOSCOPY into cecum and terminal ileum with cold snare polypectomy;  Surgeon: Christian Kelly MD;  Location: [facility];  Service: Gastroenterology;  Laterality: N/A;  Pre op: History of colon polyps  Post op: Polyp and Hemorrhoids    COLONOSCOPY W/ POLYPECTOMY  [facility]    LEG SURGERY Right     Cyst Removal.    PROSTATE SURGERY  11/2022       Medications On Admission  (Not in a hospital admission)      Allergies:  No Known Allergies    Social Hx:  Social History     Socioeconomic History    Marital status:    Tobacco Use    Smoking status: Never     Passive exposure: Never    Smokeless tobacco: Never   Vaping Use    Vaping status: Never Used   Substance and Sexual Activity    Alcohol use: Yes     Alcohol/week: 4.0 - 6.0 standard drinks of alcohol     Types: 4 - 6 Cans of beer per week     Comment: Socially.    Drug use: Never    Sexual activity: Yes     Partners: Female     Birth control/protection: Condom       Family Hx:  Family History   Problem Relation Age of Onset    Stroke Mother     Aneurysm Mother         BRAIN    COPD Father     Heart attack Brother     Diabetes Maternal Aunt     Diabetes Paternal Aunt          Exam    /53   Pulse 68   Temp 97.6 °F (36.4 °C) (Tympanic)   Resp 16   Ht 185.4 cm (73\")   Wt 98.6 kg (217 lb 6 oz)   SpO2 97%   BMI 28.68 kg/m²   gen: NAD, vitals reviewed  MS: oriented x3, recent/remote memory intact, normal attention/concentration, language intact, no neglect, normal fund of knowledge  CN: visual acuity grossly normal, visual fields full, PERRL, EOMI, facial sensation equal, no facial " droop, hearing symmetric, palate elevates symmetrically, shoulder shrug equal, tongue midline  Motor: 4/5 right upper extremity, 5/5 right lower extremity, left upper and lower extremity  Sensation: diminished to cold temperature in bilateral hands in both median and ulnar on right and in only median on left.   Coordination: no dysmetria with finger to nose bilaterally    DATA:    Lab Results   Component Value Date    GLUCOSE 254 (H) 02/12/2025    CALCIUM 9.0 02/12/2025     02/12/2025    K 4.0 02/12/2025    CO2 24.4 02/12/2025     02/12/2025    BUN 14 02/12/2025    CREATININE 0.86 02/12/2025    EGFRIFAFRI 106 01/20/2022    EGFRIFNONA 92 01/20/2022    BCR 16.3 02/12/2025    ANIONGAP 9.6 02/12/2025     Lab Results   Component Value Date    WBC 7.32 02/12/2025    HGB 14.7 02/12/2025    HCT 46.4 02/12/2025    MCV 92.6 02/12/2025     02/12/2025     Lab Results   Component Value Date    LDL 80 10/04/2024    LDL 80 04/04/2024    LDL 70 10/19/2023     Lab Results   Component Value Date    HGBA1C 7.80 (H) 10/04/2024     Lab Results   Component Value Date    INR 1.18 (H) 02/12/2025    PROTIME 15.0 (H) 02/12/2025       Lab review:   Blood glucose 254  Troponin 11  WBC 7.32  Hemoglobin 14.7      Imaging review:   CTA head and neck:  FINDINGS: The brain and ventricles are symmetrical. Mild vascular  calcification involving the carotid siphons is appreciated. There is no  evidence of acute infarction or of intracranial hemorrhage. A CT  angiogram of the neck and head was then performed. Multiplanar as well  as three-dimensional reconstructions were generated. The great vessels  are arranged in a classic configuration. There is 0% stenosis of the  internal carotid arteries using NASCET criteria. The distal aspects of  the internal carotid arteries and the proximal aspects of the anterior  and middle cerebral arteries appear unremarkable. Both vertebral  arteries were opacified. The vertebral arteries are  codominant. The  basilar artery and the proximal aspects of the posterior cerebral  arteries appear unremarkable. There is mild reversal of the normal  cervical lordosis. There is moderate to severe foraminal stenosis on the  left from C5-T1 and to the right at C5-6 and to lesser extent C4-5.  IMPRESSION:  There is no evidence of acute infarction, intracranial  hemorrhage or of carotid stenosis. There is no evidence of proximal  intracranial arterial high-grade stenosis or occlusion. Further  evaluation could be performed with a MRI examination of the brain.    EKG normal sinus rhythm, rate 82, normal axis intervals, no significant ST changes, no STEMI    EMG 12/10/2019: Impression: Studies show evidence of bilateral median and ulnar neuropathies. Median neuropathies are moderate in degree and show some evidence of axonal loss. Bilateral ulnar motor studies showed severe axonal neuropathies right greater than left. Because of the loss of amplitude localization is not possible. Needle EMG failed to show evidence of superimposed cervical radiculopathy or proximal entrapment neuropathy.     Diagnoses:  Right arm weakness/numbness  Atypical chest pain  History of bilateral median and ulnar neuropathies right greater than left  Hypertension  Hyperlipidemia  Type 2 diabetes    Comment: Patient symptoms could possibly be stroke or exacerbation of neuropathy. There is also concern for possible clot in his arm as he is complaining of pain and arm feeling more cold since this started. CT head with no evidence of acute infarction or hemorrhage. CTA head and neck demonstrates no LVO and no high grade stenosis. Will obtain MRI brain without contrast and right upper extremity ultrasound after discussing with Dr. Garber to further evaluate.     PLAN:   -MRI brain without contrast  -Pt was not a candidate for tnk given due to being outside of time window.  -Admit to 5th floor  -Give aspirin now; if cannot swallow give PA  -Maintain  permissive HTN for 24-48hrs, do not treat unless BP > 220/120 if no contraindication  -Perform bedside swallow test  -Telemetry to monitor for arrhythmia  -VTE prophylaxis  -Neuro checks, if change in exam call neuro oncall  -PT/OT when appropriate   -Hemoglobin A1c, lipid panel, vitamin B12, folate, TSH, vitamin D    Further recommendations to follow from Dr. Schroeder.

## 2025-02-12 NOTE — ED NOTES
"Nursing report ED to floor  Jeff Bernard  69 y.o.  male    HPI :  HPI  Stated Reason for Visit: CP, diffculty using R hand    Chief Complaint  Chief Complaint   Patient presents with    Chest Pain    Extremity Weakness       Admitting doctor:   Tanya Garber MD    Admitting diagnosis:   The primary encounter diagnosis was Right arm weakness. A diagnosis of Nonspecific chest pain was also pertinent to this visit.    Code status:   Current Code Status       Date Active Code Status Order ID Comments User Context       Prior            Allergies:   Patient has no known allergies.    Isolation:   No active isolations    Intake and Output  No intake or output data in the 24 hours ending 02/12/25 1825    Weight:       02/12/25  1558   Weight: 98.6 kg (217 lb 6 oz)       Most recent vitals:   Vitals:    02/12/25 1516 02/12/25 1558 02/12/25 1600 02/12/25 1634   BP: 157/80  126/62 114/53   Pulse: 78  76 68   Resp:   16 16   Temp:       TempSrc:       SpO2:   97% 97%   Weight:  98.6 kg (217 lb 6 oz)     Height:  185.4 cm (73\")         Active LDAs/IV Access:   Lines, Drains & Airways       Active LDAs       Name Placement date Placement time Site Days    Peripheral IV 02/12/25 1556 Left Antecubital 02/12/25  1556  Antecubital  less than 1                    Labs (abnormal labs have a star):   Labs Reviewed   COMPREHENSIVE METABOLIC PANEL - Abnormal; Notable for the following components:       Result Value    Glucose 254 (*)     All other components within normal limits    Narrative:     GFR Categories in Chronic Kidney Disease (CKD)      GFR Category          GFR (mL/min/1.73)    Interpretation  G1                     90 or greater         Normal or high (1)  G2                      60-89                Mild decrease (1)  G3a                   45-59                Mild to moderate decrease  G3b                   30-44                Moderate to severe decrease  G4                    15-29                Severe " decrease  G5                    14 or less           Kidney failure          (1)In the absence of evidence of kidney disease, neither GFR category G1 or G2 fulfill the criteria for CKD.    eGFR calculation 2021 CKD-EPI creatinine equation, which does not include race as a factor   PROTIME-INR - Abnormal; Notable for the following components:    Protime 15.0 (*)     INR 1.18 (*)     All other components within normal limits   CBC WITH AUTO DIFFERENTIAL - Abnormal; Notable for the following components:    RDW 12.1 (*)     Monocyte % 4.8 (*)     All other components within normal limits   APTT - Normal   BNP (IN-HOUSE) - Normal    Narrative:     This assay is used as an aid in the diagnosis of individuals suspected of having heart failure. It can be used as an aid in the diagnosis of acute decompensated heart failure (ADHF) in patients presenting with signs and symptoms of ADHF to the emergency department (ED). In addition, NT-proBNP of <300 pg/mL indicates ADHF is not likely.    Age Range Result Interpretation  NT-proBNP Concentration (pg/mL:      <50             Positive            >450                   Gray                 300-450                    Negative             <300    50-75           Positive            >900                  Gray                300-900                  Negative            <300      >75             Positive            >1800                  Gray                300-1800                  Negative            <300   TROPONIN - Normal    Narrative:     High Sensitive Troponin T Reference Range:  <14.0 ng/L- Negative Female for AMI  <22.0 ng/L- Negative Male for AMI  >=14 - Abnormal Female indicating possible myocardial injury.  >=22 - Abnormal Male indicating possible myocardial injury.   Clinicians would have to utilize clinical acumen, EKG, Troponin, and serial changes to determine if it is an Acute Myocardial Infarction or myocardial injury due to an underlying chronic condition.        HIGH  SENSITIVITIY TROPONIN T 1HR - Normal    Narrative:     High Sensitive Troponin T Reference Range:  <14.0 ng/L- Negative Female for AMI  <22.0 ng/L- Negative Male for AMI  >=14 - Abnormal Female indicating possible myocardial injury.  >=22 - Abnormal Male indicating possible myocardial injury.   Clinicians would have to utilize clinical acumen, EKG, Troponin, and serial changes to determine if it is an Acute Myocardial Infarction or myocardial injury due to an underlying chronic condition.        RAINBOW DRAW    Narrative:     The following orders were created for panel order [place] Draw.  Procedure                               Abnormality         Status                     ---------                               -----------         ------                     Green Top (Gel)[[id number]]                                  Final result               Lavender Top[[id number]]                                     Final result               Gold Top - SST[[id number]]                                   Final result               Light Blue Top[[id number]]                                   Final result                 Please view results for these tests on the individual orders.   POCT GLUCOSE FINGERSTICK   POCT GLUCOSE FINGERSTICK   POCT GLUCOSE FINGERSTICK   POCT GLUCOSE FINGERSTICK   POCT GLUCOSE FINGERSTICK   GREEN TOP   LAVENDER TOP   GOLD TOP - SST   LIGHT BLUE TOP   CBC AND DIFFERENTIAL    Narrative:     The following orders were created for panel order CBC & Differential.  Procedure                               Abnormality         Status                     ---------                               -----------         ------                     CBC Auto Differential[[id number]]        Abnormal            Final result                 Please view results for these tests on the individual orders.       EKG:   ECG 12 Lead Chest Pain   Preliminary Result   HEART RATE=82  bpm   RR Xssfkbde=823  ms   NH Taxmsczu=458  ms   P  Horizontal Axis=-16  deg   P Front Axis=33  deg   QRSD Interval=91  ms   QT Cktwinaw=433  ms   TXnF=745  ms   QRS Axis=64  deg   T Wave Axis=63  deg   - OTHERWISE NORMAL ECG -   Sinus rhythm   Minimal ST depression, lateral leads   Date and Time of Study:2025-02-12 15:05:51          Meds given in ED:   Medications   sodium chloride 0.9 % infusion (has no administration in time range)   acetaminophen (TYLENOL) tablet 650 mg (has no administration in time range)     Or   acetaminophen (TYLENOL) suppository 650 mg (has no administration in time range)   aspirin tablet 325 mg (325 mg Oral Not Given 2/12/25 1754)     Or   aspirin suppository 300 mg ( Rectal Not Given:  See Alt 2/12/25 1754)   atorvastatin (LIPITOR) tablet 80 mg (has no administration in time range)   ondansetron (ZOFRAN) injection 4 mg (has no administration in time range)   bisacodyl (DULCOLAX) suppository 10 mg (has no administration in time range)   iopamidol (ISOVUE-370) 76 % injection 100 mL (95 mL Intravenous Given by Other 2/12/25 3231)       Imaging results:  CT Angiogram Neck    Result Date: 2/12/2025  There is no evidence of acute infarction, intracranial hemorrhage or of carotid stenosis. There is no evidence of proximal intracranial arterial high-grade stenosis or occlusion. Further evaluation could be performed with a MRI examination of the brain.  Note: This is a preliminary report. The 3-dimensional reconstructions are not yet available for review.   Radiation dose reduction techniques were utilized, including automated exposure control and exposure modulation based on body size.       CT Angiogram Head    Result Date: 2/12/2025  There is no evidence of acute infarction, intracranial hemorrhage or of carotid stenosis. There is no evidence of proximal intracranial arterial high-grade stenosis or occlusion. Further evaluation could be performed with a MRI examination of the brain.  Note: This is a preliminary report. The 3-dimensional  reconstructions are not yet available for review.   Radiation dose reduction techniques were utilized, including automated exposure control and exposure modulation based on body size.       XR Chest 1 View    Result Date: 2/12/2025  Small amount of subsegmental atelectasis seen at the right lung base  This report was finalized on 2/12/2025 3:45 PM by Dr. Edilson Nichols M.D on Workstation: [id number]       Ambulatory status:   - independent     Social issues:   Social History     Socioeconomic History    Marital status:    Tobacco Use    Smoking status: Never     Passive exposure: Never    Smokeless tobacco: Never   Vaping Use    Vaping status: Never Used   Substance and Sexual Activity    Alcohol use: Yes     Alcohol/week: 4.0 - 6.0 standard drinks of alcohol     Types: 4 - 6 Cans of beer per week     Comment: Socially.    Drug use: Never    Sexual activity: Yes     Partners: Female     Birth control/protection: Condom       Peripheral Neurovascular  Peripheral Neurovascular (Adult)  Peripheral Neurovascular WDL: WDL    Neuro Cognitive  Neuro Cognitive (Adult)  Cognitive/Neuro/Behavioral WDL: .WDL except  Additional Documentation: NIH Stroke Scale (group)  NIH Stroke Scale  Interval: baseline  1a. Level of Consciousness: 0-->Alert, keenly responsive  1b. LOC Questions: 0-->Answers both questions correctly  1c. LOC Commands: 0-->Performs both tasks correctly  2. Best Gaze: 0-->Normal  3. Visual: 0-->No visual loss  4. Facial Palsy: 0-->Normal symmetrical movements  5a. Motor Arm, Left: 0-->No drift, limb holds 90 (or 45) degrees for full 10 secs  5b. Motor Arm, Right: 0-->No drift, limb holds 90 (or 45) degrees for full 10 secs  6a. Motor Leg, Left: 0-->No drift, leg holds 30 degree position for full 5 secs  6b. Motor Leg, Right: 0-->No drift, leg holds 30 degree position for full 5 secs  7. Limb Ataxia: 0-->Absent  8. Sensory: 0-->Normal, no sensory loss  9. Best Language: 0-->No aphasia, normal  10.  Dysarthria: 0-->Normal  11. Extinction and Inattention (formerly Neglect): 0-->No abnormality  Total (NIH Stroke Scale): 0    Learning  Learning Assessment  Learning Readiness and Ability: no barriers identified    Respiratory  Respiratory  Airway WDL: WDL  Respiratory WDL  Respiratory WDL: WDL    Abdominal Pain       Pain Assessments  Pain (Adult)  (0-10) Pain Rating: Rest: 5  (0-10) Pain Rating: Activity: 5    NIH Stroke Scale  NIH Stroke Scale  Interval: baseline  1a. Level of Consciousness: 0-->Alert, keenly responsive  1b. LOC Questions: 0-->Answers both questions correctly  1c. LOC Commands: 0-->Performs both tasks correctly  2. Best Gaze: 0-->Normal  3. Visual: 0-->No visual loss  4. Facial Palsy: 0-->Normal symmetrical movements  5a. Motor Arm, Left: 0-->No drift, limb holds 90 (or 45) degrees for full 10 secs  5b. Motor Arm, Right: 0-->No drift, limb holds 90 (or 45) degrees for full 10 secs  6a. Motor Leg, Left: 0-->No drift, leg holds 30 degree position for full 5 secs  6b. Motor Leg, Right: 0-->No drift, leg holds 30 degree position for full 5 secs  7. Limb Ataxia: 0-->Absent  8. Sensory: 0-->Normal, no sensory loss  9. Best Language: 0-->No aphasia, normal  10. Dysarthria: 0-->Normal  11. Extinction and Inattention (formerly Neglect): 0-->No abnormality  Total (NIH Stroke Scale): 0    Renee Zaldivar RN  02/12/25 18:25 EST

## 2025-02-13 ENCOUNTER — APPOINTMENT (OUTPATIENT)
Dept: NUCLEAR MEDICINE | Facility: HOSPITAL | Age: 70
DRG: 552 | End: 2025-02-13
Payer: MEDICARE

## 2025-02-13 ENCOUNTER — APPOINTMENT (OUTPATIENT)
Dept: CARDIOLOGY | Facility: HOSPITAL | Age: 70
DRG: 552 | End: 2025-02-13
Payer: MEDICARE

## 2025-02-13 LAB
ALBUMIN SERPL-MCNC: 3.4 G/DL (ref 3.5–5.2)
ALBUMIN/GLOB SERPL: 1.2 G/DL
ALP SERPL-CCNC: 86 U/L (ref 39–117)
ALT SERPL W P-5'-P-CCNC: 14 U/L (ref 1–41)
ANION GAP SERPL CALCULATED.3IONS-SCNC: 12.1 MMOL/L (ref 5–15)
AORTIC DIMENSIONLESS INDEX: 0.81 (DI)
ASCENDING AORTA: 3.6 CM
AST SERPL-CCNC: 13 U/L (ref 1–40)
AV MEAN PRESS GRAD SYS DOP V1V2: 4.2 MMHG
AV VMAX SYS DOP: 134.7 CM/SEC
BH CV ECHO MEAS - ACS: 2.01 CM
BH CV ECHO MEAS - AO MAX PG: 7.3 MMHG
BH CV ECHO MEAS - AO ROOT AREA (BSA CORRECTED): 1.5 CM2
BH CV ECHO MEAS - AO ROOT DIAM: 3.4 CM
BH CV ECHO MEAS - AO V2 VTI: 21.6 CM
BH CV ECHO MEAS - AVA(I,D): 2.8 CM2
BH CV ECHO MEAS - EDV(CUBED): 69.8 ML
BH CV ECHO MEAS - EDV(MOD-SP2): 74 ML
BH CV ECHO MEAS - EDV(MOD-SP4): 77 ML
BH CV ECHO MEAS - EF(MOD-SP2): 59.5 %
BH CV ECHO MEAS - EF(MOD-SP4): 59.7 %
BH CV ECHO MEAS - ESV(CUBED): 19 ML
BH CV ECHO MEAS - ESV(MOD-SP2): 30 ML
BH CV ECHO MEAS - ESV(MOD-SP4): 31 ML
BH CV ECHO MEAS - FS: 35.2 %
BH CV ECHO MEAS - IVS/LVPW: 0.99 CM
BH CV ECHO MEAS - IVSD: 1.06 CM
BH CV ECHO MEAS - LAT PEAK E' VEL: 6.5 CM/SEC
BH CV ECHO MEAS - LV DIASTOLIC VOL/BSA (35-75): 34.6 CM2
BH CV ECHO MEAS - LV MASS(C)D: 145.6 GRAMS
BH CV ECHO MEAS - LV MAX PG: 3.5 MMHG
BH CV ECHO MEAS - LV MEAN PG: 1.98 MMHG
BH CV ECHO MEAS - LV SYSTOLIC VOL/BSA (12-30): 13.9 CM2
BH CV ECHO MEAS - LV V1 MAX: 93.5 CM/SEC
BH CV ECHO MEAS - LV V1 VTI: 17.8 CM
BH CV ECHO MEAS - LVIDD: 4.1 CM
BH CV ECHO MEAS - LVIDS: 2.7 CM
BH CV ECHO MEAS - LVOT AREA: 3.4 CM2
BH CV ECHO MEAS - LVOT DIAM: 2.07 CM
BH CV ECHO MEAS - LVPWD: 1.07 CM
BH CV ECHO MEAS - MED PEAK E' VEL: 6.1 CM/SEC
BH CV ECHO MEAS - MV A DUR: 0.11 SEC
BH CV ECHO MEAS - MV A MAX VEL: 78.2 CM/SEC
BH CV ECHO MEAS - MV DEC SLOPE: 319.8 CM/SEC2
BH CV ECHO MEAS - MV DEC TIME: 0.21 SEC
BH CV ECHO MEAS - MV E MAX VEL: 60.7 CM/SEC
BH CV ECHO MEAS - MV E/A: 0.78
BH CV ECHO MEAS - MV MAX PG: 6.5 MMHG
BH CV ECHO MEAS - MV MEAN PG: 1.54 MMHG
BH CV ECHO MEAS - MV P1/2T: 56.7 MSEC
BH CV ECHO MEAS - MV V2 VTI: 17.2 CM
BH CV ECHO MEAS - MVA(P1/2T): 3.9 CM2
BH CV ECHO MEAS - MVA(VTI): 3.5 CM2
BH CV ECHO MEAS - PA ACC TIME: 0.1 SEC
BH CV ECHO MEAS - PA V2 MAX: 82.8 CM/SEC
BH CV ECHO MEAS - RV MAX PG: 1.9 MMHG
BH CV ECHO MEAS - RV V1 MAX: 69 CM/SEC
BH CV ECHO MEAS - RV V1 VTI: 11.1 CM
BH CV ECHO MEAS - SV(LVOT): 59.9 ML
BH CV ECHO MEAS - SV(MOD-SP2): 44 ML
BH CV ECHO MEAS - SV(MOD-SP4): 46 ML
BH CV ECHO MEAS - SVI(LVOT): 26.9 ML/M2
BH CV ECHO MEAS - SVI(MOD-SP2): 19.8 ML/M2
BH CV ECHO MEAS - SVI(MOD-SP4): 20.6 ML/M2
BH CV ECHO MEAS - TAPSE (>1.6): 2.26 CM
BH CV ECHO MEASUREMENTS AVERAGE E/E' RATIO: 9.63
BH CV ECHO SHUNT ASSESSMENT PERFORMED (HIDDEN SCRIPTING): 1
BH CV REST NUCLEAR ISOTOPE DOSE: 9.2 MCI
BH CV STRESS BP STAGE 1: NORMAL
BH CV STRESS DURATION MIN STAGE 1: 3
BH CV STRESS DURATION SEC STAGE 1: 0
BH CV STRESS GRADE STAGE 1: 10
BH CV STRESS HR STAGE 1: 152
BH CV STRESS METS STAGE 1: 5
BH CV STRESS NUCLEAR ISOTOPE DOSE: 29 MCI
BH CV STRESS PROTOCOL 1: NORMAL
BH CV STRESS RECOVERY BP: NORMAL MMHG
BH CV STRESS RECOVERY HR: 90 BPM
BH CV STRESS SPEED STAGE 1: 1.7
BH CV STRESS STAGE 1: 1
BH CV XLRA - RV BASE: 3.2 CM
BH CV XLRA - RV LENGTH: 7.2 CM
BH CV XLRA - RV MID: 2.19 CM
BH CV XLRA - TDI S': 16 CM/SEC
BILIRUB SERPL-MCNC: 0.7 MG/DL (ref 0–1.2)
BUN SERPL-MCNC: 15 MG/DL (ref 8–23)
BUN/CREAT SERPL: 20.3 (ref 7–25)
CALCIUM SPEC-SCNC: 8.5 MG/DL (ref 8.6–10.5)
CHLORIDE SERPL-SCNC: 105 MMOL/L (ref 98–107)
CHOLEST SERPL-MCNC: 126 MG/DL (ref 0–200)
CO2 SERPL-SCNC: 21.9 MMOL/L (ref 22–29)
CREAT SERPL-MCNC: 0.74 MG/DL (ref 0.76–1.27)
DEPRECATED RDW RBC AUTO: 40.4 FL (ref 37–54)
EGFRCR SERPLBLD CKD-EPI 2021: 96.9 ML/MIN/1.73
ERYTHROCYTE [DISTWIDTH] IN BLOOD BY AUTOMATED COUNT: 12.3 % (ref 12.3–15.4)
FOLATE SERPL-MCNC: 16.1 NG/ML (ref 4.78–24.2)
GLOBULIN UR ELPH-MCNC: 2.9 GM/DL
GLUCOSE BLDC GLUCOMTR-MCNC: 200 MG/DL (ref 70–130)
GLUCOSE BLDC GLUCOMTR-MCNC: 207 MG/DL (ref 70–130)
GLUCOSE BLDC GLUCOMTR-MCNC: 213 MG/DL (ref 70–130)
GLUCOSE BLDC GLUCOMTR-MCNC: 218 MG/DL (ref 70–130)
GLUCOSE BLDC GLUCOMTR-MCNC: 218 MG/DL (ref 70–130)
GLUCOSE BLDC GLUCOMTR-MCNC: 219 MG/DL (ref 70–130)
GLUCOSE BLDC GLUCOMTR-MCNC: 401 MG/DL (ref 70–130)
GLUCOSE BLDC GLUCOMTR-MCNC: 438 MG/DL (ref 70–130)
GLUCOSE SERPL-MCNC: 258 MG/DL (ref 65–99)
HBA1C MFR BLD: 8.1 % (ref 4.8–5.6)
HCT VFR BLD AUTO: 42.3 % (ref 37.5–51)
HDLC SERPL-MCNC: 41 MG/DL (ref 40–60)
HGB BLD-MCNC: 14.4 G/DL (ref 13–17.7)
LDLC SERPL CALC-MCNC: 72 MG/DL (ref 0–100)
LDLC/HDLC SERPL: 1.78 {RATIO}
LEFT ATRIUM VOLUME INDEX: 15.7 ML/M2
LV EF BIPLANE MOD: 60.3 %
MAXIMAL PREDICTED HEART RATE: 149 BPM
MCH RBC QN AUTO: 30.4 PG (ref 26.6–33)
MCHC RBC AUTO-ENTMCNC: 34 G/DL (ref 31.5–35.7)
MCV RBC AUTO: 89.4 FL (ref 79–97)
PERCENT MAX PREDICTED HR: 102.01 %
PLATELET # BLD AUTO: 232 10*3/MM3 (ref 140–450)
PMV BLD AUTO: 9.3 FL (ref 6–12)
POTASSIUM SERPL-SCNC: 4 MMOL/L (ref 3.5–5.2)
PROT SERPL-MCNC: 6.3 G/DL (ref 6–8.5)
QT INTERVAL: 377 MS
QTC INTERVAL: 439 MS
RBC # BLD AUTO: 4.73 10*6/MM3 (ref 4.14–5.8)
SINUS: 3.4 CM
SODIUM SERPL-SCNC: 139 MMOL/L (ref 136–145)
SPECT HRT GATED+EF W RNC IV: 74 %
STJ: 3.4 CM
STRESS BASELINE BP: NORMAL MMHG
STRESS BASELINE HR: 82 BPM
STRESS PERCENT HR: 120 %
STRESS POST ESTIMATED WORKLOAD: 4.6 METS
STRESS POST EXERCISE DUR MIN: 3 MIN
STRESS POST EXERCISE DUR SEC: 0 SEC
STRESS POST PEAK BP: NORMAL MMHG
STRESS POST PEAK HR: 152 BPM
STRESS TARGET HR: 127 BPM
TRIGL SERPL-MCNC: 60 MG/DL (ref 0–150)
TSH SERPL DL<=0.05 MIU/L-ACNC: 1.68 UIU/ML (ref 0.27–4.2)
VIT B12 BLD-MCNC: 663 PG/ML (ref 211–946)
VLDLC SERPL-MCNC: 13 MG/DL (ref 5–40)
WBC NRBC COR # BLD AUTO: 6.99 10*3/MM3 (ref 3.4–10.8)

## 2025-02-13 PROCEDURE — 78452 HT MUSCLE IMAGE SPECT MULT: CPT | Performed by: INTERNAL MEDICINE

## 2025-02-13 PROCEDURE — 63710000001 INSULIN GLARGINE PER 5 UNITS: Performed by: INTERNAL MEDICINE

## 2025-02-13 PROCEDURE — 82948 REAGENT STRIP/BLOOD GLUCOSE: CPT

## 2025-02-13 PROCEDURE — 80053 COMPREHEN METABOLIC PANEL: CPT | Performed by: INTERNAL MEDICINE

## 2025-02-13 PROCEDURE — 80061 LIPID PANEL: CPT | Performed by: INTERNAL MEDICINE

## 2025-02-13 PROCEDURE — A9502 TC99M TETROFOSMIN: HCPCS | Performed by: INTERNAL MEDICINE

## 2025-02-13 PROCEDURE — 34310000005 TECHNETIUM TETROFOSMIN KIT: Performed by: INTERNAL MEDICINE

## 2025-02-13 PROCEDURE — 93306 TTE W/DOPPLER COMPLETE: CPT | Performed by: INTERNAL MEDICINE

## 2025-02-13 PROCEDURE — 83036 HEMOGLOBIN GLYCOSYLATED A1C: CPT | Performed by: INTERNAL MEDICINE

## 2025-02-13 PROCEDURE — 97165 OT EVAL LOW COMPLEX 30 MIN: CPT

## 2025-02-13 PROCEDURE — 85027 COMPLETE CBC AUTOMATED: CPT | Performed by: INTERNAL MEDICINE

## 2025-02-13 PROCEDURE — 93017 CV STRESS TEST TRACING ONLY: CPT

## 2025-02-13 PROCEDURE — 93016 CV STRESS TEST SUPVJ ONLY: CPT | Performed by: INTERNAL MEDICINE

## 2025-02-13 PROCEDURE — 82607 VITAMIN B-12: CPT

## 2025-02-13 PROCEDURE — 63710000001 INSULIN LISPRO (HUMAN) PER 5 UNITS: Performed by: INTERNAL MEDICINE

## 2025-02-13 PROCEDURE — 93018 CV STRESS TEST I&R ONLY: CPT | Performed by: INTERNAL MEDICINE

## 2025-02-13 PROCEDURE — 99221 1ST HOSP IP/OBS SF/LOW 40: CPT | Performed by: STUDENT IN AN ORGANIZED HEALTH CARE EDUCATION/TRAINING PROGRAM

## 2025-02-13 PROCEDURE — 36415 COLL VENOUS BLD VENIPUNCTURE: CPT | Performed by: INTERNAL MEDICINE

## 2025-02-13 PROCEDURE — 84443 ASSAY THYROID STIM HORMONE: CPT | Performed by: INTERNAL MEDICINE

## 2025-02-13 PROCEDURE — 78452 HT MUSCLE IMAGE SPECT MULT: CPT

## 2025-02-13 PROCEDURE — 93306 TTE W/DOPPLER COMPLETE: CPT

## 2025-02-13 PROCEDURE — 82746 ASSAY OF FOLIC ACID SERUM: CPT

## 2025-02-13 RX ORDER — CHOLECALCIFEROL (VITAMIN D3) 25 MCG
1000 TABLET ORAL DAILY
Status: DISCONTINUED | OUTPATIENT
Start: 2025-02-13 | End: 2025-02-15 | Stop reason: HOSPADM

## 2025-02-13 RX ORDER — INSULIN LISPRO 100 [IU]/ML
4-24 INJECTION, SOLUTION INTRAVENOUS; SUBCUTANEOUS
Status: DISCONTINUED | OUTPATIENT
Start: 2025-02-13 | End: 2025-02-15 | Stop reason: HOSPADM

## 2025-02-13 RX ORDER — TAMSULOSIN HYDROCHLORIDE 0.4 MG/1
0.4 CAPSULE ORAL DAILY
Status: DISCONTINUED | OUTPATIENT
Start: 2025-02-13 | End: 2025-02-15 | Stop reason: HOSPADM

## 2025-02-13 RX ORDER — NITROGLYCERIN 0.4 MG/1
0.4 TABLET SUBLINGUAL
Status: DISCONTINUED | OUTPATIENT
Start: 2025-02-13 | End: 2025-02-15 | Stop reason: HOSPADM

## 2025-02-13 RX ORDER — DEXTROSE MONOHYDRATE 25 G/50ML
25 INJECTION, SOLUTION INTRAVENOUS
Status: DISCONTINUED | OUTPATIENT
Start: 2025-02-13 | End: 2025-02-15 | Stop reason: HOSPADM

## 2025-02-13 RX ORDER — IBUPROFEN 600 MG/1
1 TABLET ORAL
Status: DISCONTINUED | OUTPATIENT
Start: 2025-02-13 | End: 2025-02-15 | Stop reason: HOSPADM

## 2025-02-13 RX ORDER — LISINOPRIL 10 MG/1
10 TABLET ORAL
Status: DISCONTINUED | OUTPATIENT
Start: 2025-02-13 | End: 2025-02-15 | Stop reason: HOSPADM

## 2025-02-13 RX ORDER — INSULIN LISPRO 100 [IU]/ML
10 INJECTION, SOLUTION INTRAVENOUS; SUBCUTANEOUS
Status: DISCONTINUED | OUTPATIENT
Start: 2025-02-13 | End: 2025-02-15 | Stop reason: HOSPADM

## 2025-02-13 RX ORDER — NICOTINE POLACRILEX 4 MG
15 LOZENGE BUCCAL
Status: DISCONTINUED | OUTPATIENT
Start: 2025-02-13 | End: 2025-02-15 | Stop reason: HOSPADM

## 2025-02-13 RX ADMIN — LISINOPRIL 10 MG: 10 TABLET ORAL at 08:34

## 2025-02-13 RX ADMIN — INSULIN LISPRO 10 UNITS: 100 INJECTION, SOLUTION INTRAVENOUS; SUBCUTANEOUS at 21:43

## 2025-02-13 RX ADMIN — TETROFOSMIN 1 DOSE: 1.38 INJECTION, POWDER, LYOPHILIZED, FOR SOLUTION INTRAVENOUS at 12:37

## 2025-02-13 RX ADMIN — TETROFOSMIN 1 DOSE: 1.38 INJECTION, POWDER, LYOPHILIZED, FOR SOLUTION INTRAVENOUS at 13:55

## 2025-02-13 RX ADMIN — Medication 1000 UNITS: at 08:34

## 2025-02-13 RX ADMIN — INSULIN LISPRO 3 UNITS: 100 INJECTION, SOLUTION INTRAVENOUS; SUBCUTANEOUS at 17:00

## 2025-02-13 RX ADMIN — INSULIN GLARGINE 18 UNITS: 100 INJECTION, SOLUTION SUBCUTANEOUS at 22:04

## 2025-02-13 RX ADMIN — ATORVASTATIN CALCIUM 80 MG: 80 TABLET, FILM COATED ORAL at 20:43

## 2025-02-13 RX ADMIN — INSULIN LISPRO 3 UNITS: 100 INJECTION, SOLUTION INTRAVENOUS; SUBCUTANEOUS at 08:34

## 2025-02-13 RX ADMIN — TAMSULOSIN HYDROCHLORIDE 0.4 MG: 0.4 CAPSULE ORAL at 08:34

## 2025-02-13 RX ADMIN — INSULIN LISPRO 24 UNITS: 100 INJECTION, SOLUTION INTRAVENOUS; SUBCUTANEOUS at 22:09

## 2025-02-13 RX ADMIN — INSULIN GLARGINE 18 UNITS: 100 INJECTION, SOLUTION SUBCUTANEOUS at 01:29

## 2025-02-13 RX ADMIN — ASPIRIN 325 MG ORAL TABLET 325 MG: 325 PILL ORAL at 08:36

## 2025-02-13 NOTE — PLAN OF CARE
Goal Outcome Evaluation:              Outcome Evaluation: Discussed with RN. Pt passed a swallow screen and is tolerating a regular diet. No change in speech or cognition noted. MRI was negative for stroke. ST to sign off at this time. Please reconsult if needed.

## 2025-02-13 NOTE — H&P
HISTORY AND PHYSICAL   [facility]        Date of Admission: 2025  Patient Identification:  Name: Jeff Bernard  Age: 69 y.o.  Sex: male  :  1955  MRN: [id number]                     Primary Care Physician: Esperanza Sarmiento PA    Chief Complaint:  69 year old gentleman presented to the ED with pain of his right arm and chest which started in the middle of the night yesterday; he felt like his arm was heavy and he had some pain; he went back to sleep but continues to have the symptoms so he came in; he has a history of diabetes, hypertension and hyperlipidemia; he has also had some weakness of the arm;   History of Present Illness:   As above    Past Medical History:  Past Medical History:   Diagnosis Date    Arteriosclerosis of carotid artery     Cancer 2022    Prostrate    Colon polyp     ED (erectile dysfunction)     Hematuria     Hyperlipidemia     Hypertension     Sepsis     after prostate procedure    Type 2 diabetes mellitus     Vitamin D deficiency      Past Surgical History:  Past Surgical History:   Procedure Laterality Date    CARPAL TUNNEL RELEASE WITH CUBITAL TUNNEL RELEASE  2021    Dr. Bowers    COLONOSCOPY N/A 2017    Procedure: COLONOSCOPY TO CECUM WITH HOT SNARE POLYPECTOMY;  Surgeon: Christian Kelly MD;  Location: [facility] ENDOSCOPY;  Service:     COLONOSCOPY N/A 2022    Procedure: COLONOSCOPY into cecum and terminal ileum with cold snare polypectomy;  Surgeon: Christian Kelly MD;  Location: [facility] ENDOSCOPY;  Service: Gastroenterology;  Laterality: N/A;  Pre op: History of colon polyps  Post op: Polyp and Hemorrhoids    COLONOSCOPY W/ POLYPECTOMY  MMXIV    LEG SURGERY Right     Cyst Removal.    PROSTATE SURGERY  2022      Home Meds:  (Not in a hospital admission)      Allergies:  No Known Allergies  Immunizations:  Immunization History   Administered Date(s) Administered    COVID-19 (MODERNA) 12YRS+ (SPIKEVAX) 10/26/2023    COVID-19  (MODERNA) 1st,2nd,3rd Dose Monovalent 03/04/2021, 04/01/2021, 11/17/2021    DTaP 01/24/2024    FluMist 2-49yrs 10/14/2015, 10/18/2017    Fluad Quad 65+ 09/04/2020, 10/15/2021    Fluzone High-Dose 65+YRS 10/17/2018, 09/28/2019, 10/03/2024    Fluzone High-Dose 65+yrs 10/13/2022, 10/26/2023    Fluzone Quad >6mos (Multi-dose) 10/05/2016    H1N1 Inj 12/24/2009    Hepatitis A 05/07/2018, 12/15/2018    Influenza, Unspecified 09/04/2020    Pneumococcal Conjugate 13-Valent (PCV13) 08/02/2017    Pneumococcal Polysaccharide (PPSV23) 11/16/2018    Shingrix 11/27/2019, 11/27/2019, 04/27/2020    Tdap 11/13/2013, 01/24/2024    Zostavax 08/03/2017, 11/29/2019     Social History:   Social History     Social History Narrative    Not on file     Social History     Socioeconomic History    Marital status:    Tobacco Use    Smoking status: Never     Passive exposure: Never    Smokeless tobacco: Never   Vaping Use    Vaping status: Never Used   Substance and Sexual Activity    Alcohol use: Yes     Alcohol/week: 4.0 - 6.0 standard drinks of alcohol     Types: 4 - 6 Cans of beer per week     Comment: Socially.    Drug use: Never    Sexual activity: Yes     Partners: Female     Birth control/protection: Condom       Family History:  Family History   Problem Relation Age of Onset    Stroke Mother     Aneurysm Mother         BRAIN    COPD Father     Heart attack Brother     Diabetes Maternal Aunt     Diabetes Paternal Aunt         Review of Systems  See history of present illness and past medical history.  Patient denies headache, dizziness, syncope, falls, trauma, change in vision, change in hearing, change in taste, changes in weight, changes in appetite, focal weakness, numbness, or paresthesia.  Patient denies chest pain, palpitations, dyspnea, orthopnea, PND, cough, sinus pressure, rhinorrhea, epistaxis, hemoptysis, nausea, vomiting,hematemesis, diarrhea, constipation or hematochezia.  Denies cold or heat intolerance, polydipsia,  "polyuria, polyphagia. Denies hematuria, pyuria, dysuria, hesitancy, frequency or urgency. Denies consumption of raw and under cooked meats foods or change in water source.  Denies fever, chills, sweats, night sweats.  Denies missing any routine medications. Remainder of ROS is negative.    Objective:  T Max 24 hrs: Temp (24hrs), Av.6 °F (36.4 °C), Min:97.6 °F (36.4 °C), Max:97.6 °F (36.4 °C)    Vitals Ranges:   Temp:  [97.6 °F (36.4 °C)] 97.6 °F (36.4 °C)  Heart Rate:  [] 71  Resp:  [16] 16  BP: (114-157)/(53-94) 146/94      Exam:  /94   Pulse 71   Temp 97.6 °F (36.4 °C) (Tympanic)   Resp 16   Ht 185.4 cm (73\")   Wt 98.6 kg (217 lb 6 oz)   SpO2 97%   BMI 28.68 kg/m²     General Appearance:    Alert, cooperative, no distress, appears stated age   Head:    Normocephalic, without obvious abnormality, atraumatic   Eyes:    PERRL, conjunctivae/corneas clear, EOM's intact, both eyes   Ears:    Normal external ear canals, both ears   Nose:   Nares normal, septum midline, mucosa normal, no drainage    or sinus tenderness   Throat:   Lips, mucosa, and tongue normal   Neck:   Supple, symmetrical, trachea midline, no adenopathy;     thyroid:  no enlargement/tenderness/nodules; no carotid    bruit or JVD   Back:     Symmetric, no curvature, ROM normal, no CVA tenderness   Lungs:     Clear to auscultation bilaterally, respirations unlabored   Chest Wall:    No tenderness or deformity    Heart:    Regular rate and rhythm, S1 and S2 normal, no murmur, rub   or gallop   Abdomen:     Soft, nontender, bowel sounds active all four quadrants,     no masses, no hepatomegaly, no splenomegaly   Extremities:   Extremities normal, atraumatic, no cyanosis or edema                       .    Data Review:  Labs in chart were reviewed.  WBC   Date Value Ref Range Status   2025 7.32 3.40 - 10.80 10*3/mm3 Final     Hemoglobin   Date Value Ref Range Status   2025 14.7 13.0 - 17.7 g/dL Final     Hematocrit   Date " Value Ref Range Status   02/12/2025 46.4 37.5 - 51.0 % Final     Platelets   Date Value Ref Range Status   02/12/2025 270 140 - 450 10*3/mm3 Final     Sodium   Date Value Ref Range Status   02/12/2025 139 136 - 145 mmol/L Final     Potassium   Date Value Ref Range Status   02/12/2025 4.0 3.5 - 5.2 mmol/L Final     Chloride   Date Value Ref Range Status   02/12/2025 105 98 - 107 mmol/L Final     CO2   Date Value Ref Range Status   02/12/2025 24.4 22.0 - 29.0 mmol/L Final     BUN   Date Value Ref Range Status   02/12/2025 14 8 - 23 mg/dL Final     Creatinine   Date Value Ref Range Status   02/12/2025 0.86 0.76 - 1.27 mg/dL Final     Glucose   Date Value Ref Range Status   02/12/2025 254 (H) 65 - 99 mg/dL Final     Calcium   Date Value Ref Range Status   02/12/2025 9.0 8.6 - 10.5 mg/dL Final     AST (SGOT)   Date Value Ref Range Status   02/12/2025 16 1 - 40 U/L Final     ALT (SGPT)   Date Value Ref Range Status   02/12/2025 18 1 - 41 U/L Final     Alkaline Phosphatase   Date Value Ref Range Status   02/12/2025 98 39 - 117 U/L Final                Imaging Results (All)       Procedure Component Value Units Date/Time    CT Angiogram Neck [[id number]] Collected: 02/12/25 1804     Updated: 02/12/25 2046    Narrative:      CT ANGIOGRAM NECK AND HEAD WITH CONTRAST     HISTORY: Right arm weakness.     COMPARISON: None.     FINDINGS: The brain and ventricles are symmetrical. Mild vascular  calcification involving the carotid siphons is appreciated. There is no  evidence of acute infarction or of intracranial hemorrhage. A CT  angiogram of the neck and head was then performed. Multiplanar as well  as three-dimensional reconstructions were generated. The great vessels  are arranged in a classic configuration. There is 0% stenosis of the  internal carotid arteries using NASCET criteria. The distal aspects of  the internal carotid arteries and the proximal aspects of the anterior  and middle cerebral arteries appear unremarkable.  Both vertebral  arteries were opacified. The vertebral arteries are codominant. The  basilar artery and the proximal aspects of the posterior cerebral  arteries appear unremarkable. There is mild reversal of the normal  cervical lordosis. There is moderate to severe foraminal stenosis on the  left from C5-T1 and to the right at C5-6 and to lesser extent C4-5.       Impression:      There is no evidence of acute infarction, intracranial  hemorrhage or of carotid stenosis. There is no evidence of proximal  intracranial arterial high-grade stenosis or occlusion. Further  evaluation could be performed with a MRI examination of the brain.           Radiation dose reduction techniques were utilized, including automated  exposure control and exposure modulation based on body size.        This report was finalized on 2/12/2025 8:43 PM by Dr. Joaquim Burkett M.D  on Workstation: [id number]       CT Angiogram Head [[id number]] Collected: 02/12/25 1804     Updated: 02/12/25 2046    Narrative:      CT ANGIOGRAM NECK AND HEAD WITH CONTRAST     HISTORY: Right arm weakness.     COMPARISON: None.     FINDINGS: The brain and ventricles are symmetrical. Mild vascular  calcification involving the carotid siphons is appreciated. There is no  evidence of acute infarction or of intracranial hemorrhage. A CT  angiogram of the neck and head was then performed. Multiplanar as well  as three-dimensional reconstructions were generated. The great vessels  are arranged in a classic configuration. There is 0% stenosis of the  internal carotid arteries using NASCET criteria. The distal aspects of  the internal carotid arteries and the proximal aspects of the anterior  and middle cerebral arteries appear unremarkable. Both vertebral  arteries were opacified. The vertebral arteries are codominant. The  basilar artery and the proximal aspects of the posterior cerebral  arteries appear unremarkable. There is mild reversal of the normal  cervical  lordosis. There is moderate to severe foraminal stenosis on the  left from C5-T1 and to the right at C5-6 and to lesser extent C4-5.       Impression:      There is no evidence of acute infarction, intracranial  hemorrhage or of carotid stenosis. There is no evidence of proximal  intracranial arterial high-grade stenosis or occlusion. Further  evaluation could be performed with a MRI examination of the brain.           Radiation dose reduction techniques were utilized, including automated  exposure control and exposure modulation based on body size.        This report was finalized on 2/12/2025 8:43 PM by Dr. Joaquim Burkett M.D  on Workstation: [id number]       XR Chest 1 View [[id number]] Collected: 02/12/25 1544     Updated: 02/12/25 1549    Narrative:      XR CHEST 1 VW-        INDICATION: Chest pain     COMPARISON: None     TECHNIQUE: 1 view chest     FINDINGS:      Small linear opacities seen in the medial right lung base. Calcified  pulmonary nodule in the right midlung, consistent with prior  granulomatous infection. No effusions. Normal mediastinal contour. Heart  is normal in size. Calcified mediastinal and right hilar lymph nodes,  consistent with prior granulomatous infection.       Impression:      Small amount of subsegmental atelectasis seen at the right lung base     This report was finalized on 2/12/2025 3:45 PM by Dr. Edilson Nichols M.D on Workstation: [id number]                 Assessment:  Active Hospital Problems    Diagnosis  POA    **Right arm weakness [R29.898]  Yes      Resolved Hospital Problems   No resolved problems to display.   Chest pain  Hyperlipidemia  Diabetes  Hypertension      Plan:  Will proceed with stroke workup  Ask cardiology to see him regarding chest pain  Monitor on telemetry  Troponin is negative times two  Accu checks, sliding scale  Dw patient and ed provider  Patient is full code and his brother is paco Martínez Aneesh Garber MD  2/12/2025  20:54 EST

## 2025-02-13 NOTE — PLAN OF CARE
Problem: Adult Inpatient Plan of Care  Goal: Plan of Care Review  Outcome: Progressing  Goal: Patient-Specific Goal (Individualized)  Outcome: Progressing  Goal: Absence of Hospital-Acquired Illness or Injury  Outcome: Progressing  Intervention: Identify and Manage Fall Risk  Recent Flowsheet Documentation  Taken 2/13/2025 0015 by Shantell Kapadia RN  Safety Promotion/Fall Prevention:   assistive device/personal items within reach   clutter free environment maintained   fall prevention program maintained   nonskid shoes/slippers when out of bed   safety round/check completed  Intervention: Prevent Skin Injury  Recent Flowsheet Documentation  Taken 2/13/2025 0015 by Shantell Kapadia RN  Body Position: position changed independently  Goal: Optimal Comfort and Wellbeing  Outcome: Progressing  Intervention: Provide Person-Centered Care  Recent Flowsheet Documentation  Taken 2/13/2025 0015 by Shantell Kapadia RN  Trust Relationship/Rapport:   care explained   choices provided   emotional support provided   empathic listening provided   questions answered   questions encouraged   reassurance provided   thoughts/feelings acknowledged  Goal: Readiness for Transition of Care  Outcome: Progressing  Intervention: Mutually Develop Transition Plan  Recent Flowsheet Documentation  Taken 2/13/2025 0023 by Shantell Kapadia RN  Transportation Anticipated: car, drives self  Patient/Family Anticipated Services at Transition: none  Patient/Family Anticipates Transition to: home  Taken 2/13/2025 0022 by Shantell Kapadia RN  Equipment Currently Used at Home: none     Problem: Comorbidity Management  Goal: Maintenance of Asthma Control  Outcome: Progressing  Goal: Maintenance of Behavioral Health Symptom Control  Outcome: Progressing  Goal: Maintenance of COPD Symptom Control  Outcome: Progressing  Goal: Blood Glucose Level Within Target Range  Outcome: Progressing  Goal: Maintenance of Heart Failure Symptom Control  Outcome:  Progressing  Goal: Blood Pressure in Desired Range  Outcome: Progressing  Goal: Maintenance of Osteoarthritis Symptom Control  Outcome: Progressing  Goal: Bariatric Home Regimen Maintained  Outcome: Progressing  Goal: Maintenance of Seizure Control  Outcome: Progressing   Goal Outcome Evaluation:

## 2025-02-13 NOTE — SIGNIFICANT NOTE
02/13/25 1027   OTHER   Discipline physical therapist   Therapy Assessment/Plan (PT)   Criteria for Skilled Interventions Met (PT) no problems identified which require skilled intervention     70 yo admitted with R arm wkns/heaviness. Per adult pt profile, pt with no mobility issues PTA. Per adult PCS, pt is up indep and with [facility] score of 24. No indication for acute PT. Continue to facilitate HLM per [facility] (250' or more)

## 2025-02-13 NOTE — CASE MANAGEMENT/SOCIAL WORK
Discharge Planning Assessment  [facility]     Patient Name: Jeff Bernard  MRN: [id number]  Today's Date: 2/13/2025    Admit Date: 2/12/2025    Plan: Home, pt plans to drive himself home   Discharge Needs Assessment       Row Name 02/13/25 1747       Living Environment    People in Home alone    Current Living Arrangements home    Potentially Unsafe Housing Conditions none    In the past 12 months has the electric, gas, oil, or water company threatened to shut off services in your home? No    Primary Care Provided by self    Provides Primary Care For no one    Family Caregiver if Needed significant other;sibling(s)    Family Caregiver Names Vishal Bernard/brother & Misti Stevens/significant other    Able to Return to Prior Arrangements yes       Resource/Environmental Concerns    Resource/Environmental Concerns none    Transportation Concerns none       Transportation Needs    In the past 12 months, has lack of transportation kept you from medical appointments or from getting medications? no    In the past 12 months, has lack of transportation kept you from meetings, work, or from getting things needed for daily living? No       Food Insecurity    Within the past 12 months, you worried that your food would run out before you got the money to buy more. Never true    Within the past 12 months, the food you bought just didn't last and you didn't have money to get more. Never true       Transition Planning    Patient/Family Anticipates Transition to home    Patient/Family Anticipated Services at Transition none    Transportation Anticipated family or friend will provide       Discharge Needs Assessment    Readmission Within the Last 30 Days no previous admission in last 30 days    Equipment Currently Used at Home none    Concerns to be Addressed denies needs/concerns at this time;no discharge needs identified    Anticipated Changes Related to Illness none    Equipment Needed After Discharge other (see comments)  TBD,  currently denies    Provided Post Acute Provider List? N/A    N/A Provider List Comment Pt denied need                   Discharge Plan       Row Name 02/13/25 1747       Plan    Plan Home, pt plans to drive himself home    Patient/Family in Agreement with Plan yes    Plan Comments CCP met with pt at bedside, introduced self, role, & DC planning discussed. Face sheet information & pharmacy verified. Pt lives in 2-story house alone. Pt reports 5 NNEKA & 13 steps inside.  Pt denies issue managing stairs. Prior to admit, pt reports they drive, IADLs, & denies any DME. Pt denies having a living will & declined need for information. Pt declines Meds to Beds. Pt denies issues with affording or managing medications, affording food, or affording utilities. Pt has no history of home health or rehab. DC plan is return home pt driving himself home. Denies any needs/equipment. CCP will follow. DC barriers:  duplex venous upper extremity ordered 2/12 & DM educator consulted 2/12. STEPHEN Currie/CCP                  Continued Care and Services - Admitted Since 2/12/2025    No active coordination exists for this encounter.       Selected Continued Care - Episodes Includes continued care and service providers with selected services from the active episodes listed below      High Risk Care Management Episode start date: 2/13/2025 (Paused)   There are no active outsourced providers for this episode.                 Expected Discharge Date and Time       Expected Discharge Date Expected Discharge Time    Feb 14, 2025            Demographic Summary       Row Name 02/13/25 1747       General Information    Admission Type inpatient    Arrived From emergency department    Referral Source admission list    Reason for Consult discharge planning    Preferred Language English       Contact Information    Permission Granted to Share Info With ;family/designee                   Functional Status       Row Name 02/13/25 1747        Functional Status    Usual Activity Tolerance good    Current Activity Tolerance good       Assessment of Health Literacy    How often do you have someone help you read hospital materials? Never    How often do you have problems learning about your medical condition because of difficulty understanding written information? Never    How often do you have a problem understanding what is told to you about your medical condition? Never    How confident are you filling out medical forms by yourself? Extremely    Health Literacy Excellent       Functional Status, IADL    Medications independent    Meal Preparation independent    Housekeeping independent    Laundry independent    Shopping independent    If for any reason you need help with day-to-day activities such as bathing, preparing meals, shopping, managing finances, etc., do you get the help you need? I don't need any help       Mental Status    General Appearance WDL WDL       Mental Status Summary    Recent Changes in Mental Status/Cognitive Functioning no changes       Employment/    Employment Status retired           Misti Mazariegos RN

## 2025-02-13 NOTE — CONSULTS
Date of Hospital Visit: 25  Encounter Provider: Jay Eastman MD  Place of Service: [facility] CARDIOLOGY  Patient Name: Jeff Bernard  :1955  Referral Provider: Tanya Garber, *    Chief complaint   Chest Pressure and Right Arm Pain     History of Present Illness  Jeff Bernard is a 68 yo male, previously seen by Dr. Gallegos with hypertension, hyperlipidemia and IDDM who presented to the ED on 25 with complaints of chest heaviness with right arm pain, heaviness and weakness.  He states he was unable to lift a 10 pound weight and his arm feels like it is asleep.  His symptoms started the day before he presented to the ED. The chest heaviness lasted about 10 to 15 minutes and has not recurred.    Upon arrival to the ED, he was afebrile, , RR 16, /80, SpO2 98%.     Work up is notable for negative troponin's x2, unremarkable chemistries except for elevated glucoses, A1C 8.1, normal TSH, iron panel and CBC.    CXR shows mild atelectasis right lung.  CTA and MRI of the brain are normal.  EKG shows sinus rhythm, minimal ST depression in lateral leads.  Echocardiogram is normal with grade 1 diastolic dysfunction.    Cardiology has been consulted for evaluation of chest pain.     Of note, he saw Dr. Gallegos in 2018 for asymptomatic PVC's, hypertension and hyperlipidemia. Due to his risk factors and sedentary lifestyle, a stress test and echocardiogram were ordered.  Stress test showed no ischemia, Echocardiogram showed a normal EF, grade 1 diastolic dysfunction, mildly dilated RV and mild MR.        ECHO 3-26-18  Left ventricular systolic function is normal. Calculated EF = 62.6%.  Left ventricular diastolic dysfunction is noted (grade I) consistent with impaired relaxation.  Right ventricular cavity is mildly dilated.  The interatrial septum appears redundant.  Mild mitral valve regurgitation is present  There is no evidence of pericardial  effusion.    STRESS TEST 4-11-18  Myocardial perfusion imaging indicates a normal myocardial perfusion study with no evidence of ischemia.  Left ventricular ejection fraction is normal (Calculated EF = 61%).  Impressions are consistent with a low risk study.    Past Medical History:   Diagnosis Date    Arteriosclerosis of carotid artery     Cancer 12/2022    Prostrate    Colon polyp     ED (erectile dysfunction)     Hematuria     Hyperlipidemia     Hypertension     Sepsis     after prostate procedure    Type 2 diabetes mellitus     Vitamin D deficiency        Past Surgical History:   Procedure Laterality Date    CARPAL TUNNEL RELEASE WITH CUBITAL TUNNEL RELEASE  06/2021    Dr. Bowers    COLONOSCOPY N/A 03/20/2017    Procedure: COLONOSCOPY TO CECUM WITH HOT SNARE POLYPECTOMY;  Surgeon: Christian Kelly MD;  Location: [facility] ENDOSCOPY;  Service:     COLONOSCOPY N/A 08/17/2022    Procedure: COLONOSCOPY into cecum and terminal ileum with cold snare polypectomy;  Surgeon: Christian Kelly MD;  Location: [facility] ENDOSCOPY;  Service: Gastroenterology;  Laterality: N/A;  Pre op: History of colon polyps  Post op: Polyp and Hemorrhoids    COLONOSCOPY W/ POLYPECTOMY  MMXIV    LEG SURGERY Right     Cyst Removal.    PROSTATE SURGERY  11/2022       Medications Prior to Admission   Medication Sig Dispense Refill Last Dose/Taking    aspirin 81 MG EC tablet Take 1 tablet by mouth Daily.   Taking    atorvastatin (LIPITOR) 40 MG tablet Take 1 tablet by mouth Every Night. Indications: High Amount of Fats in the Blood 90 tablet 2 Taking    benazepril (LOTENSIN) 10 MG tablet TAKE 1 TABLET BY MOUTH EVERY DAY (Patient taking differently: Take 1 tablet by mouth Daily.) 90 tablet 3 Taking Differently    Cholecalciferol (VITAMIN D-3) 1000 units capsule Take 1,000 Units by mouth Daily.   Taking    Cyanocobalamin (VITAMIN B 12 PO) Take 2 tablets by mouth 1 (One) Time Per Week.   Taking    insulin aspart (NovoLOG FlexPen) 100 UNIT/ML  solution pen-injector sc pen 1 unit per 5 g of carbohydrate 3 times a day with meals.  Maximum of 20 units per dose.  Prime needle with 2 units before each use (Patient taking differently: 3 (Three) Times a Day With Meals. 1 unit per 5 g of carbohydrate 3 times a day with meals.  Maximum of 20 units per dose.  Prime needle with 2 units before each use) 60 mL 2 Taking Differently    Insulin Glargine (LANTUS SOLOSTAR) 100 UNIT/ML injection pen Inject 18 Units under the skin into the appropriate area as directed Every Night. Prime needle with 2 units before each use 18 mL 2 Taking    tamsulosin (FLOMAX) 0.4 MG capsule 24 hr capsule Take 1 capsule by mouth Daily.   Taking    Continuous Blood Gluc  (FreeStyle Celsa 2 [place]) device 1 each Daily. 1 each 1     Continuous Blood Gluc Sensor (FreeStyle Celsa 2 Sensor) misc 1 each Every 14 (Fourteen) Days. 2 each 6     glucose blood (CONTOUR NEXT TEST) test strip Dx code E11.9 testing bs 3 x day 300 each 1     Insulin Pen Needle (B-D ULTRAFINE III SHORT PEN) 31G X 8 MM misc 1 each by Other route 4 (Four) Times a Day. Indications: Type 2 Diabetes 400 each 3        Current Meds  Scheduled Meds:aspirin, 325 mg, Oral, Daily   Or  aspirin, 300 mg, Rectal, Daily  atorvastatin, 80 mg, Oral, Nightly  cholecalciferol, 1,000 Units, Oral, Daily  insulin glargine, 18 Units, Subcutaneous, Nightly  insulin lispro, 2-7 Units, Subcutaneous, 4x Daily AC & at Bedtime  lisinopril, 10 mg, Oral, Q24H  tamsulosin, 0.4 mg, Oral, Daily      Continuous Infusions:sodium chloride, 75 mL/hr      PRN Meds:.  acetaminophen **OR** acetaminophen    bisacodyl    dextrose    dextrose    glucagon (human recombinant)    nitroglycerin    ondansetron    Allergies as of 02/12/2025    (No Known Allergies)       Social History     Socioeconomic History    Marital status:    Tobacco Use    Smoking status: Never     Passive exposure: Never    Smokeless tobacco: Never   Vaping Use    Vaping status: Never  "Used   Substance and Sexual Activity    Alcohol use: Yes     Alcohol/week: 4.0 - 6.0 standard drinks of alcohol     Types: 4 - 6 Cans of beer per week     Comment: Socially.    Drug use: Never    Sexual activity: Yes     Partners: Female     Birth control/protection: Condom       Family History   Problem Relation Age of Onset    Stroke Mother     Aneurysm Mother         BRAIN    COPD Father     Heart attack Brother     Diabetes Maternal Aunt     Diabetes Paternal Aunt        REVIEW OF SYSTEMS:   12 point ROS was performed and is negative except as outlined in HPI          Objective:   Temp:  [97.6 °F (36.4 °C)-98.6 °F (37 °C)] 98.6 °F (37 °C)  Heart Rate:  [] 85  Resp:  [16] 16  BP: (114-157)/(53-94) 141/67  Body mass index is 28.63 kg/m².  Flowsheet Rows      Flowsheet Row First Filed Value   Admission Height 185.4 cm (73\") Documented at 02/12/2025 1558   Admission Weight 98.6 kg (217 lb 6 oz) Documented at 02/12/2025 1558          Vitals:    02/13/25 1017   BP: 141/67   Pulse:    Resp:    Temp:    SpO2:        GEN: no distress, alert and oriented  HEENT: NACT, EOMI, moist mucous membranes  Lungs: CTAB, no wheezes, rales or rhonchi  CV: normal rate, regular rhythm, normal S1, S2, no murmurs, +2 radial pulses b/l, no carotid bruit  Abdomen: soft, nontender, nondistended, NABS  Extremities: no edema  Skin: no rash, warm, dry  Heme/Lymph: no bruising  Psych: organized thought, normal behavior and affect      Lab Review:   Results from last 7 days   Lab Units 02/13/25  0523   SODIUM mmol/L 139   POTASSIUM mmol/L 4.0   CHLORIDE mmol/L 105   CO2 mmol/L 21.9*   BUN mg/dL 15   CREATININE mg/dL 0.74*   CALCIUM mg/dL 8.5*   BILIRUBIN mg/dL 0.7   ALK PHOS U/L 86   ALT (SGPT) U/L 14   AST (SGOT) U/L 13   GLUCOSE mg/dL 258*     Results from last 7 days   Lab Units 02/12/25  1634 02/12/25  1513   HSTROP T ng/L 13 11     Results from last 7 days   Lab Units 02/13/25  0523 02/12/25  1513   WBC 10*3/mm3 6.99 7.32   HEMOGLOBIN " g/dL 14.4 14.7   HEMATOCRIT % 42.3 46.4   PLATELETS 10*3/mm3 232 270     Results from last 7 days   Lab Units 02/12/25  1513   INR  1.18*   APTT seconds 25.1             2-12-25 2-6-23      I personally viewed and interpreted the patient's EKG/Telemetry data)      Right arm weakness    Assessment and Plan:  #Chest pressure  #Right arm discomfort  #HTN  #DM    70 yo male, previously seen by Dr. Gallegos with hypertension, hyperlipidemia and IDDM who presented to the ED on 2-12-25 with complaints of chest heaviness with right arm pain, heaviness and weakness.  EKG, troponins, and echocardiogram are normal.    MRI of the brain is normal.    Given risk factors and chest discomfort, we will proceed with exercise nuclear stress test.  If normal can be discharged from cardiac standpoint.      Jay Presley MD, [facility], [facility]  02/13/25  11:38 EST.

## 2025-02-13 NOTE — PROGRESS NOTES
Name: Jeff Bernard ADMIT: 2025   : 1955  PCP: Esperanza Sarmiento PA    MRN: [id number] LOS: 1 days   AGE/SEX: 69 y.o. male  ROOM: [facility]     Subjective   Subjective   Patient is lying on the bed and does not appear to be any major distress.  Denies nausea, vomiting, abdominal pain, chest pain.       Objective   Objective   Vital Signs  Temp:  [97.9 °F (36.6 °C)-98.6 °F (37 °C)] 98.1 °F (36.7 °C)  Heart Rate:  [71-85] 71  Resp:  [16] 16  BP: (117-146)/(58-94) 117/58  SpO2:  [96 %-99 %] 96 %  on   ;   Device (Oxygen Therapy): room air  Body mass index is 28.63 kg/m².  Physical Exam  HEENT: PERRLA, extraocular movements intact, Scleras no icterus  Neck: Supple, no JVD  Cardiovascular: Regular rate and rhythm with normal S1 and S2  Respiratory: Fairly clear to auscultation anteriorly with no wheezes  GI: Soft, nontender, bowel sounds are present  Extremities: No edema, palpable pedal pulses.  Right hand muscles appear to be atrophic chronically  Neurologic: Grossly nonfocal, no facial asymmetry    Results Review     I reviewed the patient's new clinical results.  Results from last 7 days   Lab Units 25  0523 25  1513   WBC 10*3/mm3 6.99 7.32   HEMOGLOBIN g/dL 14.4 14.7   PLATELETS 10*3/mm3 232 270     Results from last 7 days   Lab Units 25  0523 25  1513   SODIUM mmol/L 139 139   POTASSIUM mmol/L 4.0 4.0   CHLORIDE mmol/L 105 105   CO2 mmol/L 21.9* 24.4   BUN mg/dL 15 14   CREATININE mg/dL 0.74* 0.86   GLUCOSE mg/dL 258* 254*   EGFR mL/min/1.73 96.9 92.6     Results from last 7 days   Lab Units 25  0523 25  1513   ALBUMIN g/dL 3.4* 3.8   BILIRUBIN mg/dL 0.7 0.4   ALK PHOS U/L 86 98   AST (SGOT) U/L 13 16   ALT (SGPT) U/L 14 18     Results from last 7 days   Lab Units 25  0523 25  1513   CALCIUM mg/dL 8.5* 9.0   ALBUMIN g/dL 3.4* 3.8       Hemoglobin A1C   Date/Time Value Ref Range Status   2025 0522 8.10 (H) 4.80 - 5.60 % Final     Glucose    Date/Time Value Ref Range Status   02/13/2025 1713 218 (H) 70 - 130 mg/dL Final   02/13/2025 1228 200 (H) 70 - 130 mg/dL Final   02/13/2025 1102 207 (H) 70 - 130 mg/dL Final   02/13/2025 0557 219 (H) 70 - 130 mg/dL Final   02/13/2025 0028 213 (H) 70 - 130 mg/dL Final   02/12/2025 2115 92 70 - 130 mg/dL Final       MRI Brain Without Contrast    Result Date: 2/12/2025  IMPRESSION : Normal brain MRI without contrast.  This report was finalized on 2/12/2025 10:48 PM by Dr. Torsten Auguste M.D on Workstation: [id number]      CT Angiogram Neck    Result Date: 2/12/2025  There is no evidence of acute infarction, intracranial hemorrhage or of carotid stenosis. There is no evidence of proximal intracranial arterial high-grade stenosis or occlusion. Further evaluation could be performed with a MRI examination of the brain.    Radiation dose reduction techniques were utilized, including automated exposure control and exposure modulation based on body size.   This report was finalized on 2/12/2025 8:43 PM by Dr. Joaquim Burkett M.D on Workstation: [id number]      CT Angiogram Head    Result Date: 2/12/2025  There is no evidence of acute infarction, intracranial hemorrhage or of carotid stenosis. There is no evidence of proximal intracranial arterial high-grade stenosis or occlusion. Further evaluation could be performed with a MRI examination of the brain.    Radiation dose reduction techniques were utilized, including automated exposure control and exposure modulation based on body size.   This report was finalized on 2/12/2025 8:43 PM by Dr. Joaquim Burkett M.D on Workstation: [id number]      XR Chest 1 View    Result Date: 2/12/2025  Small amount of subsegmental atelectasis seen at the right lung base  This report was finalized on 2/12/2025 3:45 PM by Dr. Edilson Nichols M.D on Workstation: [id number]       I have personally reviewed all medications:  Scheduled Medications  aspirin, 325 mg, Oral, Daily    Or  aspirin, 300 mg, Rectal, Daily  atorvastatin, 80 mg, Oral, Nightly  cholecalciferol, 1,000 Units, Oral, Daily  insulin glargine, 18 Units, Subcutaneous, Nightly  insulin lispro, 2-7 Units, Subcutaneous, 4x Daily AC & at Bedtime  lisinopril, 10 mg, Oral, Q24H  tamsulosin, 0.4 mg, Oral, Daily    Infusions   Diet  NPO Diet NPO Type: Sips with Meds    I have personally reviewed:  [x]  Laboratory   [x]  Microbiology   [x]  Radiology   [x]  EKG/Telemetry  [x]  Cardiology/Vascular   []  Pathology    []  Records       Assessment/Plan     Active Hospital Problems    Diagnosis  POA    **Right arm weakness [R29.898]  Yes      Resolved Hospital Problems   No resolved problems to display.       69 y.o. male admitted with Right arm weakness.    1. Chest pain, cardiology consult has been obtained and due for stress test today.  2.  Diabetes mellitus, on Lantus and corrective dose insulin which will be continued.  3.  Hypertension, on lisinopril and blood pressure will be monitored closely.  4.  BPH, on Flomax.  5.  Hyperlipidemia, on statins.  6.  Transient right upper extremity weakness, MRI of the brain with no acute findings.  Doubt symptoms of stroke or TIA related.    Expected Discharge Date: 2/14/2025; Expected Discharge Time:       Umang Muoñz MD  [facility]  02/13/25  18:30 EST

## 2025-02-13 NOTE — CONSULTS
"Diabetes Education  Assessment/Teaching    Patient Name:  Jeff Bernard  YOB: 1955  MRN: [id number]  Admit Date:  2/12/2025      Assessment Date:  2/13/2025  Flowsheet Row Most Recent Value   General Information     Referral From: Other (comment)  [Stroke protocol]   Height 185.4 cm (73\")   Height Method Stated   Weight 98.4 kg (217 lb)   Weight Method Bed scale   Diabetes History    What type of diabetes do you have? Type 2   Length of Diabetes Diagnosis 10 + years  [Since 1999]   Current DM knowledge good   Do you test your blood sugar at home? yes   Frequency of checks CGM, Celsa   Have you had low blood sugar? (<70mg/dl) yes   Have you had high blood sugar? (>140mg/dl) yes   How would you rate your diabetes control? good   What makes it difficult for you to take care of your diabetes or yourself? dietary habits   Do you have any diabetes complications? none   Education Preferences    What areas of diabetes would you like to learn about? other (comment)  [declines educational needs at this time]   Barriers to Learning other (comment)  [none noted]   Assessment Topics    Healthy Eating - Assessment Needs education   Being Active - Assessment Needs education   Taking Medication - Assessment Needs education   Problem Solving - Assessment Needs education   Reducing Risk - Assessment Needs education  [A1c 8.1]   Healthy Coping - Assessment Needs education   Monitoring - Assessment Needs education   DM Goals    Healthy Eating - Goal 0-7 days from discharge   Being Active - Goal 0-7 days from discharge   Taking Medication - Goal 0-7 days from discharge   Problem Solving - Goal 0-7 days from discharge   Reducing Risk - Goal 0-30 days from discharge   Healthy Coping - Goal 0-30 days from discharge   Monitoring - Goal 0-7 days from discharge   Contact Plan Follow-up medical care, 0-30 days from discharge       Flowsheet Row Most Recent Value   DM Education Needs    Meter Has own   Meter Type Other " (comment)  [CGM (Celsa)]   Medication Insulin, Pen  [Lantus, Novolog]   Problem Solving Hypoglycemia, Hyperglycemia, Signs, Symptoms, Treatment   Reducing Risks A1C testing   Physical Activity Walking   Physical Activity Frequency Occasionally   Healthy Coping Appropriate   Discharge Plan Home, Follow-up with endocrinolgoist   Motivation Moderate   Teaching Method Explanation, Discussion   Patient Response Verbalized understanding       Other Comments:  Met with pt at bedside. A1c 8.1, which seems to be his normal. Expressed his dietary habits are his biggest barrier to controlling DM. Follows up with Dr. Juarez, last seen 10/4/2024. Reports having all DM supplies at home, declines additional needs at this time.      Electronically signed by:  Andriy Espinoza RN  02/13/25 11:48 EST

## 2025-02-13 NOTE — ED NOTES
"Nursing report ED to floor  Jeff Bernard  69 y.o.  male    HPI :  HPI  Stated Reason for Visit: CP, diffculty using R hand    Chief Complaint  Chief Complaint   Patient presents with    Chest Pain    Extremity Weakness       Admitting doctor:   Tanya Garber MD    Admitting diagnosis:   The primary encounter diagnosis was Right arm weakness. A diagnosis of Nonspecific chest pain was also pertinent to this visit.    Code status:   Current Code Status       Date Active Code Status Order ID Comments User Context       Prior            Allergies:   Patient has no known allergies.    Isolation:   No active isolations    Intake and Output  No intake or output data in the 24 hours ending 02/12/25 2117    Weight:       02/12/25  1558   Weight: 98.6 kg (217 lb 6 oz)       Most recent vitals:   Vitals:    02/12/25 1558 02/12/25 1600 02/12/25 1634 02/12/25 1900   BP:  126/62 114/53 146/94   Pulse:  76 68 71   Resp:  16 16 16   Temp:       TempSrc:       SpO2:  97% 97% 97%   Weight: 98.6 kg (217 lb 6 oz)      Height: 185.4 cm (73\")          Active LDAs/IV Access:   Lines, Drains & Airways       Active LDAs       Name Placement date Placement time Site Days    Peripheral IV 02/12/25 1556 Left Antecubital 02/12/25  1556  Antecubital  less than 1                    Labs (abnormal labs have a star):   Labs Reviewed   COMPREHENSIVE METABOLIC PANEL - Abnormal; Notable for the following components:       Result Value    Glucose 254 (*)     All other components within normal limits    Narrative:     GFR Categories in Chronic Kidney Disease (CKD)      GFR Category          GFR (mL/min/1.73)    Interpretation  G1                     90 or greater         Normal or high (1)  G2                      60-89                Mild decrease (1)  G3a                   45-59                Mild to moderate decrease  G3b                   30-44                Moderate to severe decrease  G4                    15-29                Severe " decrease  G5                    14 or less           Kidney failure          (1)In the absence of evidence of kidney disease, neither GFR category G1 or G2 fulfill the criteria for CKD.    eGFR calculation 2021 CKD-EPI creatinine equation, which does not include race as a factor   PROTIME-INR - Abnormal; Notable for the following components:    Protime 15.0 (*)     INR 1.18 (*)     All other components within normal limits   CBC WITH AUTO DIFFERENTIAL - Abnormal; Notable for the following components:    RDW 12.1 (*)     Monocyte % 4.8 (*)     All other components within normal limits   APTT - Normal   BNP (IN-HOUSE) - Normal    Narrative:     This assay is used as an aid in the diagnosis of individuals suspected of having heart failure. It can be used as an aid in the diagnosis of acute decompensated heart failure (ADHF) in patients presenting with signs and symptoms of ADHF to the emergency department (ED). In addition, NT-proBNP of <300 pg/mL indicates ADHF is not likely.    Age Range Result Interpretation  NT-proBNP Concentration (pg/mL:      <50             Positive            >450                   Gray                 300-450                    Negative             <300    50-75           Positive            >900                  Gray                300-900                  Negative            <300      >75             Positive            >1800                  Gray                300-1800                  Negative            <300   TROPONIN - Normal    Narrative:     High Sensitive Troponin T Reference Range:  <14.0 ng/L- Negative Female for AMI  <22.0 ng/L- Negative Male for AMI  >=14 - Abnormal Female indicating possible myocardial injury.  >=22 - Abnormal Male indicating possible myocardial injury.   Clinicians would have to utilize clinical acumen, EKG, Troponin, and serial changes to determine if it is an Acute Myocardial Infarction or myocardial injury due to an underlying chronic condition.        HIGH  SENSITIVITIY TROPONIN T 1HR - Normal    Narrative:     High Sensitive Troponin T Reference Range:  <14.0 ng/L- Negative Female for AMI  <22.0 ng/L- Negative Male for AMI  >=14 - Abnormal Female indicating possible myocardial injury.  >=22 - Abnormal Male indicating possible myocardial injury.   Clinicians would have to utilize clinical acumen, EKG, Troponin, and serial changes to determine if it is an Acute Myocardial Infarction or myocardial injury due to an underlying chronic condition.        RAINBOW DRAW    Narrative:     The following orders were created for panel order [place] Draw.  Procedure                               Abnormality         Status                     ---------                               -----------         ------                     Green Top (Gel)[[id number]]                                  Final result               Lavender Top[[id number]]                                     Final result               Gold Top - SST[[id number]]                                   Final result               Light Blue Top[[id number]]                                   Final result                 Please view results for these tests on the individual orders.   POCT GLUCOSE FINGERSTICK   POCT GLUCOSE FINGERSTICK   POCT GLUCOSE FINGERSTICK   POCT GLUCOSE FINGERSTICK   POCT GLUCOSE FINGERSTICK   POCT GLUCOSE FINGERSTICK   POCT GLUCOSE FINGERSTICK   POCT GLUCOSE FINGERSTICK   POCT GLUCOSE FINGERSTICK   GREEN TOP   LAVENDER TOP   GOLD TOP - SST   LIGHT BLUE TOP   CBC AND DIFFERENTIAL    Narrative:     The following orders were created for panel order CBC & Differential.  Procedure                               Abnormality         Status                     ---------                               -----------         ------                     CBC Auto Differential[[id number]]        Abnormal            Final result                 Please view results for these tests on the individual orders.       EKG:   ECG 12 Lead  Chest Pain   Preliminary Result   HEART RATE=82  bpm   RR Tncgntme=864  ms   MT Olllmiex=969  ms   P Horizontal Axis=-16  deg   P Front Axis=33  deg   QRSD Interval=91  ms   QT Pfrtndbj=657  ms   WWjY=474  ms   QRS Axis=64  deg   T Wave Axis=63  deg   - OTHERWISE NORMAL ECG -   Sinus rhythm   Minimal ST depression, lateral leads   Date and Time of Study:2025-02-12 15:05:51          Meds given in ED:   Medications   sodium chloride 0.9 % infusion (has no administration in time range)   acetaminophen (TYLENOL) tablet 650 mg (has no administration in time range)     Or   acetaminophen (TYLENOL) suppository 650 mg (has no administration in time range)   aspirin tablet 325 mg (325 mg Oral Not Given 2/12/25 1754)     Or   aspirin suppository 300 mg ( Rectal Not Given:  See Alt 2/12/25 1754)   atorvastatin (LIPITOR) tablet 80 mg (has no administration in time range)   ondansetron (ZOFRAN) injection 4 mg (has no administration in time range)   bisacodyl (DULCOLAX) suppository 10 mg (has no administration in time range)   dextrose (GLUTOSE) oral gel 15 g (has no administration in time range)   dextrose (D50W) (25 g/50 mL) IV injection 25 g (has no administration in time range)   glucagon (GLUCAGEN) injection 1 mg (has no administration in time range)   insulin lispro (HUMALOG/ADMELOG) injection 2-7 Units (has no administration in time range)   iopamidol (ISOVUE-370) 76 % injection 100 mL (95 mL Intravenous Given by Other 2/12/25 0221)       Imaging results:  CT Angiogram Neck    Result Date: 2/12/2025  There is no evidence of acute infarction, intracranial hemorrhage or of carotid stenosis. There is no evidence of proximal intracranial arterial high-grade stenosis or occlusion. Further evaluation could be performed with a MRI examination of the brain.    Radiation dose reduction techniques were utilized, including automated exposure control and exposure modulation based on body size.   This report was finalized on 2/12/2025  8:43 PM by Dr. Joaquim Burkett M.D on Workstation: [id number]      CT Angiogram Head    Result Date: 2/12/2025  There is no evidence of acute infarction, intracranial hemorrhage or of carotid stenosis. There is no evidence of proximal intracranial arterial high-grade stenosis or occlusion. Further evaluation could be performed with a MRI examination of the brain.    Radiation dose reduction techniques were utilized, including automated exposure control and exposure modulation based on body size.   This report was finalized on 2/12/2025 8:43 PM by Dr. Joaquim Burkett M.D on Workstation: [id number]      XR Chest 1 View    Result Date: 2/12/2025  Small amount of subsegmental atelectasis seen at the right lung base  This report was finalized on 2/12/2025 3:45 PM by Dr. Edilson Nichols M.D on Workstation: [id number]       Ambulatory status:   - as tolerated    Social issues:   Social History     Socioeconomic History    Marital status:    Tobacco Use    Smoking status: Never     Passive exposure: Never    Smokeless tobacco: Never   Vaping Use    Vaping status: Never Used   Substance and Sexual Activity    Alcohol use: Yes     Alcohol/week: 4.0 - 6.0 standard drinks of alcohol     Types: 4 - 6 Cans of beer per week     Comment: Socially.    Drug use: Never    Sexual activity: Yes     Partners: Female     Birth control/protection: Condom       Peripheral Neurovascular  Peripheral Neurovascular (Adult)  Peripheral Neurovascular WDL: WDL    Neuro Cognitive  Neuro Cognitive (Adult)  Cognitive/Neuro/Behavioral WDL: .WDL except  Additional Documentation: NIH Stroke Scale (group)  [place] Coma Scale  Best Eye Response: 4-->(E4) spontaneous  Best Motor Response: 6-->(M6) obeys commands  Best Verbal Response: 5-->(V5) oriented  [place] Coma Scale Score: 15  NIH Stroke Scale  Interval: baseline  1a. Level of Consciousness: 0-->Alert, keenly responsive  1b. LOC Questions: 0-->Answers both questions correctly  1c. LOC  Commands: 0-->Performs both tasks correctly  2. Best Gaze: 0-->Normal  3. Visual: 0-->No visual loss  4. Facial Palsy: 0-->Normal symmetrical movements  5a. Motor Arm, Left: 0-->No drift, limb holds 90 (or 45) degrees for full 10 secs  5b. Motor Arm, Right: 0-->No drift, limb holds 90 (or 45) degrees for full 10 secs  6a. Motor Leg, Left: 0-->No drift, leg holds 30 degree position for full 5 secs  6b. Motor Leg, Right: 0-->No drift, leg holds 30 degree position for full 5 secs  7. Limb Ataxia: 0-->Absent  8. Sensory: 0-->Normal, no sensory loss  9. Best Language: 0-->No aphasia, normal  10. Dysarthria: 0-->Normal  11. Extinction and Inattention (formerly Neglect): 0-->No abnormality  Total (NIH Stroke Scale): 0    Learning  Learning Assessment  Learning Readiness and Ability: no barriers identified    Respiratory  Respiratory  Airway WDL: WDL  Respiratory WDL  Respiratory WDL: WDL    Abdominal Pain       Pain Assessments  Pain (Adult)  (0-10) Pain Rating: Rest: 5  (0-10) Pain Rating: Activity: 5    NIH Stroke Scale  NIH Stroke Scale  Interval: baseline  1a. Level of Consciousness: 0-->Alert, keenly responsive  1b. LOC Questions: 0-->Answers both questions correctly  1c. LOC Commands: 0-->Performs both tasks correctly  2. Best Gaze: 0-->Normal  3. Visual: 0-->No visual loss  4. Facial Palsy: 0-->Normal symmetrical movements  5a. Motor Arm, Left: 0-->No drift, limb holds 90 (or 45) degrees for full 10 secs  5b. Motor Arm, Right: 0-->No drift, limb holds 90 (or 45) degrees for full 10 secs  6a. Motor Leg, Left: 0-->No drift, leg holds 30 degree position for full 5 secs  6b. Motor Leg, Right: 0-->No drift, leg holds 30 degree position for full 5 secs  7. Limb Ataxia: 0-->Absent  8. Sensory: 0-->Normal, no sensory loss  9. Best Language: 0-->No aphasia, normal  10. Dysarthria: 0-->Normal  11. Extinction and Inattention (formerly Neglect): 0-->No abnormality  Total (NIH Stroke Scale): 0    Flor Encarnacion RN  02/12/25  21:17 EST

## 2025-02-13 NOTE — THERAPY EVALUATION
Patient Name: Jeff Bernard  : 1955    MRN: [id number]                              Today's Date: 2025       Admit Date: 2025    Visit Dx:     ICD-10-CM ICD-9-CM   1. Right arm weakness  R29.898 729.89   2. Nonspecific chest pain  R07.9 786.50     Patient Active Problem List   Diagnosis    Abnormal electrocardiogram    Abnormal pituitary follicle stimulating hormone (FSH)    Arteriosclerosis of carotid artery    Benign colonic polyp    Essential (primary) hypertension    Erectile dysfunction of nonorganic origin    Hyperlipidemia    Vitamin D deficiency    History of adenomatous polyp of colon    Increased prostate specific antigen (PSA) velocity    Abnormal testicular exam    Spinal stenosis in cervical region    Bilateral carpal tunnel syndrome    Ulnar neuropathy at elbow    Atrophy of muscle of right hand    Sepsis, due to unspecified organism, unspecified whether acute organ dysfunction present    Acute cystitis with hematuria    E coli bacteremia    Right arm weakness     Past Medical History:   Diagnosis Date    Arteriosclerosis of carotid artery     Cancer 2022    Prostrate    Colon polyp     ED (erectile dysfunction)     Hematuria     Hyperlipidemia     Hypertension     Sepsis     after prostate procedure    Type 2 diabetes mellitus     Vitamin D deficiency      Past Surgical History:   Procedure Laterality Date    CARPAL TUNNEL RELEASE WITH CUBITAL TUNNEL RELEASE  2021    Dr. Bowers    COLONOSCOPY N/A 2017    Procedure: COLONOSCOPY TO CECUM WITH HOT SNARE POLYPECTOMY;  Surgeon: Christian Kelly MD;  Location: [facility] ENDOSCOPY;  Service:     COLONOSCOPY N/A 2022    Procedure: COLONOSCOPY into cecum and terminal ileum with cold snare polypectomy;  Surgeon: Christian Kelly MD;  Location: [facility] ENDOSCOPY;  Service: Gastroenterology;  Laterality: N/A;  Pre op: History of colon polyps  Post op: Polyp and Hemorrhoids    COLONOSCOPY W/ POLYPECTOMY  [facility]    LEG  SURGERY Right     Cyst Removal.    PROSTATE SURGERY  11/2022      General Information       [facility] Name 02/13/25 1041          OT Time and Intention    Document Type evaluation;discharge evaluation/summary  -MM     Mode of Treatment occupational therapy  -MM     Patient Effort good  -MM     Symptoms Noted During/After Treatment none  -MM       Row Name 02/13/25 1041          General Information    Patient Profile Reviewed yes  -MM     Prior Level of Function independent:;driving;ADL's;transfer;all household mobility  -MM     Existing Precautions/Restrictions no known precautions/restrictions  -MM     Barriers to Rehab none identified  -MM       [facility] Name 02/13/25 1041          Living Environment    People in Home alone  -MM       Row Name 02/13/25 1041          Cognition    Orientation Status (Cognition) oriented x 4  -MM       Row Name 02/13/25 1041          Safety Issues/Impairments Affecting Functional Mobility    Impairments Affecting Function (Mobility) coordination  -MM               User Key  (r) = Recorded By, (t) = Taken By, (c) = Cosigned By      Initials Name Provider Type    MM Temi Dejesus OT Occupational Therapist                     Mobility/ADL's       Row [facility] 02/13/25 1041          Bed Mobility    Comment, (Bed Mobility) (I), no concerns for mobility  -MM       Row Name 02/13/25 1041          Activities of Daily Living    BADL Assessment/Intervention feeding  -MM       Row Name 02/13/25 1041          Self-Feeding Assessment/Training    Comment, (Feeding) difficulty with RUE FMC with grasping cup and accuracy with grasp/release  -               User Key  (r) = Recorded By, (t) = Taken By, (c) = Cosigned By      Initials Name Provider Type    MM Temi Dejesus OT Occupational Therapist                   Obj/Interventions       Row Name 02/13/25 1043          Sensory Assessment (Somatosensory)    Sensory Assessment (Somatosensory) UE sensation intact  -[facility] Name 02/13/25 1043          Vision  Assessment/Intervention    Visual Impairment/Limitations WFL  -MM     Vision Assessment Comment glasses  -MM       Row Name 02/13/25 1043          Motor Skills    Motor Skills coordination  -MM     Coordination fine motor deficit;right;upper extremity;minimal impairment  -MM               User Key  (r) = Recorded By, (t) = Taken By, (c) = Cosigned By      Initials Name Provider Type    Temi Dale OT Occupational Therapist                   Goals/Plan       Row Name 02/13/25 1045          Strength Goal 1 (OT)    Strength Goal 1 (OT) (I) FMC HEP to improve instrinsic hand muscles strength  -MM     Time Frame (Strength Goal 1, OT) short term goal (STG);1 day  -MM     Progress/Outcome (Strength Goal 1, OT) goal met  -MM               User Key  (r) = Recorded By, (t) = Taken By, (c) = Cosigned By      Initials Name Provider Type    Temi Dale OT Occupational Therapist                   Clinical Impression       Row Name 02/13/25 1043          Pain Assessment    Pretreatment Pain Rating 0/10 - no pain  -MM     Posttreatment Pain Rating 0/10 - no pain  -MM       Row Name 02/13/25 1043          Plan of Care Review    Plan of Care Reviewed With patient  -MM     Progress no change  -MM     Outcome Evaluation pt is a 70 yo male admitted with R arm weakness, chest pain with stroke work up in progress. He reports (I) at baseline, driving, ADLs. Spoke with pt this AM, he reports extensive history with injury to RUE. 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 Reports previous OP therapy to RUE ~more than 5 years ago. Functionally, has had no concerns until new onset R arm weakness. FMC deficit noted this AM with some instrinic hand weakness. R shoulder elbow and wrist AROM WFL, opposition difficult for pt. Education provided on OP OT referral, as well as FMC activities and strengthening to complete during inpatient stay and once d/c'd home to complete to improve strength to intrinsic  hand muscles. He is agreeable, no further skilled OT needs at this time, will sign off.  -MM       Row Name 02/13/25 1043          Therapy Assessment/Plan (OT)    Criteria for Skilled Therapeutic Interventions Met (OT) no problems identified which require skilled intervention  -MM     Therapy Frequency (OT) evaluation only  -MM       Row Name 02/13/25 1043          Therapy Plan Review/Discharge Plan (OT)    Anticipated Discharge Disposition (OT) home;home with outpatient therapy services  -MM       Row Name 02/13/25 1043          Vital Signs    O2 Delivery Pre Treatment room air  -MM       Row Name 02/13/25 1043          Positioning and Restraints    Pre-Treatment Position in bed  -MM     Post Treatment Position --  transport bed  -MM               User Key  (r) = Recorded By, (t) = Taken By, (c) = Cosigned By      Initials Name Provider Type    Temi Dale, CONTRERAS Occupational Therapist                   Outcome Measures       Row Name 02/13/25 1049          How much help from another is currently needed...    Putting on and taking off regular lower body clothing? 4  -MM     Bathing (including washing, rinsing, and drying) 4  -MM     Toileting (which includes using toilet bed pan or urinal) 4  -MM     Putting on and taking off regular upper body clothing 4  -MM     Taking care of personal grooming (such as brushing teeth) 4  -MM     Eating meals 4  -MM     AM-PAC 6 Clicks Score (OT) 24  -MM       Row Name 02/13/25 0815 02/13/25 0021       How much help from another person do you currently need...    Turning from your back to your side while in flat bed without using bedrails? 4  -AS 4  -KH    Moving from lying on back to sitting on the side of a flat bed without bedrails? 4  -AS 4  -KH    Moving to and from a bed to a chair (including a wheelchair)? 4  -AS 4  -KH    Standing up from a chair using your arms (e.g., wheelchair, bedside chair)? 4  -AS 4  -KH    Climbing 3-5 steps with a railing? 4  -AS 4  -KH    To  walk in hospital room? 4  -AS 4  -KH    AM-PAC 6 Clicks Score (PT) 24  -AS 24  -KH      Row Name 02/13/25 1049          Modified Walton Scale    Modified Walton Scale 2 - Slight disability.  Unable to carry out all previous activities but able to look after own affairs without assistance.  -       Row Name 02/13/25 1049          Functional Assessment    Outcome Measure Options AM-PAC 6 Clicks Daily Activity (OT);Modified Walton  -               User Key  (r) = Recorded By, (t) = Taken By, (c) = Cosigned By      Initials Name Provider Type    AS Celeste Quispe, RN Registered Nurse    Temi Dale OT Occupational Therapist    Shantell Johnson RN Registered Nurse                    Occupational Therapy Education       Title: PT OT SLP Therapies (Done)       Topic: Occupational Therapy (Done)       Point: Home exercise program (Done)       Description:   Instruct learner(s) on appropriate technique for monitoring, assisting and/or progressing therapeutic exercises/activities.                  Learning Progress Summary            Patient Acceptance, E, VU by  at 2/13/2025 1049    Comment: role of OT                                      User Key       Initials Effective Dates Name Provider Type Discipline     05/31/24 -  Temi Dejesus OT Occupational Therapist OT                  OT Recommendation and Plan  Therapy Frequency (OT): evaluation only  Plan of Care Review  Plan of Care Reviewed With: patient  Progress: no change  Outcome Evaluation: pt is a 68 yo male admitted with R arm weakness, chest pain with stroke work up in progress. He reports (I) at baseline, driving, ADLs. Spoke with pt this AM, he reports extensive history with injury to RUE. 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 Reports previous OP therapy to RUE ~more than 5 years ago. Functionally, has had no concerns until new onset R arm weakness. [facility] deficit noted this AM with some instrinic hand weakness.  R shoulder elbow and wrist AROM WFL, opposition difficult for pt. Education provided on OP OT referral, as well as FMC activities and strengthening to complete during inpatient stay and once d/c'd home to complete to improve strength to intrinsic hand muscles. He is agreeable, no further skilled OT needs at this time, will sign off.     Time Calculation:         Time Calculation- OT       Row Name 02/13/25 1049             Time Calculation- OT    OT Start Time 0851  -MM      OT Stop Time 0904  -MM      OT Time Calculation (min) 13 min  -MM      OT Received On 02/13/25  -MM         Untimed Charges    OT Eval/Re-eval Minutes 13  -MM         Total Minutes    Untimed Charges Total Minutes 13  -MM       Total Minutes 13  -MM                User Key  (r) = Recorded By, (t) = Taken By, (c) = Cosigned By      Initials Name Provider Type    Temi Dale OT Occupational Therapist                  Therapy Charges for Today       Code Description Service Date Service Provider Modifiers Qty    [id number] HC OT EVAL LOW COMPLEXITY 2 2/13/2025 Temi Dejesus OT GO 1                 Temi Dejesus OT  2/13/2025

## 2025-02-13 NOTE — PLAN OF CARE
Goal Outcome Evaluation:  Plan of Care Reviewed With: patient        Progress: no change  Outcome Evaluation: pt is a 68 yo male admitted with R arm weakness, chest pain with stroke work up in progress. He reports (I) at baseline, driving, ADLs. Spoke with pt this AM, he reports extensive history with injury to RU. 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 Reports previous OP therapy to [facility] ~more than 5 years ago. Functionally, has had no concerns until new onset R arm weakness. F[facility] deficit noted this AM with some instrinic hand weakness. R shoulder elbow and wrist AROM WFL, opposition difficult for pt. Education provided on OP OT referral, as well as FMC activities and strengthening to complete during inpatient stay and once d/c'd home to complete to improve strength to intrinsic hand muscles. He is agreeable, no further skilled OT needs at this time, will sign off.    Anticipated Discharge Disposition (OT): home, home with outpatient therapy services

## 2025-02-14 ENCOUNTER — APPOINTMENT (OUTPATIENT)
Dept: CARDIOLOGY | Facility: HOSPITAL | Age: 70
DRG: 552 | End: 2025-02-14
Payer: MEDICARE

## 2025-02-14 ENCOUNTER — APPOINTMENT (OUTPATIENT)
Dept: MRI IMAGING | Facility: HOSPITAL | Age: 70
DRG: 552 | End: 2025-02-14
Payer: MEDICARE

## 2025-02-14 PROBLEM — N40.0 BPH (BENIGN PROSTATIC HYPERPLASIA): Status: ACTIVE | Noted: 2025-02-14

## 2025-02-14 PROBLEM — R07.9 CHEST PAIN: Status: ACTIVE | Noted: 2025-02-14

## 2025-02-14 LAB
ANION GAP SERPL CALCULATED.3IONS-SCNC: 11.4 MMOL/L (ref 5–15)
BASOPHILS # BLD AUTO: 0.03 10*3/MM3 (ref 0–0.2)
BASOPHILS NFR BLD AUTO: 0.4 % (ref 0–1.5)
BH CV UPPER VENOUS LEFT INTERNAL JUGULAR AUGMENT: NORMAL
BH CV UPPER VENOUS LEFT INTERNAL JUGULAR COMPRESS: NORMAL
BH CV UPPER VENOUS LEFT INTERNAL JUGULAR PHASIC: NORMAL
BH CV UPPER VENOUS LEFT INTERNAL JUGULAR SPONT: NORMAL
BH CV UPPER VENOUS LEFT SUBCLAVIAN AUGMENT: NORMAL
BH CV UPPER VENOUS LEFT SUBCLAVIAN COMPRESS: NORMAL
BH CV UPPER VENOUS LEFT SUBCLAVIAN PHASIC: NORMAL
BH CV UPPER VENOUS LEFT SUBCLAVIAN SPONT: NORMAL
BH CV UPPER VENOUS RIGHT AXILLARY AUGMENT: NORMAL
BH CV UPPER VENOUS RIGHT AXILLARY COMPRESS: NORMAL
BH CV UPPER VENOUS RIGHT AXILLARY PHASIC: NORMAL
BH CV UPPER VENOUS RIGHT AXILLARY SPONT: NORMAL
BH CV UPPER VENOUS RIGHT BASILIC FOREARM COMPRESS: NORMAL
BH CV UPPER VENOUS RIGHT BASILIC UPPER COMPRESS: NORMAL
BH CV UPPER VENOUS RIGHT BRACHIAL COMPRESS: NORMAL
BH CV UPPER VENOUS RIGHT CEPHALIC FOREARM COMPRESS: NORMAL
BH CV UPPER VENOUS RIGHT CEPHALIC UPPER COMPRESS: NORMAL
BH CV UPPER VENOUS RIGHT INTERNAL JUGULAR AUGMENT: NORMAL
BH CV UPPER VENOUS RIGHT INTERNAL JUGULAR COMPRESS: NORMAL
BH CV UPPER VENOUS RIGHT INTERNAL JUGULAR PHASIC: NORMAL
BH CV UPPER VENOUS RIGHT INTERNAL JUGULAR SPONT: NORMAL
BH CV UPPER VENOUS RIGHT RADIAL COMPRESS: NORMAL
BH CV UPPER VENOUS RIGHT SUBCLAVIAN AUGMENT: NORMAL
BH CV UPPER VENOUS RIGHT SUBCLAVIAN COMPRESS: NORMAL
BH CV UPPER VENOUS RIGHT SUBCLAVIAN PHASIC: NORMAL
BH CV UPPER VENOUS RIGHT SUBCLAVIAN SPONT: NORMAL
BH CV UPPER VENOUS RIGHT ULNAR COMPRESS: NORMAL
BUN SERPL-MCNC: 19 MG/DL (ref 8–23)
BUN/CREAT SERPL: 23.8 (ref 7–25)
CALCIUM SPEC-SCNC: 9.2 MG/DL (ref 8.6–10.5)
CHLORIDE SERPL-SCNC: 104 MMOL/L (ref 98–107)
CO2 SERPL-SCNC: 23.6 MMOL/L (ref 22–29)
CREAT SERPL-MCNC: 0.8 MG/DL (ref 0.76–1.27)
DEPRECATED RDW RBC AUTO: 40 FL (ref 37–54)
EGFRCR SERPLBLD CKD-EPI 2021: 94.6 ML/MIN/1.73
EOSINOPHIL # BLD AUTO: 0.17 10*3/MM3 (ref 0–0.4)
EOSINOPHIL NFR BLD AUTO: 2.4 % (ref 0.3–6.2)
ERYTHROCYTE [DISTWIDTH] IN BLOOD BY AUTOMATED COUNT: 12.3 % (ref 12.3–15.4)
GLUCOSE BLDC GLUCOMTR-MCNC: 173 MG/DL (ref 70–130)
GLUCOSE BLDC GLUCOMTR-MCNC: 223 MG/DL (ref 70–130)
GLUCOSE BLDC GLUCOMTR-MCNC: 71 MG/DL (ref 70–130)
GLUCOSE SERPL-MCNC: 53 MG/DL (ref 65–99)
HCT VFR BLD AUTO: 43.4 % (ref 37.5–51)
HGB BLD-MCNC: 14.8 G/DL (ref 13–17.7)
IMM GRANULOCYTES # BLD AUTO: 0.01 10*3/MM3 (ref 0–0.05)
IMM GRANULOCYTES NFR BLD AUTO: 0.1 % (ref 0–0.5)
LYMPHOCYTES # BLD AUTO: 2.5 10*3/MM3 (ref 0.7–3.1)
LYMPHOCYTES NFR BLD AUTO: 35.6 % (ref 19.6–45.3)
MCH RBC QN AUTO: 30.4 PG (ref 26.6–33)
MCHC RBC AUTO-ENTMCNC: 34.1 G/DL (ref 31.5–35.7)
MCV RBC AUTO: 89.1 FL (ref 79–97)
MONOCYTES # BLD AUTO: 0.64 10*3/MM3 (ref 0.1–0.9)
MONOCYTES NFR BLD AUTO: 9.1 % (ref 5–12)
NEUTROPHILS NFR BLD AUTO: 3.67 10*3/MM3 (ref 1.7–7)
NEUTROPHILS NFR BLD AUTO: 52.4 % (ref 42.7–76)
NRBC BLD AUTO-RTO: 0 /100 WBC (ref 0–0.2)
PLATELET # BLD AUTO: 280 10*3/MM3 (ref 140–450)
PMV BLD AUTO: 9.3 FL (ref 6–12)
POTASSIUM SERPL-SCNC: 3.5 MMOL/L (ref 3.5–5.2)
QT INTERVAL: 395 MS
QTC INTERVAL: 422 MS
RBC # BLD AUTO: 4.87 10*6/MM3 (ref 4.14–5.8)
SODIUM SERPL-SCNC: 139 MMOL/L (ref 136–145)
WBC NRBC COR # BLD AUTO: 7.02 10*3/MM3 (ref 3.4–10.8)

## 2025-02-14 PROCEDURE — 85025 COMPLETE CBC W/AUTO DIFF WBC: CPT | Performed by: INTERNAL MEDICINE

## 2025-02-14 PROCEDURE — 93971 EXTREMITY STUDY: CPT

## 2025-02-14 PROCEDURE — 72141 MRI NECK SPINE W/O DYE: CPT

## 2025-02-14 PROCEDURE — 93010 ELECTROCARDIOGRAM REPORT: CPT | Performed by: INTERNAL MEDICINE

## 2025-02-14 PROCEDURE — 63710000001 INSULIN LISPRO (HUMAN) PER 5 UNITS: Performed by: INTERNAL MEDICINE

## 2025-02-14 PROCEDURE — 93971 EXTREMITY STUDY: CPT | Performed by: SURGERY

## 2025-02-14 PROCEDURE — 93005 ELECTROCARDIOGRAM TRACING: CPT | Performed by: INTERNAL MEDICINE

## 2025-02-14 PROCEDURE — 99221 1ST HOSP IP/OBS SF/LOW 40: CPT

## 2025-02-14 PROCEDURE — 80048 BASIC METABOLIC PNL TOTAL CA: CPT | Performed by: INTERNAL MEDICINE

## 2025-02-14 PROCEDURE — 99232 SBSQ HOSP IP/OBS MODERATE 35: CPT | Performed by: NURSE PRACTITIONER

## 2025-02-14 PROCEDURE — 82948 REAGENT STRIP/BLOOD GLUCOSE: CPT

## 2025-02-14 PROCEDURE — 63710000001 INSULIN GLARGINE PER 5 UNITS: Performed by: INTERNAL MEDICINE

## 2025-02-14 RX ADMIN — ATORVASTATIN CALCIUM 80 MG: 80 TABLET, FILM COATED ORAL at 20:58

## 2025-02-14 RX ADMIN — TAMSULOSIN HYDROCHLORIDE 0.4 MG: 0.4 CAPSULE ORAL at 08:09

## 2025-02-14 RX ADMIN — Medication 1000 UNITS: at 08:09

## 2025-02-14 RX ADMIN — INSULIN GLARGINE 18 UNITS: 100 INJECTION, SOLUTION SUBCUTANEOUS at 21:01

## 2025-02-14 RX ADMIN — ASPIRIN 325 MG ORAL TABLET 325 MG: 325 PILL ORAL at 08:09

## 2025-02-14 RX ADMIN — INSULIN LISPRO 10 UNITS: 100 INJECTION, SOLUTION INTRAVENOUS; SUBCUTANEOUS at 19:01

## 2025-02-14 RX ADMIN — LISINOPRIL 10 MG: 10 TABLET ORAL at 08:09

## 2025-02-14 RX ADMIN — INSULIN LISPRO 8 UNITS: 100 INJECTION, SOLUTION INTRAVENOUS; SUBCUTANEOUS at 21:09

## 2025-02-14 RX ADMIN — INSULIN LISPRO 10 UNITS: 100 INJECTION, SOLUTION INTRAVENOUS; SUBCUTANEOUS at 08:10

## 2025-02-14 RX ADMIN — INSULIN LISPRO 10 UNITS: 100 INJECTION, SOLUTION INTRAVENOUS; SUBCUTANEOUS at 12:06

## 2025-02-14 RX ADMIN — INSULIN LISPRO 4 UNITS: 100 INJECTION, SOLUTION INTRAVENOUS; SUBCUTANEOUS at 19:01

## 2025-02-14 NOTE — CONSULTS
[facility] NEUROSURGERY CONSULT NOTE    Patient name: Jeff Bernard  Referring Provider: Britt Xavier  Reason for Consultation: right arm weakness, MRI c spine with bulging disc c5-6 with cord compression and edema    Patient Care Team:  Esperanza Sarmiento PA as PCP - General  Esperanza Sarmiento PA as PCP - Family Medicine  Salinas Juarez MD as Consulting Physician (Endocrinology)  Lazarus Gallegos MD as Consulting Physician (Cardiology)  Michael Fatima MD as Consulting Physician (Urology)  Jenn Del Real RN as Ambulatory  ([facility])    Chief complaint: chest pain, increased RUE weakness    Subjective .     History of present illness:    Patient is a 69 y.o.  male with history of prostate cancer, HTN, s/p carpal tunnel release of right extremity, known cervical canal stenosis, and chronic right upper extremity weakness and some hand contracture from prior trauma.  Patient states he woke up about 2 AM earlier this week with chest pain and pain down the inner part of his right arm with increased weakness and numbness and tingling last 2 fingers.  His chest pain has resolved and he reports the weakness in his right arm is improving but has not quite reached baseline. He states he is typically able to lift 10 pounds above his head but knows he would not be will do that now. Still having some tenderness in his inner right arm and minor numbness in his last 2 digits of his right hand.  Cervical MRI from 2020 reveals severe central canal stenosis at C 5-6. He denies neck pain, any issues ambulating, issues in other extremities, or progressive right upper extremity weakness. No prior spine surgery.  Workup did not reveal stroke or MI. Troponin's, EKG and echo normal. Stress test from yesterday is unremarkable.     Review of Systems  Review of Systems   Musculoskeletal:  Negative for neck pain and neck stiffness.   Neurological:  Positive for weakness and numbness.       History  PAST  MEDICAL HISTORY  Past Medical History:   Diagnosis Date    Arteriosclerosis of carotid artery     Cancer 12/2022    Prostrate    Colon polyp     ED (erectile dysfunction)     Hematuria     Hyperlipidemia     Hypertension     Sepsis     after prostate procedure    Type 2 diabetes mellitus     Vitamin D deficiency        PAST SURGICAL HISTORY  Past Surgical History:   Procedure Laterality Date    CARPAL TUNNEL RELEASE WITH CUBITAL TUNNEL RELEASE  06/2021    Dr. Bowers    COLONOSCOPY N/A 03/20/2017    Procedure: COLONOSCOPY TO CECUM WITH HOT SNARE POLYPECTOMY;  Surgeon: Christian Kelly MD;  Location:  [facility];  Service:     COLONOSCOPY N/A 08/17/2022    Procedure: COLONOSCOPY into cecum and terminal ileum with cold snare polypectomy;  Surgeon: Christian Kelly MD;  Location:  [facility];  Service: Gastroenterology;  Laterality: N/A;  Pre op: History of colon polyps  Post op: Polyp and Hemorrhoids    COLONOSCOPY W/ POLYPECTOMY  [facility]    LEG SURGERY Right     Cyst Removal.    PROSTATE SURGERY  11/2022       FAMILY HISTORY  Family History   Problem Relation Age of Onset    Stroke Mother     Aneurysm Mother         BRAIN    COPD Father     Heart attack Brother     Diabetes Maternal Aunt     Diabetes Paternal Aunt        SOCIAL HISTORY  Social History     Tobacco Use    Smoking status: Never     Passive exposure: Never    Smokeless tobacco: Never   Vaping Use    Vaping status: Never Used   Substance Use Topics    Alcohol use: Yes     Alcohol/week: 4.0 - 6.0 standard drinks of alcohol     Types: 4 - 6 Cans of beer per week     Comment: Socially.    Drug use: Never         Allergies:  Patient has no known allergies.    MEDICATIONS:  Medications Prior to Admission   Medication Sig Dispense Refill Last Dose/Taking    aspirin 81 MG EC tablet Take 1 tablet by mouth Daily.   Taking    atorvastatin (LIPITOR) 40 MG tablet Take 1 tablet by mouth Every Night. Indications: High Amount of Fats in the Blood 90  tablet 2 Taking    benazepril (LOTENSIN) 10 MG tablet TAKE 1 TABLET BY MOUTH EVERY DAY (Patient taking differently: Take 1 tablet by mouth Daily.) 90 tablet 3 Taking Differently    Cholecalciferol (VITAMIN D-3) 1000 units capsule Take 1,000 Units by mouth Daily.   Taking    Cyanocobalamin (VITAMIN B 12 PO) Take 2 tablets by mouth 1 (One) Time Per Week.   Taking    insulin aspart (NovoLOG FlexPen) 100 UNIT/ML solution pen-injector sc pen 1 unit per 5 g of carbohydrate 3 times a day with meals.  Maximum of 20 units per dose.  Prime needle with 2 units before each use (Patient taking differently: 3 (Three) Times a Day With Meals. 1 unit per 5 g of carbohydrate 3 times a day with meals.  Maximum of 20 units per dose.  Prime needle with 2 units before each use) 60 mL 2 Taking Differently    Insulin Glargine (LANTUS SOLOSTAR) 100 UNIT/ML injection pen Inject 18 Units under the skin into the appropriate area as directed Every Night. Prime needle with 2 units before each use 18 mL 2 Taking    tamsulosin (FLOMAX) 0.4 MG capsule 24 hr capsule Take 1 capsule by mouth Daily.   Taking    Continuous Blood Gluc  (FreeStyle Celsa 2 Clare) device 1 each Daily. 1 each 1     Continuous Blood Gluc Sensor (FreeStyle Celsa 2 Sensor) misc 1 each Every 14 (Fourteen) Days. 2 each 6     glucose blood (CONTOUR NEXT TEST) test strip Dx code E11.9 testing bs 3 x day 300 each 1     Insulin Pen Needle (B-D ULTRAFINE III SHORT PEN) 31G X 8 MM misc 1 each by Other route 4 (Four) Times a Day. Indications: Type 2 Diabetes 400 each 3          Current Facility-Administered Medications:     acetaminophen (TYLENOL) tablet 650 mg, 650 mg, Oral, Q4H PRN **OR** acetaminophen (TYLENOL) suppository 650 mg, 650 mg, Rectal, Q4H PRN, Tanya Garber MD    aspirin tablet 325 mg, 325 mg, Oral, Daily, 325 mg at 02/14/25 0809 **OR** aspirin suppository 300 mg, 300 mg, Rectal, Daily, Tanya Garber MD    atorvastatin (LIPITOR) tablet 80 mg, 80  mg, Oral, Nightly, Tanya Garber MD, 80 mg at 02/13/25 2043    bisacodyl (DULCOLAX) suppository 10 mg, 10 mg, Rectal, Daily PRN, Tanya Garber MD    cholecalciferol (VITAMIN D3) tablet 1,000 Units, 1,000 Units, Oral, Daily, Tanya Garber MD, 1,000 Units at 02/14/25 0809    dextrose (D50W) (25 g/50 mL) IV injection 25 g, 25 g, Intravenous, Q15 Min PRN, Tanya Garber MD    dextrose (D50W) (25 g/50 mL) IV injection 25 g, 25 g, Intravenous, Q15 Min PRN, Umang Muñoz MD    dextrose (GLUTOSE) oral gel 15 g, 15 g, Oral, Q15 Min PRN, Tanya Garber MD    dextrose (GLUTOSE) oral gel 15 g, 15 g, Oral, Q15 Min PRN, Umang Muñoz MD    glucagon (GLUCAGEN) injection 1 mg, 1 mg, Intramuscular, Q15 Min PRN, Tanya Garber MD    glucagon (GLUCAGEN) injection 1 mg, 1 mg, Intramuscular, Q15 Min PRN, Umang Muñoz MD    insulin glargine (LANTUS, SEMGLEE) injection 18 Units, 18 Units, Subcutaneous, Nightly, Tanya Garber MD, 18 Units at 02/13/25 2204    insulin lispro (HUMALOG/ADMELOG) injection 10 Units, 10 Units, Subcutaneous, TID With Meals, Umang Muñoz MD, 10 Units at 02/14/25 1206    insulin lispro (HUMALOG/ADMELOG) injection 4-24 Units, 4-24 Units, Subcutaneous, 4x Daily AC & at Bedtime, Umang Muñoz MD, 24 Units at 02/13/25 2209    lisinopril (PRINIVIL,ZESTRIL) tablet 10 mg, 10 mg, Oral, Q24H, Tanya Garber MD, 10 mg at 02/14/25 0809    nitroglycerin (NITROSTAT) SL tablet 0.4 mg, 0.4 mg, Sublingual, Q5 Min PRN, Tanya Garber MD    ondansetron (ZOFRAN) injection 4 mg, 4 mg, Intravenous, Q6H PRN, Tanya Garber MD    tamsulosin (FLOMAX) 24 hr capsule 0.4 mg, 0.4 mg, Oral, Daily, Tanya Garber MD, 0.4 mg at 02/14/25 0809    COMORBID CONDITIONS:  Cancer including (primary or metastatic), Hypertension, and known cervical stenosis, history of RUE weakness    Objective     Results  Review:  LABS:  Results from last 7 days   Lab Units 02/14/25  0459 02/13/25  0523 02/12/25  1513   WBC 10*3/mm3 7.02 6.99 7.32   HEMOGLOBIN g/dL 14.8 14.4 14.7   HEMATOCRIT % 43.4 42.3 46.4   PLATELETS 10*3/mm3 280 232 270     Results from last 7 days   Lab Units 02/14/25  0459 02/13/25  0523 02/12/25  1513   SODIUM mmol/L 139 139 139   POTASSIUM mmol/L 3.5 4.0 4.0   CHLORIDE mmol/L 104 105 105   CO2 mmol/L 23.6 21.9* 24.4   BUN mg/dL 19 15 14   CREATININE mg/dL 0.80 0.74* 0.86   CALCIUM mg/dL 9.2 8.5* 9.0   BILIRUBIN mg/dL  --  0.7 0.4   ALK PHOS U/L  --  86 98   ALT (SGPT) U/L  --  14 18   AST (SGOT) U/L  --  13 16   GLUCOSE mg/dL 53* 258* 254*         DIAGNOSTICS:  MRI brain: no acute findings    CTA head/neck: There is no evidence of acute infarction, intracranial  hemorrhage or of carotid stenosis. There is no evidence of proximal  intracranial arterial high-grade stenosis or occlusion.     Cervical MRI: There is severe central canal stenosis at C5-6 secondary to bulging disc  material that results in moderate cord compression. This canal stenosis  has progressed since prior exam. Spinal cord T2 hyperintense signal  abnormality is noted within the cervical spinal cord centered at C5-6  and extending from C4-5 down to C6-7. This is compatible with some  combination of cord edema and myelomalacia. The cord signal abnormality  is new since the prior exam. Again noted is moderate to severe bilateral  foraminal narrowing at C5-6 and this is secondary to uncovertebral joint  hypertrophy.      Results Review:   I reviewed the patient's new clinical results.  I personally viewed and interpreted the patient's chart, labs, medications, and imaging reviewed and discussed with Dr. Ghosh    Vital Signs   Temp:  [98.1 °F (36.7 °C)-99.1 °F (37.3 °C)] 98.7 °F (37.1 °C)  Heart Rate:  [66-99] 72  Resp:  [16] 16  BP: (104-125)/(56-69) 109/61    Physical Exam:  Physical Exam  Vitals reviewed.   Constitutional:       General: He  is not in acute distress.     Appearance: Normal appearance. He is not ill-appearing.   Musculoskeletal:         General: No tenderness.      Cervical back: Normal range of motion. No rigidity, tenderness or bony tenderness. No pain with movement.   Skin:     General: Skin is warm and dry.   Neurological:      Mental Status: He is alert.      Deep Tendon Reflexes:      Reflex Scores:       Tricep reflexes are 2+ on the right side and 2+ on the left side.       Bicep reflexes are 2+ on the right side and 2+ on the left side.       Brachioradialis reflexes are 2+ on the right side and 2+ on the left side.       Patellar reflexes are 2+ on the right side and 2+ on the left side.       Achilles reflexes are 2+ on the right side and 2+ on the left side.  Psychiatric:         Speech: Speech normal.       Neurological Exam  Mental Status  Alert. Oriented to person, place, time and situation. Recent and remote memory are intact. Speech is normal. Language is fluent with no aphasia. Attention and concentration are normal.    Motor   Strength is 5/5 in all four extremities except as noted.                                             Right                     Left   Triceps                                  4                            Interossei                              3                             Sensory  Some mild numbness in 4th and 5th digits of right hand.    Reflexes                                            Right                      Left  Brachioradialis                    2+                         2+  Biceps                                 2+                         2+  Triceps                                2+                         2+  Finger flex                           2+                         2+  Hamstring                            2+                         2+  Patellar                                2+                         2+  Achilles                                2+                          "2+    Right pathological reflexes: Ramya's absent. Ankle clonus absent.  Left pathological reflexes: Ramya's absent. Ankle clonus absent.    Gait    Not tested .      Assessment & Plan       Right arm weakness      Problem List Items Addressed This Visit       * (Principal) Right arm weakness - Primary     Other Visit Diagnoses       Nonspecific chest pain               Patient is a pleasant 69-year-old male with chronic right upper extremity weakness from prior trauma who presented with sudden worsening right upper extremity weakness, mild sensory deficit in last 2 fingers, and chest pain. Work-up negative for stroke or any cardiac issues. Chest pain has resolved.  Still having some weakness in his right arm and numbness in last 2 digits but feels that it is slowly returning to baseline, and estimates he will be \"back to normal in a few days\".  He has known cervical stenosis at C5-6 from 2020 MRI, however MRI this admission shows worsening stenosis at that level with evidence of cord edema and myelomalacia.  He strong exam except he does have some weakness in his right tricep, which he states is normal but slightly worse than before. No hoffmans, clonus, or hyperreflexia.  No need for emergent surgical intervention as he is not overtly myelopathic but he will need ACDF at some point.  Recommend continue with cardiac workup per cardiology. Negative for stroke. We will follow-up with him as outpatient.      PLAN:     F/u with Dr. Ghosh in 1-2 weeks in clinic  Further work-up per primary      I discussed the patient's findings and my recommendations with patient, nursing staff, and Dr. Ghosh    During patient visit, I utilized appropriate personal protective equipment including gloves and mask.  Mask used was standard procedure mask. Appropriate PPE was worn during the entire visit.  Hand hygiene was completed before and after.     Pamela Boyd, APRN  02/14/25  16:24 EST    \"Dictated utilizing Dragon " "dictation\".     "

## 2025-02-14 NOTE — PROGRESS NOTES
Name: Jeff Bernard ADMIT: 2025   : 1955  PCP: Esperanza Sarmiento PA    MRN: [id number] LOS: 2 days   AGE/SEX: 69 y.o. male  ROOM: [facility]     Subjective   Subjective   Patient is lying on the bed and does not appear to be any major distress.  Denies nausea, vomiting, abdominal pain, chest pain.       Objective   Objective   Vital Signs  Temp:  [98.1 °F (36.7 °C)-99.1 °F (37.3 °C)] 98.2 °F (36.8 °C)  Heart Rate:  [66-99] 72  Resp:  [16] 16  BP: (104-125)/(56-73) 108/73  SpO2:  [95 %-100 %] 100 %  on   ;   Device (Oxygen Therapy): room air  Body mass index is 28.63 kg/m².  Physical Exam  HEENT: PERRLA, extraocular movements intact, Scleras no icterus  Neck: Supple, no JVD  Cardiovascular: Regular rate and rhythm with normal S1 and S2  Respiratory: Fairly clear to auscultation anteriorly with no wheezes  GI: Soft, nontender, bowel sounds are present  Extremities: No edema, palpable pedal pulses.  Right hand muscles appear to be atrophic chronically  Neurologic: Grossly nonfocal, no facial asymmetry    Results Review     I reviewed the patient's new clinical results.  Results from last 7 days   Lab Units 25  04525  0525  1513   WBC 10*3/mm3 7.02 6.99 7.32   HEMOGLOBIN g/dL 14.8 14.4 14.7   PLATELETS 10*3/mm3 280 232 270     Results from last 7 days   Lab Units 25  04525  0523 25  1513   SODIUM mmol/L 139 139 139   POTASSIUM mmol/L 3.5 4.0 4.0   CHLORIDE mmol/L 104 105 105   CO2 mmol/L 23.6 21.9* 24.4   BUN mg/dL 19 15 14   CREATININE mg/dL 0.80 0.74* 0.86   GLUCOSE mg/dL 53* 258* 254*   EGFR mL/min/1.73 94.6 96.9 92.6     Results from last 7 days   Lab Units 25  0525  1513   ALBUMIN g/dL 3.4* 3.8   BILIRUBIN mg/dL 0.7 0.4   ALK PHOS U/L 86 98   AST (SGOT) U/L 13 16   ALT (SGPT) U/L 14 18     Results from last 7 days   Lab Units 25  0459 25  0523 25  1513   CALCIUM mg/dL 9.2 8.5* 9.0   ALBUMIN g/dL  --  3.4* 3.8       Hemoglobin  A1C   Date/Time Value Ref Range Status   02/13/2025 0522 8.10 (H) 4.80 - 5.60 % Final     Glucose   Date/Time Value Ref Range Status   02/14/2025 1257 173 (H) 70 - 130 mg/dL Final   02/14/2025 0626 71 70 - 130 mg/dL Final   02/13/2025 2348 218 (H) 70 - 130 mg/dL Final   02/13/2025 2118 438 (C) 70 - 130 mg/dL Final   02/13/2025 2116 401 (C) 70 - 130 mg/dL Final   02/13/2025 1713 218 (H) 70 - 130 mg/dL Final   02/13/2025 1228 200 (H) 70 - 130 mg/dL Final       MRI Cervical Spine Without Contrast    Result Date: 2/14/2025   There is severe central canal stenosis at C5-6 secondary to bulging disc material that results in moderate cord compression. This canal stenosis has progressed since prior exam. Spinal cord T2 hyperintense signal abnormality is noted within the cervical spinal cord centered at C5-6 and extending from C4-5 down to C6-7. This is compatible with some combination of cord edema and myelomalacia. The cord signal abnormality is new since the prior exam. Again noted is moderate to severe bilateral foraminal narrowing at C5-6 and this is secondary to uncovertebral joint hypertrophy.  The remaining multilevel degenerative phenomena including the canal and foraminal stenotic changes are as discussed in detail above.  These findings were discussed with Britt Xavier on 2/14/2025 at approximately 2:30 p.m.  This report was finalized on 2/14/2025 2:36 PM by Dr. Mark Veliz M.D on Workstation: [id number]      MRI Brain Without Contrast    Result Date: 2/12/2025  IMPRESSION : Normal brain MRI without contrast.  This report was finalized on 2/12/2025 10:48 PM by Dr. Torsten Auguste M.D on Workstation: [id number]       I have personally reviewed all medications:  Scheduled Medications  aspirin, 325 mg, Oral, Daily   Or  aspirin, 300 mg, Rectal, Daily  atorvastatin, 80 mg, Oral, Nightly  cholecalciferol, 1,000 Units, Oral, Daily  insulin glargine, 18 Units, Subcutaneous, Nightly  insulin lispro, 10 Units,  Subcutaneous, TID With Meals  insulin lispro, 4-24 Units, Subcutaneous, 4x Daily AC & at Bedtime  lisinopril, 10 mg, Oral, Q24H  tamsulosin, 0.4 mg, Oral, Daily    Infusions   Diet  Diet: Diabetic; Consistent Carbohydrate; Fluid Consistency: Thin (IDDSI 0)    I have personally reviewed:  [x]  Laboratory   [x]  Microbiology   [x]  Radiology   [x]  EKG/Telemetry  [x]  Cardiology/Vascular   []  Pathology    []  Records       Assessment/Plan     Active Hospital Problems    Diagnosis  POA    **Right arm weakness [R29.898]  Yes      Resolved Hospital Problems   No resolved problems to display.       69 y.o. male admitted with Right arm weakness.    1. Chest pain, cardiology did evaluate and underwent stress test with no evidence of any ischemia.  2.  Diabetes mellitus, continue with Lantus, Premeal insulin as well as corrective dose insulin.  3.  Hypertension, on lisinopril and blood pressure will be monitored closely.  4.  BPH, on Flomax.  5.  Hyperlipidemia, on statins.  6.  Chronic right upper extremity weakness with episode of sudden worsening, MRI brain with no acute findings and MRI of the C-spine has been ordered.  Neurology consult has been obtained as well.    Copy text on this note has been reviewed by me on 2/14/2025            Umang Muñoz MD  [facility]  02/14/25  17:51 EST

## 2025-02-14 NOTE — PLAN OF CARE
Patient A&O X4, tests completed today, diet initiated very late in shift, updated orders for insulin noted.   Up ad marlon, no bed alarm during day, plan is likely d/c home tmrw    Problem: Adult Inpatient Plan of Care  Goal: Plan of Care Review  Outcome: Progressing  Flowsheets (Taken 2/13/2025 1923)  Plan of Care Reviewed With: patient  Goal: Absence of Hospital-Acquired Illness or Injury  Intervention: Identify and Manage Fall Risk  Description: Perform standard risk assessment on admission using a validated tool or comprehensive approach appropriate to the patient; reassess fall risk frequently, with change in status or transfer to another level of care.  Communicate risk to interprofessional healthcare team; ensure fall risk visible cue.  Determine need for increased observation, equipment and environmental modification, as well as use of supportive, nonskid footwear.  Adjust safety measures to individual needs and identified risk factors.  Reinforce the importance of active participation with fall risk prevention, safety, and physical activity with the patient and family.  Perform regular intentional rounding to assess need for position change, pain assessment and personal needs, including assistance with toileting.  Recent Flowsheet Documentation  Taken 2/13/2025 1840 by Celeste Quispe, RN  Safety Promotion/Fall Prevention:   assistive device/personal items within reach   gait belt   fall prevention program maintained   nonskid shoes/slippers when out of bed   safety round/check completed  Taken 2/13/2025 1640 by Celeste Quispe, RN  Safety Promotion/Fall Prevention:   clutter free environment maintained   fall prevention program maintained   nonskid shoes/slippers when out of bed   safety round/check completed  Taken 2/13/2025 1440 by Celeste Quispe, RN  Safety Promotion/Fall Prevention:   fall prevention program maintained   nonskid shoes/slippers when out of bed   safety round/check completed  Taken  2/13/2025 1240 by Celeste Quispe RN  Safety Promotion/Fall Prevention:   assistive device/personal items within reach   clutter free environment maintained   fall prevention program maintained   nonskid shoes/slippers when out of bed   safety round/check completed  Taken 2/13/2025 1040 by Celeste Quispe RN  Safety Promotion/Fall Prevention:   clutter free environment maintained   fall prevention program maintained   nonskid shoes/slippers when out of bed   safety round/check completed  Taken 2/13/2025 0815 by Celeste Quispe RN  Safety Promotion/Fall Prevention:   clutter free environment maintained   fall prevention program maintained   nonskid shoes/slippers when out of bed   safety round/check completed   room organization consistent  Intervention: Prevent Skin Injury  Description: Perform a screening for skin injury risk, such as pressure or moisture-associated skin damage on admission and at regular intervals throughout hospital stay.  Keep all areas of skin (especially folds) clean and dry.  Maintain adequate skin hydration.  Relieve and redistribute pressure and protect bony prominences and skin at risk for injury; implement measures based on patient-specific risk factors.  Match turning and repositioning schedule to clinical condition.  Encourage weight shift frequently; assist with reposition if unable to complete independently.  Float heels off bed; avoid pressure on the Achilles tendon.  Keep skin free from extended contact with medical devices.  Optimize nutrition and hydration.  Encourage functional activity and mobility, as early as tolerated.  Use aids (e.g., slide boards, mechanical lift) during transfer.  Recent Flowsheet Documentation  Taken 2/13/2025 1840 by Celeste Quispe, RN  Body Position: position changed independently  Taken 2/13/2025 1640 by Celeste Quispe RN  Body Position: position changed independently  Taken 2/13/2025 1440 by Celeste Quispe RN  Body Position: position changed  independently  Taken 2/13/2025 1240 by Celeste Quispe RN  Body Position: position changed independently  Taken 2/13/2025 1040 by Celeste Quispe RN  Body Position: (Returned from ECHO, reconnected to Tele)   position changed independently   other (see comments)  Taken 2/13/2025 0815 by Celeste Quispe RN  Body Position: position changed independently  Intervention: Prevent and Manage VTE (Venous Thromboembolism) Risk  Description: Assess for VTE (venous thromboembolism) risk.  Promote early mobilization; encourage both active and passive leg exercises, if unable to ambulate.  Initiate and maintain compression or other therapy, as indicated, based on identified risk in accordance with organizational protocol and provider order.  Recognize the patient's individual risk for bleeding before initiating pharmacologic thromboprophylaxis.  Recent Flowsheet Documentation  Taken 2/13/2025 0815 by Celeste Quispe RN  VTE Prevention/Management: SCDs (sequential compression devices) off  Intervention: Prevent Infection  Description: Maintain skin and mucous membrane integrity; promote hand, oral and pulmonary hygiene.  Optimize fluid balance, nutrition, sleep and glycemic control to maximize infection resistance.  Identify potential sources of infection early to prevent or mitigate progression of infection (e.g., wound, lines, devices).  Evaluate ongoing need for invasive devices; remove promptly when no longer indicated.  Review vaccination status.  Recent Flowsheet Documentation  Taken 2/13/2025 1840 by Celeste Quispe RN  Infection Prevention:   environmental surveillance performed   single patient room provided  Taken 2/13/2025 1640 by Celeste Quispe RN  Infection Prevention:   environmental surveillance performed   single patient room provided  Taken 2/13/2025 1440 by Celeste Quispe RN  Infection Prevention:   environmental surveillance performed   single patient room provided  Taken 2/13/2025 1240 by Brittaney  Ceelste RN  Infection Prevention:   environmental surveillance performed   single patient room provided  Taken 2/13/2025 0815 by Celeste Quispe RN  Infection Prevention:   environmental surveillance performed   single patient room provided  Goal: Optimal Comfort and Wellbeing  Outcome: Progressing  Intervention: Provide Person-Centered Care  Description: Use a family-focused approach to care; encourage support system presence and participation.  Develop trust and rapport by proactively providing information, encouraging questions, addressing concerns and offering reassurance.  Acknowledge emotional response to hospitalization.  Recognize and utilize personal coping strategies and strengths; develop goals via shared decision-making.  Honor spiritual and cultural preferences.  Recent Flowsheet Documentation  Taken 2/13/2025 0815 by Celeste Quispe, RN  Trust Relationship/Rapport:   care explained   choices provided   questions answered   Goal Outcome Evaluation:  Plan of Care Reviewed With: patient

## 2025-02-14 NOTE — PROGRESS NOTES
"DOS: 2025  NAME: Jeff Bernard   : 1955  PCP: Esperanza Sarmiento PA  Chief Complaint   Patient presents with    Chest Pain    Extremity Weakness     Stroke    Subjective: Reports right arm weaker than baseline and he is still having numbness in his forearm and palm.  Denies neck pain or recent neck or shoulder injury. Pt seen in follow up today, however the problem is new to the examiner.      Objective:  Vital signs: /58 (BP Location: Left arm, Patient Position: Lying)   Pulse 73   Temp 98.3 °F (36.8 °C) (Oral)   Resp 16   Ht 185.4 cm (73\")   Wt 98.4 kg (217 lb)   SpO2 96%   BMI 28.63 kg/m²       GEN: NAD, v/s reviewed  HEENT: Normocephalic, atraumatic   COR: RRR  Resp: Even and unlabored, clear to auscultation  Extremities: No edema    Neurological:   MS: Alert and oriented x 3, intact recent/remote memory, attention/concentration.  Language intact, no neglect.  CN: Visual fields intact bilaterally, PERRL, normal facial sensation, face symmetric, symmetric shoulder shrug, tongue midline, no dysarthria  Motor: 5/5 in left upper extremity, bilateral lower extremities, right upper extremity 4 out of 5 with hand atrophy noted.  Normal tone.    Sensory: Decreased to light touch right upper extremity, intact to light touch left arm, bilateral lower extremities.  Coordination: No dysmetria with finger-to-nose or heel-to-shin  Scheduled Meds:aspirin, 325 mg, Oral, Daily   Or  aspirin, 300 mg, Rectal, Daily  atorvastatin, 80 mg, Oral, Nightly  cholecalciferol, 1,000 Units, Oral, Daily  insulin glargine, 18 Units, Subcutaneous, Nightly  insulin lispro, 10 Units, Subcutaneous, TID With Meals  insulin lispro, 4-24 Units, Subcutaneous, 4x Daily AC & at Bedtime  lisinopril, 10 mg, Oral, Q24H  tamsulosin, 0.4 mg, Oral, Daily      Continuous Infusions:   PRN Meds:.  acetaminophen **OR** acetaminophen    bisacodyl    dextrose    dextrose    dextrose    dextrose    glucagon (human recombinant)    glucagon " (human recombinant)    nitroglycerin    ondansetron    Laboratory results:  Lab Results   Component Value Date    GLUCOSE 53 (L) 02/14/2025    CALCIUM 9.2 02/14/2025     02/14/2025    K 3.5 02/14/2025    CO2 23.6 02/14/2025     02/14/2025    BUN 19 02/14/2025    CREATININE 0.80 02/14/2025    EGFRIFAFRI 106 01/20/2022    EGFRIFNONA 92 01/20/2022    BCR 23.8 02/14/2025    ANIONGAP 11.4 02/14/2025     Lab Results   Component Value Date    WBC 7.02 02/14/2025    HGB 14.8 02/14/2025    HCT 43.4 02/14/2025    MCV 89.1 02/14/2025     02/14/2025     Lab Results   Component Value Date    CHOL 126 02/13/2025     Lab Results   Component Value Date    HDL 41 02/13/2025    HDL 41 10/04/2024    HDL 43 04/04/2024     Lab Results   Component Value Date    LDL 72 02/13/2025    LDL 80 10/04/2024    LDL 80 04/04/2024     Lab Results   Component Value Date    TRIG 60 02/13/2025    TRIG 65 10/04/2024    TRIG 57 04/04/2024         Lab 02/13/25  0522   HEMOGLOBIN A1C 8.10*      Review and interpretation of imaging:  Stress Test With Myocardial Perfusion One Day    Result Date: 2/13/2025    GI artifact is present.   Myocardial perfusion imaging indicates a normal myocardial perfusion study with no evidence of ischemia. Impressions are consistent with a low risk study.   Left ventricular ejection fraction is hyperdynamic (Calculated EF > 70%).   Compared to the prior study from 4/11/2018 the current study reveals no changes.     MRI Brain Without Contrast    Result Date: 2/12/2025  MRI BRAIN without contrast.  HISTORY: Right arm weakness and heaviness.  TECHNIQUE: Routine brain MRI without contrast.  COMPARISON: Head CT and head and neck CT angiogram 2/12/2025.  FINDINGS: The brain is structurally normal, and there is no hydrocephalus or extra-axial fluid collection. Brain parenchymal signal is normal, and normal flow-voids are seen in the cerebral vessels. There is no restricted diffusion, and there is no evidence of  acute or chronic intracranial hemorrhage. There is no evidence of intracranial mass.  Bone marrow signal is normal, and the extracranial soft tissues are normal. Normal flow voids are seen in the cerebral vessels.      IMPRESSION : Normal brain MRI without contrast.  This report was finalized on 2/12/2025 10:48 PM by Dr. Torsten Auguste M.D on Workstation: [id number]      CT Angiogram Neck    Result Date: 2/12/2025  CT ANGIOGRAM NECK AND HEAD WITH CONTRAST  HISTORY: Right arm weakness.  COMPARISON: None.  FINDINGS: The brain and ventricles are symmetrical. Mild vascular calcification involving the carotid siphons is appreciated. There is no evidence of acute infarction or of intracranial hemorrhage. A CT angiogram of the neck and head was then performed. Multiplanar as well as three-dimensional reconstructions were generated. The great vessels are arranged in a classic configuration. There is 0% stenosis of the internal carotid arteries using NASCET criteria. The distal aspects of the internal carotid arteries and the proximal aspects of the anterior and middle cerebral arteries appear unremarkable. Both vertebral arteries were opacified. The vertebral arteries are codominant. The basilar artery and the proximal aspects of the posterior cerebral arteries appear unremarkable. There is mild reversal of the normal cervical lordosis. There is moderate to severe foraminal stenosis on the left from C5-T1 and to the right at C5-6 and to lesser extent C4-5.      There is no evidence of acute infarction, intracranial hemorrhage or of carotid stenosis. There is no evidence of proximal intracranial arterial high-grade stenosis or occlusion. Further evaluation could be performed with a MRI examination of the brain.    Radiation dose reduction techniques were utilized, including automated exposure control and exposure modulation based on body size.   This report was finalized on 2/12/2025 8:43 PM by Dr. Joaquim Burkett M.D on  Workstation: [id number]      CT Angiogram Head    Result Date: 2/12/2025  CT ANGIOGRAM NECK AND HEAD WITH CONTRAST  HISTORY: Right arm weakness.  COMPARISON: None.  FINDINGS: The brain and ventricles are symmetrical. Mild vascular calcification involving the carotid siphons is appreciated. There is no evidence of acute infarction or of intracranial hemorrhage. A CT angiogram of the neck and head was then performed. Multiplanar as well as three-dimensional reconstructions were generated. The great vessels are arranged in a classic configuration. There is 0% stenosis of the internal carotid arteries using NASCET criteria. The distal aspects of the internal carotid arteries and the proximal aspects of the anterior and middle cerebral arteries appear unremarkable. Both vertebral arteries were opacified. The vertebral arteries are codominant. The basilar artery and the proximal aspects of the posterior cerebral arteries appear unremarkable. There is mild reversal of the normal cervical lordosis. There is moderate to severe foraminal stenosis on the left from C5-T1 and to the right at C5-6 and to lesser extent C4-5.      There is no evidence of acute infarction, intracranial hemorrhage or of carotid stenosis. There is no evidence of proximal intracranial arterial high-grade stenosis or occlusion. Further evaluation could be performed with a MRI examination of the brain.    Radiation dose reduction techniques were utilized, including automated exposure control and exposure modulation based on body size.   This report was finalized on 2/12/2025 8:43 PM by Dr. Joaquim Burkett M.D on Workstation: [id number]      XR Chest 1 View    Result Date: 2/12/2025  XR CHEST 1 VW-   INDICATION: Chest pain  COMPARISON: None  TECHNIQUE: 1 view chest  FINDINGS:  Small linear opacities seen in the medial right lung base. Calcified pulmonary nodule in the right midlung, consistent with prior granulomatous infection. No effusions. Normal  mediastinal contour. Heart is normal in size. Calcified mediastinal and right hilar lymph nodes, consistent with prior granulomatous infection.      Small amount of subsegmental atelectasis seen at the right lung base  This report was finalized on 2/12/2025 3:45 PM by Dr. Edilson Nichols M.D on Workstation: [id number]       Impression:  69-year-old male with past medical history of hypertension, hyperlipidemia, type 2 diabetes, prostate cancer, previous right shoulder injury with subsequent development of right upper extremity weakness predominantly affecting the hand, later found to have bilateral ulnar neuropathy and carpal tunnel syndrome status post cervical repair who presented with acute onset right chest heaviness and discomfort and numbness/weakness radiating into the right arm.  This occurred upon waking from sleep.  He does have previous injury to right upper extremity with chronic weakness and atrophy but reports the symptoms are new and persistent.  He previously had EMG/NCS completed in 2019 and MRI cervical spine 2020 which did show abnormalities which surgery was not intended.  Per review of the chart MRI of C-spine showed cervical spondylosis as well as significant canal stenosis worse at C4-5 and C5-6 with some flattening of the cord with foraminal narrowing affecting C5 nerve roots bilaterally.    This admission he has had CTA head/neck which showed no significant stenosis or occlusion, MRI brain without contrast which showed no acute findings.  Cardiology evaluated him and stress test was unremarkable.    Diagnosis:  Right upper extremity numbness and weakness, worse than baseline    Plan:  Will check MRI C-spine to further evaluate right upper extremity numbness and weakness.  If stable from previous he can likely be discharged and can obtain EMG/NCS as outpatient.  Discussed with Dr. Wilson today.

## 2025-02-14 NOTE — PLAN OF CARE
Problem: Adult Inpatient Plan of Care  Goal: Plan of Care Review  Outcome: Progressing  Goal: Patient-Specific Goal (Individualized)  Outcome: Progressing  Goal: Absence of Hospital-Acquired Illness or Injury  Outcome: Progressing  Intervention: Identify and Manage Fall Risk  Recent Flowsheet Documentation  Taken 2/14/2025 0200 by Shantell Kapadia RN  Safety Promotion/Fall Prevention:   assistive device/personal items within reach   clutter free environment maintained   activity supervised   safety round/check completed   room organization consistent  Taken 2/13/2025 2358 by Shantell Kapadia RN  Safety Promotion/Fall Prevention:   assistive device/personal items within reach   clutter free environment maintained   fall prevention program maintained   nonskid shoes/slippers when out of bed   safety round/check completed  Taken 2/13/2025 2200 by Shantell Kapadia RN  Safety Promotion/Fall Prevention:   assistive device/personal items within reach   clutter free environment maintained   fall prevention program maintained   nonskid shoes/slippers when out of bed   safety round/check completed  Taken 2/13/2025 1905 by Shantell Kapadia RN  Safety Promotion/Fall Prevention:   assistive device/personal items within reach   clutter free environment maintained   fall prevention program maintained   nonskid shoes/slippers when out of bed   safety round/check completed  Intervention: Prevent Skin Injury  Recent Flowsheet Documentation  Taken 2/14/2025 0200 by Shantell Kapadia RN  Body Position: position changed independently  Taken 2/13/2025 2358 by Shantell Kapadia RN  Body Position: position changed independently  Taken 2/13/2025 2200 by Shantell Kapadia RN  Body Position: position changed independently  Taken 2/13/2025 1905 by Shantell Kapadia RN  Body Position: position changed independently  Goal: Optimal Comfort and Wellbeing  Outcome: Progressing  Goal: Readiness for Transition of Care  Outcome: Progressing     Problem:  Comorbidity Management  Goal: Maintenance of Asthma Control  Outcome: Progressing  Goal: Maintenance of Behavioral Health Symptom Control  Outcome: Progressing  Goal: Maintenance of COPD Symptom Control  Outcome: Progressing  Goal: Blood Glucose Level Within Target Range  Outcome: Progressing  Goal: Maintenance of Heart Failure Symptom Control  Outcome: Progressing  Goal: Blood Pressure in Desired Range  Outcome: Progressing  Goal: Maintenance of Osteoarthritis Symptom Control  Outcome: Progressing  Intervention: Maintain Osteoarthritis Symptom Control  Recent Flowsheet Documentation  Taken 2/14/2025 0200 by Shantell Kapadia, RN  Activity Management: activity minimized  Goal: Bariatric Home Regimen Maintained  Outcome: Progressing  Goal: Maintenance of Seizure Control  Outcome: Progressing   Goal Outcome Evaluation:

## 2025-02-15 ENCOUNTER — READMISSION MANAGEMENT (OUTPATIENT)
Dept: CALL CENTER | Facility: HOSPITAL | Age: 70
End: 2025-02-15
Payer: MEDICARE

## 2025-02-15 VITALS
RESPIRATION RATE: 18 BRPM | DIASTOLIC BLOOD PRESSURE: 73 MMHG | HEART RATE: 69 BPM | HEIGHT: 73 IN | SYSTOLIC BLOOD PRESSURE: 127 MMHG | WEIGHT: 217 LBS | OXYGEN SATURATION: 96 % | BODY MASS INDEX: 28.76 KG/M2 | TEMPERATURE: 97.3 F

## 2025-02-15 LAB
ANION GAP SERPL CALCULATED.3IONS-SCNC: 11 MMOL/L (ref 5–15)
BASOPHILS # BLD AUTO: 0.04 10*3/MM3 (ref 0–0.2)
BASOPHILS NFR BLD AUTO: 0.6 % (ref 0–1.5)
BUN SERPL-MCNC: 20 MG/DL (ref 8–23)
BUN/CREAT SERPL: 30.3 (ref 7–25)
CALCIUM SPEC-SCNC: 8.1 MG/DL (ref 8.6–10.5)
CHLORIDE SERPL-SCNC: 105 MMOL/L (ref 98–107)
CO2 SERPL-SCNC: 22 MMOL/L (ref 22–29)
CREAT SERPL-MCNC: 0.66 MG/DL (ref 0.76–1.27)
DEPRECATED RDW RBC AUTO: 39.9 FL (ref 37–54)
EGFRCR SERPLBLD CKD-EPI 2021: 100.3 ML/MIN/1.73
EOSINOPHIL # BLD AUTO: 0.17 10*3/MM3 (ref 0–0.4)
EOSINOPHIL NFR BLD AUTO: 2.7 % (ref 0.3–6.2)
ERYTHROCYTE [DISTWIDTH] IN BLOOD BY AUTOMATED COUNT: 12.5 % (ref 12.3–15.4)
GLUCOSE BLDC GLUCOMTR-MCNC: 107 MG/DL (ref 70–130)
GLUCOSE BLDC GLUCOMTR-MCNC: 143 MG/DL (ref 70–130)
GLUCOSE BLDC GLUCOMTR-MCNC: 87 MG/DL (ref 70–130)
GLUCOSE BLDC GLUCOMTR-MCNC: 99 MG/DL (ref 70–130)
GLUCOSE SERPL-MCNC: 96 MG/DL (ref 65–99)
HCT VFR BLD AUTO: 41.5 % (ref 37.5–51)
HGB BLD-MCNC: 14.4 G/DL (ref 13–17.7)
IMM GRANULOCYTES # BLD AUTO: 0.02 10*3/MM3 (ref 0–0.05)
IMM GRANULOCYTES NFR BLD AUTO: 0.3 % (ref 0–0.5)
LYMPHOCYTES # BLD AUTO: 1.83 10*3/MM3 (ref 0.7–3.1)
LYMPHOCYTES NFR BLD AUTO: 28.7 % (ref 19.6–45.3)
MCH RBC QN AUTO: 30.9 PG (ref 26.6–33)
MCHC RBC AUTO-ENTMCNC: 34.7 G/DL (ref 31.5–35.7)
MCV RBC AUTO: 89.1 FL (ref 79–97)
MONOCYTES # BLD AUTO: 0.51 10*3/MM3 (ref 0.1–0.9)
MONOCYTES NFR BLD AUTO: 8 % (ref 5–12)
NEUTROPHILS NFR BLD AUTO: 3.8 10*3/MM3 (ref 1.7–7)
NEUTROPHILS NFR BLD AUTO: 59.7 % (ref 42.7–76)
NRBC BLD AUTO-RTO: 0 /100 WBC (ref 0–0.2)
PLATELET # BLD AUTO: 225 10*3/MM3 (ref 140–450)
PMV BLD AUTO: 9.2 FL (ref 6–12)
POTASSIUM SERPL-SCNC: 3.7 MMOL/L (ref 3.5–5.2)
RBC # BLD AUTO: 4.66 10*6/MM3 (ref 4.14–5.8)
SODIUM SERPL-SCNC: 138 MMOL/L (ref 136–145)
WBC NRBC COR # BLD AUTO: 6.37 10*3/MM3 (ref 3.4–10.8)

## 2025-02-15 PROCEDURE — 82948 REAGENT STRIP/BLOOD GLUCOSE: CPT

## 2025-02-15 PROCEDURE — 80048 BASIC METABOLIC PNL TOTAL CA: CPT | Performed by: INTERNAL MEDICINE

## 2025-02-15 PROCEDURE — 85025 COMPLETE CBC W/AUTO DIFF WBC: CPT | Performed by: INTERNAL MEDICINE

## 2025-02-15 PROCEDURE — 63710000001 INSULIN LISPRO (HUMAN) PER 5 UNITS: Performed by: INTERNAL MEDICINE

## 2025-02-15 RX ADMIN — LISINOPRIL 10 MG: 10 TABLET ORAL at 08:49

## 2025-02-15 RX ADMIN — INSULIN LISPRO 10 UNITS: 100 INJECTION, SOLUTION INTRAVENOUS; SUBCUTANEOUS at 08:50

## 2025-02-15 RX ADMIN — Medication 1000 UNITS: at 08:49

## 2025-02-15 RX ADMIN — ASPIRIN 325 MG ORAL TABLET 325 MG: 325 PILL ORAL at 08:49

## 2025-02-15 RX ADMIN — INSULIN LISPRO 10 UNITS: 100 INJECTION, SOLUTION INTRAVENOUS; SUBCUTANEOUS at 12:06

## 2025-02-15 RX ADMIN — TAMSULOSIN HYDROCHLORIDE 0.4 MG: 0.4 CAPSULE ORAL at 08:49

## 2025-02-15 NOTE — PROGRESS NOTES
No events    Awake and alert  Full strength left upper extremity  Right upper extremity weak  strength and wrist extension with wasting of hand and forearm muscles  Moving all extremities    Plan: 69-year-old male with a known history of cervical stenosis as well as a history of injury to his right upper extremity 15 years ago that resulted in damage to peripheral nerves and significant muscle wasting and weakness in  strength who presented with cardiac like symptoms that were acute on onset.  MRI was performed demonstrating worsening cervical stenosis with some cord signal change which has progressed since previous MRI in 2020.  -No emergent neurosurgical interventions indicated.  Presenting symptoms are unlikely related to the patient's cervical spine.  -Discussed with the patient that I would recommend surgical intervention in the form of C5-6 ACDF for decompression of the spinal cord on an elective basis.  Patient will follow-up in clinic within the next week or 2 to discuss and possibly schedule.  -Care per primary team.  Okay for discharge from neurosurgical perspective

## 2025-02-15 NOTE — PLAN OF CARE
Patient pleasant, cooperative, AxO4. Free from falls and injuries. Blood sugars controlled. Neuro signed off, appears to be able to d/c very soon. Patient independent in room. NSR, free from falls and injury.       Problem: Adult Inpatient Plan of Care  Goal: Plan of Care Review  Outcome: Progressing  Goal: Patient-Specific Goal (Individualized)  Outcome: Progressing  Goal: Absence of Hospital-Acquired Illness or Injury  Outcome: Progressing  Intervention: Identify and Manage Fall Risk  Recent Flowsheet Documentation  Taken 2/15/2025 0600 by Miah Hwang, RN  Safety Promotion/Fall Prevention:   activity supervised   assistive device/personal items within reach   clutter free environment maintained   fall prevention program maintained   nonskid shoes/slippers when out of bed   room organization consistent   safety round/check completed  Taken 2/15/2025 0400 by Miah Hwang, RN  Safety Promotion/Fall Prevention:   activity supervised   assistive device/personal items within reach   clutter free environment maintained   fall prevention program maintained   nonskid shoes/slippers when out of bed   safety round/check completed   room organization consistent  Taken 2/15/2025 0200 by Miah Hwang, RN  Safety Promotion/Fall Prevention:   activity supervised   assistive device/personal items within reach   clutter free environment maintained   fall prevention program maintained   nonskid shoes/slippers when out of bed   room organization consistent   safety round/check completed  Taken 2/15/2025 0000 by Miah Hwang, RN  Safety Promotion/Fall Prevention:   activity supervised   assistive device/personal items within reach   clutter free environment maintained   fall prevention program maintained   nonskid shoes/slippers when out of bed   room organization consistent   safety round/check completed  Taken 2/14/2025 2200 by Miah Hwang, RN  Safety Promotion/Fall Prevention:   activity supervised   assistive  device/personal items within reach   clutter free environment maintained   fall prevention program maintained   nonskid shoes/slippers when out of bed   room organization consistent   safety round/check completed  Taken 2/14/2025 2001 by Miah Hwang RN  Safety Promotion/Fall Prevention:   activity supervised   assistive device/personal items within reach   clutter free environment maintained   fall prevention program maintained   nonskid shoes/slippers when out of bed   safety round/check completed   room organization consistent  Intervention: Prevent Skin Injury  Recent Flowsheet Documentation  Taken 2/15/2025 0600 by Miah Hwang RN  Body Position: position changed independently  Taken 2/15/2025 0400 by Miah Hwang RN  Body Position: position changed independently  Skin Protection: incontinence pads utilized  Taken 2/15/2025 0200 by Miah Hwang RN  Body Position: position changed independently  Skin Protection: incontinence pads utilized  Taken 2/15/2025 0000 by Miah Hwang RN  Body Position: position changed independently  Skin Protection: incontinence pads utilized  Taken 2/14/2025 2200 by Miah Hwang RN  Body Position: position changed independently  Skin Protection: incontinence pads utilized  Taken 2/14/2025 2001 by Miah Hwang RN  Body Position: position changed independently  Skin Protection: incontinence pads utilized  Intervention: Prevent Infection  Recent Flowsheet Documentation  Taken 2/15/2025 0600 by Miah Hwang RN  Infection Prevention: environmental surveillance performed  Taken 2/15/2025 0400 by Miah Hwang RN  Infection Prevention: environmental surveillance performed  Taken 2/15/2025 0200 by Miah Hwang RN  Infection Prevention: environmental surveillance performed  Taken 2/15/2025 0000 by Miah Hwang RN  Infection Prevention: environmental surveillance performed  Taken 2/14/2025 2001 by Miah Hwang RN  Infection Prevention:  environmental surveillance performed  Goal: Optimal Comfort and Wellbeing  Outcome: Progressing  Goal: Readiness for Transition of Care  Outcome: Progressing     Problem: Comorbidity Management  Goal: Maintenance of Asthma Control  Outcome: Progressing  Goal: Maintenance of Behavioral Health Symptom Control  Outcome: Progressing  Goal: Maintenance of COPD Symptom Control  Outcome: Progressing  Goal: Blood Glucose Level Within Target Range  Outcome: Progressing  Goal: Maintenance of Heart Failure Symptom Control  Outcome: Progressing  Goal: Blood Pressure in Desired Range  Outcome: Progressing  Goal: Maintenance of Osteoarthritis Symptom Control  Outcome: Progressing  Intervention: Maintain Osteoarthritis Symptom Control  Recent Flowsheet Documentation  Taken 2/15/2025 0600 by Miah Hwang RN  Activity Management: up ad marlon  Taken 2/15/2025 0400 by Miah Hwang RN  Activity Management: up ad marlon  Taken 2/15/2025 0200 by Miah Hwang RN  Activity Management: up ad marlon  Taken 2/15/2025 0000 by Miah Hwang RN  Activity Management: up ad marlon  Taken 2/14/2025 2200 by Miah Hwang RN  Activity Management: up ad marlon  Taken 2/14/2025 2001 by Miah Hwang RN  Activity Management: up ad marlon  Goal: Bariatric Home Regimen Maintained  Outcome: Progressing  Goal: Maintenance of Seizure Control  Outcome: Progressing   Goal Outcome Evaluation:

## 2025-02-15 NOTE — PLAN OF CARE
A&O X4, independent and up ad marlon, NSG will follow up with outpt appt in week or so r/t cervical stenosis, no urgent need.   Likely dc home tmrw     Problem: Adult Inpatient Plan of Care  Goal: Plan of Care Review  Outcome: Progressing  Flowsheets (Taken 2/14/2025 1910)  Plan of Care Reviewed With: patient  Goal: Absence of Hospital-Acquired Illness or Injury  Intervention: Identify and Manage Fall Risk  Description: Perform standard risk assessment on admission using a validated tool or comprehensive approach appropriate to the patient; reassess fall risk frequently, with change in status or transfer to another level of care.  Communicate risk to interprofessional healthcare team; ensure fall risk visible cue.  Determine need for increased observation, equipment and environmental modification, as well as use of supportive, nonskid footwear.  Adjust safety measures to individual needs and identified risk factors.  Reinforce the importance of active participation with fall risk prevention, safety, and physical activity with the patient and family.  Perform regular intentional rounding to assess need for position change, pain assessment and personal needs, including assistance with toileting.  Recent Flowsheet Documentation  Taken 2/14/2025 1855 by Celeste Quispe, RN  Safety Promotion/Fall Prevention:   assistive device/personal items within reach   fall prevention program maintained   nonskid shoes/slippers when out of bed   safety round/check completed  Taken 2/14/2025 1600 by Celeste Quispe, RN  Safety Promotion/Fall Prevention:   assistive device/personal items within reach   fall prevention program maintained   nonskid shoes/slippers when out of bed   safety round/check completed  Taken 2/14/2025 1430 by Celeste Quispe, RN  Safety Promotion/Fall Prevention:   assistive device/personal items within reach   clutter free environment maintained   fall prevention program maintained   nonskid shoes/slippers when out  of bed   safety round/check completed  Taken 2/14/2025 1230 by Celeste Quispe RN  Safety Promotion/Fall Prevention:   assistive device/personal items within reach   clutter free environment maintained   nonskid shoes/slippers when out of bed   safety round/check completed  Taken 2/14/2025 1025 by Celeste Quispe RN  Safety Promotion/Fall Prevention:   assistive device/personal items within reach   clutter free environment maintained   nonskid shoes/slippers when out of bed   safety round/check completed  Taken 2/14/2025 0815 by Celeste Quispe RN  Safety Promotion/Fall Prevention:   assistive device/personal items within reach   clutter free environment maintained   fall prevention program maintained   nonskid shoes/slippers when out of bed   safety round/check completed  Intervention: Prevent Skin Injury  Description: Perform a screening for skin injury risk, such as pressure or moisture-associated skin damage on admission and at regular intervals throughout hospital stay.  Keep all areas of skin (especially folds) clean and dry.  Maintain adequate skin hydration.  Relieve and redistribute pressure and protect bony prominences and skin at risk for injury; implement measures based on patient-specific risk factors.  Match turning and repositioning schedule to clinical condition.  Encourage weight shift frequently; assist with reposition if unable to complete independently.  Float heels off bed; avoid pressure on the Achilles tendon.  Keep skin free from extended contact with medical devices.  Optimize nutrition and hydration.  Encourage functional activity and mobility, as early as tolerated.  Use aids (e.g., slide boards, mechanical lift) during transfer.  Recent Flowsheet Documentation  Taken 2/14/2025 1855 by Celeste Qusipe, RN  Body Position: position changed independently  Taken 2/14/2025 1600 by Celeste Quispe, RN  Body Position: position changed independently  Taken 2/14/2025 1430 by Celeste Quispe,  RN  Body Position: position changed independently  Taken 2/14/2025 1230 by Celeste Quispe RN  Body Position: position changed independently  Taken 2/14/2025 1025 by Celeste Quispe RN  Body Position: position changed independently  Skin Protection: transparent dressing maintained  Taken 2/14/2025 0815 by Celeste Quispe RN  Body Position: position changed independently  Skin Protection: transparent dressing maintained  Intervention: Prevent and Manage VTE (Venous Thromboembolism) Risk  Description: Assess for VTE (venous thromboembolism) risk.  Promote early mobilization; encourage both active and passive leg exercises, if unable to ambulate.  Initiate and maintain compression or other therapy, as indicated, based on identified risk in accordance with organizational protocol and provider order.  Recognize the patient's individual risk for bleeding before initiating pharmacologic thromboprophylaxis.  Recent Flowsheet Documentation  Taken 2/14/2025 1025 by Celeste Quispe RN  VTE Prevention/Management: (aspirin)   SCDs (sequential compression devices) off   other (see comments)  Taken 2/14/2025 0815 by Celeste Quispe RN  VTE Prevention/Management: (apirin)   SCDs (sequential compression devices) off   other (see comments)  Intervention: Prevent Infection  Description: Maintain skin and mucous membrane integrity; promote hand, oral and pulmonary hygiene.  Optimize fluid balance, nutrition, sleep and glycemic control to maximize infection resistance.  Identify potential sources of infection early to prevent or mitigate progression of infection (e.g., wound, lines, devices).  Evaluate ongoing need for invasive devices; remove promptly when no longer indicated.  Review vaccination status.  Recent Flowsheet Documentation  Taken 2/14/2025 1855 by Celeste Quispe RN  Infection Prevention:   environmental surveillance performed   single patient room provided  Taken 2/14/2025 1600 by Celeste Quispe RN  Infection  Prevention:   hand hygiene promoted   single patient room provided  Taken 2/14/2025 1430 by Celeste Quispe RN  Infection Prevention:   environmental surveillance performed   hand hygiene promoted   single patient room provided  Taken 2/14/2025 1230 by Celeste Quispe RN  Infection Prevention:   environmental surveillance performed   single patient room provided  Taken 2/14/2025 1025 by Celeste Quispe RN  Infection Prevention:   hand hygiene promoted   single patient room provided  Taken 2/14/2025 0815 by Celeste Quispe RN  Infection Prevention:   environmental surveillance performed   single patient room provided  Goal: Optimal Comfort and Wellbeing  Intervention: Provide Person-Centered Care  Description: Use a family-focused approach to care; encourage support system presence and participation.  Develop trust and rapport by proactively providing information, encouraging questions, addressing concerns and offering reassurance.  Acknowledge emotional response to hospitalization.  Recognize and utilize personal coping strategies and strengths; develop goals via shared decision-making.  Honor spiritual and cultural preferences.  Recent Flowsheet Documentation  Taken 2/14/2025 0815 by Celeste Quispe RN  Trust Relationship/Rapport:   care explained   choices provided   questions answered   thoughts/feelings acknowledged  Goal: Readiness for Transition of Care  Outcome: Progressing     Problem: Adult Inpatient Plan of Care  Goal: Plan of Care Review  Outcome: Progressing  Flowsheets (Taken 2/14/2025 1910)  Plan of Care Reviewed With: patient  Goal: Absence of Hospital-Acquired Illness or Injury  Intervention: Identify and Manage Fall Risk  Description: Perform standard risk assessment on admission using a validated tool or comprehensive approach appropriate to the patient; reassess fall risk frequently, with change in status or transfer to another level of care.  Communicate risk to interprofessional healthcare  team; ensure fall risk visible cue.  Determine need for increased observation, equipment and environmental modification, as well as use of supportive, nonskid footwear.  Adjust safety measures to individual needs and identified risk factors.  Reinforce the importance of active participation with fall risk prevention, safety, and physical activity with the patient and family.  Perform regular intentional rounding to assess need for position change, pain assessment and personal needs, including assistance with toileting.  Recent Flowsheet Documentation  Taken 2/14/2025 1855 by Celeste Quispe, RN  Safety Promotion/Fall Prevention:   assistive device/personal items within reach   fall prevention program maintained   nonskid shoes/slippers when out of bed   safety round/check completed  Taken 2/14/2025 1600 by Celeste Quispe RN  Safety Promotion/Fall Prevention:   assistive device/personal items within reach   fall prevention program maintained   nonskid shoes/slippers when out of bed   safety round/check completed  Taken 2/14/2025 1430 by Celeste Quispe, RN  Safety Promotion/Fall Prevention:   assistive device/personal items within reach   clutter free environment maintained   fall prevention program maintained   nonskid shoes/slippers when out of bed   safety round/check completed  Taken 2/14/2025 1230 by Celeste Quispe, RN  Safety Promotion/Fall Prevention:   assistive device/personal items within reach   clutter free environment maintained   nonskid shoes/slippers when out of bed   safety round/check completed  Taken 2/14/2025 1025 by Celeste Quispe, RN  Safety Promotion/Fall Prevention:   assistive device/personal items within reach   clutter free environment maintained   nonskid shoes/slippers when out of bed   safety round/check completed  Taken 2/14/2025 0815 by Celeste Quispe, RN  Safety Promotion/Fall Prevention:   assistive device/personal items within reach   clutter free environment maintained   fall  prevention program maintained   nonskid shoes/slippers when out of bed   safety round/check completed  Intervention: Prevent Skin Injury  Description: Perform a screening for skin injury risk, such as pressure or moisture-associated skin damage on admission and at regular intervals throughout hospital stay.  Keep all areas of skin (especially folds) clean and dry.  Maintain adequate skin hydration.  Relieve and redistribute pressure and protect bony prominences and skin at risk for injury; implement measures based on patient-specific risk factors.  Match turning and repositioning schedule to clinical condition.  Encourage weight shift frequently; assist with reposition if unable to complete independently.  Float heels off bed; avoid pressure on the Achilles tendon.  Keep skin free from extended contact with medical devices.  Optimize nutrition and hydration.  Encourage functional activity and mobility, as early as tolerated.  Use aids (e.g., slide boards, mechanical lift) during transfer.  Recent Flowsheet Documentation  Taken 2/14/2025 1855 by Celeste Quispe RN  Body Position: position changed independently  Taken 2/14/2025 1600 by Celeste Quispe RN  Body Position: position changed independently  Taken 2/14/2025 1430 by Celeste Quispe RN  Body Position: position changed independently  Taken 2/14/2025 1230 by Celeste Quispe RN  Body Position: position changed independently  Taken 2/14/2025 1025 by Celeste Quispe RN  Body Position: position changed independently  Skin Protection: transparent dressing maintained  Taken 2/14/2025 0815 by Celeste Quispe RN  Body Position: position changed independently  Skin Protection: transparent dressing maintained  Intervention: Prevent and Manage VTE (Venous Thromboembolism) Risk  Description: Assess for VTE (venous thromboembolism) risk.  Promote early mobilization; encourage both active and passive leg exercises, if unable to ambulate.  Initiate and maintain  compression or other therapy, as indicated, based on identified risk in accordance with organizational protocol and provider order.  Recognize the patient's individual risk for bleeding before initiating pharmacologic thromboprophylaxis.  Recent Flowsheet Documentation  Taken 2/14/2025 1025 by Celeste Quispe RN  VTE Prevention/Management: (aspirin)   SCDs (sequential compression devices) off   other (see comments)  Taken 2/14/2025 0815 by Celeste Quispe RN  VTE Prevention/Management: (apirin)   SCDs (sequential compression devices) off   other (see comments)  Intervention: Prevent Infection  Description: Maintain skin and mucous membrane integrity; promote hand, oral and pulmonary hygiene.  Optimize fluid balance, nutrition, sleep and glycemic control to maximize infection resistance.  Identify potential sources of infection early to prevent or mitigate progression of infection (e.g., wound, lines, devices).  Evaluate ongoing need for invasive devices; remove promptly when no longer indicated.  Review vaccination status.  Recent Flowsheet Documentation  Taken 2/14/2025 1855 by Celeste Quispe RN  Infection Prevention:   environmental surveillance performed   single patient room provided  Taken 2/14/2025 1600 by Celeste Quispe RN  Infection Prevention:   hand hygiene promoted   single patient room provided  Taken 2/14/2025 1430 by Celeste Quispe RN  Infection Prevention:   environmental surveillance performed   hand hygiene promoted   single patient room provided  Taken 2/14/2025 1230 by Celeste Quispe RN  Infection Prevention:   environmental surveillance performed   single patient room provided  Taken 2/14/2025 1025 by Celeste Quispe RN  Infection Prevention:   hand hygiene promoted   single patient room provided  Taken 2/14/2025 0815 by Celeste Quispe RN  Infection Prevention:   environmental surveillance performed   single patient room provided  Goal: Optimal Comfort and Wellbeing  Intervention:  Provide Person-Centered Care  Description: Use a family-focused approach to care; encourage support system presence and participation.  Develop trust and rapport by proactively providing information, encouraging questions, addressing concerns and offering reassurance.  Acknowledge emotional response to hospitalization.  Recognize and utilize personal coping strategies and strengths; develop goals via shared decision-making.  Honor spiritual and cultural preferences.  Recent Flowsheet Documentation  Taken 2/14/2025 0815 by Celeste Quispe RN  Trust Relationship/Rapport:   care explained   choices provided   questions answered   thoughts/feelings acknowledged  Goal: Readiness for Transition of Care  Outcome: Progressing   Goal Outcome Evaluation:  Plan of Care Reviewed With: patient

## 2025-02-15 NOTE — OUTREACH NOTE
Prep Survey      Flowsheet Row Responses   [facility] patient discharged from? [place]   Is LACE score < 7 ? No   Eligibility [facility]   Date of Admission 02/13/25   Date of Discharge 02/15/25   Discharge Disposition Home or Self Care   Discharge diagnosis Right arm weakness   Does the patient have one of the following disease processes/diagnoses(primary or secondary)? Other   Does the patient have Home health ordered? No   Is there a DME ordered? No   Prep survey completed? Yes            Poppy JONES - Registered Nurse

## 2025-02-16 NOTE — CASE MANAGEMENT/SOCIAL WORK
Case Management Discharge Note      Final Note: dc home    Provided Post Acute Provider List?: N/A  N/A Provider List Comment: Pt denied need    Selected Continued Care - Discharged on 2/15/2025 Admission date: 2/12/2025 - Discharge disposition: Home or Self Care      Destination    No services have been selected for the patient.                Durable Medical Equipment    No services have been selected for the patient.                Dialysis/Infusion    No services have been selected for the patient.                Home Medical Care    No services have been selected for the patient.                Therapy    No services have been selected for the patient.                Community Resources    No services have been selected for the patient.                Community & DME    No services have been selected for the patient.                    Selected Continued Care - Episodes Includes continued care and service providers with selected services from the active episodes listed below      High Risk Care Management Episode start date: 2/13/2025 (Paused)   There are no active outsourced providers for this episode.                      Final Discharge Disposition Code: 01 - home or self-care

## 2025-02-17 ENCOUNTER — TRANSITIONAL CARE MANAGEMENT TELEPHONE ENCOUNTER (OUTPATIENT)
Dept: CALL CENTER | Facility: HOSPITAL | Age: 70
End: 2025-02-17
Payer: MEDICARE

## 2025-02-17 NOTE — OUTREACH NOTE
Call Center TCM Note      Flowsheet Row Responses   [facility] patient discharged from? [place]   Does the patient have one of the following disease processes/diagnoses(primary or secondary)? Other   TCM attempt successful? Yes   Call start time 1246   Call end time 1254   Discharge diagnosis Right arm weakness   Person spoke with today (if not patient) and relationship pt   Meds reviewed with patient/caregiver? Yes   Is the patient having any side effects they believe may be caused by any medication additions or changes? No   Does the patient have all medications ordered at discharge? N/A   Is the patient taking all medications as directed (includes completed medication regime)? Yes   Comments Phone number given to make Neurology fu appt,  pt has upcoming endocrinology appt   Does the patient have an appointment with their PCP within 7-14 days of discharge? No appointments available   Nursing Interventions Routed TCM call to PCP office, PCP office requested to make appointment - message sent   Psychosocial issues? No   Did the patient receive a copy of their discharge instructions? Yes   Nursing interventions Reviewed instructions with patient   What is the patient's perception of their health status since discharge? Improving   Is the patient/caregiver able to teach back signs and symptoms related to disease process for when to call PCP? Yes   Is the patient/caregiver able to teach back signs and symptoms related to disease process for when to call 911? Yes   Is the patient/caregiver able to teach back the hierarchy of who to call/visit for symptoms/problems? PCP, Specialist, Home health nurse, Urgent Care, ED, 911 Yes   If the patient is a current smoker, are they able to teach back resources for cessation? Not a smoker   TCM call completed? Yes   Wrap up additional comments Pt reports right arm weakness is not as weak. No medication changes. Pt advised to make a neurology fu appt. Message routed to PCP  office to help with TCM appt as there are no appts that satisfy TCM   Call end time 1254   Would this patient benefit from a Referral to [facility] Social Work? No   Is the patient interested in additional calls from an ambulatory ? No            Antonette Gardner RN    2/17/2025, 12:56 EST

## 2025-02-17 NOTE — OUTREACH NOTE
Call Center TCM Note      Flowsheet Row Responses   [facility] patient discharged from? [facility]   Does the patient have one of the following disease processes/diagnoses(primary or secondary)? Other   TCM attempt successful? No  [Misti Stevens, SO,  Vishal Bernard, brother]   Unsuccessful attempts Attempt 1   Call Status Left message            Antonette Gardner RN    2/17/2025, 10:03 EST

## 2025-02-18 NOTE — DISCHARGE SUMMARY
Patient Name: Jeff Bernard  : 1955  MRN: [id number]    Date of Admission: 2025  Date of Discharge:  2025  Primary Care Physician: Esperanza Sarmiento PA      Chief Complaint:   Chest Pain and Extremity Weakness      Discharge Diagnoses     Active Hospital Problems    Diagnosis  POA    **Right arm weakness [R29.898]  Yes    BPH (benign prostatic hyperplasia) [N40.0]  Unknown    Chest pain [R07.9]  Unknown    Hyperlipidemia [E78.5]  Yes    Essential (primary) hypertension [I10]  Yes    Diabetes [E11.9]  Yes      Resolved Hospital Problems   No resolved problems to display.        Hospital Course     69 year old gentleman presented to the ED with pain of his right arm and chest which started in the middle of the night yesterday; he felt like his arm was heavy and he had some pain; he went back to sleep but continues to have the symptoms so he came in; he has a history of diabetes, hypertension and hyperlipidemia; he has also had some weakness of the arm.    1. Chest pain, cardiology did evaluate and underwent stress test with no evidence of any ischemia.  2.  Diabetes mellitus, continue home regimen upon discharge.  Patient administered insulin based on carb count.  3.  Hypertension, continue with lisinopril.  4.  BPH, on Flomax.  5.  Hyperlipidemia, on statins.  6.  Chronic right upper extremity weakness with episode of sudden worsening, MRI brain with no acute findings and MRI of the C-spine was done and spine surgery did evaluate and recommended outpatient follow-up.  Patient not keen on any surgical intervention.    Copy text on this note has been reviewed by me on 2025    Day of Discharge         Physical Exam:     Body mass index is 28.63 kg/m².  Physical Exam  HEENT: PERRLA, extraocular movements intact, Scleras no icterus  Neck: Supple, no JVD  Cardiovascular: Regular rate and rhythm with normal S1 and S2  Respiratory: Fairly clear to auscultation anteriorly with no wheezes  GI: Soft,  nontender, bowel sounds are present  Extremities: No edema, palpable pedal pulses.  Right hand muscles appear to be atrophic chronically  Neurologic: Grossly nonfocal, no facial asymmetry    Consultants     Consult Orders (all) (From admission, onward)       Start     Ordered    02/14/25 1448  Inpatient Neurosurgery Consult  Once        Specialty:  Neurosurgery  Provider:  Torsten Allen MD    02/14/25 1448    02/13/25 1040  Inpatient Neurology Consult Stroke  Once        Specialty:  Neurology  Provider:  Mario Alberto Coon MD    02/13/25 1039    02/12/25 2053  Inpatient Cardiology Consult  Once        Specialty:  Cardiology  Provider:  Renee Newsome MD    02/12/25 2052 02/12/25 1726  Notify Stroke Coordinator  Once,   Status:  Canceled        Provider:  (Not yet assigned)    02/12/25 1726    02/12/25 1726  Consult to Case Management   Once        Provider:  (Not yet assigned)    02/12/25 1726    02/12/25 1726  Inpatient Diabetes Educator Consult  Once        Provider:  (Not yet assigned)    02/12/25 1726    02/12/25 1726  Inpatient Neurology Consult Stroke  Once        Specialty:  Neurology  Provider:  (Not yet assigned)    02/12/25 1726    02/12/25 1513  Inpatient Neurology Consult Stroke  Once        Specialty:  Neurology  Provider:  (Not yet assigned)    02/12/25 1513                  Procedures     * Surgery not found *    Imaging Results (All)       Procedure Component Value Units Date/Time    MRI Cervical Spine Without Contrast [[id number]] Collected: 02/14/25 1413     Updated: 02/14/25 1439    Narrative:      MRI OF THE CERVICAL SPINE WITHOUT CONTRAST     CLINICAL HISTORY:R arm weakness/numbness, history of cervical stenosis;  R29.898-Other symptoms and signs involving the musculoskeletal system;  R07.9-Chest pain, unspecified.     TECHNIQUE: Multisequence multiplanar MRI images were obtained through  the cervical spine without the use of IV contrast.     COMPARISON:  MRI of the cervical spine dated 1/23/2020     FINDINGS:     The craniocervical junction and C1-2 articulation are unremarkable.     C2-3: No significant canal or foraminal stenosis.     C3-4: Mild to moderate left foraminal narrowing secondary to combination  of uncovertebral joint hypertrophy and facet arthropathy. Interval  progression of this foraminal narrowing since the prior study.     C4-5: Moderate degenerative disc changes are again appreciated disc  osteophyte complex results in mild degree of canal narrowing is less  prominent when compared to the prior exam.. Moderate right and mild left  foraminal compromise secondary to uncovertebral joint hypertrophy. No  interval change in the degree of foraminal stenosis.     C5-6: Advanced degenerative disc changes are noted. Similar findings  noted on the prior exam. Severe canal stenosis secondary to bulging disc  material that results in moderate cord compression. Spinal cord T2  hyperintense signal abnormality is seen within the cervical spinal cord  centered at C5-6 compatible with some combination of cord edema and  myelomalacia. This cord signal abnormality extends from C4-5 down to  C6-7. There is progression of the canal stenosis and the cord signal  abnormality is new. There is moderate to severe bilateral foraminal  narrowing secondary to uncovertebral joint hypertrophy. The degree of  foraminal narrowing is similar.     C6-7: Advanced degenerative disc changes are again appreciated disc  osteophyte complex eccentric to the left resulting in mild canal  narrowing. Small left central disc protrusion is new. Moderate bilateral  foraminal narrowing secondary to uncovertebral joint hypertrophy is  stable.     C7-T1: Advanced degenerative disc changes are again identified disc  osteophyte complex results in mild to moderate foraminal narrowing and  this is stable. No significant canal stenosis.     The visualized contents of the cranial vault are  unremarkable. There are  no abnormal foci of susceptibility artifact.       Impression:         There is severe central canal stenosis at C5-6 secondary to bulging disc  material that results in moderate cord compression. This canal stenosis  has progressed since prior exam. Spinal cord T2 hyperintense signal  abnormality is noted within the cervical spinal cord centered at C5-6  and extending from C4-5 down to C6-7. This is compatible with some  combination of cord edema and myelomalacia. The cord signal abnormality  is new since the prior exam. Again noted is moderate to severe bilateral  foraminal narrowing at C5-6 and this is secondary to uncovertebral joint  hypertrophy.     The remaining multilevel degenerative phenomena including the canal and  foraminal stenotic changes are as discussed in detail above.     These findings were discussed with Britt Xavier on 2/14/2025 at  approximately 2:30 p.m.     This report was finalized on 2/14/2025 2:36 PM by Dr. Mark Veliz M.D  on Workstation: [id number]       MRI Brain Without Contrast [[id number]] Collected: 02/12/25 2245     Updated: 02/12/25 2251    Narrative:      MRI BRAIN without contrast.     HISTORY: Right arm weakness and heaviness.     TECHNIQUE: Routine brain MRI without contrast.     COMPARISON: Head CT and head and neck CT angiogram 2/12/2025.     FINDINGS: The brain is structurally normal, and there is no  hydrocephalus or extra-axial fluid collection. Brain parenchymal signal  is normal, and normal flow-voids are seen in the cerebral vessels. There  is no restricted diffusion, and there is no evidence of acute or chronic  intracranial hemorrhage. There is no evidence of intracranial mass.     Bone marrow signal is normal, and the extracranial soft tissues are  normal. Normal flow voids are seen in the cerebral vessels.       Impression:      IMPRESSION : Normal brain MRI without contrast.     This report was finalized on 2/12/2025 10:48 PM by  Dr. Tosrten Auguste M.D on Workstation: [id number]       CT Angiogram Neck [[id number]] Collected: 02/12/25 1804     Updated: 02/12/25 2046    Narrative:      CT ANGIOGRAM NECK AND HEAD WITH CONTRAST     HISTORY: Right arm weakness.     COMPARISON: None.     FINDINGS: The brain and ventricles are symmetrical. Mild vascular  calcification involving the carotid siphons is appreciated. There is no  evidence of acute infarction or of intracranial hemorrhage. A CT  angiogram of the neck and head was then performed. Multiplanar as well  as three-dimensional reconstructions were generated. The great vessels  are arranged in a classic configuration. There is 0% stenosis of the  internal carotid arteries using NASCET criteria. The distal aspects of  the internal carotid arteries and the proximal aspects of the anterior  and middle cerebral arteries appear unremarkable. Both vertebral  arteries were opacified. The vertebral arteries are codominant. The  basilar artery and the proximal aspects of the posterior cerebral  arteries appear unremarkable. There is mild reversal of the normal  cervical lordosis. There is moderate to severe foraminal stenosis on the  left from C5-T1 and to the right at C5-6 and to lesser extent C4-5.       Impression:      There is no evidence of acute infarction, intracranial  hemorrhage or of carotid stenosis. There is no evidence of proximal  intracranial arterial high-grade stenosis or occlusion. Further  evaluation could be performed with a MRI examination of the brain.           Radiation dose reduction techniques were utilized, including automated  exposure control and exposure modulation based on body size.        This report was finalized on 2/12/2025 8:43 PM by Dr. Joaquim Burkett M.D  on Workstation: [id number]       CT Angiogram Head [[id number]] Collected: 02/12/25 1804     Updated: 02/12/25 2046    Narrative:      CT ANGIOGRAM NECK AND HEAD WITH CONTRAST     HISTORY: Right arm  weakness.     COMPARISON: None.     FINDINGS: The brain and ventricles are symmetrical. Mild vascular  calcification involving the carotid siphons is appreciated. There is no  evidence of acute infarction or of intracranial hemorrhage. A CT  angiogram of the neck and head was then performed. Multiplanar as well  as three-dimensional reconstructions were generated. The great vessels  are arranged in a classic configuration. There is 0% stenosis of the  internal carotid arteries using NASCET criteria. The distal aspects of  the internal carotid arteries and the proximal aspects of the anterior  and middle cerebral arteries appear unremarkable. Both vertebral  arteries were opacified. The vertebral arteries are codominant. The  basilar artery and the proximal aspects of the posterior cerebral  arteries appear unremarkable. There is mild reversal of the normal  cervical lordosis. There is moderate to severe foraminal stenosis on the  left from C5-T1 and to the right at C5-6 and to lesser extent C4-5.       Impression:      There is no evidence of acute infarction, intracranial  hemorrhage or of carotid stenosis. There is no evidence of proximal  intracranial arterial high-grade stenosis or occlusion. Further  evaluation could be performed with a MRI examination of the brain.           Radiation dose reduction techniques were utilized, including automated  exposure control and exposure modulation based on body size.        This report was finalized on 2/12/2025 8:43 PM by Dr. Joaquim Burkett M.D  on Workstation: [id number]       XR Chest 1 View [[id number]] Collected: 02/12/25 1544     Updated: 02/12/25 1549    Narrative:      XR CHEST 1 VW-        INDICATION: Chest pain     COMPARISON: None     TECHNIQUE: 1 view chest     FINDINGS:      Small linear opacities seen in the medial right lung base. Calcified  pulmonary nodule in the right midlung, consistent with prior  granulomatous infection. No effusions. Normal  mediastinal contour. Heart  is normal in size. Calcified mediastinal and right hilar lymph nodes,  consistent with prior granulomatous infection.       Impression:      Small amount of subsegmental atelectasis seen at the right lung base     This report was finalized on 2/12/2025 3:45 PM by Dr. Edilson Nichols M.D on Workstation: [id number]             Results for orders placed during the hospital encounter of 02/12/25    Duplex Venous Upper Extremity - Right CAR    Interpretation Summary    Normal right upper extremity venous duplex scan.    Results for orders placed during the hospital encounter of 02/12/25    Adult transthoracic echo complete    Interpretation Summary    Left ventricular systolic function is normal. Left ventricular ejection fraction appears to be 61 - 65%.    Left ventricular diastolic function is consistent with (grade I) impaired relaxation.    Normal right ventricular cavity size and systolic function noted.    Saline test results are negative.    Insufficient TR velocity profile to estimate the right ventricular systolic pressure.    There is no evidence of pericardial effusion    Pertinent Labs     Results from last 7 days   Lab Units 02/15/25  0648 02/14/25 0459 02/13/25 0523 02/12/25  1513   WBC 10*3/mm3 6.37 7.02 6.99 7.32   HEMOGLOBIN g/dL 14.4 14.8 14.4 14.7   PLATELETS 10*3/mm3 225 280 232 270     Results from last 7 days   Lab Units 02/15/25  0648 02/14/25 0459 02/13/25  0523 02/12/25  1513   SODIUM mmol/L 138 139 139 139   POTASSIUM mmol/L 3.7 3.5 4.0 4.0   CHLORIDE mmol/L 105 104 105 105   CO2 mmol/L 22.0 23.6 21.9* 24.4   BUN mg/dL 20 19 15 14   CREATININE mg/dL 0.66* 0.80 0.74* 0.86   GLUCOSE mg/dL 96 53* 258* 254*   EGFR mL/min/1.73 100.3 94.6 96.9 92.6     Results from last 7 days   Lab Units 02/13/25 0523 02/12/25  1513   ALBUMIN g/dL 3.4* 3.8   BILIRUBIN mg/dL 0.7 0.4   ALK PHOS U/L 86 98   AST (SGOT) U/L 13 16   ALT (SGPT) U/L 14 18     Results from last 7 days   Lab  Units 02/15/25  0648 02/14/25  0459 02/13/25  0523 02/12/25  1513   CALCIUM mg/dL 8.1* 9.2 8.5* 9.0   ALBUMIN g/dL  --   --  3.4* 3.8       Results from last 7 days   Lab Units 02/12/25  1634 02/12/25  1513   HSTROP T ng/L 13 11   PROBNP pg/mL  --  48.2       Results from last 7 days   Lab Units 02/13/25  0523   CHOLESTEROL mg/dL 126   TRIGLYCERIDES mg/dL 60   HDL CHOL mg/dL 41   LDL CHOL mg/dL 72             Test Results Pending at Discharge     Pending Results       None              Discharge Details        Discharge Medications        Changes to Medications        Instructions Start Date   NovoLOG FlexPen 100 UNIT/ML solution pen-injector sc pen  Generic drug: insulin aspart  What changed: when to take this   1 unit per 5 g of carbohydrate 3 times a day with meals.  Maximum of 20 units per dose.  Prime needle with 2 units before each use             Continue These Medications        Instructions Start Date   aspirin 81 MG EC tablet   81 mg, Daily      atorvastatin 40 MG tablet  Commonly known as: LIPITOR   40 mg, Oral, Nightly      B-D ULTRAFINE III SHORT PEN 31G X 8 MM misc  Generic drug: Insulin Pen Needle   1 each, Other, 4 Times Daily      benazepril 10 MG tablet  Commonly known as: LOTENSIN   TAKE 1 TABLET BY MOUTH EVERY DAY      FreeStyle Celsa 2 [place] device   1 each, Not Applicable, Daily      FreeStyle Celsa 2 Sensor misc   1 each, Not Applicable, Every 14 Days      glucose blood test strip  Commonly known as: Contour Next Test   Dx code E11.9 testing bs 3 x day      Insulin Glargine 100 UNIT/ML injection pen  Commonly known as: LANTUS SOLOSTAR   18 Units, Subcutaneous, Nightly, Prime needle with 2 units before each use      tamsulosin 0.4 MG capsule 24 hr capsule  Commonly known as: FLOMAX   1 capsule, Daily      VITAMIN B 12 PO   2 tablets, Weekly      Vitamin D-3 25 MCG (1000 UT) capsule   1,000 Units, Daily               No Known Allergies    Discharge Disposition:  Home or Self Care      Discharge  Diet:  No active diet order      Discharge Activity:   Activity Instructions       Activity as Tolerated              CODE STATUS:    There are no questions and answers to display.       Future Appointments   Date Time Provider Department Center   2/24/2025  1:45 PM Esperanza Sarmiento PA [facility]   2/28/2025 11:15 AM Vishal Ghosh IV, MD [facility]   4/4/2025 12:00 PM Salinas Juarez MD [facility]   1/2/2026 10:30 AM Esperanza Sarmiento PA [facility]     Additional Instructions for the Follow-ups that You Need to Schedule       Discharge Follow-up with PCP   As directed       Currently Documented PCP:    Esperanza Sarmiento PA    PCP Phone Number:    [phone]     Follow Up Details: 1 week        Discharge Follow-up with Specified Provider: ; 2 Weeks   As directed      To:    Follow Up: 2 Weeks               Follow-up Information       Esperanza Sarmiento PA .    Specialty: Family Medicine  Why: 1 week  Contact information:  [address] [place], [place] [id number] [phone]                             Additional Instructions for the Follow-ups that You Need to Schedule       Discharge Follow-up with PCP   As directed       Currently Documented PCP:    Esperanza Sarmiento PA    PCP Phone Number:    [phone]     Follow Up Details: 1 week        Discharge Follow-up with Specified Provider: ; 2 Weeks   As directed      To:    Follow Up: 2 Weeks            Time Spent on Discharge:  Greater than 30 minutes      Umang Muñoz MD  [facility]  02/18/25  15:24 EST

## 2025-02-24 ENCOUNTER — OFFICE VISIT (OUTPATIENT)
Dept: FAMILY MEDICINE CLINIC | Facility: CLINIC | Age: 70
End: 2025-02-24
Payer: MEDICARE

## 2025-02-24 VITALS
DIASTOLIC BLOOD PRESSURE: 40 MMHG | HEART RATE: 74 BPM | OXYGEN SATURATION: 100 % | WEIGHT: 218 LBS | BODY MASS INDEX: 28.76 KG/M2 | SYSTOLIC BLOOD PRESSURE: 104 MMHG | TEMPERATURE: 97.3 F

## 2025-02-24 DIAGNOSIS — M50.20 CERVICAL DISC HERNIATION: ICD-10-CM

## 2025-02-24 DIAGNOSIS — R29.898 WEAKNESS OF RIGHT HAND: Primary | ICD-10-CM

## 2025-02-24 DIAGNOSIS — M50.30 DEGENERATIVE DISC DISEASE, CERVICAL: ICD-10-CM

## 2025-02-24 DIAGNOSIS — M48.02 SPINAL STENOSIS IN CERVICAL REGION: ICD-10-CM

## 2025-02-24 PROCEDURE — 3052F HG A1C>EQUAL 8.0%<EQUAL 9.0%: CPT | Performed by: PHYSICIAN ASSISTANT

## 2025-02-24 PROCEDURE — 99215 OFFICE O/P EST HI 40 MIN: CPT | Performed by: PHYSICIAN ASSISTANT

## 2025-02-24 PROCEDURE — 1159F MED LIST DOCD IN RCRD: CPT | Performed by: PHYSICIAN ASSISTANT

## 2025-02-24 PROCEDURE — 3074F SYST BP LT 130 MM HG: CPT | Performed by: PHYSICIAN ASSISTANT

## 2025-02-24 PROCEDURE — 1160F RVW MEDS BY RX/DR IN RCRD: CPT | Performed by: PHYSICIAN ASSISTANT

## 2025-02-24 PROCEDURE — 1126F AMNT PAIN NOTED NONE PRSNT: CPT | Performed by: PHYSICIAN ASSISTANT

## 2025-02-24 PROCEDURE — 3078F DIAST BP <80 MM HG: CPT | Performed by: PHYSICIAN ASSISTANT

## 2025-02-24 NOTE — PROGRESS NOTES
Subjective   Jeff Bernard is a 69 y.o. male who presents today for follow-up of renal protection from diabetes, hyperlipidemia, B12 deficiency, vitamin D deficiency, weakness and atrophy of right hand after a shoulder injury, and specialists.     Primary Care Follow-Up    2:30 am- pain in chest, ran down his right arm and 4th and 5th fingers were numb. Lasted 10 minutes and resolved. His arm was sore and tight but was not the same as it was.     Patient was hospitalized 2/12/2025 through 2/18/2025 for right arm weakness, BPH, chest pain, hyperlipidemia, hypertension, and diabetes.    I have reviewed and discussed with patient hospital notes, including H&P, labs, imaging, consult notes, and discharge summary. Per discharge summary, he presented to the ED with pain in his right arm and chest which started in the middle of the night the day prior.  He felt like his arm was heavy that at some point he went back to sleep but continued to have symptoms.  He also had some weakness of the arm.  Per hospital course-cardiology evaluated and underwent stress test and echocardiogram without evidence of ischemia.  Continued home regimen upon discharge.  Hypertension controlled with lisinopril, BPH on Flomax, hyperlipidemia on statin.  Chronic right upper extremity weakness with sudden worsening-MRI brain with no acute findings and MRI cervical spine.  Spine surgery evaluated and recommended outpatient follow-up-patient not sure about surgical intervention.  MRI cervical spine-C3-4 mild to moderate left foraminal narrowing secondary to combination uncovertebral joint hypertrophy and facet arthropathy with interval progression of foraminal narrowing since prior study.  C4-5 moderate degenerative disc changes, disc osteophyte complex results in mild degree of canal narrowing-less prominent when compared to the prior exam, moderate right and mild left foraminal compromise secondary to uncovertebral joint hypertrophy.   C5-6-advanced degenerative disc changes-similar finding to prior exam-severe canal stenosis secondary to bulging disc material that resulted in mild cord compression, spinal cord T2 hyperintense signal abnormality within the cervical spinal cord centered at A6-2-[id number] with some combination of cord edema and myelomalacia, cord signal abnormality extends from C4-5 down to C6-7.  Progression of the canal stenosis and cord signal abnormality is new.  Moderate to severe bilateral foraminal narrowing secondary to uncovertebral joint hypertrophy that is similar to previous.  C6-7-advanced degenerative disc changes, disc osteophyte complex eccentric to the left resulting in mild canal narrowing, small left central disc protrusion is new, moderate bilateral foraminal narrowing secondary to uncovertebral joint hypertrophy stable.  C7-T1 advanced degenerative disc changes, disc osteophyte complex results in mild to moderate foraminal narrowing-stable.  MRI brain-negative.  CTA head and neck-mild vascular calcification involving the carotid siphons.  0% stenosis internal carotid arteries, distal aspects of internal carotid arteries and proximal aspect of anterior and middle cerebral arteries unremarkable.  Mild reversal of normal cervical lordosis, moderate to severe foraminal stenosis on the left from C5-T1 and on the right C5-6 and to a lesser extent C4-5.  Chest x-ray-small linear opacities medial right lung base, calcified pulmonary nodule in the right midlung consistent with prior granulomatous infection, calcified mediastinal and right hilar lymph nodes consistent with prior granulomatous infection.  Small amount of subsegmental atelectasis in the right lung base.  Duplex right upper extremity-negative for DVT  Echocardiogram-normal left ventricular systolic function-LVEF 61 to 65%, left ventricular diastolic dysfunction grade 1, normal right ventricular cavity size and systolic function, insufficient TR velocity  profile to estimate right ventricular systolic pressure, no pericardial effusion.  Negative saline test.  Exercise stress test with myocardial perfusion SPECT-GI artifact present, myocardial perfusion imaging indicates a normal myocardial perfusion study, no evidence of ischemia, LVEF hyperdynamic greater than 70%-low risk study.  No change from 04/11/2018    Within 48 business hours after discharge, Jeff Bernard was contacted via telephone to coordinate care and needs.   Current outpatient and discharge medications have been reconciled for the patient.  Risk for Readmission (LACE) Score: 8 (2/15/2025  6:00 AM)    They think it is from his neck.  He has not had similar symptoms as prior to going to the hospital.  Patient is a with neurosurgery 3/14/2025 (had to be rescheduled from 2/28/2025 due to provider needing to reschedule).      Right shoulder pain, right arm and hand weakness- he previously reported he may  have a little more use of the hands- 2nd and 3rd fingers straightened out more. He hit his elbow and had sensation. He thought he may have some improvement   12-13 years ago, he hurt his right shoulder at work. Patient slipped and swung around, caught himself by his hand with injury, and had physical therapy.   He then had another job, hurt his shoulder, and had to have PT again. He continues doing exercises he was given by PT.   He has atrophy and reports he is dropping things. He does notice the weakness and has atrophy noted on exam.   I referred for EMG/NCS with median and ulnar neuropathy and he was seen by Dr Quiroz, hand surgery, then was referred to neurosurgery. He had MRI cervical spine with prominent diffuse cervical spondylosis, generalized reversal of the normal cervical lordosis centered at C4-5, mild left facet overgrowth contributing to mild left foraminal narrowing at C2-3 and C3-4, prominent disc space narrowing and degenerative disc and endplate changes from C4 to T1, diffuse  spurring and diffuse posterior disc bulge that abuts and mildly deforms the ventral surface of the cord moderately narrowing the canal at C4-5 and bilateral uncovertebral joint hypertrophy with moderate-to-severe bilateral foraminal narrowing at C4-5 that could affect the exiting C5 nerve roots bilaterally, at C5-6, a posterior disc osteophyte complex abuts and flattens the cord contributing to moderate-to-severe narrowing of the anterior posterior dimension of the canal with uncovertebral joint spurs contributing to moderate-to-severe bilateral bony foraminal narrowing that could affect the exiting C6 nerve roots bilaterally, questionable very minimal T2 high signal in the right lateral aspect of the cord at the C5-6 level, could be artifact or some minimal myelomalacia in the right lateral cord at the C5-6 level, posterior spurs and uncovertebral joint hypertrophy contributing to mild canal and moderate bilateral bony foraminal narrowing at C6-7 and uncovertebral joint spurring contributes to mild-to-moderate bilateral bony foraminal narrowing at C7-T1.   He was referred him to Dr Herzog, hand surgery,  Patient was seen by Dr. Lyons last 9/2020 following EMG with severe cubital tunnel and carpal tunnel syndrome.  Recommended surgery.  Patient advised to think about it and go back after the first of 2021.  Dr Herzog-wanted to do surgery on wrist and elbow. He was not sure if it would get worse if did not do it. Not sure if it will improve with surgery.   8/3/2021-underwent carpal tunnel and cubital tunnel surgery with Dr. Herzog. He was not sure he noticed improvement. It seemed like he has a little more strength.   DDD cervical spine- has been seen by neurosurgery.  Advised to see hand surgeon and follow-up in 6 weeks.  He did not return for follow-up.      From 12/2024-  Blood pressure/renal protection- has been stable on Lotensin- tolerating ok. BP at home- 115-120s/70s-80. Checking 4 x monthly- always  normal.  No low BP at home.   Hyperlipidemia- continues medication as directed and is tolerating without AE.   B12- taking supplement twice weekly.  Vitamin D-taking Vitamin D 1000 IU daily  PSA- PSA was 6- last appt 7/2024- recheck in 6 months.    It was > 4. Prostate biopsy positive for cancer. Awaiting CT and further testing for recommendations for treatment.      Taking ASA 81 mg   No change in weight at home.     EKG 1 week prior to biopsy. Has results to review.   Did not have vascular testing due to more testing and follow up with urology.    Diarrhea, history of C. difficile, history of Yersinia enterocolititica-no symptoms at this time.  Thinks spicy foods cause him to have diarrhea. Chinese or Mexican- done after 1-2 x.   He is better- no diarrhea or issues. He has not had recurrence.   Trouble with diarrhea- 2 weeks was ok then will have BM with urination. Sometimes gas and sometimes water. Started a couple months ago and occurs for 2-3 days then was ok for a couple weeks. Eating better since meeting a lady. Lapses sometimes. No pain or blood with BM. No antibiotics. No fever. No other symptoms.   Positive Yersinia culture 5/2022  Positive C. difficile culture 12/2022 and 5/2023      Lumbar radiculopathy, Right hip pain- has been ok. PT helped and did not have any other issues.   Patient had pain after lifting 350 pound family member out of their car 6/22/2021. Started with pain and thought it was related to bursitis. Previously he was treated with steroid with improvement. He put his arms around patient's neck and he got him out of the car. He initially felt pain in right low back. He took Tylenol and back pain resolved. He then developed pain in lateral and posterior right hip and sometimes pain in anterior leg. Dull pain that made him sit down. Sitting and laying down helped. Getting up and down worsened it. Tried Naproxen and extra strength Tylenol.   He had hip and back pain, however, it appeared to  be lumbar radiculopathy. He wanted to try steroid. I discussed with him that this would usually not be indicated with uncontrolled DM. I gave medrol dose pack as directed and Baclofen as needed. He was advised to monitor glucose carefully, use sliding scale, and control glucose levels.  Consideration of PT for treatment and consider imaging if no resolution of pain.  Abnormal lumbar spine x-ray with degenerative disc and osteoarthritis.  I placed order for MRI.  He did improve with physical therapy and did not have MRI.        Patient's specialists:  Cardiology- Dr Gallegos- last seen 9/2018-they confirmed no need for follow-up after last visit.  Endocrinology-Dr. Juarez last visit 10/2024 for diabetes mellitus type 2, hyperlipidemia, hypertension, vitamin D deficiency, history of colon polyps, prostate cancer.  Increase Basaglar to 18 units every evening and continue mealtime NovoLog, benazepril, Lipitor 40 mg daily, and vitamin D 1000 IU daily.  Continue follow-up with Dr. Fatima.  Follow-up in 6 months.  Patient had a major discrepancy with his blood sugars.  He is using the freestyle eva machine and when he scanned his meter for his sugars the morning of his labs with endocrinology, he had readings between 80s and 151.  However, the glucose reading from his labs was 283.  He has started checking fingerstick glucose as well as standing his meter and reports the readings are only about 20 points off.  He is not having that significant of the discrepancy.  He discussed this with the endocrinologist and they advised that he call the freestyle company.  They just sent in the new sensor.  He is a little concerned about how to accurately track his blood sugars.  He stated his average per his meter was about 145 which would not be quite 8.7% A1c.  GI-Dr. Kelly-last appointment 8/2023 for C. difficile colitis reportedly related to UTI followed by Yersinia infection with food poisoning.  He was treated by hospital ID and  given Flagyl due to cost of vancomycin.  2 courses of Flagyl later and was doing well for about a month.  No nausea, vomiting, bright red blood per rectum, or diarrhea.  No further GI intervention required.  If recurrent C. difficile, advised follow-up there to arrange Dificid treatment.  Follow-up as needed.  Infectious disease-Dr. Vega-last appointment 6/2023 for recurrent C. difficile and history of E. coli septicemia.  He was cured with most recent course of Flagyl.  It may take several weeks for his bowels to return to normal.  Discussed signs and symptoms of recurrent C. difficile and will call infectious disease if this occurs.  At that time, we will get C. difficile PCR and toxin EIA to test and confirm infection then plan to send Rx for vancomycin monitor 20 mg every 6 hours for 10 days to [facility] retail pharmacy that is typically reasonable pricing.  Discussion regarding possible FMT but did not think they were at that point.  Neurosurgery- Evi Burden PA-C-He had MRI cervical spine with prominent diffuse cervical spondylosis, generalized reversal of the normal cervical lordosis centered at C4-5, mild left facet overgrowth contributing to mild left foraminal narrowing at C2-3 and C3-4, prominent disc space narrowing and degenerative disc and endplate changes from C4 to T1, diffuse spurring and diffuse posterior disc bulge that abuts and mildly deforms the ventral surface of the cord moderately narrowing the canal at C4-5 and bilateral uncovertebral joint hypertrophy with moderate-to-severe bilateral foraminal narrowing at C4-5 that could affect the exiting C5 nerve roots bilaterally, at C5-6, a posterior disc osteophyte complex abuts and flattens the cord contributing to moderate-to-severe narrowing of the anterior posterior dimension of the canal with uncovertebral joint spurs contributing to moderate-to-severe bilateral bony foraminal narrowing that could affect the exiting C6 nerve  roots bilaterally, questionable very minimal T2 high signal in the right lateral aspect of the cord at the C5-6 level, could be artifact or some minimal myelomalacia in the right lateral cord at the C5-6 level, posterior spurs and uncovertebral joint hypertrophy contributing to mild canal and moderate bilateral bony foraminal narrowing at C6-7 and uncovertebral joint spurring contributes to mild-to-moderate bilateral bony foraminal narrowing at C7-T1. Advised to see Dr Herzog and follow up in 6 weeks.   Urology-Dr. Fatima- last seen 2/2025 for prostate cancer, BPH, right undescended testicle, ED.  Options discussed.  Increase Flomax to 2 tablets daily.  Previously prescribed Cialis.  Follow-up 6 months.  Ophthalmology- Dr Camejo- last appt 12/2021 in [facility].  No DMR.  Follow-up 1 year.  Hand surgery- Dr Herzog- last visit 8/2021 for carpal tunnel syndrome and a little tunnel syndrome-s/p right CTR release.  Advised course of home physical therapy with active and passive ROM.  Return to normal activity in 2 weeks as tolerated.  Follow-up 3 to 4 months.  EMG confirmed severe carpal tunnel and cubital tunnel syndromes-discussed surgical options.  Patient wanted to think about surgery and will let them know.   Prior Dr Quiroz who referred to neurosurgery    The following portions of the patient's history were reviewed and updated as appropriate: allergies, current medications, past family history, past medical history, past social history, past surgical history and problem list.    Review of Systems  Objective    Vitals:    02/24/25 1422   BP: 104/40   Pulse:    Temp:    SpO2:      Body mass index is 28.76 kg/m².    Physical Exam   Constitutional: He is oriented to person, place, and time. He appears well-developed. No distress.   HENT:   Head: Normocephalic and atraumatic.   Right Ear: External ear normal.   Left Ear: External ear normal.   Eyes: Conjunctivae are normal.   Neck: Carotid bruit is not present. No  tracheal deviation present. No thyroid mass and no thyromegaly present.   Cardiovascular: Normal rate, regular rhythm, normal heart sounds and normal pulses.   Pulmonary/Chest: Effort normal and breath sounds normal.   Abdominal: Normal appearance.   Musculoskeletal:      Comments: Significant atrophy and weakness RUE and right hand   Neurological: He is alert and oriented to person, place, and time. Gait normal.   Skin: Skin is warm and dry.   Psychiatric: His behavior is normal. Mood, judgment and thought content normal.   Nursing note and vitals reviewed.        Assessment & Plan   Diagnoses and all orders for this visit:    1. Weakness of right hand (Primary)    2. Spinal stenosis in cervical region    3. Degenerative disc disease, cervical    4. Cervical disc herniation             Assessment and plan  Follow-up in 12/2024 AWV and follow up or sooner if any concerns.     DDD cervical spine, herniation cervical disc, cervical spinal stenosis, right upper extremity weakness, cervical radiculopathy- To see spine specialist as directed and scheduled.  I did discuss the need to ensure controlled blood sugars, as A1c has consistently been above 8%.  We discussed healing and trying to avoid failed surgery with better control of blood sugars.  To be seen here or ER ASAP if worsening, new or changing symptoms.  Right upper extremity atrophy-cubital tunnel syndrome and carpal tunnel syndrome- Follow up with hand surgery, Dr. Lyons as recommended.      Blood pressure- Blood pressure is low today with significant pulse pressure.  Flomax was doubled 2/11/2025 at his urology appointment.  This could be contributing.  He is on Lotensin for renal protection with diabetes mellitus. Continue Lotensin 10 mg once daily. Monitor BP and pulse at least twice daily and call if low or elevated.  He should also get blood pressure readings to me in 2 days. Ensure changing positions carefully and return if low BP. We will consider  decreasing to 5 mg daily if low.     From 12/2024-  B12 deficiency-Continue B12 1000 mcg twice weekly. I will monitor and make further recommendations.   Vitamin D deficiency-Continue Vitamin D 1000IU once daily per endocrinology.   Diabetes mellitus type II uncontrolled, hyperlipidemia, and vitamin D deficiency- Last A1C remained uncontrolled at 8.6%.  He should continue treatment and follow up with endocrinology as directed by them.     Lumbar radiculopathy, Right hip pain- Patient has been stable. Follow up if worsening, new, or changing symptoms.      Prostate cancer, BPH, ED- Patient to follow with urology as directed.  C. difficile colitis, Yersinia infection, diarrhea- He had intermittent diarrhea x months and decreased appetite.  Positive stool cultures and he was treated with Flagyl x 2 courses.  He was seen by infectious disease who recommended vancomycin which was too expensive.  He was also seen by GI who advised no further treatment or workup at this time but that they will consider other treatment if recurrence of symptoms.   Borderline EKG- He had preop testing with EKG.  No acute changes.  There are a couple complexes in the inferior and lateral leads with some mild ST depression.  This is very mild and I am unsure the significance.  I asked if his previous cardiologist will over read and make recommendations if he would like to see him in follow-up or any other recommendations or interpretation.  He is asymptomatic.  Patient to be seen ASAP if he develops any symptoms.    From AWV 12/2024. He is up-to-date on colonoscopy until 2027 and is up-to-date on flu shot, Prevnar, Pneumovax, Shingrix, and hepatitis A, and Tdap.  Patient will find out date of last COVID-19 booster.  He will also consider RSV vaccination.  Last carotid duplex and triple arterial screening with bilateral sided plaque without stenosis 4/2024. He received diagnosis of prostate cancer 12/2022 and continues follow-up with urology as  directed by them.     Patient continues to see specialist as noted above.  I have reviewed available records, including consult notes, labs, and imaging/testing.  Patient to continue follow-up with specialist as directed by them.    I spent 45 minutes caring for Jeff Bernard on this date of service. This time includes time spent by me in the following activities as necessary: preparing for the visit, reviewing tests, specialists records and previous visits, obtaining and/or reviewing a separately obtained history, performing a medically appropriate exam and/or evaluation, counseling and educating the patient, family, caregiver, referring and/or communicating with other healthcare professionals, documenting information in the medical record, independently interpreting results and communicating that information with the patient, family, caregiver, and developing a medically appropriate treatment plan with consideration of other conditions, medications, and treatments.

## 2025-02-26 ENCOUNTER — READMISSION MANAGEMENT (OUTPATIENT)
Dept: CALL CENTER | Facility: HOSPITAL | Age: 70
End: 2025-02-26
Payer: MEDICARE

## 2025-02-26 NOTE — OUTREACH NOTE
Medical Week 2 Survey      Flowsheet Row Responses   [facility] patient discharged from? [place]   Does the patient have one of the following disease processes/diagnoses(primary or secondary)? Other   Week 2 attempt successful? No   Unsuccessful attempts Attempt 1            Lalita OH - Registered Nurse

## 2025-02-26 NOTE — OUTREACH NOTE
Medical Week 1 Survey      Flowsheet Row Responses   [facility] patient discharged from? [place]   Does the patient have one of the following disease processes/diagnoses(primary or secondary)? Other            Lalita T - Registered Nurse

## 2025-03-04 ENCOUNTER — READMISSION MANAGEMENT (OUTPATIENT)
Dept: CALL CENTER | Facility: HOSPITAL | Age: 70
End: 2025-03-04
Payer: MEDICARE

## 2025-03-04 NOTE — OUTREACH NOTE
Medical Week 2 Survey      Flowsheet Row Responses   [facility] patient discharged from? [place]   Does the patient have one of the following disease processes/diagnoses(primary or secondary)? Other   Week 2 attempt successful? No   Unsuccessful attempts Attempt 1   Revoke Other transitional program            Sari Mcfarland Registered Nurse

## 2025-03-07 RX ORDER — INSULIN ASPART 100 [IU]/ML
INJECTION, SOLUTION INTRAVENOUS; SUBCUTANEOUS
Qty: 60 ML | Refills: 2 | Status: SHIPPED | OUTPATIENT
Start: 2025-03-07

## 2025-03-07 NOTE — TELEPHONE ENCOUNTER
Rx Refill Note  Requested Prescriptions     Pending Prescriptions Disp Refills    insulin aspart (NovoLOG FlexPen) 100 UNIT/ML solution pen-injector sc pen [Pharmacy Med Name: NOVOLOG 100 UNIT/ML FLEXPEN] 60 mL 2     Si UNIT PER 5 G OF CARBOHYDRATE 3 TIMES A DAY WITH MEALS. MAXIMUM OF 20 UNITS PER DOSE. PRIME NEEDLE WITH 2 UNITS BEFORE EACH USE      Last office visit with prescribing clinician: 10/4/2024   Last telemedicine visit with prescribing clinician: Visit date not found   Next office visit with prescribing clinician: 2025                         Would you like a call back once the refill request has been completed: [] Yes [] No    If the office needs to give you a call back, can they leave a voicemail: [] Yes [] No    Peggy Jj  25, 09:29 EST

## 2025-03-14 ENCOUNTER — PREP FOR SURGERY (OUTPATIENT)
Dept: OTHER | Facility: HOSPITAL | Age: 70
End: 2025-03-14
Payer: MEDICARE

## 2025-03-14 ENCOUNTER — OFFICE VISIT (OUTPATIENT)
Dept: NEUROSURGERY | Facility: CLINIC | Age: 70
End: 2025-03-14
Payer: MEDICARE

## 2025-03-14 VITALS
RESPIRATION RATE: 18 BRPM | BODY MASS INDEX: 28.89 KG/M2 | HEIGHT: 73 IN | WEIGHT: 218 LBS | HEART RATE: 90 BPM | OXYGEN SATURATION: 97 %

## 2025-03-14 DIAGNOSIS — M48.02 CERVICAL STENOSIS OF SPINE: Primary | ICD-10-CM

## 2025-03-14 DIAGNOSIS — M48.02 CERVICAL STENOSIS OF SPINE: ICD-10-CM

## 2025-03-14 DIAGNOSIS — R79.9 ABNORMAL FINDING OF BLOOD CHEMISTRY, UNSPECIFIED: ICD-10-CM

## 2025-03-14 DIAGNOSIS — M50.30 DDD (DEGENERATIVE DISC DISEASE), CERVICAL: Primary | ICD-10-CM

## 2025-03-14 DIAGNOSIS — R79.1 ABNORMAL COAGULATION PROFILE: ICD-10-CM

## 2025-03-14 NOTE — PROGRESS NOTES
"Subjective   History of Present Illness: Jeff Bernard is a 69 y.o. male is here today for follow-up. Today patient reports he is doing well. He does not have any pain.  Patient was recently seen in the hospital where he was found to have significant cervical stenosis.  Patient denies any worsening symptoms of difficulty using his hands or balance issues recently.  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  No other new issues    Previous treatment:     Previous neurosurgery:      Previous injections:     The following portions of the patient's history were reviewed and updated as appropriate: allergies, current medications, past family history, past medical history, past social history, past surgical history, and problem list.    Review of Systems   Constitutional:  Positive for activity change.   Respiratory:  Negative for chest tightness and shortness of breath.    Cardiovascular:  Negative for chest pain.   Musculoskeletal:  Positive for myalgias. Negative for neck pain.       Objective      Pulse 90   Resp 18   Ht 185.4 cm (73\")   Wt 98.9 kg (218 lb)   SpO2 97%   BMI 28.76 kg/m²    Body mass index is 28.76 kg/m².  There were no vitals filed for this visit.      Neurological Exam        Assessment & Plan   Independent Review of Radiographic Studies:      I personally reviewed and interpreted the images from the following studies.    MRI cervical spine: Severe stenosis at C5-6 with cord signal change.  Areas of mild to moderate central and foraminal stenosis otherwise without high-grade stenosis and no other spinal cord compression    Medical Decision Making:      Jeff Bernard is a 69 y.o. male with severe central stenosis and spinal cord compression with cord signal change at C5-6 on imaging.  Patient has no overt signs of progressive myelopathy, however some of these symptoms may be masked by his " significant weakness and deficits of the right upper extremity due to previous injury.  In any case he has severe stenosis with cord signal change on MRI.  I recommend C5-6 ACDF for decompression of the spinal cord and to prevent worsening myelopathy and neurodeficit in the future.      Diagnoses and all orders for this visit:    1. DDD (degenerative disc disease), cervical (Primary)    2. Cervical stenosis of spine      No follow-ups on file.    This patient was examined wearing appropriate personal protective equipment.                      Dr. Vishal Ghosh IV    03/14/25  11:30 EDT

## 2025-03-25 ENCOUNTER — TELEPHONE (OUTPATIENT)
Dept: NEUROSURGERY | Facility: CLINIC | Age: 70
End: 2025-03-25
Payer: MEDICARE

## 2025-04-03 ENCOUNTER — PRE-ADMISSION TESTING (OUTPATIENT)
Dept: PREADMISSION TESTING | Facility: HOSPITAL | Age: 70
End: 2025-04-03
Payer: MEDICARE

## 2025-04-03 VITALS
HEART RATE: 63 BPM | BODY MASS INDEX: 30.93 KG/M2 | WEIGHT: 220.9 LBS | TEMPERATURE: 97.8 F | DIASTOLIC BLOOD PRESSURE: 57 MMHG | SYSTOLIC BLOOD PRESSURE: 133 MMHG | OXYGEN SATURATION: 97 % | RESPIRATION RATE: 18 BRPM | HEIGHT: 71 IN

## 2025-04-03 DIAGNOSIS — M48.02 CERVICAL STENOSIS OF SPINE: ICD-10-CM

## 2025-04-03 DIAGNOSIS — R79.1 ABNORMAL COAGULATION PROFILE: ICD-10-CM

## 2025-04-03 DIAGNOSIS — Z98.1 S/P SPINAL FUSION: Primary | ICD-10-CM

## 2025-04-03 DIAGNOSIS — R79.9 ABNORMAL FINDING OF BLOOD CHEMISTRY, UNSPECIFIED: ICD-10-CM

## 2025-04-03 LAB
ABO GROUP BLD: NORMAL
ANION GAP SERPL CALCULATED.3IONS-SCNC: 10.9 MMOL/L (ref 5–15)
BASOPHILS # BLD AUTO: 0.04 10*3/MM3 (ref 0–0.2)
BASOPHILS NFR BLD AUTO: 0.6 % (ref 0–1.5)
BILIRUB UR QL STRIP: NEGATIVE
BLD GP AB SCN SERPL QL: NEGATIVE
BUN SERPL-MCNC: 14 MG/DL (ref 8–23)
BUN/CREAT SERPL: 17.9 (ref 7–25)
CALCIUM SPEC-SCNC: 8.8 MG/DL (ref 8.6–10.5)
CHLORIDE SERPL-SCNC: 105 MMOL/L (ref 98–107)
CLARITY UR: CLEAR
CO2 SERPL-SCNC: 24.1 MMOL/L (ref 22–29)
COLOR UR: YELLOW
CREAT SERPL-MCNC: 0.78 MG/DL (ref 0.76–1.27)
DEPRECATED RDW RBC AUTO: 41.9 FL (ref 37–54)
EGFRCR SERPLBLD CKD-EPI 2021: 95.3 ML/MIN/1.73
EOSINOPHIL # BLD AUTO: 0.13 10*3/MM3 (ref 0–0.4)
EOSINOPHIL NFR BLD AUTO: 2 % (ref 0.3–6.2)
ERYTHROCYTE [DISTWIDTH] IN BLOOD BY AUTOMATED COUNT: 12.4 % (ref 12.3–15.4)
GLUCOSE SERPL-MCNC: 178 MG/DL (ref 65–99)
GLUCOSE UR STRIP-MCNC: ABNORMAL MG/DL
HBA1C MFR BLD: 8.3 % (ref 4.8–5.6)
HCT VFR BLD AUTO: 43.7 % (ref 37.5–51)
HGB BLD-MCNC: 14.1 G/DL (ref 13–17.7)
HGB UR QL STRIP.AUTO: NEGATIVE
IMM GRANULOCYTES # BLD AUTO: 0.01 10*3/MM3 (ref 0–0.05)
IMM GRANULOCYTES NFR BLD AUTO: 0.2 % (ref 0–0.5)
INR PPP: 1.09 (ref 0.9–1.1)
KETONES UR QL STRIP: ABNORMAL
LEUKOCYTE ESTERASE UR QL STRIP.AUTO: NEGATIVE
LYMPHOCYTES # BLD AUTO: 1.65 10*3/MM3 (ref 0.7–3.1)
LYMPHOCYTES NFR BLD AUTO: 25.2 % (ref 19.6–45.3)
MCH RBC QN AUTO: 29.6 PG (ref 26.6–33)
MCHC RBC AUTO-ENTMCNC: 32.3 G/DL (ref 31.5–35.7)
MCV RBC AUTO: 91.6 FL (ref 79–97)
MONOCYTES # BLD AUTO: 0.45 10*3/MM3 (ref 0.1–0.9)
MONOCYTES NFR BLD AUTO: 6.9 % (ref 5–12)
NEUTROPHILS NFR BLD AUTO: 4.26 10*3/MM3 (ref 1.7–7)
NEUTROPHILS NFR BLD AUTO: 65.1 % (ref 42.7–76)
NITRITE UR QL STRIP: NEGATIVE
NRBC BLD AUTO-RTO: 0 /100 WBC (ref 0–0.2)
PH UR STRIP.AUTO: 5.5 [PH] (ref 5–8)
PLATELET # BLD AUTO: 249 10*3/MM3 (ref 140–450)
PMV BLD AUTO: 9.2 FL (ref 6–12)
POTASSIUM SERPL-SCNC: 4.6 MMOL/L (ref 3.5–5.2)
PROT UR QL STRIP: NEGATIVE
PROTHROMBIN TIME: 14 SECONDS (ref 11.7–14.2)
RBC # BLD AUTO: 4.77 10*6/MM3 (ref 4.14–5.8)
RH BLD: POSITIVE
SODIUM SERPL-SCNC: 140 MMOL/L (ref 136–145)
SP GR UR STRIP: 1.02 (ref 1–1.03)
T&S EXPIRATION DATE: NORMAL
UROBILINOGEN UR QL STRIP: ABNORMAL
WBC NRBC COR # BLD AUTO: 6.54 10*3/MM3 (ref 3.4–10.8)

## 2025-04-03 PROCEDURE — 86900 BLOOD TYPING SEROLOGIC ABO: CPT | Performed by: NEUROLOGICAL SURGERY

## 2025-04-03 PROCEDURE — 83036 HEMOGLOBIN GLYCOSYLATED A1C: CPT

## 2025-04-03 PROCEDURE — 86850 RBC ANTIBODY SCREEN: CPT | Performed by: NEUROLOGICAL SURGERY

## 2025-04-03 PROCEDURE — 36415 COLL VENOUS BLD VENIPUNCTURE: CPT

## 2025-04-03 PROCEDURE — 81003 URINALYSIS AUTO W/O SCOPE: CPT

## 2025-04-03 PROCEDURE — 86901 BLOOD TYPING SEROLOGIC RH(D): CPT | Performed by: NEUROLOGICAL SURGERY

## 2025-04-03 PROCEDURE — 85610 PROTHROMBIN TIME: CPT

## 2025-04-03 PROCEDURE — 80048 BASIC METABOLIC PNL TOTAL CA: CPT

## 2025-04-03 PROCEDURE — 85025 COMPLETE CBC W/AUTO DIFF WBC: CPT

## 2025-04-03 NOTE — DISCHARGE INSTRUCTIONS
Take the following medications the morning of surgery:    NONE    If you are on prescription narcotic pain medication to control your pain you may also take that medication the morning of surgery.      General Instructions:     Do not eat solid food after midnight the night before surgery.  Clear liquids day of surgery are allowed but must be stopped at least two hours before your hospital arrival time.       Allowed clear liquids      Water, sodas, and tea or coffee with no cream or milk added.       12 to 20 ounces of a clear liquid that contains carbohydrates is recommended.  If non-diabetic, have Gatorade or Powerade.  If diabetic, have G2 or Powerade Zero.     Do not have liquids red in color.  Do not consume chicken, beef, pork or vegetable broth or bouillon cubes of any variety as they are not considered clear liquids and are not allowed.      Infants may have breast milk up to four hours before surgery.  Infants drinking formula may drink formula up to six hours before surgery.   Patients who avoid smoking, chewing tobacco and alcohol for 4 weeks prior to surgery have a reduced risk of post-operative complications.  Quit smoking as many days before surgery as you can.  Do not smoke, use chewing tobacco or drink alcohol the day of surgery.   If applicable bring your C-PAP/ BI-PAP machine in with you to preop day of surgery.  Bring any papers given to you in the doctor’s office.  Wear clean comfortable clothes.  Do not wear contact lenses, false eyelashes or make-up.  Bring a case for your glasses.   Bring crutches or walker if applicable.  Remove all piercings.  Leave jewelry and any other valuables at home.  Hair extensions with metal clips must be removed prior to surgery.  The Pre-Admission Testing nurse will instruct you to bring medications if unable to obtain an accurate list in Pre-Admission Testing.    Day of surgery you will need to let the preoperative nurse know the last time you took each of your  medications.  To ensure a safe environment for patients and staff, we kindly ask that children under the age of 16 not accompany patients.  If you must bring a dependent child or dependent adult please ensure a responsible adult, other than yourself, is present to supervise them.      If you were given a blood bank ID arm band remember to bring it with you the day of surgery.    Preventing a Surgical Site Infection:  For 2 to 3 days before surgery, avoid shaving with a razor because the razor can irritate skin and make it easier to develop an infection.    Any areas of open skin can increase the risk of a post-operative wound infection by allowing bacteria to enter and travel throughout the body.  Notify your surgeon if you have any skin wounds / rashes even if it is not near the expected surgical site.  The area will need assessed to determine if surgery should be delayed until it is healed.  The night prior to surgery shower using a fresh bar of anti-bacterial soap (such as Dial) and clean washcloth.  Sleep in a clean bed with clean clothing.  Do not allow pets to sleep with you.  Shower on the morning of surgery using a fresh bar of anti-bacterial soap (such as Dial) and clean washcloth.  Dry with a clean towel and dress in clean clothing.  Ask your surgeon if you will be receiving antibiotics prior to surgery.  Make sure you, your family, and all healthcare providers clean their hands with soap and water or an alcohol based hand  before caring for you or your wound.    Day of surgery:  Your arrival time is approximately two hours before your scheduled surgery time.  Please note if you have an early arrival time the surgery doors do not open before 5:00 AM.  Upon arrival, a Pre-op nurse and Anesthesiologist will review your health history, obtain vital signs, and answer questions you may have.  The only belongings needed at this time will be a list of your home medications and if applicable your  C-PAP/BI-PAP machine.  A Pre-op nurse will start an IV and you may receive medication in preparation for surgery, including something to help you relax.     Please be aware that surgery does come with discomfort.  We want to make every effort to control your discomfort so please discuss any uncontrolled symptoms with your nurse.   Your doctor will most likely have prescribed pain medications.      If you are going home after surgery you will receive individualized written care instructions before being discharged.  A responsible adult must drive you to and from the hospital on the day of your surgery and ideally stay with you through the night.   .  Discharge prescriptions can be filled by the hospital pharmacy during regular pharmacy hours.  If you are having surgery late in the day/evening your prescription may be e-prescribed to your pharmacy.  Please verify your pharmacy hours or chose a 24 hour pharmacy to avoid not having access to your prescription because your pharmacy has closed for the day.    If you are staying overnight following surgery, you will be transported to your hospital room following the recovery period.  [facility] has all private rooms.    If you have any questions please call Pre-Admission Testing at [phone].  Deductibles and co-payments are collected on the day of service. Please be prepared to pay the required co-pay, deductible or deposit on the day of service as defined by your plan.    Call your surgeon immediately if you experience any of the following symptoms:  Sore Throat  Shortness of Breath or difficulty breathing  Cough  Chills  Body soreness or muscle pain  Headache  Fever  New loss of taste or smell  Do not arrive for your surgery ill.  Your procedure will need to be rescheduled to another time.  You will need to call your physician before the day of surgery to avoid any unnecessary exposure to hospital staff as well as other patients.        CHLORHEXIDINE  CLOTH INSTRUCTIONS  The morning of surgery follow these instructions using the Chlorhexidine cloths you've been given.  These steps reduce bacteria on the body.  Do not use the cloths near your eyes, ears mouth, genitalia or on open wounds.  Throw the cloths away after use but do not try to flush them down a toilet.      Open and remove one cloth at a time from the package.    Leave the cloth unfolded and begin the bathing.  Massage the skin with the cloths using gentle pressure to remove bacteria.  Do not scrub harshly.   Follow the steps below with one 2% CHG cloth per area (6 total cloths).  One cloth for neck, shoulders and chest.  One cloth for both arms, hands, fingers and underarms (do underarms last).  One cloth for the abdomen followed by groin.  One cloth for right leg and foot including between the toes.  One cloth for left leg and foot including between the toes.  The last cloth is to be used for the back of the neck, back and buttocks.    Allow the CHG to air dry 3 minutes on the skin which will give it time to work and decrease the chance of irritation.  The skin may feel sticky until it is dry.  Do not rinse with water or any other liquid or you will lose the beneficial effects of the CHG.  If mild skin irritation occurs, do rinse the skin to remove the CHG.  Report this to the nurse at time of admission.  Do not apply lotions, creams, ointments, deodorants or perfumes after using the clothes. Dress in clean clothes before coming to the hospital.

## 2025-04-04 ENCOUNTER — OFFICE VISIT (OUTPATIENT)
Dept: ENDOCRINOLOGY | Age: 70
End: 2025-04-04
Payer: MEDICARE

## 2025-04-04 VITALS
OXYGEN SATURATION: 97 % | DIASTOLIC BLOOD PRESSURE: 60 MMHG | WEIGHT: 220.2 LBS | SYSTOLIC BLOOD PRESSURE: 118 MMHG | HEART RATE: 68 BPM | BODY MASS INDEX: 30.71 KG/M2 | TEMPERATURE: 98.5 F

## 2025-04-04 DIAGNOSIS — I10 ESSENTIAL (PRIMARY) HYPERTENSION: ICD-10-CM

## 2025-04-04 DIAGNOSIS — I10 ESSENTIAL HYPERTENSION: ICD-10-CM

## 2025-04-04 DIAGNOSIS — Z85.46 HISTORY OF PROSTATE CANCER: ICD-10-CM

## 2025-04-04 DIAGNOSIS — Z79.4 TYPE 2 DIABETES MELLITUS WITH MILD NONPROLIFERATIVE RETINOPATHY WITHOUT MACULAR EDEMA, WITH LONG-TERM CURRENT USE OF INSULIN, UNSPECIFIED LATERALITY: Primary | ICD-10-CM

## 2025-04-04 DIAGNOSIS — Z86.0100 HISTORY OF COLON POLYPS: ICD-10-CM

## 2025-04-04 DIAGNOSIS — E78.5 HYPERLIPIDEMIA, UNSPECIFIED HYPERLIPIDEMIA TYPE: ICD-10-CM

## 2025-04-04 DIAGNOSIS — E11.3299 TYPE 2 DIABETES MELLITUS WITH MILD NONPROLIFERATIVE RETINOPATHY WITHOUT MACULAR EDEMA, WITH LONG-TERM CURRENT USE OF INSULIN, UNSPECIFIED LATERALITY: Primary | ICD-10-CM

## 2025-04-04 DIAGNOSIS — E55.9 VITAMIN D DEFICIENCY: ICD-10-CM

## 2025-04-04 NOTE — LETTER
12/26/2019       RE: Philip Delacruz  [address] [facility] [address]     Dear Colleague,    Thank you for referring your patient, Philip Delacruz, to the [facility] LUNG [facility]ENCE [facility] at [facility]. Please see a copy of my visit note below.    Nutrition Note    Reason for Visit: Per pt/provider request to discuss enzymes    Ongoing GI concerns. Currently taking Creon 24,000 - 4-5 caps with meals and 2-3 with snacks = 1500 units lipase/kg/meal. Reports he did trial increasing enzyme dose to 7-8 caps with meals (2400 units lipase/kg/meal) without improvement. Also trialed omeprazole.     Thinks GI symptoms were most improved when off Symdeko and reports stools more firm only 1-2x/day. Was off Symdeko May-December and just restarted 1 week ago. Now back up to stools 4-5x/day however may be multi-factorial as pt also started antibiotics at this time.     Interventions/Recommendations:  Per provider, plan for pt to switch from Symdeko to Trikafta. Discussed with pt about only making one change at a time. When pt has transitioned to Trikafta for 2-4 weeks and if/when GI symptoms seem stable (not getting worse or better), could then trial changing enzyme brands as pt has been on Creon since 2015. Pt specifically asked about Pertzye enzyme brand, however this will not be covered by insurance until he has tried/failed 2 enzyme brands.     Will send follow-up message via [employer] with enzyme plan. Will encourage pt to contact RD after stable on Trikafta; then RD may send prescription for Zenpep 25,000 - 4-5 caps with meals and 3 with snacks (1560 units lipase/kg/meal) if appropriate for nutrition plan.        Nisa Dumont RD, LD  Cystic Fibrosis/Lung Transplant Dietitian  Pager [phone]           Reason for Visit  Philip Delacruz is a 35 year old year old male who is being seen for Follow Up (cf)      Assessment and plan:   Shar Delacruz is a 35-year-old  April 4, 2025     TAD Mccartney  [address] [facility] [address]    Patient: Jeff Bernard   YOB: 1955   Date of Visit: 4/4/2025     Dear TAD Mccartney:       Thank you for referring Jeff Bernard to me for evaluation. Below are the relevant portions of my assessment and plan of care.    If you have questions, please do not hesitate to call me. I look forward to following Jeff along with you.         Sincerely,        Salinas Juarez MD        CC: MD Vishal Oreilly IV, MD Kevin M Snipes, Salinas Mancia MD  04/04/25 1230  Sign when Signing Visit  Subjective  Jeff Bernard is a 69 y.o. male.     History of Present Illness     Patient has known diabetes mellitus since 1999 and started on insulin in 2000. He is on Lantus 18 units every evening and NovoLog 1 unit per 5 g of carbohydrate before each meal.  He is using a freestyle eva 2.  His last meal was 8:30 AM.     Sensor data from March 22, 2025 to April 4, 2025 reviewed.  Average glucose 177 mg per DL.  Time in range 57%.  Time above range 39%.  Time below range 4%.    Fasting glucose is 118 or higher.  He has occasional hyperglycemia mostly after supper.  He has intermittent postprandial hyperglycemia after lunch and after supper.     His last eye examination was in January 2025.  He has nonproliferative diabetic retinopathy.  He will see an ophthalmologist for possible cataract surgery.  He denies any associated nephropathy. Urine microalbumin was normal in 6/23. He is on benazepril. He denies any numbness, tingling or burning in his feet.      He has hyperlipidemia and has been on Lipitor 40 mg once a day. He denies any muscle pain.      He has hypertension and is on benazepril 10 mg/day.  He denies any previous history of myocardial infarction, heart failure, or stroke. He denies chest pain, shortness of breath or pedal edema.  He had a normal nuclear stress test in 4/18     He had a  colonoscopy done in March 2017 and polyps were removed by Dr. Kelly.  Pathology report was read as tubular adenoma with low-grade dysplasia and hyperplastic polyps.      He had tubular adenoma with low-grade dysplasia removed by colonoscopy in August 2022.  He was advised repeat colonoscopy in 2027.     He has vitamin D deficiency  and is on vitamin D3 1000 units/day.     He has prostate cancer on biopsy done in November 2022.  He was complicated by sepsis.  He has completed IV antibiotic therapy.  He has completed treatment for C. difficile colitis.  He is on Flomax 0.4 mg daily.  He denies urinary symptoms.  He had a prostate biopsy in December 2023.  He follows with Dr. Fatima.    He is scheduled for a C5-C6 discectomy by Dr. Ghosh on April 9, 2025    The following portions of the patient's history were reviewed and updated as appropriate: allergies, current medications, past family history, past medical history, past social history, past surgical history, and problem list.    Review of Systems   Eyes:  Negative for visual disturbance.   Respiratory: Negative.  Negative for shortness of breath.    Cardiovascular:  Negative for chest pain and palpitations.   Gastrointestinal: Negative.    Genitourinary:  Negative for difficulty urinating and hematuria.   Musculoskeletal:  Negative for myalgias.   Neurological:  Negative for numbness.     Vitals:    04/04/25 1139   BP: 118/60   BP Location: Left arm   Patient Position: Sitting   Cuff Size: Adult   Pulse: 68   Temp: 98.5 °F (36.9 °C)   TempSrc: Oral   SpO2: 97%   Weight: 99.9 kg (220 lb 3.2 oz)      Objective  Physical Exam  Constitutional:       General: He is not in acute distress.     Appearance: Normal appearance. He is not ill-appearing, toxic-appearing or diaphoretic.   Eyes:      General: No scleral icterus.        Right eye: No discharge.         Left eye: No discharge.   Neck:      Vascular: No carotid bruit.   Cardiovascular:      Rate and Rhythm: Normal  male with cystic fibrosis with mild lung disease and pancreatic insufficiency.     Pulmonary: The patient appears to be doing well from a pulmonary standpoint.  He has excellent exercise tolerance.  Minimal cough or sputum production.  He is oxygenating well.  PFTs are similar to his recent best.  He does not appear to be having an exacerbation at this time.  Currently he does not do any formal airway clearance therapy.  He was encouraged to exercise regularly.  If there is any decline and PFTs, formal airway clearance will be recommended.    Pancreatic insufficiency: The patient reports loose stools although improving.  Is unclear what is related to antibiotics, Symdeko or pancreatic insufficiency.  His weight has gradually improved over the past few years and his BMI is adequate.  He will continue his current pancreatic enzymes and supplemental vitamins.  The patient inquired about changing to Pertzye.  This will be considered with his next visit depending on his response to adjustment of his CFTR Modulation.    CFTR Modulation:  The patient has 1 copy of T082sqf and 1 copy of O0638H. The patient is currently on Symdeko with marked improvement in his pulmonary and sinus complaints..  In view of the improved lung function and symptoms with the Trikafta clinical trial, I have recommended that he change to the triple therapy modulator.  I have reviewed the indications, side effects, trial results, medication interactions and dosing with the patient (see AVS for details).  I have submitted a prescription and have recommended that he change once the medication is available.  LFTs and CK will be checked today and quarterly for the next year.    CF related sinus disease: The patient had recurrent sinusitis through the fall.  He has had marked symptomatic improvement since reinitiating Symdeko.  See above for recommendations for his CFTR modulator.    Mental health: The patient reports improvement in his anxiety symptoms  "with Lexapro.    Healthcare maintenance: The patient received his influenza vaccine in October.  Annual studies will be due in May.    LFTs and CK will be checked today in anticipation of initiating Trikafta.  Follow-up in 3 months with LFTs, CK, PFTs and sputum culture.  CF HPI  The patient was seen and examined by True Sahni MD   Shar Delacruz is a 35-year-old male with cystic fibrosis with mild lung disease and pancreatic insufficiency.   The patient reports feeling poorly all fall with sinus and respiratory complaints.  He has received multiple courses of antibiotics including Augmentin in September and Bactrim in November.  He also received Cefdinir for a \"skin infection in his nose\".  He has had a waxing and waning cough and sputum production as well as thick nasal secretions partially relieved with nasal washes.  He had been on Symdeko in the past but stopped it due to GI side effects.  He reinitiated it last week with marked improvement in his sinus symptoms, cough and sputum production.  Currently breathing is comfortable at rest and with all activity.  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  He reports his cough is infrequent with minimal yellow sputum.  He denies hemoptysis.  He denies chest pain.  No recent fever, chills or night sweats.  He does not do any formal airway clearance therapy.    Review of systems:  Appetite is excellent  Currently no ear pain, sore throat, sinus pain or rhinorrhea.  At his last visit, he was encouraged to use Prilosec to improve the efficacy of his pancreatic enzymes.  He used it for about 2 weeks and then stopped due to concerns about side effects from long-term use.  He reports loose stools but is unclear if this is related to recent antibiotics or reinitiation of Symdeko.  He did note that there was some symptomatic improvement with Lexapro.  Anxiety improved with Lexapro.  A complete ROS was " rate and regular rhythm.   Pulmonary:      Breath sounds: Normal breath sounds. No rales.   Chest:      Chest wall: No tenderness.   Abdominal:      General: Bowel sounds are normal.      Palpations: Abdomen is soft.      Tenderness: There is no right CVA tenderness or left CVA tenderness.   Musculoskeletal:      Comments: Feet warm.  No cyanosis or clubbing.  No pedal edema.   Lymphadenopathy:      Cervical: No cervical adenopathy.   Neurological:      Mental Status: He is alert and oriented to person, place, and time.      Comments: Intact light touch in both feet.       Pre-Admission Testing on 04/03/2025   Component Date Value Ref Range Status   • Glucose 04/03/2025 178 (H)  65 - 99 mg/dL Final   • BUN 04/03/2025 14  8 - 23 mg/dL Final   • Creatinine 04/03/2025 0.78  0.76 - 1.27 mg/dL Final   • Sodium 04/03/2025 140  136 - 145 mmol/L Final   • Potassium 04/03/2025 4.6  3.5 - 5.2 mmol/L Final   • Chloride 04/03/2025 105  98 - 107 mmol/L Final   • CO2 04/03/2025 24.1  22.0 - 29.0 mmol/L Final   • Calcium 04/03/2025 8.8  8.6 - 10.5 mg/dL Final   • BUN/Creatinine Ratio 04/03/2025 17.9  7.0 - 25.0 Final   • Anion Gap 04/03/2025 10.9  5.0 - 15.0 mmol/L Final   • eGFR 04/03/2025 95.3  >60.0 mL/min/1.73 Final   • Protime 04/03/2025 14.0  11.7 - 14.2 Seconds Final   • INR 04/03/2025 1.09  0.90 - 1.10 Final   • Color, UA 04/03/2025 Yellow  Yellow, Straw Final   • Appearance, UA 04/03/2025 Clear  Clear Final   • pH, UA 04/03/2025 5.5  5.0 - 8.0 Final   • Specific Gravity, UA 04/03/2025 1.021  1.005 - 1.030 Final   • Glucose, UA 04/03/2025 100 mg/dL (Trace) (A)  Negative Final   • Ketones, UA 04/03/2025 15 mg/dL (1+) (A)  Negative Final   • Bilirubin, UA 04/03/2025 Negative  Negative Final   • Blood, UA 04/03/2025 Negative  Negative Final   • Protein, UA 04/03/2025 Negative  Negative Final   • Leuk Esterase, UA 04/03/2025 Negative  Negative Final   • Nitrite, UA 04/03/2025 Negative  Negative Final   • Urobilinogen, UA  04/03/2025 1.0 E.U./dL  0.2 - 1.0 E.U./dL Final   • Hemoglobin A1C 04/03/2025 8.30 (H)  4.80 - 5.60 % Final   • WBC 04/03/2025 6.54  3.40 - 10.80 10*3/mm3 Final   • RBC 04/03/2025 4.77  4.14 - 5.80 10*6/mm3 Final   • Hemoglobin 04/03/2025 14.1  13.0 - 17.7 g/dL Final   • Hematocrit 04/03/2025 43.7  37.5 - 51.0 % Final   • MCV 04/03/2025 91.6  79.0 - 97.0 fL Final   • MCH 04/03/2025 29.6  26.6 - 33.0 pg Final   • MCHC 04/03/2025 32.3  31.5 - 35.7 g/dL Final   • RDW 04/03/2025 12.4  12.3 - 15.4 % Final   • RDW-SD 04/03/2025 41.9  37.0 - 54.0 fl Final   • MPV 04/03/2025 9.2  6.0 - 12.0 fL Final   • Platelets 04/03/2025 249  140 - 450 10*3/mm3 Final   • Neutrophil % 04/03/2025 65.1  42.7 - 76.0 % Final   • Lymphocyte % 04/03/2025 25.2  19.6 - 45.3 % Final   • Monocyte % 04/03/2025 6.9  5.0 - 12.0 % Final   • Eosinophil % 04/03/2025 2.0  0.3 - 6.2 % Final   • Basophil % 04/03/2025 0.6  0.0 - 1.5 % Final   • Immature Grans % 04/03/2025 0.2  0.0 - 0.5 % Final   • Neutrophils, Absolute 04/03/2025 4.26  1.70 - 7.00 10*3/mm3 Final   • Lymphocytes, Absolute 04/03/2025 1.65  0.70 - 3.10 10*3/mm3 Final   • Monocytes, Absolute 04/03/2025 0.45  0.10 - 0.90 10*3/mm3 Final   • Eosinophils, Absolute 04/03/2025 0.13  0.00 - 0.40 10*3/mm3 Final   • Basophils, Absolute 04/03/2025 0.04  0.00 - 0.20 10*3/mm3 Final   • Immature Grans, Absolute 04/03/2025 0.01  0.00 - 0.05 10*3/mm3 Final   • nRBC 04/03/2025 0.0  0.0 - 0.2 /100 WBC Final   • ABO Type 04/03/2025 AB   Final   • RH type 04/03/2025 Positive   Final   • Antibody Screen 04/03/2025 Negative   Final   • T&S Expiration Date 04/03/2025 4/17/2025 11:59:00 PM   Final     Assessment & Plan  Diagnoses and all orders for this visit:    1. Type 2 diabetes mellitus with mild nonproliferative retinopathy without macular edema, with long-term current use of insulin, unspecified laterality (Primary)  -     Microalbumin / Creatinine Urine Ratio - Urine, Clean Catch  -     Lipid Panel  -     TSH  otherwise negative except as noted in the HPI.    Current Outpatient Medications   Medication     calcium carbonate 600 mg-vitamin D 400 units (CALTRATE) 600-400 MG-UNIT per tablet     elexacaftor-tezacaftor-ivacaftor & ivacaftor (TRIKAFTA) 100-50-75 & 150 MG tablet pack     oseltamivir (TAMIFLU) 75 MG capsule     Probiotic Product (FLORAJEN BIFIDOBLEND) CAPS     ACYCLOVIR PO     albuterol (PROAIR HFA/PROVENTIL HFA/VENTOLIN HFA) 108 (90 Base) MCG/ACT inhaler     amylase-lipase-protease (CREON) 88475 units CPEP     escitalopram (LEXAPRO) 20 MG tablet     fluticasone (FLONASE) 50 MCG/ACT nasal spray     multivitamin CF formula (MVW COMPLETE FORMULATION) chewable tablet     Omega-3 Fatty Acids (FISH OIL) 1200 MG CPDR     order for DME     order for DME     Sodium Chloride-Sodium Bicarb (SINUFLO READYRINSE) KIT     Wheat Dextrin (BENEFIBER PO)     ZIRGAN 0.15 % GEL     No current facility-administered medications for this visit.      Allergies   Allergen Reactions     Liquid Adhesive Rash     Tagederm     Tegaderm Transparent Dressing (Informational Only) Rash     Past Medical History:   Diagnosis Date     Chronic sinusitis      Congenital absence of vas deferens, bilateral      Cystic fibrosis (H)     Delta F508 and [id number]      Nasal polyps      Sinusitis, chronic        Past Surgical History:   Procedure Laterality Date     HC TOOTH EXTRACTION W/FORCEP       NASAL/SINUS POLYPECTOMY       REPAIR PECTUS EXCAVATUM  1997     SINUS SURGERY  1992, 2002, 2004    Removed tubinates and polyps OSH     Sperm harvest         Social History     Socioeconomic History     Marital status: Single     Spouse name: Not on file     Number of children: Not on file     Years of education: Not on file     Highest education level: Not on file   Occupational History     Occupation: Teacher   Social Needs     Financial resource strain: Not on file     Food insecurity:     Worry: Not on file     Inability: Not on file     Transportation needs:  "    Medical: Not on file     Non-medical: Not on file   Tobacco Use     Smoking status: Never Smoker     Smokeless tobacco: Never Used   Substance and Sexual Activity     Alcohol use: Yes     Alcohol/week: 0.0 standard drinks     Comment: 1 drink 3X per week     Drug use: No     Sexual activity: Yes     Partners: Female     Birth control/protection: None   Lifestyle     Physical activity:     Days per week: Not on file     Minutes per session: Not on file     Stress: Not on file   Relationships     Social connections:     Talks on phone: Not on file     Gets together: Not on file     Attends [employer] service: Not on file     Active member of club or organization: Not on file     Attends meetings of clubs or organizations: Not on file     Relationship status: Not on file     Intimate partner violence:     Fear of current or ex partner: Not on file     Emotionally abused: Not on file     Physically abused: Not on file     Forced sexual activity: Not on file   Other Topics Concern     Parent/sibling w/ CABG, MI or angioplasty before 65F 55M? Not Asked   Social History Narrative    .  Lives in [place] with wife, Aimee, and sons, Shay (3) Serafin (3mo's).         /85   Pulse 60   Ht 1.84 m (6' 0.44\")   Wt 80.5 kg (177 lb 7.5 oz)   SpO2 98%   BMI 23.78 kg/m     Body mass index is 23.78 kg/m .  Exam:   GENERAL APPEARANCE: Well developed, well nourished, alert, and in no apparent distress.  EYES: PERRL, EOMI  HENT: Nasal mucosa with edema and hyperemia. No nasal polyps.  EARS: Canals clear, TMs normal  MOUTH: Oral mucosa is moist, without any lesions, no tonsillar enlargement, no oropharyngeal exudate.  NECK: supple, no masses, no thyromegaly.  LYMPHATICS: No significant axillary, cervical, or supraclavicular nodes.  RESP: normal percussion, good air flow throughout.  No crackles. No rhonchi. No wheezes.  CV: Normal S1, S2, regular rhythm, normal rate. No murmur.  No rub. No gallop. No LE " Rfx On Abnormal To Free T4    2. Hyperlipidemia, unspecified hyperlipidemia type  -     Lipid Panel  -     TSH Rfx On Abnormal To Free T4    3. Essential (primary) hypertension    4. History of colon polyps    5. Vitamin D deficiency  -     Vitamin D,25-Hydroxy    6. History of prostate cancer    7. Essential hypertension      Continue Lantus 18 units every evening and NovoLog at mealtime.  Follow-up with ophthalmologist as scheduled.    Continue Lipitor 40 mg/day.    Continue benazepril.    Follow-up colonoscopy in 2027.    Continue vitamin D3 1000 units/day.    Continue Flomax.  Follow-up with Dr. Fatima.    RTC 6 mos.    Copy of my note sent to TAD Mccartney, Dr. Ghosh , Dr. Fatima and Dr. Quentin Camejo                   edema.   ABDOMEN:  Bowel sounds normal, soft, nontender, no HSM or masses.   MS: extremities normal. No clubbing. No cyanosis.  SKIN: no rash on limited exam  NEURO: Mentation intact, speech normal, normal strength and tone, normal gait and stance  PSYCH: mentation appears normal. and affect normal/bright  Results:  Recent Results (from the past 168 hour(s))   General PFT Lab (Please always keep checked)    Collection Time: 12/26/19  9:52 AM   Result Value Ref Range    FVC-Pred 5.77 L    FVC-Pre 5.17 L    FVC-%Pred-Pre 89 %    FEV1-Pre 4.39 L    FEV1-%Pred-Pre 94 %    FEV1FVC-Pred 81 %    FEV1FVC-Pre 85 %    FEFMax-Pred 10.78 L/sec    FEFMax-Pre 10.72 L/sec    FEFMax-%Pred-Pre 99 %    FEF2575-Pred 4.50 L/sec    FEF2575-Pre 5.29 L/sec    PTO7149-%Pred-Pre 117 %    ExpTime-Pre 6.06 sec    FIFMax-Pre 7.95 L/sec    FEV1FEV6-Pred 82 %    FEV1FEV6-Pre 85 %   Cystic Fibrosis Culture Aerob Bacterial    Collection Time: 12/26/19 10:30 AM   Result Value Ref Range    Specimen Description Throat     Special Requests Specimen collected in eSwab transport (white cap)     Culture Micro Moderate growth  Normal haile      Hepatic panel    Collection Time: 12/26/19 12:15 PM   Result Value Ref Range    Bilirubin Direct 0.1 0.0 - 0.2 mg/dL    Bilirubin Total 0.6 0.2 - 1.3 mg/dL    Albumin 4.1 3.4 - 5.0 g/dL    Protein Total 8.0 6.8 - 8.8 g/dL    Alkaline Phosphatase 101 40 - 150 U/L    ALT 34 0 - 70 U/L    AST 25 0 - 45 U/L   CK total    Collection Time: 12/26/19 12:15 PM   Result Value Ref Range    CK Total 145 30 - 300 U/L                   Results as noted above.    PFT Interpretation:  Normal spirometry.  Unchanged from previous.   Similar to recent best.  Valid Maneuver    CF Exacerbation  Absent          Again, thank you for allowing me to participate in the care of your patient.      Sincerely,    True Sahni MD

## 2025-04-04 NOTE — PROGRESS NOTES
Subjective   Jeff Bernard is a 69 y.o. male.     History of Present Illness     Patient has known diabetes mellitus since 1999 and started on insulin in 2000. He is on Lantus 18 units every evening and NovoLog 1 unit per 5 g of carbohydrate before each meal.  He is using a freestyle eva 2.  His last meal was 8:30 AM.     Sensor data from March 22, 2025 to April 4, 2025 reviewed.  Average glucose 177 mg per DL.  Time in range 57%.  Time above range 39%.  Time below range 4%.    Fasting glucose is 118 or higher.  He has occasional hyperglycemia mostly after supper.  He has intermittent postprandial hyperglycemia after lunch and after supper.     His last eye examination was in January 2025.  He has nonproliferative diabetic retinopathy.  He will see an ophthalmologist for possible cataract surgery.  He denies any associated nephropathy. Urine microalbumin was normal in 6/23. He is on benazepril. He denies any numbness, tingling or burning in his feet.      He has hyperlipidemia and has been on Lipitor 40 mg once a day. He denies any muscle pain.      He has hypertension and is on benazepril 10 mg/day.  He denies any previous history of myocardial infarction, heart failure, or stroke. He denies chest pain, shortness of breath or pedal edema.  He had a normal nuclear stress test in 4/18     He had a colonoscopy done in March 2017 and polyps were removed by Dr. Kelly.  Pathology report was read as tubular adenoma with low-grade dysplasia and hyperplastic polyps.      He had tubular adenoma with low-grade dysplasia removed by colonoscopy in August 2022.  He was advised repeat colonoscopy in 2027.     He has vitamin D deficiency  and is on vitamin D3 1000 units/day.     He has prostate cancer on biopsy done in November 2022.  He was complicated by sepsis.  He has completed IV antibiotic therapy.  He has completed treatment for C. difficile colitis.  He is on Flomax 0.4 mg daily.  He denies urinary symptoms.  He had  a prostate biopsy in December 2023.  He follows with Dr. Fatima.    He is scheduled for a C5-C6 discectomy by Dr. Ghosh on April 9, 2025    The following portions of the patient's history were reviewed and updated as appropriate: allergies, current medications, past family history, past medical history, past social history, past surgical history, and problem list.    Review of Systems   Eyes:  Negative for visual disturbance.   Respiratory: Negative.  Negative for shortness of breath.    Cardiovascular:  Negative for chest pain and palpitations.   Gastrointestinal: Negative.    Genitourinary:  Negative for difficulty urinating and hematuria.   Musculoskeletal:  Negative for myalgias.   Neurological:  Negative for numbness.     Vitals:    04/04/25 1139   BP: 118/60   BP Location: Left arm   Patient Position: Sitting   Cuff Size: Adult   Pulse: 68   Temp: 98.5 °F (36.9 °C)   TempSrc: Oral   SpO2: 97%   Weight: 99.9 kg (220 lb 3.2 oz)      Objective   Physical Exam  Constitutional:       General: He is not in acute distress.     Appearance: Normal appearance. He is not ill-appearing, toxic-appearing or diaphoretic.   Eyes:      General: No scleral icterus.        Right eye: No discharge.         Left eye: No discharge.   Neck:      Vascular: No carotid bruit.   Cardiovascular:      Rate and Rhythm: Normal rate and regular rhythm.   Pulmonary:      Breath sounds: Normal breath sounds. No rales.   Chest:      Chest wall: No tenderness.   Abdominal:      General: Bowel sounds are normal.      Palpations: Abdomen is soft.      Tenderness: There is no right CVA tenderness or left CVA tenderness.   Musculoskeletal:      Comments: Feet warm.  No cyanosis or clubbing.  No pedal edema.   Lymphadenopathy:      Cervical: No cervical adenopathy.   Neurological:      Mental Status: He is alert and oriented to person, place, and time.      Comments: Intact light touch in both feet.       Pre-Admission Testing on 04/03/2025    Component Date Value Ref Range Status    Glucose 04/03/2025 178 (H)  65 - 99 mg/dL Final    BUN 04/03/2025 14  8 - 23 mg/dL Final    Creatinine 04/03/2025 0.78  0.76 - 1.27 mg/dL Final    Sodium 04/03/2025 140  136 - 145 mmol/L Final    Potassium 04/03/2025 4.6  3.5 - 5.2 mmol/L Final    Chloride 04/03/2025 105  98 - 107 mmol/L Final    CO2 04/03/2025 24.1  22.0 - 29.0 mmol/L Final    Calcium 04/03/2025 8.8  8.6 - 10.5 mg/dL Final    BUN/Creatinine Ratio 04/03/2025 17.9  7.0 - 25.0 Final    Anion Gap 04/03/2025 10.9  5.0 - 15.0 mmol/L Final    eGFR 04/03/2025 95.3  >60.0 mL/min/1.73 Final    Protime 04/03/2025 14.0  11.7 - 14.2 Seconds Final    INR 04/03/2025 1.09  0.90 - 1.10 Final    Color, UA 04/03/2025 Yellow  Yellow, Straw Final    Appearance, UA 04/03/2025 Clear  Clear Final    pH, UA 04/03/2025 5.5  5.0 - 8.0 Final    Specific Gravity, UA 04/03/2025 1.021  1.005 - 1.030 Final    Glucose, UA 04/03/2025 100 mg/dL (Trace) (A)  Negative Final    Ketones, UA 04/03/2025 15 mg/dL (1+) (A)  Negative Final    Bilirubin, UA 04/03/2025 Negative  Negative Final    Blood, UA 04/03/2025 Negative  Negative Final    Protein, UA 04/03/2025 Negative  Negative Final    Leuk Esterase, UA 04/03/2025 Negative  Negative Final    Nitrite, UA 04/03/2025 Negative  Negative Final    Urobilinogen, UA 04/03/2025 1.0 E.U./dL  0.2 - 1.0 E.U./dL Final    Hemoglobin A1C 04/03/2025 8.30 (H)  4.80 - 5.60 % Final    WBC 04/03/2025 6.54  3.40 - 10.80 10*3/mm3 Final    RBC 04/03/2025 4.77  4.14 - 5.80 10*6/mm3 Final    Hemoglobin 04/03/2025 14.1  13.0 - 17.7 g/dL Final    Hematocrit 04/03/2025 43.7  37.5 - 51.0 % Final    MCV 04/03/2025 91.6  79.0 - 97.0 fL Final    MCH 04/03/2025 29.6  26.6 - 33.0 pg Final    MCHC 04/03/2025 32.3  31.5 - 35.7 g/dL Final    RDW 04/03/2025 12.4  12.3 - 15.4 % Final    RDW-SD 04/03/2025 41.9  37.0 - 54.0 fl Final    MPV 04/03/2025 9.2  6.0 - 12.0 fL Final    Platelets 04/03/2025 249  140 - 450 10*3/mm3 Final     Neutrophil % 04/03/2025 65.1  42.7 - 76.0 % Final    Lymphocyte % 04/03/2025 25.2  19.6 - 45.3 % Final    Monocyte % 04/03/2025 6.9  5.0 - 12.0 % Final    Eosinophil % 04/03/2025 2.0  0.3 - 6.2 % Final    Basophil % 04/03/2025 0.6  0.0 - 1.5 % Final    Immature Grans % 04/03/2025 0.2  0.0 - 0.5 % Final    Neutrophils, Absolute 04/03/2025 4.26  1.70 - 7.00 10*3/mm3 Final    Lymphocytes, Absolute 04/03/2025 1.65  0.70 - 3.10 10*3/mm3 Final    Monocytes, Absolute 04/03/2025 0.45  0.10 - 0.90 10*3/mm3 Final    Eosinophils, Absolute 04/03/2025 0.13  0.00 - 0.40 10*3/mm3 Final    Basophils, Absolute 04/03/2025 0.04  0.00 - 0.20 10*3/mm3 Final    Immature Grans, Absolute 04/03/2025 0.01  0.00 - 0.05 10*3/mm3 Final    nRBC 04/03/2025 0.0  0.0 - 0.2 /100 WBC Final    ABO Type 04/03/2025 AB   Final    RH type 04/03/2025 Positive   Final    Antibody Screen 04/03/2025 Negative   Final    T&S Expiration Date 04/03/2025 4/17/2025 11:59:00 PM   Final     Assessment & Plan   Diagnoses and all orders for this visit:    1. Type 2 diabetes mellitus with mild nonproliferative retinopathy without macular edema, with long-term current use of insulin, unspecified laterality (Primary)  -     Microalbumin / Creatinine Urine Ratio - Urine, Clean Catch  -     Lipid Panel  -     TSH Rfx On Abnormal To Free T4    2. Hyperlipidemia, unspecified hyperlipidemia type  -     Lipid Panel  -     TSH Rfx On Abnormal To Free T4    3. Essential (primary) hypertension    4. History of colon polyps    5. Vitamin D deficiency  -     Vitamin D,25-Hydroxy    6. History of prostate cancer    7. Essential hypertension      Continue Lantus 18 units every evening and NovoLog at mealtime.  Follow-up with ophthalmologist as scheduled.    Continue Lipitor 40 mg/day.    Continue benazepril.    Follow-up colonoscopy in 2027.    Continue vitamin D3 1000 units/day.    Continue Flomax.  Follow-up with Dr. Fatima.    RTC 6 mos.    Copy of my note sent to Esperanza Sarmiento  PA, Dr. Ghosh , Dr. Fatima and Dr. Quentin Camejo

## 2025-04-05 LAB
25(OH)D3+25(OH)D2 SERPL-MCNC: 53.7 NG/ML (ref 30–100)
ALBUMIN/CREAT UR: 10 MG/G CREAT (ref 0–29)
CHOLEST SERPL-MCNC: 153 MG/DL (ref 0–200)
CREAT UR-MCNC: 140 MG/DL
HDLC SERPL-MCNC: 44 MG/DL (ref 40–60)
IMP & REVIEW OF LAB RESULTS: NORMAL
LDLC SERPL CALC-MCNC: 97 MG/DL (ref 0–100)
MICROALBUMIN UR-MCNC: 14.6 UG/ML
TRIGL SERPL-MCNC: 60 MG/DL (ref 0–150)
TSH SERPL DL<=0.005 MIU/L-ACNC: 1.34 UIU/ML (ref 0.27–4.2)
VLDLC SERPL CALC-MCNC: 12 MG/DL (ref 5–40)

## 2025-04-09 ENCOUNTER — APPOINTMENT (OUTPATIENT)
Dept: GENERAL RADIOLOGY | Facility: HOSPITAL | Age: 70
End: 2025-04-09
Payer: MEDICARE

## 2025-04-09 ENCOUNTER — ANESTHESIA EVENT (OUTPATIENT)
Dept: PERIOP | Facility: HOSPITAL | Age: 70
End: 2025-04-09
Payer: MEDICARE

## 2025-04-09 ENCOUNTER — HOSPITAL ENCOUNTER (OUTPATIENT)
Facility: HOSPITAL | Age: 70
Setting detail: HOSPITAL OUTPATIENT SURGERY
Discharge: HOME | End: 2025-04-09
Attending: NEUROLOGICAL SURGERY | Admitting: NEUROLOGICAL SURGERY
Payer: MEDICARE

## 2025-04-09 ENCOUNTER — ANESTHESIA (OUTPATIENT)
Dept: PERIOP | Facility: HOSPITAL | Age: 70
End: 2025-04-09
Payer: MEDICARE

## 2025-04-09 VITALS
SYSTOLIC BLOOD PRESSURE: 159 MMHG | HEART RATE: 93 BPM | DIASTOLIC BLOOD PRESSURE: 75 MMHG | OXYGEN SATURATION: 97 % | RESPIRATION RATE: 16 BRPM | TEMPERATURE: 98.3 F

## 2025-04-09 DIAGNOSIS — M48.02 CERVICAL STENOSIS OF SPINE: ICD-10-CM

## 2025-04-09 LAB
GLUCOSE BLDC GLUCOMTR-MCNC: 183 MG/DL (ref 70–130)
GLUCOSE BLDC GLUCOMTR-MCNC: 187 MG/DL (ref 70–130)

## 2025-04-09 PROCEDURE — 25010000002 FENTANYL CITRATE (PF) 50 MCG/ML SOLUTION

## 2025-04-09 PROCEDURE — 25010000002 LIDOCAINE 2% SOLUTION

## 2025-04-09 PROCEDURE — C1713 ANCHOR/SCREW BN/BN,TIS/BN: HCPCS | Performed by: NEUROLOGICAL SURGERY

## 2025-04-09 PROCEDURE — 82948 REAGENT STRIP/BLOOD GLUCOSE: CPT

## 2025-04-09 PROCEDURE — 76000 FLUOROSCOPY <1 HR PHYS/QHP: CPT

## 2025-04-09 PROCEDURE — 25010000002 DEXAMETHASONE PER 1 MG

## 2025-04-09 PROCEDURE — 25010000002 CEFAZOLIN PER 500 MG: Performed by: NEUROLOGICAL SURGERY

## 2025-04-09 PROCEDURE — 25010000002 METHYLPREDNISOLONE PER 80 MG: Performed by: NEUROLOGICAL SURGERY

## 2025-04-09 PROCEDURE — 25810000003 LACTATED RINGERS PER 1000 ML: Performed by: ANESTHESIOLOGY

## 2025-04-09 PROCEDURE — 25010000002 ONDANSETRON PER 1 MG

## 2025-04-09 PROCEDURE — 25010000002 FAMOTIDINE 10 MG/ML SOLUTION: Performed by: ANESTHESIOLOGY

## 2025-04-09 PROCEDURE — 25010000002 PROPOFOL 10 MG/ML EMULSION

## 2025-04-09 DEVICE — I-FACTOR PUTTY, 1.0CC SYRINGE
Type: IMPLANTABLE DEVICE | Site: SPINE CERVICAL | Status: FUNCTIONAL
Brand: I-FACTOR PEPTIDE ENHANCED BONE GRAFT

## 2025-04-09 DEVICE — KNOTLESS TISSUE CONTROL DEVICE, UNDYED UNIDIRECTIONAL (ANTIBACTERIAL) SYNTHETIC ABSORBABLE DEVICE
Type: IMPLANTABLE DEVICE | Site: SPINE CERVICAL | Status: FUNCTIONAL
Brand: STRATAFIX

## 2025-04-09 DEVICE — COALITION MIS ANCHOR, 12MM
Type: IMPLANTABLE DEVICE | Site: SPINE CERVICAL | Status: FUNCTIONAL
Brand: COALITION MIS

## 2025-04-09 DEVICE — FLOSEAL WITH RECOTHROM - 10ML.
Type: IMPLANTABLE DEVICE | Site: SPINE CERVICAL | Status: FUNCTIONAL
Brand: FLOSEAL HEMOSTATIC MATRIX

## 2025-04-09 DEVICE — COALITION MIS SPACER, 14 X 16, 7&DEG;, 6MM
Type: IMPLANTABLE DEVICE | Site: SPINE CERVICAL | Status: FUNCTIONAL
Brand: COALITION MIS

## 2025-04-09 DEVICE — SSC BONE WAX
Type: IMPLANTABLE DEVICE | Site: SPINE CERVICAL | Status: FUNCTIONAL
Brand: SSC BONE WAX

## 2025-04-09 DEVICE — ALLOGRFT FIBR OSTEOAMP SELECT 1CC: Type: IMPLANTABLE DEVICE | Site: SPINE CERVICAL | Status: FUNCTIONAL

## 2025-04-09 RX ORDER — SODIUM CHLORIDE 0.9 % (FLUSH) 0.9 %
3-10 SYRINGE (ML) INJECTION AS NEEDED
Status: DISCONTINUED | OUTPATIENT
Start: 2025-04-09 | End: 2025-04-09 | Stop reason: HOSPADM

## 2025-04-09 RX ORDER — HYDROCODONE BITARTRATE AND ACETAMINOPHEN 5; 325 MG/1; MG/1
1 TABLET ORAL ONCE AS NEEDED
Status: DISCONTINUED | OUTPATIENT
Start: 2025-04-09 | End: 2025-04-09 | Stop reason: HOSPADM

## 2025-04-09 RX ORDER — MIDAZOLAM HYDROCHLORIDE 1 MG/ML
0.5 INJECTION, SOLUTION INTRAMUSCULAR; INTRAVENOUS
Status: DISCONTINUED | OUTPATIENT
Start: 2025-04-09 | End: 2025-04-09 | Stop reason: HOSPADM

## 2025-04-09 RX ORDER — HYDRALAZINE HYDROCHLORIDE 20 MG/ML
5 INJECTION INTRAMUSCULAR; INTRAVENOUS
Status: DISCONTINUED | OUTPATIENT
Start: 2025-04-09 | End: 2025-04-09 | Stop reason: HOSPADM

## 2025-04-09 RX ORDER — EPHEDRINE SULFATE 50 MG/ML
INJECTION INTRAVENOUS AS NEEDED
Status: DISCONTINUED | OUTPATIENT
Start: 2025-04-09 | End: 2025-04-09 | Stop reason: SURG

## 2025-04-09 RX ORDER — NALOXONE HCL 0.4 MG/ML
0.2 VIAL (ML) INJECTION AS NEEDED
Status: DISCONTINUED | OUTPATIENT
Start: 2025-04-09 | End: 2025-04-09 | Stop reason: HOSPADM

## 2025-04-09 RX ORDER — BUPIVACAINE HCL/0.9 % NACL/PF 0.125 %
PLASTIC BAG, INJECTION (ML) EPIDURAL AS NEEDED
Status: DISCONTINUED | OUTPATIENT
Start: 2025-04-09 | End: 2025-04-09 | Stop reason: SURG

## 2025-04-09 RX ORDER — ATROPINE SULFATE 0.4 MG/ML
0.4 INJECTION, SOLUTION INTRAMUSCULAR; INTRAVENOUS; SUBCUTANEOUS ONCE AS NEEDED
Status: DISCONTINUED | OUTPATIENT
Start: 2025-04-09 | End: 2025-04-09 | Stop reason: HOSPADM

## 2025-04-09 RX ORDER — BUPIVACAINE HYDROCHLORIDE AND EPINEPHRINE 2.5; 5 MG/ML; UG/ML
INJECTION, SOLUTION INFILTRATION; PERINEURAL AS NEEDED
Status: DISCONTINUED | OUTPATIENT
Start: 2025-04-09 | End: 2025-04-09 | Stop reason: HOSPADM

## 2025-04-09 RX ORDER — FENTANYL CITRATE 50 UG/ML
50 INJECTION, SOLUTION INTRAMUSCULAR; INTRAVENOUS
Status: DISCONTINUED | OUTPATIENT
Start: 2025-04-09 | End: 2025-04-09 | Stop reason: HOSPADM

## 2025-04-09 RX ORDER — DEXAMETHASONE SODIUM PHOSPHATE 4 MG/ML
INJECTION, SOLUTION INTRA-ARTICULAR; INTRALESIONAL; INTRAMUSCULAR; INTRAVENOUS; SOFT TISSUE AS NEEDED
Status: DISCONTINUED | OUTPATIENT
Start: 2025-04-09 | End: 2025-04-09 | Stop reason: SURG

## 2025-04-09 RX ORDER — SODIUM CHLORIDE, SODIUM LACTATE, POTASSIUM CHLORIDE, CALCIUM CHLORIDE 600; 310; 30; 20 MG/100ML; MG/100ML; MG/100ML; MG/100ML
9 INJECTION, SOLUTION INTRAVENOUS CONTINUOUS
Status: DISCONTINUED | OUTPATIENT
Start: 2025-04-09 | End: 2025-04-09 | Stop reason: HOSPADM

## 2025-04-09 RX ORDER — FENTANYL CITRATE 50 UG/ML
INJECTION, SOLUTION INTRAMUSCULAR; INTRAVENOUS AS NEEDED
Status: DISCONTINUED | OUTPATIENT
Start: 2025-04-09 | End: 2025-04-09 | Stop reason: SURG

## 2025-04-09 RX ORDER — EPHEDRINE SULFATE 50 MG/ML
5 INJECTION, SOLUTION INTRAVENOUS ONCE AS NEEDED
Status: DISCONTINUED | OUTPATIENT
Start: 2025-04-09 | End: 2025-04-09 | Stop reason: HOSPADM

## 2025-04-09 RX ORDER — PROPOFOL 10 MG/ML
VIAL (ML) INTRAVENOUS AS NEEDED
Status: DISCONTINUED | OUTPATIENT
Start: 2025-04-09 | End: 2025-04-09 | Stop reason: SURG

## 2025-04-09 RX ORDER — CYCLOBENZAPRINE HCL 10 MG
10 TABLET ORAL 3 TIMES DAILY PRN
Qty: 60 TABLET | Refills: 0 | Status: SHIPPED | OUTPATIENT
Start: 2025-04-09

## 2025-04-09 RX ORDER — LIDOCAINE HYDROCHLORIDE 40 MG/ML
SOLUTION TOPICAL AS NEEDED
Status: DISCONTINUED | OUTPATIENT
Start: 2025-04-09 | End: 2025-04-09 | Stop reason: SURG

## 2025-04-09 RX ORDER — MAGNESIUM HYDROXIDE 1200 MG/15ML
LIQUID ORAL AS NEEDED
Status: DISCONTINUED | OUTPATIENT
Start: 2025-04-09 | End: 2025-04-09 | Stop reason: HOSPADM

## 2025-04-09 RX ORDER — IPRATROPIUM BROMIDE AND ALBUTEROL SULFATE 2.5; .5 MG/3ML; MG/3ML
3 SOLUTION RESPIRATORY (INHALATION) ONCE AS NEEDED
Status: DISCONTINUED | OUTPATIENT
Start: 2025-04-09 | End: 2025-04-09 | Stop reason: HOSPADM

## 2025-04-09 RX ORDER — ONDANSETRON 2 MG/ML
4 INJECTION INTRAMUSCULAR; INTRAVENOUS ONCE AS NEEDED
Status: DISCONTINUED | OUTPATIENT
Start: 2025-04-09 | End: 2025-04-09 | Stop reason: HOSPADM

## 2025-04-09 RX ORDER — ONDANSETRON 2 MG/ML
INJECTION INTRAMUSCULAR; INTRAVENOUS AS NEEDED
Status: DISCONTINUED | OUTPATIENT
Start: 2025-04-09 | End: 2025-04-09 | Stop reason: SURG

## 2025-04-09 RX ORDER — METHYLPREDNISOLONE ACETATE 80 MG/ML
INJECTION, SUSPENSION INTRA-ARTICULAR; INTRALESIONAL; INTRAMUSCULAR; SOFT TISSUE AS NEEDED
Status: DISCONTINUED | OUTPATIENT
Start: 2025-04-09 | End: 2025-04-09 | Stop reason: HOSPADM

## 2025-04-09 RX ORDER — DROPERIDOL 2.5 MG/ML
0.62 INJECTION, SOLUTION INTRAMUSCULAR; INTRAVENOUS
Status: DISCONTINUED | OUTPATIENT
Start: 2025-04-09 | End: 2025-04-09 | Stop reason: HOSPADM

## 2025-04-09 RX ORDER — DIPHENHYDRAMINE HYDROCHLORIDE 50 MG/ML
12.5 INJECTION, SOLUTION INTRAMUSCULAR; INTRAVENOUS
Status: DISCONTINUED | OUTPATIENT
Start: 2025-04-09 | End: 2025-04-09 | Stop reason: HOSPADM

## 2025-04-09 RX ORDER — FENTANYL CITRATE 50 UG/ML
50 INJECTION, SOLUTION INTRAMUSCULAR; INTRAVENOUS ONCE AS NEEDED
Status: DISCONTINUED | OUTPATIENT
Start: 2025-04-09 | End: 2025-04-09 | Stop reason: HOSPADM

## 2025-04-09 RX ORDER — LIDOCAINE HYDROCHLORIDE 10 MG/ML
0.5 INJECTION, SOLUTION INFILTRATION; PERINEURAL ONCE AS NEEDED
Status: DISCONTINUED | OUTPATIENT
Start: 2025-04-09 | End: 2025-04-09 | Stop reason: HOSPADM

## 2025-04-09 RX ORDER — PROMETHAZINE HYDROCHLORIDE 25 MG/1
25 SUPPOSITORY RECTAL ONCE AS NEEDED
Status: DISCONTINUED | OUTPATIENT
Start: 2025-04-09 | End: 2025-04-09 | Stop reason: HOSPADM

## 2025-04-09 RX ORDER — LABETALOL HYDROCHLORIDE 5 MG/ML
5 INJECTION, SOLUTION INTRAVENOUS
Status: DISCONTINUED | OUTPATIENT
Start: 2025-04-09 | End: 2025-04-09 | Stop reason: HOSPADM

## 2025-04-09 RX ORDER — HYDROMORPHONE HYDROCHLORIDE 1 MG/ML
0.5 INJECTION, SOLUTION INTRAMUSCULAR; INTRAVENOUS; SUBCUTANEOUS
Status: DISCONTINUED | OUTPATIENT
Start: 2025-04-09 | End: 2025-04-09 | Stop reason: HOSPADM

## 2025-04-09 RX ORDER — FAMOTIDINE 10 MG/ML
20 INJECTION, SOLUTION INTRAVENOUS ONCE
Status: COMPLETED | OUTPATIENT
Start: 2025-04-09 | End: 2025-04-09

## 2025-04-09 RX ORDER — SODIUM CHLORIDE 0.9 % (FLUSH) 0.9 %
3 SYRINGE (ML) INJECTION EVERY 12 HOURS SCHEDULED
Status: DISCONTINUED | OUTPATIENT
Start: 2025-04-09 | End: 2025-04-09 | Stop reason: HOSPADM

## 2025-04-09 RX ORDER — SUCCINYLCHOLINE/SOD CL,ISO/PF 200MG/10ML
SYRINGE (ML) INTRAVENOUS AS NEEDED
Status: DISCONTINUED | OUTPATIENT
Start: 2025-04-09 | End: 2025-04-09 | Stop reason: SURG

## 2025-04-09 RX ORDER — LIDOCAINE HYDROCHLORIDE 20 MG/ML
INJECTION, SOLUTION INFILTRATION; PERINEURAL AS NEEDED
Status: DISCONTINUED | OUTPATIENT
Start: 2025-04-09 | End: 2025-04-09 | Stop reason: SURG

## 2025-04-09 RX ORDER — FLUMAZENIL 0.1 MG/ML
0.2 INJECTION INTRAVENOUS AS NEEDED
Status: DISCONTINUED | OUTPATIENT
Start: 2025-04-09 | End: 2025-04-09 | Stop reason: HOSPADM

## 2025-04-09 RX ORDER — PROMETHAZINE HYDROCHLORIDE 25 MG/1
25 TABLET ORAL ONCE AS NEEDED
Status: DISCONTINUED | OUTPATIENT
Start: 2025-04-09 | End: 2025-04-09 | Stop reason: HOSPADM

## 2025-04-09 RX ORDER — OXYCODONE AND ACETAMINOPHEN 7.5; 325 MG/1; MG/1
1 TABLET ORAL EVERY 4 HOURS PRN
Status: DISCONTINUED | OUTPATIENT
Start: 2025-04-09 | End: 2025-04-09 | Stop reason: HOSPADM

## 2025-04-09 RX ORDER — HYDROCODONE BITARTRATE AND ACETAMINOPHEN 5; 325 MG/1; MG/1
1 TABLET ORAL EVERY 6 HOURS PRN
Qty: 20 TABLET | Refills: 0 | Status: SHIPPED | OUTPATIENT
Start: 2025-04-09

## 2025-04-09 RX ADMIN — FENTANYL CITRATE 50 MCG: 50 INJECTION, SOLUTION INTRAMUSCULAR; INTRAVENOUS at 11:00

## 2025-04-09 RX ADMIN — LIDOCAINE HYDROCHLORIDE 1 EACH: 40 SOLUTION TOPICAL at 11:03

## 2025-04-09 RX ADMIN — FENTANYL CITRATE 50 MCG: 50 INJECTION, SOLUTION INTRAMUSCULAR; INTRAVENOUS at 13:09

## 2025-04-09 RX ADMIN — CEFAZOLIN 2 G: 2 INJECTION, POWDER, FOR SOLUTION INTRAMUSCULAR; INTRAVENOUS at 10:46

## 2025-04-09 RX ADMIN — PROPOFOL 150 MCG/KG/MIN: 10 INJECTION, EMULSION INTRAVENOUS at 11:10

## 2025-04-09 RX ADMIN — LIDOCAINE HYDROCHLORIDE 60 MG: 20 INJECTION, SOLUTION INFILTRATION; PERINEURAL at 11:00

## 2025-04-09 RX ADMIN — ONDANSETRON 4 MG: 2 INJECTION, SOLUTION INTRAMUSCULAR; INTRAVENOUS at 11:45

## 2025-04-09 RX ADMIN — PROPOFOL 200 MG: 10 INJECTION, EMULSION INTRAVENOUS at 11:03

## 2025-04-09 RX ADMIN — EPHEDRINE SULFATE 10 MG: 50 INJECTION INTRAVENOUS at 11:26

## 2025-04-09 RX ADMIN — Medication 200 MCG: at 12:02

## 2025-04-09 RX ADMIN — Medication 100 MCG: at 11:45

## 2025-04-09 RX ADMIN — FAMOTIDINE 20 MG: 10 INJECTION, SOLUTION INTRAVENOUS at 08:44

## 2025-04-09 RX ADMIN — SODIUM CHLORIDE, POTASSIUM CHLORIDE, SODIUM LACTATE AND CALCIUM CHLORIDE 9 ML/HR: 600; 310; 30; 20 INJECTION, SOLUTION INTRAVENOUS at 08:39

## 2025-04-09 RX ADMIN — Medication 200 MCG: at 11:24

## 2025-04-09 RX ADMIN — OXYCODONE HYDROCHLORIDE AND ACETAMINOPHEN 1 TABLET: 7.5; 325 TABLET ORAL at 13:11

## 2025-04-09 RX ADMIN — Medication 200 MG: at 11:00

## 2025-04-09 RX ADMIN — Medication 200 MCG: at 11:57

## 2025-04-09 RX ADMIN — Medication 200 MCG: at 12:08

## 2025-04-09 RX ADMIN — PROPOFOL 50 MG: 10 INJECTION, EMULSION INTRAVENOUS at 11:06

## 2025-04-09 RX ADMIN — REMIFENTANIL HYDROCHLORIDE 0.2 MCG/KG/MIN: 1 INJECTION, POWDER, LYOPHILIZED, FOR SOLUTION INTRAVENOUS at 11:10

## 2025-04-09 RX ADMIN — Medication 200 MCG: at 11:48

## 2025-04-09 RX ADMIN — DEXAMETHASONE SODIUM PHOSPHATE 8 MG: 4 INJECTION, SOLUTION INTRA-ARTICULAR; INTRALESIONAL; INTRAMUSCULAR; INTRAVENOUS; SOFT TISSUE at 11:45

## 2025-04-09 NOTE — ANESTHESIA PREPROCEDURE EVALUATION
Anesthesia Evaluation     no history of anesthetic complications:   NPO Solid Status: > 8 hours  NPO Liquid Status: > 2 hours           Airway   Mallampati: I  TM distance: >3 FB  Neck ROM: full  Dental - normal exam   (+) implants    Comment: Capped lower incisors, risks of dental damage discussed    Pulmonary - normal exam   Cardiovascular - normal exam    Rhythm: regular  Rate: normal    (+) hypertension, hyperlipidemia  (-) carotid artery disease      Neuro/Psych  (+) numbness  GI/Hepatic/Renal/Endo    (+) diabetes mellitus    Musculoskeletal     Abdominal    Substance History      OB/GYN          Other                          Anesthesia Plan    ASA 3     general     intravenous induction     Anesthetic plan, risks, benefits, and alternatives have been provided, discussed and informed consent has been obtained with: patient.        CODE STATUS:

## 2025-04-09 NOTE — DISCHARGE SUMMARY
Discharge Summary    Patient: Jeff Bernard  : 1955    Patient Care Team:  Esperanza Sarmiento PA as PCP - General  Esperanza Sarmiento PA as PCP - Family Medicine  Salinas Juarez MD as Consulting Physician (Endocrinology)  Lazarus Gallegos MD as Consulting Physician (Cardiology)  Michael Fatima MD as Consulting Physician (Urology)  Jenn Del Real, RN as Ambulatory  ([facility])    Date of Admit: 2025    Date of Discharge:  2025    Discharge Diagnosis:  Cervical stenosis of spine      Procedures Performed  Procedure(s):  Cervical 5 to cervical 6 anterior cervical discectomy and fusion       Complications: none     Consultants:   Consults       No orders found from 3/11/2025 to 4/10/2025.            Condition on Discharge: stable    Discharge disposition: home    HPI: Jeff Bernard is a 69 y.o. 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  Given this patient was taken to the OR for C5-6 ACDF with Dr. Ghosh on 25.    Hospital Course: Patient underwent surgery as scheduled.  There were transferred to PACU after the procedure.  Patient was observed in recovery until discharge criteria were met at which point they were discharged home to self-care.    Vitals:    25 0813   BP: 144/66   Pulse: 72   Resp: 16   Temp: 98 °F (36.7 °C)   SpO2: 97%         Lab Results (last 24 hours)       Procedure Component Value Units Date/Time    POC Glucose Once [[id number]]  (Abnormal) Collected: 25 0756    Specimen: Blood Updated: 25 0758     Glucose 183 mg/dL                                Discharge Physical Exam:    General  - WD/WN male, appears their stated age, awake, cooperative, in no acute distress  HEENT  - Normocephalic, atraumatic, PERRLA, EOM intact  Respiratory  - Normal respiratory  rate and effort  Abdomen  - Flat, soft, NT/ND  Musculoskeletal  - Moves all extremities well, no joint swelling/tenderness  Skin  - Surgical incision well approximated, clean and dry, no swelling, redness, or drainage  NEUROLOGIC  - A/O x3  - CN II-XII grossly intact  - Moves all extremities symmetrically and with good strength  - Sensation intact throughout      Discharge Medications  Inspect has been reviewed and narcotic consent is on file in the patient's chart.     Your medication list        PAUSE taking these medications        Instructions Last Dose Given Next Dose Due   aspirin 81 MG EC tablet  Wait to take this until: April 16, 2025      Take 1 tablet by mouth Daily. HOLDING FOR SURGERY PER MD LAST DOSE 4/2/2025              START taking these medications        Instructions Last Dose Given Next Dose Due   cyclobenzaprine 10 MG tablet  Commonly known as: FLEXERIL      Take 1 tablet by mouth 3 (Three) Times a Day As Needed for Muscle Spasms.       HYDROcodone-acetaminophen 5-325 MG per tablet  Commonly known as: NORCO      Take 1 tablet by mouth Every 6 (Six) Hours As Needed for Moderate Pain.              CHANGE how you take these medications        Instructions Last Dose Given Next Dose Due   atorvastatin 40 MG tablet  Commonly known as: LIPITOR  What changed: when to take this      Take 1 tablet by mouth Every Night. Indications: High Amount of Fats in the Blood              CONTINUE taking these medications        Instructions Last Dose Given Next Dose Due   B-D ULTRAFINE III SHORT PEN 31G X 8 MM misc  Generic drug: Insulin Pen Needle      1 each by Other route 4 (Four) Times a Day. Indications: Type 2 Diabetes       benazepril 10 MG tablet  Commonly known as: LOTENSIN      TAKE 1 TABLET BY MOUTH EVERY DAY       FreeStyle Celsa 2 [place] device      1 each Daily.       FreeStyle Celsa 2 Sensor misc      1 each Every 14 (Fourteen) Days.       glucose blood test strip  Commonly known as: Contour Next  Test      Dx code E11.9 testing bs 3 x day       Insulin Glargine 100 UNIT/ML injection pen  Commonly known as: LANTUS SOLOSTAR      Inject 18 Units under the skin into the appropriate area as directed Every Night. Prime needle with 2 units before each use       NovoLOG FlexPen 100 UNIT/ML solution pen-injector sc pen  Generic drug: insulin aspart      1 UNIT PER 5 G OF CARBOHYDRATE 3 TIMES A DAY WITH MEALS. MAXIMUM OF 20 UNITS PER DOSE. PRIME NEEDLE WITH 2 UNITS BEFORE EACH USE       tamsulosin 0.4 MG capsule 24 hr capsule  Commonly known as: FLOMAX      Take 1 capsule by mouth Daily.       VITAMIN B 12 PO      Take 2 tablets by mouth 1 (One) Time Per Week. HOLD PRIOR TO SURG       Vitamin D-3 25 MCG (1000 UT) capsule      Take 1,000 Units by mouth Daily.                 Where to Get Your Medications        These medications were sent to [facility] [place] [id number]      Hours: Monday to Friday 7 AM to 6 PM, Saturday & Sunday 8 AM to 4:30 PM (Closed 12 PM to 12:30 PM) Phone: [phone]   cyclobenzaprine 10 MG tablet  HYDROcodone-acetaminophen 5-325 MG per tablet         Discharge Diet: Regular, advance as tolerated      Activity at Discharge: No rapid bending or twisting of the neck.  No lifting greater than 5 pounds.  No driving for 1 week.  Do not soak or submerge incision underwater for 6 weeks.      Call for: questions or concerns    Follow-up Appointments  Future Appointments   Date Time Provider Department Center   4/25/2025 10:40 AM Kath Ball APRN [facility] [address] [place]   10/3/2025 12:00 PM Salinas Juarez MD [facility]  [place]   1/2/2026 10:30 AM Esperanza Sarmiento PA [facility]      Follow-up Information       Esperanza Sarmiento PA .    Specialty: Family Medicine  Contact information:  [address] [facility] [address] [phone]               Kath Ball APRN. Go on 4/25/2025.    Specialties: Orthopedic Surgery, Nurse  Practitioner  Contact information:  [address]  [address] [address] [phone]                               I discussed the discharge instructions with patient    TAD Corado  04/09/25  12:42 EDT            Part of this note may be an electronic transcription/translation of spoken language to printed text using the Dragon Dictation System.

## 2025-04-09 NOTE — ANESTHESIA PROCEDURE NOTES
Airway  Reason: elective    Date/Time: 4/9/2025 11:03 AM  Airway not difficult    General Information and Staff    Patient location during procedure: OR  Anesthesiologist: Karo Hannah MD  CRNA/CAA: Millicent Panda CRNA    Indications and Patient Condition  Indications for airway management: airway protection    Preoxygenated: yes    Mask difficulty assessment: 2 - vent by mask + OA or adjuvant +/- NMBA    Final Airway Details    Final airway type: endotracheal airway      Successful airway: ETT  Cuffed: yes   Successful intubation technique: direct laryngoscopy  Adjuncts used in placement: intubating stylet and anterior pressure/BURP  Endotracheal tube insertion site: oral  Blade: P[place]  Blade size: 4  ETT size (mm): 7.5  Cormack-Lehane Classification: grade IIa - partial view of glottis  Placement verified by: chest auscultation and capnometry   Cuff volume (mL): 6  Measured from: lips  ETT/EBT  to lips (cm): 25  Number of attempts at approach: 1  Assessment: lips, teeth, and gum same as pre-op and atraumatic intubation

## 2025-04-09 NOTE — ANESTHESIA POSTPROCEDURE EVALUATION
Patient: Jeff Bernard    Procedure Summary       Date: 04/09/25 Room / Location: [facility] OR [address] [place] [address] MAIN OR    Anesthesia Start: 1055 Anesthesia Stop: 1250    Procedure: Cervical 5 to cervical 6 anterior cervical discectomy and fusion (Spine Cervical) Diagnosis:       Cervical stenosis of spine      (Cervical stenosis of spine [M48.02])    Surgeons: Vishal Ghosh IV, MD Provider: Karo Hannah MD    Anesthesia Type: general ASA Status: 3            Anesthesia Type: general    Vitals  Vitals Value Taken Time   /83 04/09/25 13:30   Temp 36.8 °C (98.3 °F) 04/09/25 12:50   Pulse 93 04/09/25 13:40   Resp 16 04/09/25 12:50   SpO2 98 % 04/09/25 13:40   Vitals shown include unfiled device data.        Anesthesia Post Evaluation

## 2025-04-09 NOTE — OP NOTE
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  Given this patient was taken to the OR for C5-6 ACDF.  Patient understood the risks of surgery including bleeding vascular injury stroke infection CSF leak nerve damage spinal cord injury hardware failure pseudoarthrosis injury to neck structure including esophagus or trachea recurrent laryngeal nerve injury permanent hoarseness and/or dysphagia need for future operation among many other risks and he agreed to undergo the procedure    Pre-op Diagnosis:   Cervical stenosis of spine [M48.02]       Post-Op Diagnosis Codes:     * Cervical stenosis of spine [M48.02]    Procedure/CPT® Codes:  No CPT Code Applied in Case Entry    Procedure(s):  Cervical 5 to cervical 6 anterior cervical discectomy and fusion        Spinal Surgery Levels Completed:1 Level      Staff:  Surgeon(s):  Vishal Ghosh IV, MD    Assistant: Octavio Flores PA    Anesthesia: General    Estimated Blood Loss:  50cc    Implants:    Implant Name Type Inv. Item Serial No.  Lot No. LRB No. Used Action   DEV CONTRL TISS STRATAFIXSPIRALMNCRYL [id number] REV4/0 45CM - [id number] Implant DEV CONTRL TISS STRATAFIXSPIRALMNCRYL PLSPS2 REV4/0 45CM  [employer]  DIV OF J AND J [id number] N/A 1 Implanted   KT SEAL HEMOS ABS FLOSEAL MATRX 1.5/FAST/PREP 5000/IU 10ML - [id number] Implant KT SEAL HEMOS ABS FLOSEAL MATRX 1.5/FAST/PREP 5000/IU 10ML  [email or web address] [id number] N/A 1 Implanted   WAX BONE HEMO LUKENS SHARPOINT 2.5GM WHT - [id number] Implant WAX BONE HEMO LUKENS SHARPOINT 2.5GM WHT  SURGICAL SPECIALTIES RAHUL [id number] N/A 1 Implanted   GRFT BONE IFACTOR PUTTY 1ML -  [id number] Implant GRFT BONE IFACTOR PUTTY 1ML  [employer] [id number] N/A 1 Implanted   ALLOGRFT FIBR OSTEOAMP SELECT 1CC - [id number] - [id number] Implant ALLOGRFT FIBR OSTEOAMP SELECT 1CC [id number] [employer] NR N/A 1 Implanted   SPACR ACDF COALITIONMIS TI 7DEG [id number] - [id number] Implant SPACR ACDF COALITIONMIS TI 7DEG [id number]  [employer] NR N/A 1 Implanted   ANCHR COALITIONMIS 12MM - [id number] Implant ANCHR COALITIONMIS 12MM  [employer] NR N/A 2 Implanted       Specimen:          None        Findings: Stenosis    Complications: None    Description of Procedure: Patient was brought to the OR and placed under general endotracheal anesthesia.  He was placed supine on a regular or table with his neck slightly extended and prepped and draped in the usual fashion.  AP and lateral fluoroscopy was used to plan an incision over the C5-6 disc space.  Incision was made carried down to the deep dermal and fat layers with monopolar cautery.  Self-retaining retractor was placed exposing the platysma which was  from underlying neck structures and cut sharply with Metzenbaum scissors.  Planes of the neck were followed down to the anterior cervical spine and the C5-6 disc base was identified on lateral fluoroscopy.  Longus coli muscles were detached from the anterior cervical spine from C5-C6 and a shadow line retractor system was placed at this level.  [place] pins were placed in the C5 and C6 vertebral bodies under fluoroscopic guidance and [place] retractor was placed in open thus opening the disc space.  Curettes were used to remove soft disc and cartilaginous endplate.  Anterior osteophyte was removed with 3 Kerrison and high-speed drill.  Microscope was brought in the field.  Remaining cartilaginous endplate was removed with high-speed drill and posterior osteophytes were removed with high-speed drill.  Posterior longitudinal ligament was opened with nerve hook and removed with 2 Kerrison.  Posterior  osteophytes were also removed with 2 Kerrison.  Foraminotomies were performed bilaterally.  Once central and foraminal decompression was complete hemostasis was achieved with bipolar cautery and Gelfoam powder and the area was irrigated.  Peek interbody cage filled with iFactor and allograft was placed on the disc base under fluoroscopic guidance and anterior instrumentation was performed with globus titanium plate fixation pins at the C5-6 level under fluoroscopic guidance.  Locking caps final tightened.  Retractor systems were removed and hemostasis was achieved with bipolar cautery and Gelfoam powder the wound was thoroughly irrigated.  Final x-rays demonstrated good positioning of hardware.  Platysma was closed with 3-0 Vicryl sutures, the deep dermal layer with 3-0 Vicryl sutures and the skin was closed with a running subcuticular Monocryl.  Dermabond was placed over the wound.  There were no changes in neuromonitoring throughout the case.                Assistant: Octavio Flores PA  was responsible for performing the following activities: Retraction, Suction, Irrigation, Suturing, Closing, and Placing Dressing and their skilled assistance was necessary for the success of this case.    Vishal Ghosh IV, MD     Date: 4/9/2025  Time: 12:34 EDT

## 2025-04-09 NOTE — INTERVAL H&P NOTE
H&P reviewed. The patient was examined and there are no changes to the H&P.  Patient stands risks of surgery as well as increased risk due to medical comorbidities including type 2 diabetes hypertension hyperlipidemia and history of sepsis among other medical comorbidities and he has agreed to undergo the procedure

## 2025-04-09 NOTE — DISCHARGE INSTRUCTIONS
Cervical Fusion    Post-op Guide    Dr. Vishal Ghosh MD                                      WOUND CARE INSTRUCTIONS AFTER SURGERY    Keep incision covered with sterile dressing at all times for 48 hours after surgery.     You may shower 48 hours after surgery.  Keep the incision covered in the shower with watertight dressing for 2 weeks after surgery. When not in the shower you may remove the dressing and keep incision open to air.      Do not submerge your incision under water, to include hot pools, tubs, pools, lakes, and oceans x6 weeks after surgery.     Don't be alarmed if you experience some of your pre-operative symptoms after going home. This is not uncommon and normally goes away in a few days but may last longer. Pain, aching and stiffness in your neck, back, and down your legs is common.     If you have any questions or concerns don't hesitate to call the office.                Surgery:    There are two types of cervical fusion:      1. Anterior Cervical Discectomy and Fusion (ACDF): Dr. Ghosh makes a small incision on the front of the neck, and dissects between the trachea (wind pipe), esophagus and major muscles and arteries of the neck until he reaches the anterior (front) of the cervical spine. Then he removes the abnormal disc or discs that are putting pressure on the spinal cord and the spinal nerves. Next, he places a piece of synthetic bone where the disc previously was, and anchors a plate and screws into the bone above and below the disc he removed.  The synthetic bone, plate and screws hold the bones of the neck in place, while your body produces new bone where the disc used to be.  This results in “fusion” of the bones of the neck that previously were  by the  abnormal disc.  Fusion takes your body approximately 12 weeks.       2. Posterior Cervical Fusion: Dr. Ghosh makes a vertical incision on the back of neck.  He then separates the muscles from the bones of the  posterior (back portion) of the spine. The bone overlying the spinal cord is removed, thus relieving pressure on the spinal cord and spinal nerves.  Next, screws and rods are secured into the bone next to the spinal cord.  This instrumentation (screws and rods) holds the neck in steady position, while the body forms bone over the joints of the neck.  This results in “fusion”. The process of fusion takes your body approximately 12 weeks.       “Fusion” is the process of forming new bone where abnormal discs and spinal joints used to be.  Normal discs and spinal joints allow for a specific range of movement of each bone of the spine.  Arthritis, trauma and other processes destroy the discs and joints, which causes pain as well as compression of the spinal cord and nerves roots.  Fusion prevents movement between spinal bones, thus eliminating pain and allowing for removal of abnormal discs and joints that compress the spinal cord and nerve roots.    Plates, rods and screws are used to temporarily hold the neck bones rigidly in place, while the body produces new bone.  The new bone creates a permanent “fusion”.  This process takes about 12 weeks, which is why you have activity limitations following your surgery.  These limitations are designed to prevent breakage or failure of the plates, screws and rods, while fusion is taking place.  Once your body produces a solid fusion (which Dr. Ghosh can determined based on the CT scan you will have about 12 weeks after surgery), you can return to certain activities without fear (see below).       You will lose some degree of mobility of the neck following fusion.  If you are concerned about this, Dr. Ghosh can provide you with information based on your specific surgery.           Post-op  ACTIVITY GUIDE     DAY OF SURGERY   We want you to get up and Move!    Getting out of bed soon after surgery will speed your recovery!    GOAL:  Out of bed a few hours after awaking from  surgery for your first walk  You may require assistance from the nurse  A walker for stability may be used depending on your pre-op abilities  Short frequent walks (5min) every 2 hours while in hospital  Engage core when getting in and out of bed   Use smart movement strategies when changing positions  Practice DEEP BREATHING while you walk  3-6 count inhale and exhale  Rely on ORAL pain medications NOT IV       ADVANTAGES:  Prevent blood clots in the legs  Prevents pneumonia through promoting deep breathing  Prevents muscles from stiffening - will decrease pain   You CANNOT walk too much  Oral pain meds have better steady control of pain     POST-OP DAY #1   Diet / Activity / Pain Control / GO HOME    Assessments by Physical Therapy and Occupational Therapy (OT)    GOAL  Review proper neck mechanics/restrictions  Walking up and down stairs is GOOD as long as you do it safely  PT / OT will assess when you are safe to go home  Activities of Daily Living - okay to shower, dress, navigate around house  Surgical Incision needs to be covered in the shower unless advised otherwise by Dr. Ghosh  No baths, hot tubs, swimming pools for 6 weeks or until incision closed without scab.   Go home !            Criteria for Discharge Home:  Cleared by PT / OT   Cleared by the Medicine Service  Adequate pain control with or without medications  Tolerating food and Liquids  Ability to urinate  Having a bowel movement IS NOT a discharge requirement unless there is a medical issue as per Medicine Service  Depends on pain control, support at home, mobility    Occasionally, some patients with extra care needs may require more days in the hospital or a short stay at a local rehabilitation facility (e.g. patients with minimal home social support, additional medical needs). Hospital based  will assist with rehab placement.     ACTIVITY GUIDELINES    Be Careful with BLTs for 3 months !    Bending (Lateral bending, Flexing or  Extending the neck)  Stay within a limited range of motion - Do NOT bend chin to chest or extend neck to look straight up  Move the head and eyes NOT the neck to look up and down  Place or hold computers, tablets and cell phones close to eye level  Surgical hardware won't break from over flexing or extending neck once or twice  Repetitive over flexion or extension can loosen hardware over time      Lifting  General weight limit for first 12 weeks is a maximum of 30 lbs   Anything that causes “strain” should not be lifted  Coughing, sneezing, vomiting, or straining with bowel movements will not damage your surgical hardware  Lap top, gallon of milk, purse, infant children okay  Luggage, large backpack, heavy grocery bag, furniture - not okay  Lift things close to body - limit reaching to pick things up    Turning  Turn hips, shoulders, head and neck as one unit  Do NOT turn head without also turning shoulders  Avoid reaching across the body and avoid overhead movements           Driving    - Okay to consider driving during first 2 weeks after surgery   - MUST meet following requirements    -You must be OFF narcotics and not under their influence     - You must be a SAFE  yourself.     - Patients may be considered to be UNSAFE if they are:     -Distracted by their pain     -Unable to sit comfortably for the required length of the      journey                         -Unable to use mirrors safely because of restrictions or              surgical pain      ACTIVITY - FIRST 2 WEEKS:    Low Impact Aerobic Exercise  5-30min 2 times per day EVERY DAY  Walking, elliptical, stairs and/or recumbent bike  Slow safe pace, okay to do intervals (walk 4 min rest 1 min x 3-5 sets)  No hiking, speed walking or carrying baggage.   FOCUS ON POSTURE, DEEP BREATHING (6 count) AND CORE ACTIVATION    Physical Therapy  Will start after your first post-operative office visit (2 weeks)  It's Important, So, YES it's Mandatory  2 times per  week x 12 weeks  We can recommend the Physical Therapist near your home  They should help guide your core exercise program  Home exercises and low impact aerobic work should be done 4-5x per week in addition to PT       RETURNING TO WORK     The timescale for Return to Work will vary from patient-to-patient and depends mainly on the nature of your specific surgery and the type of work / activity you wish to re-commence.     General Guidelines:    Can work from home if needed within the first week or so after surgery  Do not work for longer than 30-45 minutes at a time without a break (standing and walking around)    Sedentary jobs (desk work etc)   Typically within 1-6 weeks  depends on particulars of job  driving distance   stress, etc  light duty  <15lbs weight limit, Minimal BLT - okay  If you have the option, sometimes returning to work alternate days (i.e Mon-Wed-Fri) for 1-2 weeks assists with the transition back to work.     Manual Labor  3-6 months - varies per case  depends on intensity and weight limit  Performance in PT program can help determine      RECREATIONAL SPORTS & ACTIVITIES     After 2 weeks  swimming, walking, recumbent bike, stair master    After 6 weeks   hiking (no Pack, light grade)    After 3 months   Must be cleared by Dr. Ghosh and PT  Biking, jogging, resistance training, climbing, Pilates, sports specific drills, body weight interval training, golf, tennis  Start slow and build up slowly   Do NOT go back FULL SPEED right away     After 6 months  Skiing, horseback riding, ATV riding, snow mobile etc      Sports - Non-Contact  Minimum 18 weeks  Must have completed aerobic and resistance training   Must have successfully done sports specific drill and been asymptomatic    Sports - Contact   Varies from sport to sport  Minimum 3 Months  Must exhibit signs of fusion on CT  Must have completed non-contact  sports specific drills without any symptoms      Lifelong recommendations:  Warm up  before EVERY activity 5-10 minutes using Recumbent bike, Stair Master, elliptical , speed walk  Core exercises:   3 -5 x /week - forever!  10 minutes  Should follow warm up prior to activity / sport  Always know your core level

## 2025-04-25 ENCOUNTER — OFFICE VISIT (OUTPATIENT)
Dept: ORTHOPEDIC SURGERY | Facility: CLINIC | Age: 70
End: 2025-04-25
Payer: MEDICARE

## 2025-04-25 VITALS — HEIGHT: 72 IN | WEIGHT: 217.2 LBS | TEMPERATURE: 98 F | BODY MASS INDEX: 29.42 KG/M2

## 2025-04-25 DIAGNOSIS — Z98.1 S/P CERVICAL SPINAL FUSION: Primary | ICD-10-CM

## 2025-04-25 NOTE — PROGRESS NOTES
Patient Name: Jeff Bernard   YOB: 1955  Referring Primary Care Physician: Esperanza Sarmiento PA      Chief Complaint:    Chief Complaint   Patient presents with    Cervical Spine - Post-op Follow-up            HPI:  Jeff Bernard is a 69 y.o. male who presents to [facility] for first postop visit following C5-6 cervical fusion.  He is doing very well.  No hoarseness of voice or difficulty swallowing.  Incision is healing nicely.  He says he has already noticed improvement in the right hand strength.  He had chronic history of hand weakness and muscle wasting from remote injury.    PFSH:  See attached    ROS: As per HPI, otherwise negative    Objective:      69 y.o. male  Body mass index is 29.46 kg/m²., 98.5 kg (217 lb 3.2 oz), @HT@  Vitals:    04/25/25 1044   Temp: 98 °F (36.7 °C)     Pain Score    04/25/25 1044   PainSc: 0-No pain   PainLoc: Neck            Spine Musculoskeletal Exam    Inspection    Cervical Spine    Prior incision: right anterior cervical    Incision: clean, dry and intact    Strength    Cervical Spine      Right      Interossei: 3/5.     Cervical spine strength additional comments: Chronic right hand weakness and muscle waisting    Sensory    Cervical Spine    Cervical spine sensation is normal.    Reflexes    Right      [place]: absent    Left      [place]: absent        IMAGING:   No imaging in office, postop films reviewed with the patient    Assessment:           Diagnoses and all orders for this visit:    1. S/P cervical spinal fusion (Primary)  -     Ambulatory Referral to Physical Therapy for Evaluation & Treatment  -     CT Cervical Spine Without Contrast; Future             Plan:   Patient is doing well postoperatively and already feels like presurgical symptoms have improved.  No hoarseness of voice or swallowing difficulty. The incision is healing nicely with no signs of infection or wound breakdown.  There are no new focal  sensorimotor deficits on exam.  Will begin physical therapy for postoperative recovery. Patient will follow up with Dr. Ghosh in 3 months with cervical CT.  Patient is agreeable to this plan. Call for questions or concerns in the meantime.  Insurance denied bone stimulator. He is not a smoker. Advised to avoid extremes of ROM    Return in about 3 months (around 7/25/2025) for [place].    EMR Dragon/Transcription Disclaimer:   Much of this encounter note is an electronic transcription/translation of spoken language to printed text utilizing Dragon dictation.  Red flags have been discussed at this or previous visits to include but not limited weakness in extremities, worsening pain that does not respond to conservative treatment and bowel or bladder dysfunction. These are reasons to present to ER and patient has been informed.    The diagnosis(es), natural history, pathophysiology and treatment for diagnosis(es) were discussed. Opportunity given and questions answered. Biomechanics of pertinent body areas discussed.    EXERCISES:  Advice on benefits of, and types of regular/moderate exercise pertaining to diagnosis.  Continue HEP. For back or neck pain, recommend pilates and or yoga as tolerated. Generally it is best to start any new exercise under the guidance of a  or therapist.   MEDICATIONS:  When prescribe, the risks, benefits, warnings,side effects and alternatives of medications discussed. Advised against long term use of narcotics.   PAIN CONTROL:  Cold, heat, OTC lidocaine patches and/or ointment as needed. Avoid direct skin contact with ice. Ice 15-20 minutes 3-4 times daily as needed. For SI joint pain, recommend ice bath in water about 50 degrees for 5 consecutive days, add ice slowly to help with adjustment and may cover with warm towel or robe to help with cold tolerance. If using lidocaine, do not apply heat in conjunction as this can cause a burn.   MEDICAL RECORDS reviewed from other provider(s)  for past and current medical history pertinent to this visit..  Answers submitted by the patient for this visit:  Post Operative Visit (Submitted on 4/23/2025)  Chief Complaint: Follow-up  Pain Control: no pain  Fever: no fever  Diet: adequate intake  Activity: returning to normal  Operative Site Issues: No  Additional information: Throat is still a little sore. Coughing some

## 2025-05-08 DIAGNOSIS — E78.5 HYPERLIPIDEMIA, UNSPECIFIED HYPERLIPIDEMIA TYPE: ICD-10-CM

## 2025-05-08 RX ORDER — ATORVASTATIN CALCIUM 40 MG/1
40 TABLET, FILM COATED ORAL NIGHTLY
Qty: 90 TABLET | Refills: 2 | Status: SHIPPED | OUTPATIENT
Start: 2025-05-08

## 2025-05-08 NOTE — TELEPHONE ENCOUNTER
Rx Refill Note  Requested Prescriptions     Pending Prescriptions Disp Refills    atorvastatin (LIPITOR) 40 MG tablet [Pharmacy Med Name: ATORVASTATIN 40 MG TABLET] 90 tablet 2     Sig: Take 1 tablet by mouth Every Night. Indications: High Amount of Fats in the Blood      Last office visit with prescribing clinician: 4/4/2025   Last telemedicine visit with prescribing clinician: Visit date not found   Next office visit with prescribing clinician: 10/3/2025                         Would you like a call back once the refill request has been completed: [] Yes [] No    If the office needs to give you a call back, can they leave a voicemail: [] Yes [] No    Peggy Jj  05/08/25, 07:52 EDT

## 2025-07-08 DIAGNOSIS — Z79.4 TYPE 2 DIABETES MELLITUS WITHOUT COMPLICATION, WITH LONG-TERM CURRENT USE OF INSULIN: ICD-10-CM

## 2025-07-08 DIAGNOSIS — E11.9 TYPE 2 DIABETES MELLITUS WITHOUT COMPLICATION, WITH LONG-TERM CURRENT USE OF INSULIN: ICD-10-CM

## 2025-07-08 RX ORDER — PEN NEEDLE, DIABETIC 31 GX5/16"
1 NEEDLE, DISPOSABLE MISCELLANEOUS 4 TIMES DAILY
Qty: 400 EACH | Refills: 3 | Status: SHIPPED | OUTPATIENT
Start: 2025-07-08

## 2025-07-08 NOTE — TELEPHONE ENCOUNTER
Rx Refill Note  Requested Prescriptions     Pending Prescriptions Disp Refills    BD Pen Needle Short Ultrafine 31G X 8 MM misc [Pharmacy Med Name: BD UF SHORT PEN NEEDLE 8PMQ18S] 400 each 3     Si EACH BY OTHER ROUTE 4 (FOUR) TIMES A DAY. INDICATIONS: TYPE 2 DIABETES      Last office visit with prescribing clinician: 2025   Last telemedicine visit with prescribing clinician: Visit date not found   Next office visit with prescribing clinician: 10/3/2025                         Would you like a call back once the refill request has been completed: [] Yes [] No    If the office needs to give you a call back, can they leave a voicemail: [] Yes [] No    Yara Lancaster MA  25, 17:10 EDT

## 2025-07-11 ENCOUNTER — HOSPITAL ENCOUNTER (OUTPATIENT)
Dept: CT IMAGING | Facility: HOSPITAL | Age: 70
Discharge: HOME | End: 2025-07-11
Payer: MEDICARE

## 2025-07-11 DIAGNOSIS — Z98.1 S/P CERVICAL SPINAL FUSION: ICD-10-CM

## 2025-07-11 PROCEDURE — 72125 CT NECK SPINE W/O DYE: CPT

## 2025-07-25 ENCOUNTER — OFFICE VISIT (OUTPATIENT)
Dept: NEUROSURGERY | Age: 70
End: 2025-07-25
Payer: MEDICARE

## 2025-07-25 VITALS
HEART RATE: 70 BPM | BODY MASS INDEX: 29.39 KG/M2 | OXYGEN SATURATION: 94 % | HEIGHT: 72 IN | WEIGHT: 217 LBS | RESPIRATION RATE: 18 BRPM

## 2025-07-25 DIAGNOSIS — Z98.1 S/P SPINAL FUSION: Primary | ICD-10-CM

## 2025-07-25 NOTE — PROGRESS NOTES
"Subjective   History of Present Illness: Jeff Bernard is a 70 y.o. male is here today for follow-up. Today patient reports he is doing great with no pain.      Chief Complaint   Patient presents with    Follow-up          Previous treatment:   Muscle Relaxants, Post-op PT, Home exercise program, Narcotic's, Ice, and Rest    Previous neurosurgery:  4/9/2025- C5-C6 ACDF    Previous injections:     The following portions of the patient's history were reviewed and updated as appropriate: allergies, current medications, past family history, past medical history, past social history, past surgical history, and problem list.    Review of Systems   Constitutional:  Positive for activity change.   Respiratory:  Negative for chest tightness and stridor.    Musculoskeletal:  Negative for neck pain.       Objective      Pulse 70   Resp 18   Ht 182.9 cm (72\")   Wt 98.4 kg (217 lb)   SpO2 94%   BMI 29.43 kg/m²    Body mass index is 29.43 kg/m².  Vitals:    07/25/25 1215   PainSc: 0-No pain         Neurological Exam        Assessment & Plan   Independent Review of Radiographic Studies:      I personally reviewed and interpreted the images from the following studies.    CT cervical spine: Hardware intact in good positioning with evidence of developing fusion    Medical Decision Making:      Jeff Benrard is a 70 y.o. male s/p C5-6 ACDF for severe central stenosis and spinal cord compression with cord signal change overall doing well at 3 months postop.  CT scan is reassuring.  Patient should continue to limit bending lifting and twisting for another 3 months but he can begin to slowly ramp up activity.  He should follow-up with [facility] in 3 months to evaluate his progress.  If he is doing well at that point we can lift restrictions and discharge him from clinic      Diagnoses and all orders for this visit:    1. S/P spinal fusion (Primary)      No follow-ups on file.    This patient was examined wearing appropriate personal " protective equipment.                      Dr. Vishal Ghosh IV    07/25/25  12:52 EDT

## (undated) DEVICE — TUBING, SUCTION, 1/4" X 10', STRAIGHT: Brand: MEDLINE

## (undated) DEVICE — SYR LUERLOK 20CC BX/50

## (undated) DEVICE — GLV SURG BIOGEL LTX PF 8

## (undated) DEVICE — TOOL MR8-14MH30 MR8 14CM MATCH 3MM: Brand: MIDAS REX MR8

## (undated) DEVICE — Device: Brand: DEFENDO AIR/WATER/SUCTION AND BIOPSY VALVE

## (undated) DEVICE — DRP MICROSCP LEICA 137X381CM

## (undated) DEVICE — DRAPE,INSTRUMENT,MAGNETIC,10X16: Brand: MEDLINE

## (undated) DEVICE — PIN DISTRACT TI 12MM STRL

## (undated) DEVICE — DRP C/ARMOR

## (undated) DEVICE — KT ORCA ORCAPOD DISP STRL

## (undated) DEVICE — PK NEURO SPINE 40

## (undated) DEVICE — SNAR POLYP SENSATION STDOVL 27 240 BX40

## (undated) DEVICE — SPONGE,NEURO,1"X1",XR,STRL,LF,10/PK: Brand: MEDLINE

## (undated) DEVICE — NEEDLE, QUINCKE, 20GX3.5": Brand: MEDLINE

## (undated) DEVICE — GLV SURG BIOGEL LTX PF 8 1/2

## (undated) DEVICE — LN SMPL CO2 SHTRM SD STREAM W/M LUER

## (undated) DEVICE — Device

## (undated) DEVICE — THE TORRENT IRRIGATION SCOPE CONNECTOR IS USED WITH THE TORRENT IRRIGATION TUBING TO PROVIDE IRRIGATION FLUIDS SUCH AS STERILE WATER DURING GASTROINTESTINAL ENDOSCOPIC PROCEDURES WHEN USED IN CONJUNCTION WITH AN IRRIGATION PUMP (OR ELECTROSURGICAL UNIT).: Brand: TORRENT

## (undated) DEVICE — LIMB HOLDER, WRIST/ANKLE: Brand: DEROYAL

## (undated) DEVICE — SENSR O2 OXIMAX FNGR A/ 18IN NONSTR

## (undated) DEVICE — CANNULA,ADULT,SOFT-TOUCH,7TUBE,SC: Brand: MEDLINE

## (undated) DEVICE — SUT VIC 3/0 SH CR8 18IN J864D

## (undated) DEVICE — DRSNG WND BORDR/ADHS NONADHR/GZ LF 4X4IN STRL

## (undated) DEVICE — DISPOSABLE BIPOLAR CABLE 12FT. (3.6M): Brand: KIRWAN

## (undated) DEVICE — ELECTRD BLD EZ CLN MOD 4IN

## (undated) DEVICE — THE SINGLE USE ETRAP – POLYP TRAP IS USED FOR SUCTION RETRIEVAL OF ENDOSCOPICALLY REMOVED POLYPS.: Brand: ETRAP

## (undated) DEVICE — ADAPT CLN BIOGUARD AIR/H2O DISP

## (undated) DEVICE — CONN TBG Y 5 IN 1 LF STRL

## (undated) DEVICE — COVER,C-ARM,41X74: Brand: MEDLINE

## (undated) DEVICE — CANN O2 ETCO2 FITS ALL CONN CO2 SMPL A/ 7IN DISP LF

## (undated) DEVICE — SYR LL TP 10ML STRL

## (undated) DEVICE — GLV SURG BIOGEL LTX PF 7 1/2

## (undated) DEVICE — SHEET, DRAPE, SPLIT, STERILE: Brand: MEDLINE